# Patient Record
Sex: FEMALE | Race: WHITE | NOT HISPANIC OR LATINO | URBAN - METROPOLITAN AREA
[De-identification: names, ages, dates, MRNs, and addresses within clinical notes are randomized per-mention and may not be internally consistent; named-entity substitution may affect disease eponyms.]

---

## 2020-10-11 ENCOUNTER — INPATIENT (INPATIENT)
Facility: HOSPITAL | Age: 25
LOS: 39 days | Discharge: HOME | End: 2020-11-20
Attending: PSYCHIATRY & NEUROLOGY | Admitting: PSYCHIATRY & NEUROLOGY
Payer: COMMERCIAL

## 2020-10-11 VITALS
TEMPERATURE: 98 F | HEART RATE: 106 BPM | DIASTOLIC BLOOD PRESSURE: 80 MMHG | RESPIRATION RATE: 16 BRPM | OXYGEN SATURATION: 100 % | SYSTOLIC BLOOD PRESSURE: 128 MMHG

## 2020-10-11 DIAGNOSIS — F25.9 SCHIZOAFFECTIVE DISORDER, UNSPECIFIED: ICD-10-CM

## 2020-10-11 LAB
ALBUMIN SERPL ELPH-MCNC: 4.9 G/DL — SIGNIFICANT CHANGE UP (ref 3.3–5)
ALP SERPL-CCNC: 48 U/L — SIGNIFICANT CHANGE UP (ref 40–120)
ALT FLD-CCNC: 6 U/L — SIGNIFICANT CHANGE UP (ref 4–33)
AMPHET UR-MCNC: NEGATIVE — SIGNIFICANT CHANGE UP
ANION GAP SERPL CALC-SCNC: 11 MMO/L — SIGNIFICANT CHANGE UP (ref 7–14)
APAP SERPL-MCNC: < 15 UG/ML — LOW (ref 15–25)
APPEARANCE UR: SIGNIFICANT CHANGE UP
AST SERPL-CCNC: 10 U/L — SIGNIFICANT CHANGE UP (ref 4–32)
BACTERIA # UR AUTO: HIGH
BARBITURATES UR SCN-MCNC: NEGATIVE — SIGNIFICANT CHANGE UP
BASOPHILS # BLD AUTO: 0.03 K/UL — SIGNIFICANT CHANGE UP (ref 0–0.2)
BASOPHILS NFR BLD AUTO: 0.3 % — SIGNIFICANT CHANGE UP (ref 0–2)
BENZODIAZ UR-MCNC: NEGATIVE — SIGNIFICANT CHANGE UP
BILIRUB SERPL-MCNC: 0.8 MG/DL — SIGNIFICANT CHANGE UP (ref 0.2–1.2)
BILIRUB UR-MCNC: NEGATIVE — SIGNIFICANT CHANGE UP
BLOOD UR QL VISUAL: NEGATIVE — SIGNIFICANT CHANGE UP
BUN SERPL-MCNC: 10 MG/DL — SIGNIFICANT CHANGE UP (ref 7–23)
CALCIUM SERPL-MCNC: 9.2 MG/DL — SIGNIFICANT CHANGE UP (ref 8.4–10.5)
CANNABINOIDS UR-MCNC: NEGATIVE — SIGNIFICANT CHANGE UP
CHLORIDE SERPL-SCNC: 107 MMOL/L — SIGNIFICANT CHANGE UP (ref 98–107)
CO2 SERPL-SCNC: 24 MMOL/L — SIGNIFICANT CHANGE UP (ref 22–31)
COCAINE METAB.OTHER UR-MCNC: NEGATIVE — SIGNIFICANT CHANGE UP
COD CRY URNS QL: SIGNIFICANT CHANGE UP (ref 0–0)
COLOR SPEC: YELLOW — SIGNIFICANT CHANGE UP
CREAT SERPL-MCNC: 0.61 MG/DL — SIGNIFICANT CHANGE UP (ref 0.5–1.3)
EOSINOPHIL # BLD AUTO: 0.06 K/UL — SIGNIFICANT CHANGE UP (ref 0–0.5)
EOSINOPHIL NFR BLD AUTO: 0.7 % — SIGNIFICANT CHANGE UP (ref 0–6)
ETHANOL BLD-MCNC: < 10 MG/DL — SIGNIFICANT CHANGE UP
GLUCOSE SERPL-MCNC: 103 MG/DL — HIGH (ref 70–99)
GLUCOSE UR-MCNC: NEGATIVE — SIGNIFICANT CHANGE UP
HCT VFR BLD CALC: 41.8 % — SIGNIFICANT CHANGE UP (ref 34.5–45)
HGB BLD-MCNC: 13.4 G/DL — SIGNIFICANT CHANGE UP (ref 11.5–15.5)
IMM GRANULOCYTES NFR BLD AUTO: 0.2 % — SIGNIFICANT CHANGE UP (ref 0–1.5)
KETONES UR-MCNC: SIGNIFICANT CHANGE UP
LEUKOCYTE ESTERASE UR-ACNC: NEGATIVE — SIGNIFICANT CHANGE UP
LYMPHOCYTES # BLD AUTO: 1.39 K/UL — SIGNIFICANT CHANGE UP (ref 1–3.3)
LYMPHOCYTES # BLD AUTO: 15.6 % — SIGNIFICANT CHANGE UP (ref 13–44)
MCHC RBC-ENTMCNC: 26.9 PG — LOW (ref 27–34)
MCHC RBC-ENTMCNC: 32.1 % — SIGNIFICANT CHANGE UP (ref 32–36)
MCV RBC AUTO: 83.9 FL — SIGNIFICANT CHANGE UP (ref 80–100)
METHADONE UR-MCNC: NEGATIVE — SIGNIFICANT CHANGE UP
MONOCYTES # BLD AUTO: 1.08 K/UL — HIGH (ref 0–0.9)
MONOCYTES NFR BLD AUTO: 12.1 % — SIGNIFICANT CHANGE UP (ref 2–14)
NEUTROPHILS # BLD AUTO: 6.35 K/UL — SIGNIFICANT CHANGE UP (ref 1.8–7.4)
NEUTROPHILS NFR BLD AUTO: 71.1 % — SIGNIFICANT CHANGE UP (ref 43–77)
NITRITE UR-MCNC: NEGATIVE — SIGNIFICANT CHANGE UP
NRBC # FLD: 0 K/UL — SIGNIFICANT CHANGE UP (ref 0–0)
OPIATES UR-MCNC: NEGATIVE — SIGNIFICANT CHANGE UP
OXYCODONE UR-MCNC: NEGATIVE — SIGNIFICANT CHANGE UP
PCP UR-MCNC: NEGATIVE — SIGNIFICANT CHANGE UP
PH UR: 6.5 — SIGNIFICANT CHANGE UP (ref 5–8)
PLATELET # BLD AUTO: 238 K/UL — SIGNIFICANT CHANGE UP (ref 150–400)
PMV BLD: 10.8 FL — SIGNIFICANT CHANGE UP (ref 7–13)
POTASSIUM SERPL-MCNC: 3.4 MMOL/L — LOW (ref 3.5–5.3)
POTASSIUM SERPL-SCNC: 3.4 MMOL/L — LOW (ref 3.5–5.3)
PROT SERPL-MCNC: 7 G/DL — SIGNIFICANT CHANGE UP (ref 6–8.3)
PROT UR-MCNC: 30 — SIGNIFICANT CHANGE UP
RBC # BLD: 4.98 M/UL — SIGNIFICANT CHANGE UP (ref 3.8–5.2)
RBC # FLD: 14.7 % — HIGH (ref 10.3–14.5)
RBC CASTS # UR COMP ASSIST: SIGNIFICANT CHANGE UP (ref 0–?)
SALICYLATES SERPL-MCNC: < 5 MG/DL — LOW (ref 15–30)
SODIUM SERPL-SCNC: 142 MMOL/L — SIGNIFICANT CHANGE UP (ref 135–145)
SP GR SPEC: 1.03 — SIGNIFICANT CHANGE UP (ref 1–1.04)
SQUAMOUS # UR AUTO: SIGNIFICANT CHANGE UP
TSH SERPL-MCNC: 1.25 UIU/ML — SIGNIFICANT CHANGE UP (ref 0.27–4.2)
UROBILINOGEN FLD QL: HIGH
WBC # BLD: 8.93 K/UL — SIGNIFICANT CHANGE UP (ref 3.8–10.5)
WBC # FLD AUTO: 8.93 K/UL — SIGNIFICANT CHANGE UP (ref 3.8–10.5)
WBC UR QL: SIGNIFICANT CHANGE UP (ref 0–?)

## 2020-10-11 PROCEDURE — 99285 EMERGENCY DEPT VISIT HI MDM: CPT | Mod: GC

## 2020-10-11 RX ORDER — HALOPERIDOL DECANOATE 100 MG/ML
5 INJECTION INTRAMUSCULAR ONCE
Refills: 0 | Status: COMPLETED | OUTPATIENT
Start: 2020-10-11 | End: 2020-10-11

## 2020-10-11 RX ORDER — RISPERIDONE 4 MG/1
1 TABLET ORAL
Refills: 0 | Status: DISCONTINUED | OUTPATIENT
Start: 2020-10-12 | End: 2020-10-16

## 2020-10-11 RX ORDER — HALOPERIDOL DECANOATE 100 MG/ML
5 INJECTION INTRAMUSCULAR EVERY 6 HOURS
Refills: 0 | Status: DISCONTINUED | OUTPATIENT
Start: 2020-10-12 | End: 2020-10-14

## 2020-10-11 RX ORDER — HALOPERIDOL DECANOATE 100 MG/ML
5 INJECTION INTRAMUSCULAR ONCE
Refills: 0 | Status: DISCONTINUED | OUTPATIENT
Start: 2020-10-12 | End: 2020-10-14

## 2020-10-11 RX ORDER — BENZTROPINE MESYLATE 1 MG
1 TABLET ORAL AT BEDTIME
Refills: 0 | Status: DISCONTINUED | OUTPATIENT
Start: 2020-10-12 | End: 2020-11-05

## 2020-10-11 RX ADMIN — HALOPERIDOL DECANOATE 5 MILLIGRAM(S): 100 INJECTION INTRAMUSCULAR at 19:39

## 2020-10-11 RX ADMIN — Medication 2 MILLIGRAM(S): at 19:39

## 2020-10-11 NOTE — ED BEHAVIORAL HEALTH ASSESSMENT NOTE - PSYCHIATRIC ISSUES AND PLAN (INCLUDE STANDING AND PRN MEDICATION)
Psychosis and hyperactivity. Start Risperdal 1mg BID, Ativan 1mg BID, Cogentin 1mg QHS. PRNS: Haldol 5mg, Ativan 2mg po/IM q6H for agitation/severe agitation.

## 2020-10-11 NOTE — ED BEHAVIORAL HEALTH ASSESSMENT NOTE - CASE SUMMARY
Patient is 26yo female, unemployed, single, not in school, domiciled with mom, PPH of schizoaffective disorder with symptom onset recently in December 2019, 2 past inpatient psych hospitalizations, recently was enrolled in On-Track program at [facility] but dropped out, who presented to [facility] ED BIB mom for worsening psychosis, talking to self. Upon evaluation today, patient is floridly psychotic, talking to multiple people in her head, at times cackling loudly to herself. Pt required PRN Haldol 5mg IM and Ativan 2mg IM for acute psychosis and agitation while in the ED with positive effect. Given her acute decompensation secondary to med non-adherence, patient at this time requires psych hospitalization for stabilization and will be admitted on a 9.39 status. Pt will be started on Risperdal 1mg po bid for psychosis and cogentin 1mg po qhs. Pt also will be given Ativan 1mg po bid for psychotic agitation with a plan to taper down. No CO needed at this time as patient is redirectable and has no hx of elopement/violence/suicidal ideation.

## 2020-10-11 NOTE — ED PROVIDER NOTE - OBJECTIVE STATEMENT
26 y/o  F   hx  Schizophrenia BIB mother   secondary to increase paranoia and bizarre behaviour  . Patient appears paranoid, expressing a flight of ideas.   Admit to hearing voices telling her bad things.  Denies falling, punching or kicking any objects. Denies pain, SOB, fever, chills, chest/abdominal discomfort .Denies SI/HI/VH. Denies  recent use of  illicit drug. No evidence of physical ,   injuries, broken  skin or deformities.

## 2020-10-11 NOTE — ED BEHAVIORAL HEALTH NOTE - BEHAVIORAL HEALTH NOTE
COVID Exposure Screen- Collateral (i.e. third-party, chart review, belongings, etc; include EMS and ED staff)    PER PATIENT'S MOM GWEN ([phone])    1.	*In the past 14 days, has the patient been around anyone with a positive COVID-19 test?*   (  ) Yes   ( X ) No   (  ) Unknown- Reason (e.g. collateral uncertain, refusing to answer, etc.):  ______  IF YES PROCEED TO QUESTION #2. IF NO or UNKNOWN, PLEASE SKIP TO QUESTION #7  2.	Was the patient within 6 feet of them for at least 15 minutes? (  ) Yes   (  ) No   (  ) Unknown- Reason: ______    3.	Did the patient provided care for them? (  ) Yes   (  ) No   (  ) Unknown- Reason: ______    4.	Did the patient have direct physical contact with them (touched, hugged, or kissed them)?   (  ) Yes   (  ) No    (  ) Unknown- Reason: ______    5.	Did the patient share eating or drinking utensils with them? (  ) Yes   (  ) No    (  ) Unknown- Reason: ______    6.	Have they sneezed, coughed, or somehow got respiratory droplets on the patient? (  ) Yes   (  ) No    (  ) Unknown- Reason: ______      7.	*Has the patient been out of [place] within the past 14 days?*  (  ) Yes   (X  ) No   (  ) Unknown- Reason (e.g. collateral uncertain, refusing to answer, etc.): _______  IF YES PLEASE ANSWER THE FOLLOWING QUESTIONS:  8.	Which state/country has the patient been to? ______   9.	Was the patient there over 24 hours? (  ) Yes   (  ) No    (  ) Unknown- Reason: ______    10.	What date did the patient return to [facility]? ______     Lesa Morales M.D.   PGY-3 Psychiatry Resident

## 2020-10-11 NOTE — ED PROVIDER NOTE - PROGRESS NOTE DETAILS
DD ED ATTG:  rec'd signout from ELVIE Delmer:  25F paranoid, talking to her self, command auditory hallucination, rx/ed 5/2/50, agitated.  TBA L4 awaiting covid.  I was asked to put in the disposition when the covid resulted.  Covid negative.  OK for admission to L4 under Dr MARSHALL Montana.

## 2020-10-11 NOTE — ED BEHAVIORAL HEALTH ASSESSMENT NOTE - DIFFERENTIAL
1. Schizoaffective Disorder 1. Schizoaffective Disorder vs. schizophrenia vs. bipolar 1 disorder with psychotic symptoms

## 2020-10-11 NOTE — ED BEHAVIORAL HEALTH ASSESSMENT NOTE - RISK ASSESSMENT
Risk factors: history of schizoaffective disorder; patient is currently floridly psychotic with AH and responding to internal stimuli. Also with 2.5 weeks of med non-compliance. Protective factors: patient not voicing SI/HI, no history of past SAs, domiciled with mom., no access to guns. Due to patient's level of disorganization/bizarre behavior and active psychosis, she cannot care for herself and requires inpatient psychiatric hospitalization for stabilization. Low Acute Suicide Risk

## 2020-10-11 NOTE — ED ADULT NURSE NOTE - OBJECTIVE STATEMENT
Break Coverage - Patient received into low acuity brought by mother for psych evaluation.  Per triage note mother reports patient is talking to herself, hallucinating.  Patient denies any complaints initially, states she is here because her mother brought her, not answering further questions to RN. Patient received crying then intermittently laughing and cursing to herself "Carmen shut the f*** up you are a stupid c*nt".  Patient responds to verbal redirection at this time.  Awaiting collateral information from family and psych evaluation.

## 2020-10-11 NOTE — ED PROVIDER NOTE - CLINICAL SUMMARY MEDICAL DECISION MAKING FREE TEXT BOX
Labs, Urine Tox/UA, EKG    Medical evaluation performed. There is no clinical evidence of intoxication or any acute medical problem requiring immediate intervention. Patient is awaiting psychiatric consultation. Final disposition will be determined by psychiatrist. 26 y/o  F   hx  Schizophrenia   Labs, Urine Tox/UA, EKG    Medical evaluation performed. There is no clinical evidence of intoxication or any acute medical problem requiring immediate intervention. Patient is awaiting psychiatric consultation. Final disposition will be determined by psychiatrist.

## 2020-10-11 NOTE — ED ADULT NURSE REASSESSMENT NOTE - NS ED NURSE REASSESS COMMENT FT1
Patient increasingly agitated, talking to herself in 3rd person reference, calling herself different curses and names.  Cooperative to instruction.  Psych NP Andrew notified to patient behavior, brought to low acuity psych unit to observe.  Labs drawn, covid pcr collected and urine specimens sent as ordered   Psychiatry evaluation ongoing. Patient increasingly agitated, talking to herself in 3rd person reference, calling herself different curses and names.  Cooperative to instruction.  Psych NP Andrew notified to patient behavior, brought to low acuity psych unit to observe.  Labs drawn, covid pcr collected and urine specimens sent as ordered  Patient brought to high acuity  unit for escalated behavior, property secured and placed in locker Psychiatry evaluation ongoing.

## 2020-10-11 NOTE — ED ADULT NURSE NOTE - NSIMPLEMENTINTERV_GEN_ALL_ED
Implemented All Fall Risk Interventions:  [place] to call system. Call bell, personal items and telephone within reach. Instruct patient to call for assistance. Room bathroom lighting operational. Non-slip footwear when patient is off stretcher. Physically safe environment: no spills, clutter or unnecessary equipment. Stretcher in lowest position, wheels locked, appropriate side rails in place. Provide visual cue, wrist band, yellow gown, etc. Monitor gait and stability. Monitor for mental status changes and reorient to person, place, and time. Review medications for side effects contributing to fall risk. Reinforce activity limits and safety measures with patient and family.

## 2020-10-11 NOTE — ED ADULT TRIAGE NOTE - CHIEF COMPLAINT QUOTE
mom states " she is talking to her self and people in her head." patient states my mom brought me here. and not answering any questions.

## 2020-10-11 NOTE — ED BEHAVIORAL HEALTH ASSESSMENT NOTE - DESCRIPTION
In the ED, patient appeared quite bizarre, cackling loudly to self, talking to self in high-pitched, off-putting voice. Patient at times whispering/muttering to self, at times shouting, at times doubled over in bed laughing. She required PO PRN Haldol 5mg, Ativan 2mg for agitation. No physical aggression.     Vital Signs Last 24 Hrs  T(C): 36.9 (11 Oct 2020 18:13), Max: 36.9 (11 Oct 2020 18:13)  T(F): 98.5 (11 Oct 2020 18:13), Max: 98.5 (11 Oct 2020 18:13)  HR: 106 (11 Oct 2020 18:13) (106 - 106)  BP: 128/80 (11 Oct 2020 18:13) (128/80 - 128/80)  BP(mean): --  RR: 16 (11 Oct 2020 18:13) (16 - 16)  SpO2: 100% (11 Oct 2020 18:13) (100% - 100%) In the ED, patient appeared quite bizarre, cackling loudly to self, talking to self in high-pitched, off-putting voice. Patient at times whispering/muttering to self, at times shouting, at times doubled over in bed laughing. She required IM PRN Haldol 5mg, Ativan 2mg for agitation. No physical aggression.     Vital Signs Last 24 Hrs  T(C): 36.9 (11 Oct 2020 18:13), Max: 36.9 (11 Oct 2020 18:13)  T(F): 98.5 (11 Oct 2020 18:13), Max: 98.5 (11 Oct 2020 18:13)  HR: 106 (11 Oct 2020 18:13) (106 - 106)  BP: 128/80 (11 Oct 2020 18:13) (128/80 - 128/80)  BP(mean): --  RR: 16 (11 Oct 2020 18:13) (16 - 16)  SpO2: 100% (11 Oct 2020 18:13) (100% - 100%) none lives with mom, not a student, single, currently unemployed (previously worked at [employer])

## 2020-10-11 NOTE — ED BEHAVIORAL HEALTH ASSESSMENT NOTE - SUMMARY
Patient is 24yo female, unemployed, single, not in school, domiciled with mom, PPH of schizoaffective disorder with symptom onset recently in December 2019, 2 past inpatient psych hospitalizations, recently was enrolled in On-Track program at [facility] but dropped out, who presented to [facility] ED BIB mom for worsening psychosis, talking to self. Upon evaluation today, patient is floridly psychotic, talking to multiple people in her head, at times cackling loudly to herself. She is unable to hold linear conversation. Per mom, patient has been non-compliant with Haldol and Ativan for 2.5 weeks, over which time she has become increasingly psychotic. Per mom, no known past SAs and no known current SI/HI.     Plan:   1. Patient requires admission to inpatient psychiatric hospital due to inability to care for self in the context of severe psychosis. Will admit to [facility] L4. (Pending negative covid testing). Legal: 9.39. Routine obs, as patient not reporting SI/HI and no history of past SAs.   2. Patient has failed Abilify and Haldol in the past. Will start patient on Risperdal 1mg BID and Ativan 1mg BID, with Cogentin 1mg QHS (patient has history of tremor from Haldol in past, took Cogentin in past).   3. Will start PRN Haldol 5mg, Ativan 2mg po/IM q6H for agitation/severe agitation.   4. Spoke to patient's mom Dank and informed her of dispo

## 2020-10-11 NOTE — ED BEHAVIORAL HEALTH ASSESSMENT NOTE - DETAILS
Unable to assess for SI in patient, although she does not report SI/HI. Per mom, patient with history of voicing SI in past but no past SAs. Haldol: tremor provided verbal handoff to JOEY Brown rosita Morales

## 2020-10-11 NOTE — ED PROVIDER NOTE - PMH
No pertinent past medical history <<----- Click to add NO pertinent Past Medical History Schizophrenia

## 2020-10-11 NOTE — ED BEHAVIORAL HEALTH ASSESSMENT NOTE - ADDITIONAL DETAILS ALL
Per mom, patient has history of SIB by cutting in past, none recent. Also with history of voicing vague SI, but no history of past SAs and no history of active suicidal intent or plan, to mom's knowledge. Patient is unable to tolerate interview at this time.

## 2020-10-11 NOTE — ED BEHAVIORAL HEALTH ASSESSMENT NOTE - COMMENTS ON SUICIDE RISK/PROTECTIVE FACTORS:
Risk factors: history of schizoaffective disorder; patient is currently floridly psychotic with AH and responding to internal stimuli. Also with 2.5 weeks of med non-compliance. Protective factors: patient not voicing SI/HI, no history of past SAs, domiciled with mom., no access to guns.

## 2020-10-11 NOTE — ED BEHAVIORAL HEALTH ASSESSMENT NOTE - OTHER PAST PSYCHIATRIC HISTORY (INCLUDE DETAILS REGARDING ONSET, COURSE OF ILLNESS, INPATIENT/OUTPATIENT TREATMENT)
history of schizoaffective disorder which first presented in December 2019. 2 past inpatient psych hosps. Failed Abilify trial in past. Was maintained on Haldol 10mg and Ativan 2mg until became non-compliant 2.5 weeks ago. Per mom, patient did not do well on Haldol, became overly sedated. No past SAs. Hx of SIB by cutting in past.

## 2020-10-11 NOTE — ED BEHAVIORAL HEALTH ASSESSMENT NOTE - UNABLE TO CARE FOR SELF DETAILS
patient floridly psychotic, disorganized, illogical, talking loudly to herself, having multiple conversations with self

## 2020-10-11 NOTE — ED BEHAVIORAL HEALTH ASSESSMENT NOTE - HPI (INCLUDE ILLNESS QUALITY, SEVERITY, DURATION, TIMING, CONTEXT, MODIFYING FACTORS, ASSOCIATED SIGNS AND SYMPTOMS)
Patient is 26yo female, unemployed, single, not in school, domiciled with mom, PPH of schizoaffective disorder with symptom onset recently in December 2019, 2 past inpatient psych hospitalizations, recently was enrolled in On-Track program at [facility] but dropped out, who presented to [facility] ED BIB mom for worsening psychosis, talking to self.     Upon interview this evening, patient presents as floridly psychotic, says "This is all Dru's fault!" and then proceeds to have conversation with multiple people, as if playing multiple characters in a play. She says "Carmen why would you say that? Oh god damn it this b**** is so smart. I wanna be tazed b****. Carmen, we have a confession - we really know you don't give a f***, you might be adopted, all of you in that room are pedophiles..." When writer asks if patient can have a conversation, patient says loudly "WE ARE" and then begins laughing uncontrollably, alternating between talking to herself and cackling in high-pitched, off-putting way. Interview limited by patient's inability to hold conversation.    Obtained collateral from patient's mom Dank ([phone]). Per mom, patient began to have psychotic symptoms in December 2019; at that time, she presented with paranoid, persecutory delusions. She has had two inpatient psych hospitalizations, most recently at [facility]. In August of this year, patient also presented to [facility] with psychotic symptoms but was not able to be admitted to inpatient psychiatric hospital because she had gone to [place] for one day and needed to be quarantined. Patient was discharged home to mom at that time. In addition, patient was recently enrolled in On-Track program at [facility] and given a diagnosis of Schizoaffective Disorder. She has been compliant with a regimen of Haldol 10mg, Ativan 2mg until 2.5 weeks ago when she became non-compliant. Patient has been progressively more psychotic since auto-discontinuing meds; she has been talking to herself at home (having conversations with multiple people in her head), preoccupied with refuting accusation that she is a lesbian, preoccupied with her previous job working as a  at [employer] over 1 year ago, at which she perceives to have been bullied by coworkers.     Regarding safety, mom is not aware of any past suicide attempts by patient, although she has made vague statements of SI in the past. She has a history of SIB by cutting, but none recently. No guns in the home.     Substance: mom reports that patient has a history of cannabis use. Mom is not aware of any other drug use.     Past med trials: mom reports that in the past, patient has been tried on Abilify (did not help with psychosis). Most recently, she has been prescribed Haldol 10mg QHS and Ativan 2mg. She has also been prescribed Cogentin in the past for tremor 2/2 Haldol. Mom says that Haldol has not had good effect on patient's behavior; she appeared overly sedated and "like a zombie," and still with residual psychotic symptoms.

## 2020-10-12 DIAGNOSIS — F25.9 SCHIZOAFFECTIVE DISORDER, UNSPECIFIED: ICD-10-CM

## 2020-10-12 LAB
HBA1C BLD-MCNC: 4.2 % — SIGNIFICANT CHANGE UP (ref 4–5.6)
HCG SERPL-ACNC: < 5 MIU/ML — SIGNIFICANT CHANGE UP
SARS-COV-2 IGG SERPL QL IA: NEGATIVE — SIGNIFICANT CHANGE UP
SARS-COV-2 IGM SERPL IA-ACNC: <3.8 AU/ML — SIGNIFICANT CHANGE UP
SARS-COV-2 RNA SPEC QL NAA+PROBE: SIGNIFICANT CHANGE UP

## 2020-10-12 PROCEDURE — 99222 1ST HOSP IP/OBS MODERATE 55: CPT

## 2020-10-12 RX ADMIN — Medication 1 MILLIGRAM(S): at 20:58

## 2020-10-12 RX ADMIN — Medication 2 MILLIGRAM(S): at 03:30

## 2020-10-12 RX ADMIN — HALOPERIDOL DECANOATE 5 MILLIGRAM(S): 100 INJECTION INTRAMUSCULAR at 20:58

## 2020-10-12 RX ADMIN — RISPERIDONE 1 MILLIGRAM(S): 4 TABLET ORAL at 20:58

## 2020-10-12 RX ADMIN — HALOPERIDOL DECANOATE 5 MILLIGRAM(S): 100 INJECTION INTRAMUSCULAR at 03:30

## 2020-10-12 RX ADMIN — Medication 1 MILLIGRAM(S): at 09:06

## 2020-10-12 RX ADMIN — RISPERIDONE 1 MILLIGRAM(S): 4 TABLET ORAL at 09:06

## 2020-10-12 NOTE — ED ADULT NURSE REASSESSMENT NOTE - NS ED NURSE REASSESS COMMENT FT1
Pt sleeping, respirations even and non labored. Pt awaiting covid-19 results and medical clearance prior to xfer to inpatient psychiatric bed.

## 2020-10-13 LAB
CHOLEST SERPL-MCNC: 115 MG/DL — LOW (ref 120–199)
HDLC SERPL-MCNC: 49 MG/DL — SIGNIFICANT CHANGE UP (ref 45–65)
LIPID PNL WITH DIRECT LDL SERPL: 72 MG/DL — SIGNIFICANT CHANGE UP
TRIGL SERPL-MCNC: 41 MG/DL — SIGNIFICANT CHANGE UP (ref 10–149)

## 2020-10-13 PROCEDURE — 99232 SBSQ HOSP IP/OBS MODERATE 35: CPT

## 2020-10-13 RX ORDER — TRAZODONE HCL 50 MG
50 TABLET ORAL AT BEDTIME
Refills: 0 | Status: DISCONTINUED | OUTPATIENT
Start: 2020-10-13 | End: 2020-10-14

## 2020-10-13 RX ORDER — LITHIUM CARBONATE 300 MG/1
300 TABLET, EXTENDED RELEASE ORAL
Refills: 0 | Status: DISCONTINUED | OUTPATIENT
Start: 2020-10-13 | End: 2020-10-15

## 2020-10-13 RX ADMIN — LITHIUM CARBONATE 300 MILLIGRAM(S): 300 TABLET, EXTENDED RELEASE ORAL at 20:34

## 2020-10-13 RX ADMIN — Medication 50 MILLIGRAM(S): at 20:34

## 2020-10-13 RX ADMIN — Medication 1 MILLIGRAM(S): at 08:23

## 2020-10-13 RX ADMIN — RISPERIDONE 1 MILLIGRAM(S): 4 TABLET ORAL at 20:34

## 2020-10-13 RX ADMIN — Medication 1 MILLIGRAM(S): at 20:34

## 2020-10-13 RX ADMIN — RISPERIDONE 1 MILLIGRAM(S): 4 TABLET ORAL at 08:23

## 2020-10-13 RX ADMIN — HALOPERIDOL DECANOATE 5 MILLIGRAM(S): 100 INJECTION INTRAMUSCULAR at 20:34

## 2020-10-14 PROCEDURE — 99232 SBSQ HOSP IP/OBS MODERATE 35: CPT

## 2020-10-14 RX ORDER — OLANZAPINE 15 MG/1
7.5 TABLET, FILM COATED ORAL EVERY 12 HOURS
Refills: 0 | Status: DISCONTINUED | OUTPATIENT
Start: 2020-10-14 | End: 2020-10-14

## 2020-10-14 RX ORDER — OLANZAPINE 15 MG/1
10 TABLET, FILM COATED ORAL EVERY 8 HOURS
Refills: 0 | Status: DISCONTINUED | OUTPATIENT
Start: 2020-10-14 | End: 2020-11-20

## 2020-10-14 RX ORDER — TRAZODONE HCL 50 MG
100 TABLET ORAL AT BEDTIME
Refills: 0 | Status: DISCONTINUED | OUTPATIENT
Start: 2020-10-14 | End: 2020-10-23

## 2020-10-14 RX ORDER — CLONAZEPAM 1 MG
1 TABLET ORAL
Refills: 0 | Status: DISCONTINUED | OUTPATIENT
Start: 2020-10-14 | End: 2020-10-19

## 2020-10-14 RX ORDER — OLANZAPINE 15 MG/1
7.5 TABLET, FILM COATED ORAL ONCE
Refills: 0 | Status: DISCONTINUED | OUTPATIENT
Start: 2020-10-14 | End: 2020-11-20

## 2020-10-14 RX ADMIN — LITHIUM CARBONATE 300 MILLIGRAM(S): 300 TABLET, EXTENDED RELEASE ORAL at 08:13

## 2020-10-14 RX ADMIN — RISPERIDONE 1 MILLIGRAM(S): 4 TABLET ORAL at 08:14

## 2020-10-14 RX ADMIN — Medication 1 MILLIGRAM(S): at 20:15

## 2020-10-14 RX ADMIN — Medication 1 MILLIGRAM(S): at 08:14

## 2020-10-14 RX ADMIN — RISPERIDONE 1 MILLIGRAM(S): 4 TABLET ORAL at 20:15

## 2020-10-14 RX ADMIN — LITHIUM CARBONATE 300 MILLIGRAM(S): 300 TABLET, EXTENDED RELEASE ORAL at 20:15

## 2020-10-14 RX ADMIN — Medication 100 MILLIGRAM(S): at 20:15

## 2020-10-15 PROCEDURE — 99232 SBSQ HOSP IP/OBS MODERATE 35: CPT

## 2020-10-15 RX ORDER — LITHIUM CARBONATE 300 MG/1
600 TABLET, EXTENDED RELEASE ORAL AT BEDTIME
Refills: 0 | Status: DISCONTINUED | OUTPATIENT
Start: 2020-10-15 | End: 2020-10-23

## 2020-10-15 RX ORDER — LITHIUM CARBONATE 300 MG/1
300 TABLET, EXTENDED RELEASE ORAL DAILY
Refills: 0 | Status: DISCONTINUED | OUTPATIENT
Start: 2020-10-15 | End: 2020-10-23

## 2020-10-15 RX ORDER — LANOLIN ALCOHOL/MO/W.PET/CERES
5 CREAM (GRAM) TOPICAL AT BEDTIME
Refills: 0 | Status: DISCONTINUED | OUTPATIENT
Start: 2020-10-15 | End: 2020-11-20

## 2020-10-15 RX ADMIN — Medication 1 MILLIGRAM(S): at 20:21

## 2020-10-15 RX ADMIN — LITHIUM CARBONATE 600 MILLIGRAM(S): 300 TABLET, EXTENDED RELEASE ORAL at 20:21

## 2020-10-15 RX ADMIN — Medication 100 MILLIGRAM(S): at 20:21

## 2020-10-15 RX ADMIN — LITHIUM CARBONATE 300 MILLIGRAM(S): 300 TABLET, EXTENDED RELEASE ORAL at 11:16

## 2020-10-15 RX ADMIN — RISPERIDONE 1 MILLIGRAM(S): 4 TABLET ORAL at 11:16

## 2020-10-15 RX ADMIN — RISPERIDONE 1 MILLIGRAM(S): 4 TABLET ORAL at 20:21

## 2020-10-15 RX ADMIN — Medication 1 MILLIGRAM(S): at 11:16

## 2020-10-16 PROCEDURE — 99232 SBSQ HOSP IP/OBS MODERATE 35: CPT

## 2020-10-16 RX ORDER — RISPERIDONE 4 MG/1
2 TABLET ORAL AT BEDTIME
Refills: 0 | Status: DISCONTINUED | OUTPATIENT
Start: 2020-10-16 | End: 2020-10-20

## 2020-10-16 RX ORDER — RISPERIDONE 4 MG/1
1 TABLET ORAL DAILY
Refills: 0 | Status: DISCONTINUED | OUTPATIENT
Start: 2020-10-17 | End: 2020-10-28

## 2020-10-16 RX ADMIN — RISPERIDONE 2 MILLIGRAM(S): 4 TABLET ORAL at 21:16

## 2020-10-16 RX ADMIN — Medication 5 MILLIGRAM(S): at 00:08

## 2020-10-16 RX ADMIN — LITHIUM CARBONATE 600 MILLIGRAM(S): 300 TABLET, EXTENDED RELEASE ORAL at 21:16

## 2020-10-16 RX ADMIN — Medication 100 MILLIGRAM(S): at 21:16

## 2020-10-16 RX ADMIN — LITHIUM CARBONATE 300 MILLIGRAM(S): 300 TABLET, EXTENDED RELEASE ORAL at 09:44

## 2020-10-16 RX ADMIN — Medication 1 MILLIGRAM(S): at 21:16

## 2020-10-16 RX ADMIN — RISPERIDONE 1 MILLIGRAM(S): 4 TABLET ORAL at 09:45

## 2020-10-16 RX ADMIN — Medication 1 MILLIGRAM(S): at 09:44

## 2020-10-17 PROCEDURE — 99232 SBSQ HOSP IP/OBS MODERATE 35: CPT

## 2020-10-17 RX ADMIN — Medication 1 MILLIGRAM(S): at 10:14

## 2020-10-17 RX ADMIN — Medication 1 MILLIGRAM(S): at 20:59

## 2020-10-17 RX ADMIN — LITHIUM CARBONATE 600 MILLIGRAM(S): 300 TABLET, EXTENDED RELEASE ORAL at 20:59

## 2020-10-17 RX ADMIN — Medication 5 MILLIGRAM(S): at 21:15

## 2020-10-17 RX ADMIN — Medication 100 MILLIGRAM(S): at 20:59

## 2020-10-17 RX ADMIN — RISPERIDONE 1 MILLIGRAM(S): 4 TABLET ORAL at 10:14

## 2020-10-17 RX ADMIN — RISPERIDONE 2 MILLIGRAM(S): 4 TABLET ORAL at 20:59

## 2020-10-17 RX ADMIN — Medication 2 MILLIGRAM(S): at 01:55

## 2020-10-17 RX ADMIN — OLANZAPINE 10 MILLIGRAM(S): 15 TABLET, FILM COATED ORAL at 01:55

## 2020-10-17 RX ADMIN — LITHIUM CARBONATE 300 MILLIGRAM(S): 300 TABLET, EXTENDED RELEASE ORAL at 10:14

## 2020-10-18 RX ORDER — ACETAMINOPHEN 500 MG
650 TABLET ORAL ONCE
Refills: 0 | Status: DISCONTINUED | OUTPATIENT
Start: 2020-10-18 | End: 2020-11-05

## 2020-10-18 RX ORDER — IBUPROFEN 200 MG
600 TABLET ORAL ONCE
Refills: 0 | Status: DISCONTINUED | OUTPATIENT
Start: 2020-10-18 | End: 2020-10-18

## 2020-10-18 RX ADMIN — Medication 5 MILLIGRAM(S): at 20:11

## 2020-10-18 RX ADMIN — Medication 100 MILLIGRAM(S): at 20:10

## 2020-10-18 RX ADMIN — RISPERIDONE 2 MILLIGRAM(S): 4 TABLET ORAL at 20:10

## 2020-10-18 RX ADMIN — Medication 1 MILLIGRAM(S): at 08:23

## 2020-10-18 RX ADMIN — RISPERIDONE 1 MILLIGRAM(S): 4 TABLET ORAL at 08:23

## 2020-10-18 RX ADMIN — LITHIUM CARBONATE 300 MILLIGRAM(S): 300 TABLET, EXTENDED RELEASE ORAL at 08:23

## 2020-10-18 RX ADMIN — Medication 1 MILLIGRAM(S): at 20:10

## 2020-10-18 RX ADMIN — LITHIUM CARBONATE 600 MILLIGRAM(S): 300 TABLET, EXTENDED RELEASE ORAL at 20:10

## 2020-10-18 NOTE — PHARMACOTHERAPY INTERVENTION NOTE - COMMENTS
Patient ordered for ibuprofen 600 mG x1 dose for headache. Patient also on lithium 300 mG daily and lithium 600 mG at bedtime. Called MD and recommended using acetaminophen due to interaction between ibuprofen and lithium. MD discontinued ibuprofen and ordered acetaminophen 650 mG x1 dose.

## 2020-10-19 LAB
ALBUMIN SERPL ELPH-MCNC: 4.2 G/DL — SIGNIFICANT CHANGE UP (ref 3.3–5)
ALP SERPL-CCNC: 40 U/L — SIGNIFICANT CHANGE UP (ref 40–120)
ALT FLD-CCNC: 5 U/L — SIGNIFICANT CHANGE UP (ref 4–33)
ANION GAP SERPL CALC-SCNC: 10 MMO/L — SIGNIFICANT CHANGE UP (ref 7–14)
AST SERPL-CCNC: 12 U/L — SIGNIFICANT CHANGE UP (ref 4–32)
BASOPHILS # BLD AUTO: 0.01 K/UL — SIGNIFICANT CHANGE UP (ref 0–0.2)
BASOPHILS NFR BLD AUTO: 0.2 % — SIGNIFICANT CHANGE UP (ref 0–2)
BILIRUB SERPL-MCNC: 1.2 MG/DL — SIGNIFICANT CHANGE UP (ref 0.2–1.2)
BUN SERPL-MCNC: 10 MG/DL — SIGNIFICANT CHANGE UP (ref 7–23)
CALCIUM SERPL-MCNC: 9.4 MG/DL — SIGNIFICANT CHANGE UP (ref 8.4–10.5)
CHLORIDE SERPL-SCNC: 105 MMOL/L — SIGNIFICANT CHANGE UP (ref 98–107)
CHOLEST SERPL-MCNC: 109 MG/DL — LOW (ref 120–199)
CO2 SERPL-SCNC: 22 MMOL/L — SIGNIFICANT CHANGE UP (ref 22–31)
CREAT SERPL-MCNC: 0.64 MG/DL — SIGNIFICANT CHANGE UP (ref 0.5–1.3)
EOSINOPHIL # BLD AUTO: 0.1 K/UL — SIGNIFICANT CHANGE UP (ref 0–0.5)
EOSINOPHIL NFR BLD AUTO: 1.8 % — SIGNIFICANT CHANGE UP (ref 0–6)
GLUCOSE SERPL-MCNC: 120 MG/DL — HIGH (ref 70–99)
HCT VFR BLD CALC: 38.3 % — SIGNIFICANT CHANGE UP (ref 34.5–45)
HDLC SERPL-MCNC: 43 MG/DL — LOW (ref 45–65)
HGB BLD-MCNC: 12.1 G/DL — SIGNIFICANT CHANGE UP (ref 11.5–15.5)
IMM GRANULOCYTES NFR BLD AUTO: 0.2 % — SIGNIFICANT CHANGE UP (ref 0–1.5)
LIPID PNL WITH DIRECT LDL SERPL: 68 MG/DL — SIGNIFICANT CHANGE UP
LITHIUM SERPL-MCNC: 1.1 MMOL/L — SIGNIFICANT CHANGE UP (ref 0.6–1.2)
LYMPHOCYTES # BLD AUTO: 0.56 K/UL — LOW (ref 1–3.3)
LYMPHOCYTES # BLD AUTO: 10.3 % — LOW (ref 13–44)
MCHC RBC-ENTMCNC: 26.2 PG — LOW (ref 27–34)
MCHC RBC-ENTMCNC: 31.6 % — LOW (ref 32–36)
MCV RBC AUTO: 83.1 FL — SIGNIFICANT CHANGE UP (ref 80–100)
MONOCYTES # BLD AUTO: 0.63 K/UL — SIGNIFICANT CHANGE UP (ref 0–0.9)
MONOCYTES NFR BLD AUTO: 11.5 % — SIGNIFICANT CHANGE UP (ref 2–14)
NEUTROPHILS # BLD AUTO: 4.15 K/UL — SIGNIFICANT CHANGE UP (ref 1.8–7.4)
NEUTROPHILS NFR BLD AUTO: 76 % — SIGNIFICANT CHANGE UP (ref 43–77)
NRBC # FLD: 0 K/UL — SIGNIFICANT CHANGE UP (ref 0–0)
PLATELET # BLD AUTO: 179 K/UL — SIGNIFICANT CHANGE UP (ref 150–400)
PMV BLD: 10.3 FL — SIGNIFICANT CHANGE UP (ref 7–13)
POTASSIUM SERPL-MCNC: 3.8 MMOL/L — SIGNIFICANT CHANGE UP (ref 3.5–5.3)
POTASSIUM SERPL-SCNC: 3.8 MMOL/L — SIGNIFICANT CHANGE UP (ref 3.5–5.3)
PROT SERPL-MCNC: 6.4 G/DL — SIGNIFICANT CHANGE UP (ref 6–8.3)
RBC # BLD: 4.61 M/UL — SIGNIFICANT CHANGE UP (ref 3.8–5.2)
RBC # FLD: 14.7 % — HIGH (ref 10.3–14.5)
SODIUM SERPL-SCNC: 137 MMOL/L — SIGNIFICANT CHANGE UP (ref 135–145)
TRIGL SERPL-MCNC: 58 MG/DL — SIGNIFICANT CHANGE UP (ref 10–149)
WBC # BLD: 5.46 K/UL — SIGNIFICANT CHANGE UP (ref 3.8–10.5)
WBC # FLD AUTO: 5.46 K/UL — SIGNIFICANT CHANGE UP (ref 3.8–10.5)

## 2020-10-19 PROCEDURE — 99232 SBSQ HOSP IP/OBS MODERATE 35: CPT

## 2020-10-19 RX ORDER — CLONAZEPAM 1 MG
1 TABLET ORAL
Refills: 0 | Status: DISCONTINUED | OUTPATIENT
Start: 2020-10-19 | End: 2020-10-23

## 2020-10-19 RX ADMIN — LITHIUM CARBONATE 300 MILLIGRAM(S): 300 TABLET, EXTENDED RELEASE ORAL at 10:46

## 2020-10-19 RX ADMIN — Medication 1 MILLIGRAM(S): at 10:46

## 2020-10-19 RX ADMIN — RISPERIDONE 1 MILLIGRAM(S): 4 TABLET ORAL at 10:46

## 2020-10-20 PROCEDURE — 99232 SBSQ HOSP IP/OBS MODERATE 35: CPT

## 2020-10-20 RX ORDER — RISPERIDONE 4 MG/1
3 TABLET ORAL AT BEDTIME
Refills: 0 | Status: DISCONTINUED | OUTPATIENT
Start: 2020-10-20 | End: 2020-10-22

## 2020-10-20 RX ADMIN — Medication 1 MILLIGRAM(S): at 21:28

## 2020-10-20 RX ADMIN — Medication 1 MILLIGRAM(S): at 11:34

## 2020-10-20 RX ADMIN — Medication 100 MILLIGRAM(S): at 21:28

## 2020-10-20 RX ADMIN — LITHIUM CARBONATE 300 MILLIGRAM(S): 300 TABLET, EXTENDED RELEASE ORAL at 11:34

## 2020-10-20 RX ADMIN — Medication 2 MILLIGRAM(S): at 04:15

## 2020-10-20 RX ADMIN — RISPERIDONE 1 MILLIGRAM(S): 4 TABLET ORAL at 11:34

## 2020-10-20 RX ADMIN — RISPERIDONE 3 MILLIGRAM(S): 4 TABLET ORAL at 21:28

## 2020-10-20 RX ADMIN — LITHIUM CARBONATE 600 MILLIGRAM(S): 300 TABLET, EXTENDED RELEASE ORAL at 21:28

## 2020-10-21 PROCEDURE — 99232 SBSQ HOSP IP/OBS MODERATE 35: CPT

## 2020-10-21 RX ADMIN — Medication 100 MILLIGRAM(S): at 20:55

## 2020-10-21 RX ADMIN — LITHIUM CARBONATE 600 MILLIGRAM(S): 300 TABLET, EXTENDED RELEASE ORAL at 20:54

## 2020-10-21 RX ADMIN — LITHIUM CARBONATE 300 MILLIGRAM(S): 300 TABLET, EXTENDED RELEASE ORAL at 09:08

## 2020-10-21 RX ADMIN — Medication 1 MILLIGRAM(S): at 20:54

## 2020-10-21 RX ADMIN — RISPERIDONE 1 MILLIGRAM(S): 4 TABLET ORAL at 09:08

## 2020-10-21 RX ADMIN — Medication 1 MILLIGRAM(S): at 09:08

## 2020-10-21 RX ADMIN — RISPERIDONE 3 MILLIGRAM(S): 4 TABLET ORAL at 20:55

## 2020-10-22 PROCEDURE — 99232 SBSQ HOSP IP/OBS MODERATE 35: CPT

## 2020-10-22 RX ORDER — RISPERIDONE 4 MG/1
4 TABLET ORAL AT BEDTIME
Refills: 0 | Status: DISCONTINUED | OUTPATIENT
Start: 2020-10-22 | End: 2020-10-26

## 2020-10-22 RX ADMIN — Medication 1 MILLIGRAM(S): at 21:07

## 2020-10-22 RX ADMIN — RISPERIDONE 4 MILLIGRAM(S): 4 TABLET ORAL at 21:07

## 2020-10-22 RX ADMIN — LITHIUM CARBONATE 600 MILLIGRAM(S): 300 TABLET, EXTENDED RELEASE ORAL at 21:07

## 2020-10-22 RX ADMIN — Medication 1 MILLIGRAM(S): at 08:14

## 2020-10-22 RX ADMIN — RISPERIDONE 1 MILLIGRAM(S): 4 TABLET ORAL at 08:12

## 2020-10-22 RX ADMIN — Medication 100 MILLIGRAM(S): at 21:07

## 2020-10-22 RX ADMIN — LITHIUM CARBONATE 300 MILLIGRAM(S): 300 TABLET, EXTENDED RELEASE ORAL at 08:14

## 2020-10-23 PROCEDURE — 99232 SBSQ HOSP IP/OBS MODERATE 35: CPT

## 2020-10-23 PROCEDURE — 93010 ELECTROCARDIOGRAM REPORT: CPT

## 2020-10-23 RX ORDER — LITHIUM CARBONATE 300 MG/1
900 TABLET, EXTENDED RELEASE ORAL AT BEDTIME
Refills: 0 | Status: DISCONTINUED | OUTPATIENT
Start: 2020-10-23 | End: 2020-11-20

## 2020-10-23 RX ORDER — TRAZODONE HCL 50 MG
100 TABLET ORAL AT BEDTIME
Refills: 0 | Status: DISCONTINUED | OUTPATIENT
Start: 2020-10-23 | End: 2020-11-20

## 2020-10-23 RX ORDER — CLONAZEPAM 1 MG
0.5 TABLET ORAL
Refills: 0 | Status: DISCONTINUED | OUTPATIENT
Start: 2020-10-23 | End: 2020-10-26

## 2020-10-23 RX ADMIN — RISPERIDONE 4 MILLIGRAM(S): 4 TABLET ORAL at 20:27

## 2020-10-23 RX ADMIN — LITHIUM CARBONATE 900 MILLIGRAM(S): 300 TABLET, EXTENDED RELEASE ORAL at 20:27

## 2020-10-23 RX ADMIN — RISPERIDONE 1 MILLIGRAM(S): 4 TABLET ORAL at 09:00

## 2020-10-23 RX ADMIN — Medication 1 MILLIGRAM(S): at 09:00

## 2020-10-23 RX ADMIN — Medication 1 MILLIGRAM(S): at 20:27

## 2020-10-23 RX ADMIN — Medication 0.5 MILLIGRAM(S): at 20:27

## 2020-10-23 RX ADMIN — LITHIUM CARBONATE 300 MILLIGRAM(S): 300 TABLET, EXTENDED RELEASE ORAL at 09:00

## 2020-10-24 RX ADMIN — LITHIUM CARBONATE 900 MILLIGRAM(S): 300 TABLET, EXTENDED RELEASE ORAL at 20:16

## 2020-10-24 RX ADMIN — Medication 0.5 MILLIGRAM(S): at 09:26

## 2020-10-24 RX ADMIN — RISPERIDONE 1 MILLIGRAM(S): 4 TABLET ORAL at 09:26

## 2020-10-24 RX ADMIN — RISPERIDONE 4 MILLIGRAM(S): 4 TABLET ORAL at 20:16

## 2020-10-24 RX ADMIN — Medication 0.5 MILLIGRAM(S): at 20:16

## 2020-10-24 RX ADMIN — Medication 1 MILLIGRAM(S): at 20:16

## 2020-10-24 RX ADMIN — Medication 100 MILLIGRAM(S): at 20:15

## 2020-10-25 RX ADMIN — LITHIUM CARBONATE 900 MILLIGRAM(S): 300 TABLET, EXTENDED RELEASE ORAL at 20:13

## 2020-10-25 RX ADMIN — Medication 1 MILLIGRAM(S): at 20:13

## 2020-10-25 RX ADMIN — RISPERIDONE 1 MILLIGRAM(S): 4 TABLET ORAL at 09:22

## 2020-10-25 RX ADMIN — RISPERIDONE 4 MILLIGRAM(S): 4 TABLET ORAL at 20:13

## 2020-10-25 RX ADMIN — Medication 0.5 MILLIGRAM(S): at 20:13

## 2020-10-25 RX ADMIN — Medication 5 MILLIGRAM(S): at 20:13

## 2020-10-25 RX ADMIN — Medication 0.5 MILLIGRAM(S): at 09:21

## 2020-10-26 LAB
LITHIUM SERPL-MCNC: 0.88 MMOL/L — SIGNIFICANT CHANGE UP (ref 0.6–1.2)
PROLACTIN SERPL-MCNC: 167 NG/ML — HIGH (ref 3.4–24.1)

## 2020-10-26 PROCEDURE — 99232 SBSQ HOSP IP/OBS MODERATE 35: CPT

## 2020-10-26 RX ORDER — CLONAZEPAM 1 MG
0.5 TABLET ORAL
Refills: 0 | Status: DISCONTINUED | OUTPATIENT
Start: 2020-10-26 | End: 2020-11-02

## 2020-10-26 RX ORDER — RISPERIDONE 4 MG/1
3 TABLET ORAL AT BEDTIME
Refills: 0 | Status: DISCONTINUED | OUTPATIENT
Start: 2020-10-26 | End: 2020-10-28

## 2020-10-26 RX ADMIN — LITHIUM CARBONATE 900 MILLIGRAM(S): 300 TABLET, EXTENDED RELEASE ORAL at 20:26

## 2020-10-26 RX ADMIN — Medication 100 MILLIGRAM(S): at 20:26

## 2020-10-26 RX ADMIN — RISPERIDONE 1 MILLIGRAM(S): 4 TABLET ORAL at 08:34

## 2020-10-26 RX ADMIN — RISPERIDONE 3 MILLIGRAM(S): 4 TABLET ORAL at 20:26

## 2020-10-26 RX ADMIN — Medication 0.5 MILLIGRAM(S): at 08:34

## 2020-10-26 RX ADMIN — Medication 0.5 MILLIGRAM(S): at 20:26

## 2020-10-26 RX ADMIN — Medication 1 MILLIGRAM(S): at 20:25

## 2020-10-27 PROCEDURE — 99232 SBSQ HOSP IP/OBS MODERATE 35: CPT

## 2020-10-27 RX ADMIN — Medication 0.5 MILLIGRAM(S): at 20:26

## 2020-10-27 RX ADMIN — Medication 0.5 MILLIGRAM(S): at 09:15

## 2020-10-27 RX ADMIN — RISPERIDONE 1 MILLIGRAM(S): 4 TABLET ORAL at 09:15

## 2020-10-27 RX ADMIN — RISPERIDONE 3 MILLIGRAM(S): 4 TABLET ORAL at 20:26

## 2020-10-27 RX ADMIN — LITHIUM CARBONATE 900 MILLIGRAM(S): 300 TABLET, EXTENDED RELEASE ORAL at 20:26

## 2020-10-27 RX ADMIN — Medication 1 MILLIGRAM(S): at 20:25

## 2020-10-27 RX ADMIN — Medication 5 MILLIGRAM(S): at 20:26

## 2020-10-28 PROCEDURE — 99232 SBSQ HOSP IP/OBS MODERATE 35: CPT

## 2020-10-28 RX ORDER — RISPERIDONE 4 MG/1
2 TABLET ORAL AT BEDTIME
Refills: 0 | Status: DISCONTINUED | OUTPATIENT
Start: 2020-10-28 | End: 2020-10-29

## 2020-10-28 RX ADMIN — RISPERIDONE 2 MILLIGRAM(S): 4 TABLET ORAL at 21:26

## 2020-10-28 RX ADMIN — LITHIUM CARBONATE 900 MILLIGRAM(S): 300 TABLET, EXTENDED RELEASE ORAL at 21:26

## 2020-10-28 RX ADMIN — Medication 0.5 MILLIGRAM(S): at 21:26

## 2020-10-28 RX ADMIN — Medication 0.5 MILLIGRAM(S): at 08:31

## 2020-10-28 RX ADMIN — Medication 1 MILLIGRAM(S): at 21:26

## 2020-10-28 RX ADMIN — RISPERIDONE 1 MILLIGRAM(S): 4 TABLET ORAL at 08:31

## 2020-10-29 PROCEDURE — 99232 SBSQ HOSP IP/OBS MODERATE 35: CPT

## 2020-10-29 RX ADMIN — Medication 0.5 MILLIGRAM(S): at 20:18

## 2020-10-29 RX ADMIN — Medication 0.5 MILLIGRAM(S): at 08:42

## 2020-10-29 RX ADMIN — Medication 5 MILLIGRAM(S): at 20:18

## 2020-10-29 RX ADMIN — LITHIUM CARBONATE 900 MILLIGRAM(S): 300 TABLET, EXTENDED RELEASE ORAL at 20:18

## 2020-10-29 RX ADMIN — Medication 1 MILLIGRAM(S): at 20:18

## 2020-10-30 LAB
BASOPHILS # BLD AUTO: 0.02 K/UL — SIGNIFICANT CHANGE UP (ref 0–0.2)
BASOPHILS NFR BLD AUTO: 0.3 % — SIGNIFICANT CHANGE UP (ref 0–2)
EOSINOPHIL # BLD AUTO: 0.11 K/UL — SIGNIFICANT CHANGE UP (ref 0–0.5)
EOSINOPHIL NFR BLD AUTO: 1.7 % — SIGNIFICANT CHANGE UP (ref 0–6)
HCT VFR BLD CALC: 38.1 % — SIGNIFICANT CHANGE UP (ref 34.5–45)
HGB BLD-MCNC: 12.5 G/DL — SIGNIFICANT CHANGE UP (ref 11.5–15.5)
IMM GRANULOCYTES NFR BLD AUTO: 0.3 % — SIGNIFICANT CHANGE UP (ref 0–1.5)
LITHIUM SERPL-MCNC: 0.98 MMOL/L — SIGNIFICANT CHANGE UP (ref 0.6–1.2)
LYMPHOCYTES # BLD AUTO: 0.44 K/UL — LOW (ref 1–3.3)
LYMPHOCYTES # BLD AUTO: 6.7 % — LOW (ref 13–44)
MCHC RBC-ENTMCNC: 26.7 PG — LOW (ref 27–34)
MCHC RBC-ENTMCNC: 32.8 % — SIGNIFICANT CHANGE UP (ref 32–36)
MCV RBC AUTO: 81.4 FL — SIGNIFICANT CHANGE UP (ref 80–100)
MONOCYTES # BLD AUTO: 0.65 K/UL — SIGNIFICANT CHANGE UP (ref 0–0.9)
MONOCYTES NFR BLD AUTO: 9.9 % — SIGNIFICANT CHANGE UP (ref 2–14)
NEUTROPHILS # BLD AUTO: 5.35 K/UL — SIGNIFICANT CHANGE UP (ref 1.8–7.4)
NEUTROPHILS NFR BLD AUTO: 81.1 % — HIGH (ref 43–77)
NRBC # FLD: 0 K/UL — SIGNIFICANT CHANGE UP (ref 0–0)
PLATELET # BLD AUTO: 156 K/UL — SIGNIFICANT CHANGE UP (ref 150–400)
PMV BLD: 9.9 FL — SIGNIFICANT CHANGE UP (ref 7–13)
PROLACTIN SERPL-MCNC: 127 NG/ML — HIGH (ref 3.4–24.1)
RBC # BLD: 4.68 M/UL — SIGNIFICANT CHANGE UP (ref 3.8–5.2)
RBC # FLD: 14.6 % — HIGH (ref 10.3–14.5)
WBC # BLD: 6.59 K/UL — SIGNIFICANT CHANGE UP (ref 3.8–10.5)
WBC # FLD AUTO: 6.59 K/UL — SIGNIFICANT CHANGE UP (ref 3.8–10.5)

## 2020-10-30 PROCEDURE — 99232 SBSQ HOSP IP/OBS MODERATE 35: CPT

## 2020-10-30 RX ORDER — SENNA PLUS 8.6 MG/1
2 TABLET ORAL AT BEDTIME
Refills: 0 | Status: DISCONTINUED | OUTPATIENT
Start: 2020-10-30 | End: 2020-11-09

## 2020-10-30 RX ORDER — CLOZAPINE 150 MG/1
50 TABLET, ORALLY DISINTEGRATING ORAL AT BEDTIME
Refills: 0 | Status: DISCONTINUED | OUTPATIENT
Start: 2020-11-01 | End: 2020-11-03

## 2020-10-30 RX ORDER — CLOZAPINE 150 MG/1
25 TABLET, ORALLY DISINTEGRATING ORAL AT BEDTIME
Refills: 0 | Status: COMPLETED | OUTPATIENT
Start: 2020-10-30 | End: 2020-10-31

## 2020-10-30 RX ADMIN — Medication 1 MILLIGRAM(S): at 21:21

## 2020-10-30 RX ADMIN — Medication 0.5 MILLIGRAM(S): at 07:59

## 2020-10-30 RX ADMIN — LITHIUM CARBONATE 900 MILLIGRAM(S): 300 TABLET, EXTENDED RELEASE ORAL at 21:21

## 2020-10-30 RX ADMIN — Medication 0.5 MILLIGRAM(S): at 21:21

## 2020-10-30 RX ADMIN — CLOZAPINE 25 MILLIGRAM(S): 150 TABLET, ORALLY DISINTEGRATING ORAL at 21:21

## 2020-10-31 RX ADMIN — CLOZAPINE 25 MILLIGRAM(S): 150 TABLET, ORALLY DISINTEGRATING ORAL at 21:09

## 2020-10-31 RX ADMIN — Medication 0.5 MILLIGRAM(S): at 10:12

## 2020-10-31 RX ADMIN — SENNA PLUS 2 TABLET(S): 8.6 TABLET ORAL at 21:10

## 2020-10-31 RX ADMIN — LITHIUM CARBONATE 900 MILLIGRAM(S): 300 TABLET, EXTENDED RELEASE ORAL at 21:09

## 2020-10-31 RX ADMIN — Medication 1 MILLIGRAM(S): at 21:09

## 2020-10-31 RX ADMIN — Medication 0.5 MILLIGRAM(S): at 21:09

## 2020-11-01 RX ADMIN — Medication 5 MILLIGRAM(S): at 21:11

## 2020-11-01 RX ADMIN — OLANZAPINE 10 MILLIGRAM(S): 15 TABLET, FILM COATED ORAL at 21:11

## 2020-11-01 RX ADMIN — Medication 1 MILLIGRAM(S): at 21:10

## 2020-11-01 RX ADMIN — CLOZAPINE 50 MILLIGRAM(S): 150 TABLET, ORALLY DISINTEGRATING ORAL at 21:11

## 2020-11-01 RX ADMIN — Medication 0.5 MILLIGRAM(S): at 08:35

## 2020-11-01 RX ADMIN — Medication 0.5 MILLIGRAM(S): at 21:10

## 2020-11-01 RX ADMIN — LITHIUM CARBONATE 900 MILLIGRAM(S): 300 TABLET, EXTENDED RELEASE ORAL at 21:11

## 2020-11-01 RX ADMIN — Medication 100 MILLIGRAM(S): at 21:20

## 2020-11-02 PROCEDURE — 99232 SBSQ HOSP IP/OBS MODERATE 35: CPT

## 2020-11-02 RX ORDER — CLONAZEPAM 1 MG
0.5 TABLET ORAL
Refills: 0 | Status: DISCONTINUED | OUTPATIENT
Start: 2020-11-02 | End: 2020-11-09

## 2020-11-02 RX ADMIN — Medication 0.5 MILLIGRAM(S): at 21:37

## 2020-11-02 RX ADMIN — Medication 0.5 MILLIGRAM(S): at 09:54

## 2020-11-02 RX ADMIN — Medication 1 MILLIGRAM(S): at 21:37

## 2020-11-02 RX ADMIN — CLOZAPINE 50 MILLIGRAM(S): 150 TABLET, ORALLY DISINTEGRATING ORAL at 21:37

## 2020-11-02 RX ADMIN — LITHIUM CARBONATE 900 MILLIGRAM(S): 300 TABLET, EXTENDED RELEASE ORAL at 21:37

## 2020-11-03 PROCEDURE — 99232 SBSQ HOSP IP/OBS MODERATE 35: CPT

## 2020-11-03 RX ORDER — CLOZAPINE 150 MG/1
75 TABLET, ORALLY DISINTEGRATING ORAL AT BEDTIME
Refills: 0 | Status: DISCONTINUED | OUTPATIENT
Start: 2020-11-03 | End: 2020-11-05

## 2020-11-03 RX ADMIN — Medication 1 MILLIGRAM(S): at 22:31

## 2020-11-03 RX ADMIN — Medication 0.5 MILLIGRAM(S): at 22:31

## 2020-11-03 RX ADMIN — CLOZAPINE 75 MILLIGRAM(S): 150 TABLET, ORALLY DISINTEGRATING ORAL at 22:31

## 2020-11-03 RX ADMIN — Medication 0.5 MILLIGRAM(S): at 08:42

## 2020-11-03 RX ADMIN — LITHIUM CARBONATE 900 MILLIGRAM(S): 300 TABLET, EXTENDED RELEASE ORAL at 22:31

## 2020-11-04 PROCEDURE — 99232 SBSQ HOSP IP/OBS MODERATE 35: CPT

## 2020-11-04 RX ADMIN — CLOZAPINE 75 MILLIGRAM(S): 150 TABLET, ORALLY DISINTEGRATING ORAL at 21:40

## 2020-11-04 RX ADMIN — Medication 1 MILLIGRAM(S): at 21:40

## 2020-11-04 RX ADMIN — Medication 0.5 MILLIGRAM(S): at 10:49

## 2020-11-04 RX ADMIN — LITHIUM CARBONATE 900 MILLIGRAM(S): 300 TABLET, EXTENDED RELEASE ORAL at 21:40

## 2020-11-04 RX ADMIN — Medication 0.5 MILLIGRAM(S): at 21:40

## 2020-11-05 PROCEDURE — 99232 SBSQ HOSP IP/OBS MODERATE 35: CPT

## 2020-11-05 RX ORDER — CLOZAPINE 150 MG/1
100 TABLET, ORALLY DISINTEGRATING ORAL AT BEDTIME
Refills: 0 | Status: DISCONTINUED | OUTPATIENT
Start: 2020-11-05 | End: 2020-11-06

## 2020-11-05 RX ADMIN — Medication 0.5 MILLIGRAM(S): at 20:56

## 2020-11-05 RX ADMIN — Medication 0.5 MILLIGRAM(S): at 08:04

## 2020-11-05 RX ADMIN — LITHIUM CARBONATE 900 MILLIGRAM(S): 300 TABLET, EXTENDED RELEASE ORAL at 20:56

## 2020-11-05 RX ADMIN — CLOZAPINE 100 MILLIGRAM(S): 150 TABLET, ORALLY DISINTEGRATING ORAL at 20:56

## 2020-11-06 LAB
BASOPHILS # BLD AUTO: 0.03 K/UL — SIGNIFICANT CHANGE UP (ref 0–0.2)
BASOPHILS NFR BLD AUTO: 0.4 % — SIGNIFICANT CHANGE UP (ref 0–2)
EOSINOPHIL # BLD AUTO: 0.14 K/UL — SIGNIFICANT CHANGE UP (ref 0–0.5)
EOSINOPHIL NFR BLD AUTO: 1.8 % — SIGNIFICANT CHANGE UP (ref 0–6)
HCT VFR BLD CALC: 38.6 % — SIGNIFICANT CHANGE UP (ref 34.5–45)
HGB BLD-MCNC: 12.2 G/DL — SIGNIFICANT CHANGE UP (ref 11.5–15.5)
IMM GRANULOCYTES NFR BLD AUTO: 0.4 % — SIGNIFICANT CHANGE UP (ref 0–1.5)
LITHIUM SERPL-MCNC: 0.99 MMOL/L — SIGNIFICANT CHANGE UP (ref 0.6–1.2)
LYMPHOCYTES # BLD AUTO: 0.55 K/UL — LOW (ref 1–3.3)
LYMPHOCYTES # BLD AUTO: 7 % — LOW (ref 13–44)
MCHC RBC-ENTMCNC: 26.8 PG — LOW (ref 27–34)
MCHC RBC-ENTMCNC: 31.6 % — LOW (ref 32–36)
MCV RBC AUTO: 84.8 FL — SIGNIFICANT CHANGE UP (ref 80–100)
MONOCYTES # BLD AUTO: 0.69 K/UL — SIGNIFICANT CHANGE UP (ref 0–0.9)
MONOCYTES NFR BLD AUTO: 8.7 % — SIGNIFICANT CHANGE UP (ref 2–14)
NEUTROPHILS # BLD AUTO: 6.47 K/UL — SIGNIFICANT CHANGE UP (ref 1.8–7.4)
NEUTROPHILS NFR BLD AUTO: 81.7 % — HIGH (ref 43–77)
NRBC # FLD: 0 K/UL — SIGNIFICANT CHANGE UP (ref 0–0)
PLATELET # BLD AUTO: 201 K/UL — SIGNIFICANT CHANGE UP (ref 150–400)
PMV BLD: 10.1 FL — SIGNIFICANT CHANGE UP (ref 7–13)
PROLACTIN SERPL-MCNC: 28.2 NG/ML — HIGH (ref 3.4–24.1)
RBC # BLD: 4.55 M/UL — SIGNIFICANT CHANGE UP (ref 3.8–5.2)
RBC # FLD: 14.6 % — HIGH (ref 10.3–14.5)
WBC # BLD: 7.91 K/UL — SIGNIFICANT CHANGE UP (ref 3.8–10.5)
WBC # FLD AUTO: 7.91 K/UL — SIGNIFICANT CHANGE UP (ref 3.8–10.5)

## 2020-11-06 RX ORDER — CLOZAPINE 150 MG/1
150 TABLET, ORALLY DISINTEGRATING ORAL ONCE
Refills: 0 | Status: COMPLETED | OUTPATIENT
Start: 2020-11-08 | End: 2020-11-08

## 2020-11-06 RX ORDER — CLOZAPINE 150 MG/1
100 TABLET, ORALLY DISINTEGRATING ORAL ONCE
Refills: 0 | Status: COMPLETED | OUTPATIENT
Start: 2020-11-06 | End: 2020-11-06

## 2020-11-06 RX ORDER — CLOZAPINE 150 MG/1
175 TABLET, ORALLY DISINTEGRATING ORAL AT BEDTIME
Refills: 0 | Status: DISCONTINUED | OUTPATIENT
Start: 2020-11-09 | End: 2020-11-09

## 2020-11-06 RX ORDER — CLOZAPINE 150 MG/1
125 TABLET, ORALLY DISINTEGRATING ORAL ONCE
Refills: 0 | Status: COMPLETED | OUTPATIENT
Start: 2020-11-07 | End: 2020-11-07

## 2020-11-06 RX ADMIN — CLOZAPINE 100 MILLIGRAM(S): 150 TABLET, ORALLY DISINTEGRATING ORAL at 20:44

## 2020-11-06 RX ADMIN — LITHIUM CARBONATE 900 MILLIGRAM(S): 300 TABLET, EXTENDED RELEASE ORAL at 20:44

## 2020-11-06 RX ADMIN — Medication 0.5 MILLIGRAM(S): at 20:44

## 2020-11-06 RX ADMIN — Medication 0.5 MILLIGRAM(S): at 08:06

## 2020-11-07 RX ADMIN — Medication 0.5 MILLIGRAM(S): at 21:52

## 2020-11-07 RX ADMIN — CLOZAPINE 125 MILLIGRAM(S): 150 TABLET, ORALLY DISINTEGRATING ORAL at 21:52

## 2020-11-07 RX ADMIN — LITHIUM CARBONATE 900 MILLIGRAM(S): 300 TABLET, EXTENDED RELEASE ORAL at 21:52

## 2020-11-07 RX ADMIN — Medication 0.5 MILLIGRAM(S): at 08:15

## 2020-11-08 RX ADMIN — Medication 0.5 MILLIGRAM(S): at 20:29

## 2020-11-08 RX ADMIN — CLOZAPINE 150 MILLIGRAM(S): 150 TABLET, ORALLY DISINTEGRATING ORAL at 20:29

## 2020-11-08 RX ADMIN — LITHIUM CARBONATE 900 MILLIGRAM(S): 300 TABLET, EXTENDED RELEASE ORAL at 20:29

## 2020-11-08 RX ADMIN — Medication 0.5 MILLIGRAM(S): at 08:16

## 2020-11-09 PROCEDURE — 99232 SBSQ HOSP IP/OBS MODERATE 35: CPT

## 2020-11-09 RX ORDER — SENNA PLUS 8.6 MG/1
2 TABLET ORAL AT BEDTIME
Refills: 0 | Status: DISCONTINUED | OUTPATIENT
Start: 2020-11-09 | End: 2020-11-20

## 2020-11-09 RX ORDER — CLOZAPINE 150 MG/1
200 TABLET, ORALLY DISINTEGRATING ORAL AT BEDTIME
Refills: 0 | Status: DISCONTINUED | OUTPATIENT
Start: 2020-11-09 | End: 2020-11-13

## 2020-11-09 RX ORDER — CLOZAPINE 150 MG/1
50 TABLET, ORALLY DISINTEGRATING ORAL DAILY
Refills: 0 | Status: DISCONTINUED | OUTPATIENT
Start: 2020-11-10 | End: 2020-11-10

## 2020-11-09 RX ORDER — CLONAZEPAM 1 MG
0.5 TABLET ORAL
Refills: 0 | Status: DISCONTINUED | OUTPATIENT
Start: 2020-11-09 | End: 2020-11-16

## 2020-11-09 RX ADMIN — Medication 0.5 MILLIGRAM(S): at 09:01

## 2020-11-09 RX ADMIN — Medication 0.5 MILLIGRAM(S): at 20:28

## 2020-11-09 RX ADMIN — CLOZAPINE 200 MILLIGRAM(S): 150 TABLET, ORALLY DISINTEGRATING ORAL at 20:28

## 2020-11-09 RX ADMIN — OLANZAPINE 10 MILLIGRAM(S): 15 TABLET, FILM COATED ORAL at 20:29

## 2020-11-09 RX ADMIN — LITHIUM CARBONATE 900 MILLIGRAM(S): 300 TABLET, EXTENDED RELEASE ORAL at 20:28

## 2020-11-09 RX ADMIN — SENNA PLUS 2 TABLET(S): 8.6 TABLET ORAL at 20:28

## 2020-11-10 PROCEDURE — 99232 SBSQ HOSP IP/OBS MODERATE 35: CPT

## 2020-11-10 RX ORDER — CLOZAPINE 150 MG/1
100 TABLET, ORALLY DISINTEGRATING ORAL DAILY
Refills: 0 | Status: DISCONTINUED | OUTPATIENT
Start: 2020-11-11 | End: 2020-11-18

## 2020-11-10 RX ADMIN — Medication 0.5 MILLIGRAM(S): at 20:28

## 2020-11-10 RX ADMIN — CLOZAPINE 50 MILLIGRAM(S): 150 TABLET, ORALLY DISINTEGRATING ORAL at 08:40

## 2020-11-10 RX ADMIN — LITHIUM CARBONATE 900 MILLIGRAM(S): 300 TABLET, EXTENDED RELEASE ORAL at 20:28

## 2020-11-10 RX ADMIN — Medication 5 MILLIGRAM(S): at 20:28

## 2020-11-10 RX ADMIN — SENNA PLUS 2 TABLET(S): 8.6 TABLET ORAL at 20:28

## 2020-11-10 RX ADMIN — Medication 0.5 MILLIGRAM(S): at 08:40

## 2020-11-10 RX ADMIN — CLOZAPINE 200 MILLIGRAM(S): 150 TABLET, ORALLY DISINTEGRATING ORAL at 20:28

## 2020-11-11 PROCEDURE — 99232 SBSQ HOSP IP/OBS MODERATE 35: CPT

## 2020-11-11 RX ADMIN — CLOZAPINE 200 MILLIGRAM(S): 150 TABLET, ORALLY DISINTEGRATING ORAL at 20:09

## 2020-11-11 RX ADMIN — CLOZAPINE 100 MILLIGRAM(S): 150 TABLET, ORALLY DISINTEGRATING ORAL at 09:43

## 2020-11-11 RX ADMIN — SENNA PLUS 2 TABLET(S): 8.6 TABLET ORAL at 20:09

## 2020-11-11 RX ADMIN — Medication 0.5 MILLIGRAM(S): at 09:43

## 2020-11-11 RX ADMIN — LITHIUM CARBONATE 900 MILLIGRAM(S): 300 TABLET, EXTENDED RELEASE ORAL at 20:09

## 2020-11-11 RX ADMIN — Medication 0.5 MILLIGRAM(S): at 20:09

## 2020-11-12 PROCEDURE — 99232 SBSQ HOSP IP/OBS MODERATE 35: CPT

## 2020-11-12 RX ADMIN — LITHIUM CARBONATE 900 MILLIGRAM(S): 300 TABLET, EXTENDED RELEASE ORAL at 20:24

## 2020-11-12 RX ADMIN — Medication 0.5 MILLIGRAM(S): at 20:24

## 2020-11-12 RX ADMIN — SENNA PLUS 2 TABLET(S): 8.6 TABLET ORAL at 20:24

## 2020-11-12 RX ADMIN — Medication 0.5 MILLIGRAM(S): at 08:38

## 2020-11-12 RX ADMIN — CLOZAPINE 100 MILLIGRAM(S): 150 TABLET, ORALLY DISINTEGRATING ORAL at 08:38

## 2020-11-12 RX ADMIN — CLOZAPINE 200 MILLIGRAM(S): 150 TABLET, ORALLY DISINTEGRATING ORAL at 20:24

## 2020-11-13 LAB
BASOPHILS # BLD AUTO: 0.02 K/UL — SIGNIFICANT CHANGE UP (ref 0–0.2)
BASOPHILS NFR BLD AUTO: 0.2 % — SIGNIFICANT CHANGE UP (ref 0–2)
EOSINOPHIL # BLD AUTO: 0.16 K/UL — SIGNIFICANT CHANGE UP (ref 0–0.5)
EOSINOPHIL NFR BLD AUTO: 1.8 % — SIGNIFICANT CHANGE UP (ref 0–6)
HCT VFR BLD CALC: 38.4 % — SIGNIFICANT CHANGE UP (ref 34.5–45)
HGB BLD-MCNC: 11.9 G/DL — SIGNIFICANT CHANGE UP (ref 11.5–15.5)
IMM GRANULOCYTES NFR BLD AUTO: 1.2 % — SIGNIFICANT CHANGE UP (ref 0–1.5)
LYMPHOCYTES # BLD AUTO: 0.62 K/UL — LOW (ref 1–3.3)
LYMPHOCYTES # BLD AUTO: 6.8 % — LOW (ref 13–44)
MCHC RBC-ENTMCNC: 26.7 PG — LOW (ref 27–34)
MCHC RBC-ENTMCNC: 31 % — LOW (ref 32–36)
MCV RBC AUTO: 86.1 FL — SIGNIFICANT CHANGE UP (ref 80–100)
MONOCYTES # BLD AUTO: 0.85 K/UL — SIGNIFICANT CHANGE UP (ref 0–0.9)
MONOCYTES NFR BLD AUTO: 9.4 % — SIGNIFICANT CHANGE UP (ref 2–14)
NEUTROPHILS # BLD AUTO: 7.32 K/UL — SIGNIFICANT CHANGE UP (ref 1.8–7.4)
NEUTROPHILS NFR BLD AUTO: 80.6 % — HIGH (ref 43–77)
NRBC # FLD: 0 K/UL — SIGNIFICANT CHANGE UP (ref 0–0)
PLATELET # BLD AUTO: 171 K/UL — SIGNIFICANT CHANGE UP (ref 150–400)
PMV BLD: 10.2 FL — SIGNIFICANT CHANGE UP (ref 7–13)
RBC # BLD: 4.46 M/UL — SIGNIFICANT CHANGE UP (ref 3.8–5.2)
RBC # FLD: 15.2 % — HIGH (ref 10.3–14.5)
WBC # BLD: 9.08 K/UL — SIGNIFICANT CHANGE UP (ref 3.8–10.5)
WBC # FLD AUTO: 9.08 K/UL — SIGNIFICANT CHANGE UP (ref 3.8–10.5)

## 2020-11-13 PROCEDURE — 99232 SBSQ HOSP IP/OBS MODERATE 35: CPT

## 2020-11-13 RX ORDER — CLOZAPINE 150 MG/1
250 TABLET, ORALLY DISINTEGRATING ORAL AT BEDTIME
Refills: 0 | Status: DISCONTINUED | OUTPATIENT
Start: 2020-11-13 | End: 2020-11-16

## 2020-11-13 RX ADMIN — Medication 0.5 MILLIGRAM(S): at 21:16

## 2020-11-13 RX ADMIN — CLOZAPINE 250 MILLIGRAM(S): 150 TABLET, ORALLY DISINTEGRATING ORAL at 21:16

## 2020-11-13 RX ADMIN — Medication 0.5 MILLIGRAM(S): at 09:38

## 2020-11-13 RX ADMIN — LITHIUM CARBONATE 900 MILLIGRAM(S): 300 TABLET, EXTENDED RELEASE ORAL at 21:16

## 2020-11-13 RX ADMIN — SENNA PLUS 2 TABLET(S): 8.6 TABLET ORAL at 21:16

## 2020-11-13 RX ADMIN — CLOZAPINE 100 MILLIGRAM(S): 150 TABLET, ORALLY DISINTEGRATING ORAL at 09:38

## 2020-11-14 RX ADMIN — Medication 0.5 MILLIGRAM(S): at 08:55

## 2020-11-14 RX ADMIN — LITHIUM CARBONATE 900 MILLIGRAM(S): 300 TABLET, EXTENDED RELEASE ORAL at 20:06

## 2020-11-14 RX ADMIN — CLOZAPINE 250 MILLIGRAM(S): 150 TABLET, ORALLY DISINTEGRATING ORAL at 20:06

## 2020-11-14 RX ADMIN — SENNA PLUS 2 TABLET(S): 8.6 TABLET ORAL at 20:06

## 2020-11-14 RX ADMIN — Medication 5 MILLIGRAM(S): at 20:07

## 2020-11-14 RX ADMIN — Medication 0.5 MILLIGRAM(S): at 20:07

## 2020-11-14 RX ADMIN — CLOZAPINE 100 MILLIGRAM(S): 150 TABLET, ORALLY DISINTEGRATING ORAL at 08:55

## 2020-11-15 RX ADMIN — Medication 0.5 MILLIGRAM(S): at 20:32

## 2020-11-15 RX ADMIN — SENNA PLUS 2 TABLET(S): 8.6 TABLET ORAL at 20:32

## 2020-11-15 RX ADMIN — CLOZAPINE 250 MILLIGRAM(S): 150 TABLET, ORALLY DISINTEGRATING ORAL at 20:32

## 2020-11-15 RX ADMIN — LITHIUM CARBONATE 900 MILLIGRAM(S): 300 TABLET, EXTENDED RELEASE ORAL at 20:32

## 2020-11-15 RX ADMIN — Medication 0.5 MILLIGRAM(S): at 08:27

## 2020-11-15 RX ADMIN — CLOZAPINE 100 MILLIGRAM(S): 150 TABLET, ORALLY DISINTEGRATING ORAL at 08:27

## 2020-11-16 PROCEDURE — 99232 SBSQ HOSP IP/OBS MODERATE 35: CPT

## 2020-11-16 RX ORDER — CLOZAPINE 150 MG/1
300 TABLET, ORALLY DISINTEGRATING ORAL AT BEDTIME
Refills: 0 | Status: DISCONTINUED | OUTPATIENT
Start: 2020-11-16 | End: 2020-11-18

## 2020-11-16 RX ORDER — CLONAZEPAM 1 MG
0.5 TABLET ORAL
Refills: 0 | Status: DISCONTINUED | OUTPATIENT
Start: 2020-11-16 | End: 2020-11-20

## 2020-11-16 RX ADMIN — Medication 0.5 MILLIGRAM(S): at 20:55

## 2020-11-16 RX ADMIN — CLOZAPINE 100 MILLIGRAM(S): 150 TABLET, ORALLY DISINTEGRATING ORAL at 09:06

## 2020-11-16 RX ADMIN — LITHIUM CARBONATE 900 MILLIGRAM(S): 300 TABLET, EXTENDED RELEASE ORAL at 20:55

## 2020-11-16 RX ADMIN — CLOZAPINE 300 MILLIGRAM(S): 150 TABLET, ORALLY DISINTEGRATING ORAL at 20:55

## 2020-11-16 RX ADMIN — Medication 0.5 MILLIGRAM(S): at 09:06

## 2020-11-16 RX ADMIN — SENNA PLUS 2 TABLET(S): 8.6 TABLET ORAL at 20:55

## 2020-11-17 PROCEDURE — 99232 SBSQ HOSP IP/OBS MODERATE 35: CPT

## 2020-11-17 RX ADMIN — Medication 0.5 MILLIGRAM(S): at 08:42

## 2020-11-17 RX ADMIN — CLOZAPINE 100 MILLIGRAM(S): 150 TABLET, ORALLY DISINTEGRATING ORAL at 08:42

## 2020-11-17 RX ADMIN — Medication 0.5 MILLIGRAM(S): at 21:22

## 2020-11-17 RX ADMIN — CLOZAPINE 300 MILLIGRAM(S): 150 TABLET, ORALLY DISINTEGRATING ORAL at 21:22

## 2020-11-17 RX ADMIN — LITHIUM CARBONATE 900 MILLIGRAM(S): 300 TABLET, EXTENDED RELEASE ORAL at 21:22

## 2020-11-17 RX ADMIN — SENNA PLUS 2 TABLET(S): 8.6 TABLET ORAL at 21:22

## 2020-11-18 PROCEDURE — 99232 SBSQ HOSP IP/OBS MODERATE 35: CPT

## 2020-11-18 RX ORDER — CLOZAPINE 150 MG/1
400 TABLET, ORALLY DISINTEGRATING ORAL AT BEDTIME
Refills: 0 | Status: DISCONTINUED | OUTPATIENT
Start: 2020-11-18 | End: 2020-11-20

## 2020-11-19 LAB
ALBUMIN SERPL ELPH-MCNC: 4.3 G/DL — SIGNIFICANT CHANGE UP (ref 3.3–5)
ALP SERPL-CCNC: 72 U/L — SIGNIFICANT CHANGE UP (ref 40–120)
ALT FLD-CCNC: 13 U/L — SIGNIFICANT CHANGE UP (ref 4–33)
ANION GAP SERPL CALC-SCNC: 12 MMO/L — SIGNIFICANT CHANGE UP (ref 7–14)
AST SERPL-CCNC: 13 U/L — SIGNIFICANT CHANGE UP (ref 4–32)
BASOPHILS # BLD AUTO: 0.01 K/UL — SIGNIFICANT CHANGE UP (ref 0–0.2)
BASOPHILS NFR BLD AUTO: 0.1 % — SIGNIFICANT CHANGE UP (ref 0–2)
BILIRUB SERPL-MCNC: 1 MG/DL — SIGNIFICANT CHANGE UP (ref 0.2–1.2)
BUN SERPL-MCNC: 7 MG/DL — SIGNIFICANT CHANGE UP (ref 7–23)
CALCIUM SERPL-MCNC: 9.2 MG/DL — SIGNIFICANT CHANGE UP (ref 8.4–10.5)
CHLORIDE SERPL-SCNC: 107 MMOL/L — SIGNIFICANT CHANGE UP (ref 98–107)
CHOLEST SERPL-MCNC: 126 MG/DL — SIGNIFICANT CHANGE UP (ref 120–199)
CO2 SERPL-SCNC: 20 MMOL/L — LOW (ref 22–31)
CREAT SERPL-MCNC: 0.63 MG/DL — SIGNIFICANT CHANGE UP (ref 0.5–1.3)
DIRECT LDL: 90 MG/DL — SIGNIFICANT CHANGE UP
EOSINOPHIL # BLD AUTO: 0.05 K/UL — SIGNIFICANT CHANGE UP (ref 0–0.5)
EOSINOPHIL NFR BLD AUTO: 0.7 % — SIGNIFICANT CHANGE UP (ref 0–6)
GLUCOSE SERPL-MCNC: 131 MG/DL — HIGH (ref 70–99)
HCT VFR BLD CALC: 39.4 % — SIGNIFICANT CHANGE UP (ref 34.5–45)
HDLC SERPL-MCNC: 23 MG/DL — LOW (ref 45–65)
HGB BLD-MCNC: 12.1 G/DL — SIGNIFICANT CHANGE UP (ref 11.5–15.5)
IMM GRANULOCYTES NFR BLD AUTO: 0.8 % — SIGNIFICANT CHANGE UP (ref 0–1.5)
LYMPHOCYTES # BLD AUTO: 0.6 K/UL — LOW (ref 1–3.3)
LYMPHOCYTES # BLD AUTO: 8.3 % — LOW (ref 13–44)
MCHC RBC-ENTMCNC: 26.3 PG — LOW (ref 27–34)
MCHC RBC-ENTMCNC: 30.7 % — LOW (ref 32–36)
MCV RBC AUTO: 85.7 FL — SIGNIFICANT CHANGE UP (ref 80–100)
MONOCYTES # BLD AUTO: 0.64 K/UL — SIGNIFICANT CHANGE UP (ref 0–0.9)
MONOCYTES NFR BLD AUTO: 8.8 % — SIGNIFICANT CHANGE UP (ref 2–14)
NEUTROPHILS # BLD AUTO: 5.9 K/UL — SIGNIFICANT CHANGE UP (ref 1.8–7.4)
NEUTROPHILS NFR BLD AUTO: 81.3 % — HIGH (ref 43–77)
NRBC # FLD: 0 K/UL — SIGNIFICANT CHANGE UP (ref 0–0)
PLATELET # BLD AUTO: 147 K/UL — LOW (ref 150–400)
PMV BLD: 10.2 FL — SIGNIFICANT CHANGE UP (ref 7–13)
POTASSIUM SERPL-MCNC: 3.9 MMOL/L — SIGNIFICANT CHANGE UP (ref 3.5–5.3)
POTASSIUM SERPL-SCNC: 3.9 MMOL/L — SIGNIFICANT CHANGE UP (ref 3.5–5.3)
PROLACTIN SERPL-MCNC: 24.7 NG/ML — HIGH (ref 3.4–24.1)
PROT SERPL-MCNC: 5.9 G/DL — LOW (ref 6–8.3)
RBC # BLD: 4.6 M/UL — SIGNIFICANT CHANGE UP (ref 3.8–5.2)
RBC # FLD: 16.1 % — HIGH (ref 10.3–14.5)
SODIUM SERPL-SCNC: 139 MMOL/L — SIGNIFICANT CHANGE UP (ref 135–145)
TRIGL SERPL-MCNC: 132 MG/DL — SIGNIFICANT CHANGE UP (ref 10–149)
WBC # BLD: 7.26 K/UL — SIGNIFICANT CHANGE UP (ref 3.8–10.5)
WBC # FLD AUTO: 7.26 K/UL — SIGNIFICANT CHANGE UP (ref 3.8–10.5)

## 2020-11-19 PROCEDURE — 99232 SBSQ HOSP IP/OBS MODERATE 35: CPT

## 2020-11-19 RX ORDER — SENNA PLUS 8.6 MG/1
2 TABLET ORAL
Qty: 28 | Refills: 0
Start: 2020-11-19 | End: 2020-12-02

## 2020-11-19 RX ORDER — CLONAZEPAM 1 MG
1 TABLET ORAL
Qty: 14 | Refills: 0
Start: 2020-11-19 | End: 2020-11-25

## 2020-11-19 RX ORDER — CLOZAPINE 150 MG/1
2 TABLET, ORALLY DISINTEGRATING ORAL
Qty: 14 | Refills: 0
Start: 2020-11-19 | End: 2020-11-25

## 2020-11-19 RX ORDER — PROPRANOLOL HCL 160 MG
1 CAPSULE, EXTENDED RELEASE 24HR ORAL
Qty: 28 | Refills: 0
Start: 2020-11-19 | End: 2020-12-02

## 2020-11-19 RX ORDER — LITHIUM CARBONATE 300 MG/1
3 TABLET, EXTENDED RELEASE ORAL
Qty: 42 | Refills: 0
Start: 2020-11-19 | End: 2020-12-02

## 2020-11-19 RX ADMIN — CLOZAPINE 400 MILLIGRAM(S): 150 TABLET, ORALLY DISINTEGRATING ORAL at 20:36

## 2020-11-19 RX ADMIN — SENNA PLUS 2 TABLET(S): 8.6 TABLET ORAL at 20:36

## 2020-11-19 RX ADMIN — LITHIUM CARBONATE 900 MILLIGRAM(S): 300 TABLET, EXTENDED RELEASE ORAL at 20:36

## 2020-11-19 RX ADMIN — Medication 0.5 MILLIGRAM(S): at 08:29

## 2020-11-19 RX ADMIN — Medication 0.5 MILLIGRAM(S): at 20:36

## 2020-11-20 VITALS — TEMPERATURE: 99 F

## 2020-11-20 PROBLEM — F20.9 SCHIZOPHRENIA, UNSPECIFIED: Chronic | Status: ACTIVE | Noted: 2020-10-11

## 2020-11-20 PROCEDURE — 99232 SBSQ HOSP IP/OBS MODERATE 35: CPT

## 2020-11-20 RX ADMIN — Medication 0.5 MILLIGRAM(S): at 09:16

## 2020-11-24 ENCOUNTER — OUTPATIENT (OUTPATIENT)
Dept: OUTPATIENT SERVICES | Facility: HOSPITAL | Age: 25
LOS: 1 days | Discharge: LEFT BEFORE DISCHARGE | End: 2020-11-24
Payer: COMMERCIAL

## 2020-11-27 LAB
ANISOCYTOSIS BLD QL: SLIGHT — SIGNIFICANT CHANGE UP
BASOPHILS # BLD AUTO: 0 K/UL — SIGNIFICANT CHANGE UP (ref 0–0.2)
BASOPHILS NFR BLD AUTO: 0 % — SIGNIFICANT CHANGE UP (ref 0–2)
BASOPHILS NFR SPEC: 0 % — SIGNIFICANT CHANGE UP (ref 0–2)
EOSINOPHIL # BLD AUTO: 0.01 K/UL — SIGNIFICANT CHANGE UP (ref 0–0.5)
EOSINOPHIL NFR BLD AUTO: 0.2 % — SIGNIFICANT CHANGE UP (ref 0–6)
EOSINOPHIL NFR FLD: 0 % — SIGNIFICANT CHANGE UP (ref 0–6)
GIANT PLATELETS BLD QL SMEAR: PRESENT — SIGNIFICANT CHANGE UP
HCT VFR BLD CALC: 34.8 % — SIGNIFICANT CHANGE UP (ref 34.5–45)
HGB BLD-MCNC: 11 G/DL — LOW (ref 11.5–15.5)
IMM GRANULOCYTES NFR BLD AUTO: 0.7 % — SIGNIFICANT CHANGE UP (ref 0–1.5)
LYMPHOCYTES # BLD AUTO: 0.48 K/UL — LOW (ref 1–3.3)
LYMPHOCYTES # BLD AUTO: 8.7 % — LOW (ref 13–44)
LYMPHOCYTES NFR SPEC AUTO: 2.7 % — LOW (ref 13–44)
MCHC RBC-ENTMCNC: 27 PG — SIGNIFICANT CHANGE UP (ref 27–34)
MCHC RBC-ENTMCNC: 31.6 % — LOW (ref 32–36)
MCV RBC AUTO: 85.5 FL — SIGNIFICANT CHANGE UP (ref 80–100)
MICROCYTES BLD QL: SLIGHT — SIGNIFICANT CHANGE UP
MONOCYTES # BLD AUTO: 0.62 K/UL — SIGNIFICANT CHANGE UP (ref 0–0.9)
MONOCYTES NFR BLD AUTO: 11.2 % — SIGNIFICANT CHANGE UP (ref 2–14)
MONOCYTES NFR BLD: 9 % — SIGNIFICANT CHANGE UP (ref 2–9)
NEUTROPHIL AB SER-ACNC: 86.5 % — HIGH (ref 43–77)
NEUTROPHILS # BLD AUTO: 4.37 K/UL — SIGNIFICANT CHANGE UP (ref 1.8–7.4)
NEUTROPHILS NFR BLD AUTO: 79.2 % — HIGH (ref 43–77)
NEUTS BAND # BLD: 0.9 % — SIGNIFICANT CHANGE UP (ref 0–6)
NRBC # FLD: 0 K/UL — SIGNIFICANT CHANGE UP (ref 0–0)
OVALOCYTES BLD QL SMEAR: SLIGHT — SIGNIFICANT CHANGE UP
PLATELET # BLD AUTO: 104 K/UL — LOW (ref 150–400)
PLATELET COUNT - ESTIMATE: SIGNIFICANT CHANGE UP
PMV BLD: 10.3 FL — SIGNIFICANT CHANGE UP (ref 7–13)
POLYCHROMASIA BLD QL SMEAR: SLIGHT — SIGNIFICANT CHANGE UP
RBC # BLD: 4.07 M/UL — SIGNIFICANT CHANGE UP (ref 3.8–5.2)
RBC # FLD: 17 % — HIGH (ref 10.3–14.5)
SMUDGE CELLS # BLD: PRESENT — SIGNIFICANT CHANGE UP
VARIANT LYMPHS # BLD: 0.9 % — SIGNIFICANT CHANGE UP
WBC # BLD: 5.52 K/UL — SIGNIFICANT CHANGE UP (ref 3.8–10.5)
WBC # FLD AUTO: 5.52 K/UL — SIGNIFICANT CHANGE UP (ref 3.8–10.5)

## 2020-12-01 LAB
BASOPHILS # BLD AUTO: 0 K/UL — SIGNIFICANT CHANGE UP (ref 0–0.2)
BASOPHILS NFR BLD AUTO: 0 % — SIGNIFICANT CHANGE UP (ref 0–2)
EOSINOPHIL # BLD AUTO: 0.01 K/UL — SIGNIFICANT CHANGE UP (ref 0–0.5)
EOSINOPHIL NFR BLD AUTO: 0.3 % — SIGNIFICANT CHANGE UP (ref 0–6)
HCT VFR BLD CALC: 35.9 % — SIGNIFICANT CHANGE UP (ref 34.5–45)
HGB BLD-MCNC: 11.4 G/DL — LOW (ref 11.5–15.5)
IMM GRANULOCYTES NFR BLD AUTO: 0.5 % — SIGNIFICANT CHANGE UP (ref 0–1.5)
LYMPHOCYTES # BLD AUTO: 0.46 K/UL — LOW (ref 1–3.3)
LYMPHOCYTES # BLD AUTO: 11.7 % — LOW (ref 13–44)
MCHC RBC-ENTMCNC: 27.4 PG — SIGNIFICANT CHANGE UP (ref 27–34)
MCHC RBC-ENTMCNC: 31.8 % — LOW (ref 32–36)
MCV RBC AUTO: 86.3 FL — SIGNIFICANT CHANGE UP (ref 80–100)
MONOCYTES # BLD AUTO: 0.48 K/UL — SIGNIFICANT CHANGE UP (ref 0–0.9)
MONOCYTES NFR BLD AUTO: 12.2 % — SIGNIFICANT CHANGE UP (ref 2–14)
NEUTROPHILS # BLD AUTO: 2.96 K/UL — SIGNIFICANT CHANGE UP (ref 1.8–7.4)
NEUTROPHILS NFR BLD AUTO: 75.3 % — SIGNIFICANT CHANGE UP (ref 43–77)
NRBC # FLD: 0 K/UL — SIGNIFICANT CHANGE UP (ref 0–0)
PLATELET # BLD AUTO: 99 K/UL — LOW (ref 150–400)
PMV BLD: 10.5 FL — SIGNIFICANT CHANGE UP (ref 7–13)
RBC # BLD: 4.16 M/UL — SIGNIFICANT CHANGE UP (ref 3.8–5.2)
RBC # FLD: 17.1 % — HIGH (ref 10.3–14.5)
WBC # BLD: 3.93 K/UL — SIGNIFICANT CHANGE UP (ref 3.8–10.5)
WBC # FLD AUTO: 3.93 K/UL — SIGNIFICANT CHANGE UP (ref 3.8–10.5)

## 2020-12-08 LAB
ALBUMIN SERPL ELPH-MCNC: 3.9 G/DL — SIGNIFICANT CHANGE UP (ref 3.3–5)
ALP SERPL-CCNC: 123 U/L — HIGH (ref 40–120)
ALT FLD-CCNC: 42 U/L — HIGH (ref 4–33)
ANION GAP SERPL CALC-SCNC: 11 MMOL/L — SIGNIFICANT CHANGE UP (ref 7–14)
AST SERPL-CCNC: 42 U/L — HIGH (ref 4–32)
BASOPHILS # BLD AUTO: 0 K/UL — SIGNIFICANT CHANGE UP (ref 0–0.2)
BASOPHILS NFR BLD AUTO: 0 % — SIGNIFICANT CHANGE UP (ref 0–2)
BILIRUB SERPL-MCNC: 0.7 MG/DL — SIGNIFICANT CHANGE UP (ref 0.2–1.2)
BUN SERPL-MCNC: 9 MG/DL — SIGNIFICANT CHANGE UP (ref 7–23)
CALCIUM SERPL-MCNC: 8.8 MG/DL — SIGNIFICANT CHANGE UP (ref 8.4–10.5)
CHLORIDE SERPL-SCNC: 108 MMOL/L — HIGH (ref 98–107)
CHOLEST SERPL-MCNC: 131 MG/DL — SIGNIFICANT CHANGE UP
CO2 SERPL-SCNC: 22 MMOL/L — SIGNIFICANT CHANGE UP (ref 22–31)
CREAT SERPL-MCNC: 0.5 MG/DL — SIGNIFICANT CHANGE UP (ref 0.5–1.3)
EOSINOPHIL # BLD AUTO: 0.07 K/UL — SIGNIFICANT CHANGE UP (ref 0–0.5)
EOSINOPHIL NFR BLD AUTO: 2.7 % — SIGNIFICANT CHANGE UP (ref 0–6)
GIANT PLATELETS BLD QL SMEAR: PRESENT — SIGNIFICANT CHANGE UP
GLUCOSE SERPL-MCNC: 112 MG/DL — HIGH (ref 70–99)
HCT VFR BLD CALC: 34.7 % — SIGNIFICANT CHANGE UP (ref 34.5–45)
HDLC SERPL-MCNC: 29 MG/DL — LOW
HGB BLD-MCNC: 11.1 G/DL — LOW (ref 11.5–15.5)
IANC: 1.83 K/UL — SIGNIFICANT CHANGE UP (ref 1.5–8.5)
LIPID PNL WITH DIRECT LDL SERPL: 73 MG/DL — SIGNIFICANT CHANGE UP
LYMPHOCYTES # BLD AUTO: 0.19 K/UL — LOW (ref 1–3.3)
LYMPHOCYTES # BLD AUTO: 7.1 % — LOW (ref 13–44)
MANUAL SMEAR VERIFICATION: SIGNIFICANT CHANGE UP
MCHC RBC-ENTMCNC: 27.6 PG — SIGNIFICANT CHANGE UP (ref 27–34)
MCHC RBC-ENTMCNC: 32 GM/DL — SIGNIFICANT CHANGE UP (ref 32–36)
MCV RBC AUTO: 86.3 FL — SIGNIFICANT CHANGE UP (ref 80–100)
MONOCYTES # BLD AUTO: 0.24 K/UL — SIGNIFICANT CHANGE UP (ref 0–0.9)
MONOCYTES NFR BLD AUTO: 8.8 % — SIGNIFICANT CHANGE UP (ref 2–14)
NEUTROPHILS # BLD AUTO: 1.93 K/UL — SIGNIFICANT CHANGE UP (ref 1.8–7.4)
NEUTROPHILS NFR BLD AUTO: 70.8 % — SIGNIFICANT CHANGE UP (ref 43–77)
NEUTS BAND # BLD: 0.9 % — SIGNIFICANT CHANGE UP (ref 0–6)
NON HDL CHOLESTEROL: 102 MG/DL — SIGNIFICANT CHANGE UP
PLAT MORPH BLD: NORMAL — SIGNIFICANT CHANGE UP
PLATELET # BLD AUTO: 94 K/UL — LOW (ref 150–400)
PLATELET COUNT - ESTIMATE: ABNORMAL
POTASSIUM SERPL-MCNC: 3.8 MMOL/L — SIGNIFICANT CHANGE UP (ref 3.5–5.3)
POTASSIUM SERPL-SCNC: 3.8 MMOL/L — SIGNIFICANT CHANGE UP (ref 3.5–5.3)
PROT SERPL-MCNC: 5.7 G/DL — LOW (ref 6–8.3)
RBC # BLD: 4.02 M/UL — SIGNIFICANT CHANGE UP (ref 3.8–5.2)
RBC # FLD: 17.2 % — HIGH (ref 10.3–14.5)
RBC BLD AUTO: NORMAL — SIGNIFICANT CHANGE UP
SMUDGE CELLS # BLD: PRESENT — SIGNIFICANT CHANGE UP
SODIUM SERPL-SCNC: 141 MMOL/L — SIGNIFICANT CHANGE UP (ref 135–145)
TRIGL SERPL-MCNC: 143 MG/DL — SIGNIFICANT CHANGE UP
VARIANT LYMPHS # BLD: 9.7 % — HIGH (ref 0–6)
WBC # BLD: 2.69 K/UL — LOW (ref 3.8–10.5)
WBC # FLD AUTO: 2.69 K/UL — LOW (ref 3.8–10.5)

## 2020-12-09 LAB — A1C WITH ESTIMATED AVERAGE GLUCOSE RESULT: 4.1 % — SIGNIFICANT CHANGE UP (ref 4–5.6)

## 2020-12-18 DIAGNOSIS — F25.9 SCHIZOAFFECTIVE DISORDER, UNSPECIFIED: ICD-10-CM

## 2020-12-21 ENCOUNTER — INPATIENT (INPATIENT)
Facility: HOSPITAL | Age: 25
LOS: 141 days | Discharge: HOME | End: 2021-05-12
Attending: PSYCHIATRY & NEUROLOGY | Admitting: PSYCHIATRY & NEUROLOGY
Payer: COMMERCIAL

## 2020-12-21 VITALS
SYSTOLIC BLOOD PRESSURE: 119 MMHG | DIASTOLIC BLOOD PRESSURE: 94 MMHG | RESPIRATION RATE: 18 BRPM | HEIGHT: 61 IN | OXYGEN SATURATION: 98 % | HEART RATE: 77 BPM | TEMPERATURE: 97 F

## 2020-12-21 DIAGNOSIS — F25.0 SCHIZOAFFECTIVE DISORDER, BIPOLAR TYPE: ICD-10-CM

## 2020-12-21 LAB
ALBUMIN SERPL ELPH-MCNC: 4.9 G/DL — SIGNIFICANT CHANGE UP (ref 3.3–5)
ALP SERPL-CCNC: 62 U/L — SIGNIFICANT CHANGE UP (ref 40–120)
ALT FLD-CCNC: 32 U/L — SIGNIFICANT CHANGE UP (ref 4–33)
ANION GAP SERPL CALC-SCNC: 18 MMOL/L — HIGH (ref 7–14)
AST SERPL-CCNC: 24 U/L — SIGNIFICANT CHANGE UP (ref 4–32)
B PERT DNA SPEC QL NAA+PROBE: SIGNIFICANT CHANGE UP
BILIRUB SERPL-MCNC: 1 MG/DL — SIGNIFICANT CHANGE UP (ref 0.2–1.2)
BUN SERPL-MCNC: 6 MG/DL — LOW (ref 7–23)
C PNEUM DNA SPEC QL NAA+PROBE: SIGNIFICANT CHANGE UP
CALCIUM SERPL-MCNC: 9.8 MG/DL — SIGNIFICANT CHANGE UP (ref 8.4–10.5)
CHLORIDE SERPL-SCNC: 107 MMOL/L — SIGNIFICANT CHANGE UP (ref 98–107)
CO2 SERPL-SCNC: 19 MMOL/L — LOW (ref 22–31)
CREAT SERPL-MCNC: 0.52 MG/DL — SIGNIFICANT CHANGE UP (ref 0.5–1.3)
FLUAV H1 2009 PAND RNA SPEC QL NAA+PROBE: SIGNIFICANT CHANGE UP
FLUAV H1 RNA SPEC QL NAA+PROBE: SIGNIFICANT CHANGE UP
FLUAV H3 RNA SPEC QL NAA+PROBE: SIGNIFICANT CHANGE UP
FLUAV SUBTYP SPEC NAA+PROBE: SIGNIFICANT CHANGE UP
FLUBV RNA SPEC QL NAA+PROBE: SIGNIFICANT CHANGE UP
GLUCOSE SERPL-MCNC: 89 MG/DL — SIGNIFICANT CHANGE UP (ref 70–99)
HADV DNA SPEC QL NAA+PROBE: SIGNIFICANT CHANGE UP
HCG SERPL-ACNC: <5 MIU/ML — SIGNIFICANT CHANGE UP
HCOV PNL SPEC NAA+PROBE: SIGNIFICANT CHANGE UP
HMPV RNA SPEC QL NAA+PROBE: SIGNIFICANT CHANGE UP
HPIV1 RNA SPEC QL NAA+PROBE: SIGNIFICANT CHANGE UP
HPIV2 RNA SPEC QL NAA+PROBE: SIGNIFICANT CHANGE UP
HPIV3 RNA SPEC QL NAA+PROBE: SIGNIFICANT CHANGE UP
HPIV4 RNA SPEC QL NAA+PROBE: SIGNIFICANT CHANGE UP
PCP SPEC-MCNC: SIGNIFICANT CHANGE UP
POTASSIUM SERPL-MCNC: 3 MMOL/L — LOW (ref 3.5–5.3)
POTASSIUM SERPL-SCNC: 3 MMOL/L — LOW (ref 3.5–5.3)
PROT SERPL-MCNC: 6.9 G/DL — SIGNIFICANT CHANGE UP (ref 6–8.3)
RAPID RVP RESULT: SIGNIFICANT CHANGE UP
RSV RNA SPEC QL NAA+PROBE: SIGNIFICANT CHANGE UP
RV+EV RNA SPEC QL NAA+PROBE: SIGNIFICANT CHANGE UP
SARS-COV-2 RNA SPEC QL NAA+PROBE: SIGNIFICANT CHANGE UP
SODIUM SERPL-SCNC: 144 MMOL/L — SIGNIFICANT CHANGE UP (ref 135–145)
TSH SERPL-MCNC: 0.98 UIU/ML — SIGNIFICANT CHANGE UP (ref 0.27–4.2)

## 2020-12-21 PROCEDURE — 99285 EMERGENCY DEPT VISIT HI MDM: CPT

## 2020-12-21 PROCEDURE — 93010 ELECTROCARDIOGRAM REPORT: CPT

## 2020-12-21 PROCEDURE — 99285 EMERGENCY DEPT VISIT HI MDM: CPT | Mod: 25

## 2020-12-21 RX ORDER — CEPHALEXIN 500 MG
500 CAPSULE ORAL ONCE
Refills: 0 | Status: COMPLETED | OUTPATIENT
Start: 2020-12-21 | End: 2020-12-22

## 2020-12-21 RX ORDER — HALOPERIDOL DECANOATE 100 MG/ML
5 INJECTION INTRAMUSCULAR ONCE
Refills: 0 | Status: COMPLETED | OUTPATIENT
Start: 2020-12-21 | End: 2020-12-21

## 2020-12-21 RX ORDER — CHLORPROMAZINE HCL 10 MG
50 TABLET ORAL EVERY 6 HOURS
Refills: 0 | Status: DISCONTINUED | OUTPATIENT
Start: 2020-12-21 | End: 2020-12-22

## 2020-12-21 RX ORDER — POTASSIUM CHLORIDE 20 MEQ
10 PACKET (EA) ORAL ONCE
Refills: 0 | Status: COMPLETED | OUTPATIENT
Start: 2020-12-21 | End: 2020-12-21

## 2020-12-21 RX ORDER — POTASSIUM CHLORIDE 20 MEQ
10 PACKET (EA) ORAL ONCE
Refills: 0 | Status: COMPLETED | OUTPATIENT
Start: 2020-12-21 | End: 2020-12-22

## 2020-12-21 RX ORDER — SODIUM CHLORIDE 9 MG/ML
1000 INJECTION INTRAMUSCULAR; INTRAVENOUS; SUBCUTANEOUS
Refills: 0 | Status: COMPLETED | OUTPATIENT
Start: 2020-12-21 | End: 2020-12-21

## 2020-12-21 RX ORDER — CHLORPROMAZINE HCL 10 MG
50 TABLET ORAL ONCE
Refills: 0 | Status: DISCONTINUED | OUTPATIENT
Start: 2020-12-21 | End: 2020-12-22

## 2020-12-21 RX ORDER — POTASSIUM CHLORIDE 20 MEQ
40 PACKET (EA) ORAL ONCE
Refills: 0 | Status: COMPLETED | OUTPATIENT
Start: 2020-12-21 | End: 2020-12-21

## 2020-12-21 RX ADMIN — Medication 2 MILLIGRAM(S): at 15:10

## 2020-12-21 RX ADMIN — SODIUM CHLORIDE 100 MILLILITER(S): 9 INJECTION INTRAMUSCULAR; INTRAVENOUS; SUBCUTANEOUS at 23:26

## 2020-12-21 RX ADMIN — Medication 100 MILLIEQUIVALENT(S): at 23:22

## 2020-12-21 RX ADMIN — HALOPERIDOL DECANOATE 5 MILLIGRAM(S): 100 INJECTION INTRAMUSCULAR at 15:10

## 2020-12-21 NOTE — ED PROVIDER NOTE - OBJECTIVE STATEMENT
25 year old female history of schizo-affective disorder off medications brought in by mother for erratic behavior and outbursts at home.  During interview with writer, patient talking to self but not-understandable, laughing inappropriately.  Patient unable to answer further questions.  Mother Dank [phone]

## 2020-12-21 NOTE — ED BEHAVIORAL HEALTH ASSESSMENT NOTE - DESCRIPTION
none see HPI  Vital Signs Last 24 Hrs  T(C): 36.3 (21 Dec 2020 13:35), Max: 36.3 (21 Dec 2020 13:35)  T(F): 97.4 (21 Dec 2020 13:35), Max: 97.4 (21 Dec 2020 13:35)  HR: 77 (21 Dec 2020 13:35) (77 - 77)  BP: 119/94 (21 Dec 2020 13:35) (119/94 - 119/94)  BP(mean): --  RR: 18 (21 Dec 2020 13:35) (18 - 18)  SpO2: 98% (21 Dec 2020 13:35) (98% - 98%) lives with mom, not a student, single, currently unemployed (previously worked at [employer])

## 2020-12-21 NOTE — ED BEHAVIORAL HEALTH ASSESSMENT NOTE - DEPENDENTS
Anesthesia Volume In Cc: 0 Render The Number Of Extractions: Yes Consent: The patient's consent was obtained including but not limited to risks of crusting, scabbing, blistering, scarring, darker or lighter pigmentary change, recurrence, incomplete removal and infection. Post-Care Instructions: I reviewed with the patient in detail post-care instructions. Patient is to wear sunprotection, and avoid picking at any of the treated lesions. Pt may apply Vaseline to crusted or scabbing areas. Detail Level: Detailed None known

## 2020-12-21 NOTE — ED ADULT TRIAGE NOTE - CHIEF COMPLAINT QUOTE
Pt refusing to take meds for schizo-affective disorder as per mom and acting out the past 2 weeks. Pt refusing to answer questions

## 2020-12-21 NOTE — ED BEHAVIORAL HEALTH NOTE - BEHAVIORAL HEALTH NOTE
Worker called patient’s mother Dank Taylor ([phone]) for collateral information. All information is as per Mother:    Patient is a 25 year old female, domiciled with mother, with a recent [facility] hospitalization and discharged on November 20, BIB as a walk in for decompensation. Mother states that the patient has been non-compliant with her medications for three days. Mother states that the patient was taking Clozapine 200mg once a day but two weeks ago the patient was switched to Abilify 5mg. Patient is also prescribed Propanol 20mg twice a day. Mother states that the patient sees Dr. Antonina Villaseñor. She states that the patient refused her medications on Saturday, Sunday, and Monday. She states that the patient is not using drugs or alcohol. She states that since the patient has not been taking the medication she has been talking to herself and has been telling and screaming. Mother states that the patient is “having a whole party with herself”. Mother states that the patient was screaming on the top of her lungs yesterday. Mother denies that the patient has been violent. She states that the patient is talking with people who are not there. Mother believes that the people who she is seeing is people who she use to work with in the past. Mother states that the patient has been worsening since she has been off the medications. Mother states that she has not slept in 3 days and only has been taking naps. Mother states that the patient has been taking baths. Mother states that the patient has not traveled out of [place] for the past two weeks and mother states that the patient was last tested for covid- 19 during her last hospitalization. Patient is not working or is not in school. Mother states while on the unit at [facility] during her last admission patient has a history of refusing to take medication.  Case discussed with Dr. Coronado. Worker called patient’s mother Dank Taylor ([phone]) for collateral information. All information is as per Mother:    Patient is a 25 year old female, domiciled with mother, with a recent [facility] hospitalization and discharged on November 20, BIB as a walk in for decompensation. Mother states that the patient has been non-compliant with her medications for three days. Mother states that the patient was taking Clozapine 200mg once a day but two weeks ago the patient was switched to Abilify 5mg. Patient is also prescribed Propanol 20mg twice a day. Mother states that the patient sees Dr. Antonina Villaseñor. She states that the patient refused her medications on Saturday, Sunday, and Monday. She states that the patient is not using drugs or alcohol. She states that since the patient has not been taking the medication she has been talking to herself and has been telling and screaming. Mother states that the patient is “having a whole party with herself”. Mother states that the patient was screaming on the top of her lungs yesterday. Mother denies that the patient has been violent. She states that the patient is talking with people who are not there. Mother believes that the people who she is seeing is people who she use to work with in the past. Mother states that the patient has been worsening since she has been off the medications. Mother states that she has not slept in 3 days and only has been taking naps. Mother states that the patient has been taking baths. Mother states that the patient has not traveled out of [place] for the past two weeks and mother states that the patient was last tested for covid- 19 during her last hospitalization. Patient is not working or is not in school. Mother states while on the unit at [facility] during her last admission patient has a history of refusing to take medication.  Case discussed with Dr. Coronado. Patient is admitted to [facility] l3. Worker informed mother of patient admission to L3 and provided unit information. Worker informed that currently there are no visitation hours.

## 2020-12-21 NOTE — ED PROVIDER NOTE - PROGRESS NOTE DETAILS
Patient evaluated by psych, increasingly psychotic, screaming inappropriately.  Will provide meds. K noted to be 3.0.  Attempted to provide PO K replacement but patient refused.  Will transfer to main ED under Dr. Bloch for IV replenishment. Rec'd signout on this pt from ELVIE Manuel:  24yo F w/ PPH of schizophrenia awaiting K repletion before psych admission for acute psychosis.   -Dr. Salazar

## 2020-12-21 NOTE — ED PROVIDER NOTE - CLINICAL SUMMARY MEDICAL DECISION MAKING FREE TEXT BOX
25 year old female history of schizo-affective disorder off medications brought in by mother for erratic behavior and outbursts at home.  During interview with writer, patient talking to self but not-understandable, laughing inappropriately.  Acutely psychotic.  Will likely require meds, labs, psych consult for likely admission.
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
## 2020-12-21 NOTE — ED ADULT NURSE NOTE - OBJECTIVE STATEMENT
Pt refusing to take meds for schizo-affective disorder as per mom and acting out the past 2 weeks. Pt refusing to answer questions about SI/HI, AH/VH.  Pt is laughing to herself when asked a question.  Pt was uncooperative with intake process in  at first.  Eventually able to follow directions.

## 2020-12-21 NOTE — ED BEHAVIORAL HEALTH ASSESSMENT NOTE - SUMMARY
Patient is 24yo female, unemployed, single, not in school, domiciled with mom, PPH of schizoaffective disorder with symptom onset recently in December 2019, 3 past inpatient psych hospitalizations (most recent 10/12-11/20/20 at [facility] for psychosis), followed by Dr. Loya at [facility] but pt currently noncompliant with medications, no h/o suicide attempts, no h/o violence, no PMH, no h/o substance abuse, brought in by mother for talking/laughing to herself, yelling, not sleeping, med noncompliance.   Pt is unable to care for self at this time due to severity of psychosis. She requires inpatient psychiatric admission for safety and stabilization.

## 2020-12-22 LAB
ANION GAP SERPL CALC-SCNC: 10 MMOL/L — SIGNIFICANT CHANGE UP (ref 7–14)
BUN SERPL-MCNC: 7 MG/DL — SIGNIFICANT CHANGE UP (ref 7–23)
CALCIUM SERPL-MCNC: 9 MG/DL — SIGNIFICANT CHANGE UP (ref 8.4–10.5)
CHLORIDE SERPL-SCNC: 110 MMOL/L — HIGH (ref 98–107)
CO2 SERPL-SCNC: 22 MMOL/L — SIGNIFICANT CHANGE UP (ref 22–31)
CREAT SERPL-MCNC: 0.52 MG/DL — SIGNIFICANT CHANGE UP (ref 0.5–1.3)
GLUCOSE SERPL-MCNC: 83 MG/DL — SIGNIFICANT CHANGE UP (ref 70–99)
POTASSIUM SERPL-MCNC: 3.9 MMOL/L — SIGNIFICANT CHANGE UP (ref 3.5–5.3)
POTASSIUM SERPL-SCNC: 3.9 MMOL/L — SIGNIFICANT CHANGE UP (ref 3.5–5.3)
SARS-COV-2 IGG SERPL QL IA: NEGATIVE — SIGNIFICANT CHANGE UP
SARS-COV-2 IGM SERPL IA-ACNC: 0.07 INDEX — SIGNIFICANT CHANGE UP
SODIUM SERPL-SCNC: 142 MMOL/L — SIGNIFICANT CHANGE UP (ref 135–145)

## 2020-12-22 RX ORDER — CEPHALEXIN 500 MG
500 CAPSULE ORAL EVERY 12 HOURS
Refills: 0 | Status: COMPLETED | OUTPATIENT
Start: 2020-12-22 | End: 2020-12-26

## 2020-12-22 RX ORDER — DIPHENHYDRAMINE HCL 50 MG
50 CAPSULE ORAL EVERY 4 HOURS
Refills: 0 | Status: DISCONTINUED | OUTPATIENT
Start: 2020-12-22 | End: 2021-05-12

## 2020-12-22 RX ORDER — ARIPIPRAZOLE 15 MG/1
10 TABLET ORAL AT BEDTIME
Refills: 0 | Status: DISCONTINUED | OUTPATIENT
Start: 2020-12-22 | End: 2020-12-26

## 2020-12-22 RX ORDER — FLUPHENAZINE HYDROCHLORIDE 1 MG/1
5 TABLET, FILM COATED ORAL EVERY 6 HOURS
Refills: 0 | Status: DISCONTINUED | OUTPATIENT
Start: 2020-12-22 | End: 2021-02-08

## 2020-12-22 RX ORDER — DIPHENHYDRAMINE HCL 50 MG
50 CAPSULE ORAL ONCE
Refills: 0 | Status: DISCONTINUED | OUTPATIENT
Start: 2020-12-22 | End: 2021-05-12

## 2020-12-22 RX ORDER — FLUPHENAZINE HYDROCHLORIDE 1 MG/1
5 TABLET, FILM COATED ORAL ONCE
Refills: 0 | Status: DISCONTINUED | OUTPATIENT
Start: 2020-12-22 | End: 2021-02-08

## 2020-12-22 RX ADMIN — Medication 100 MILLIEQUIVALENT(S): at 00:20

## 2020-12-22 RX ADMIN — Medication 100 MILLIEQUIVALENT(S): at 01:20

## 2020-12-22 NOTE — BH SOCIAL WORK INITIAL PSYCHOSOCIAL EVALUATION - OTHER PAST PSYCHIATRIC HISTORY (INCLUDE DETAILS REGARDING ONSET, COURSE OF ILLNESS, INPATIENT/OUTPATIENT TREATMENT)
Patient had her first break in December, 2019 and has been hospitalized three times since then with her most recent discharge on 11/20/2020 from [facility].  She has a history of med non-compliance both in and out of the hospital and went off her medications about four days ago.  Since then she has been increasingly bizarre.  She appears to be having both auditory and visual hallucinations, is non-sensical, not sleeping, laughing to herself and trying to high five people who are not there.  Her mother, with whom she lives, brought her to the ED due to this bizarre behavior.  She has continued to be bizarre and agitated at times.

## 2020-12-22 NOTE — BH INPATIENT PSYCHIATRY ASSESSMENT NOTE - HPI (INCLUDE ILLNESS QUALITY, SEVERITY, DURATION, TIMING, CONTEXT, MODIFYING FACTORS, ASSOCIATED SIGNS AND SYMPTOMS)
Patient is 24yo female, unemployed, single, not in school, domiciled with mom, PPH of schizoaffective disorder with symptom onset recently in December 2019, 3 past inpatient psych hospitalizations (most recent 10/12-11/20/20 at [facility] for psychosis), followed by Dr. Loya at [facility] but pt currently noncompliant with medications, no h/o suicide attempts, no h/o violence, no PMH, no h/o substance abuse, brought in by mother for talking/laughing to herself, yelling, not sleeping, med noncompliance.     She was being cross titrated from clozapine to abilify and clozapine appears to have been discontinued and abilify 5 was started.  She was disorganized in the ED and transferred to us on a 939 legal status and arrive in the 5am-6am hour.     She sits up in bed, notably malodorous with pants covered with menstrual blood, staring at us minimizing, stating that she is "perfect" and everything is "fine".  She does not participate in interview meaningfully.

## 2020-12-22 NOTE — BH INPATIENT PSYCHIATRY ASSESSMENT NOTE - OTHER PAST PSYCHIATRIC HISTORY (INCLUDE DETAILS REGARDING ONSET, COURSE OF ILLNESS, INPATIENT/OUTPATIENT TREATMENT)
patient with two admissions prior to fall 2020 [facility] inpatient admission during which time she was started on clozapine.  reportedly history of failed response to abilify and haldol  family reports history of non suicidal sself injurious behavior but denies miladys suicide attempts/suicidality  in ETP

## 2020-12-22 NOTE — BH SOCIAL WORK INITIAL PSYCHOSOCIAL EVALUATION - NSBHCHILDBEHAVIOR_PSY_ALL_CORE
As per patient's mother, she has a history of some self injurious behavior though none recently./Other

## 2020-12-22 NOTE — BH INPATIENT PSYCHIATRY ASSESSMENT NOTE - NSCOMMENTSUICRISKFACT_PSY_ALL_CORE
patient appears to be at risk of harm to herself primarily through self care or poor decision making influenced by symptoms

## 2020-12-22 NOTE — BH INPATIENT PSYCHIATRY ASSESSMENT NOTE - NSBHASSESSSUMMFT_PSY_ALL_CORE
Patient is 24yo female, unemployed, single, not in school, domiciled with mom, PPH of schizoaffective disorder with symptom onset recently in December 2019, 3 past inpatient psych hospitalizations (most recent 10/12-11/20/20 at [facility] for psychosis), followed by Dr. Loya at [facility] but pt currently noncompliant with medications, no h/o suicide attempts, no h/o violence, no PMH, no h/o substance abuse, brought in by mother for talking/laughing to herself, yelling, not sleeping, med noncompliance in setting of cross titration from clozapine to abilify.  She was disorganized but somewhat agitated in the ED.  Here she is calmer but uncooperative and likely still disorganized.  She is unable to care for herself.   1. continue 939 \[facility]\"" for safety and stabilization.  2. will give abilify 10mg daily at bedtime for psychosis  3. will dc thorazine prns because of risk involved in giving im thorazine and im ativan together  4. will change prns to prolixin po/im, ativan po/im and benadrylpo/im--some notation in chart about eps on haldol so giving benadryl im prophylactically is justified  v falls (  5. continue keflex

## 2020-12-22 NOTE — BH INPATIENT PSYCHIATRY ASSESSMENT NOTE - NSBHCHARTREVIEWVS_PSY_A_CORE FT
Vital Signs Last 24 Hrs  T(C): 36.3 (22 Dec 2020 06:56), Max: 37.1 (21 Dec 2020 18:28)  T(F): 97.4 (22 Dec 2020 06:56), Max: 98.8 (21 Dec 2020 18:28)  HR: 95 (22 Dec 2020 04:25) (67 - 95)  BP: 107/59 (22 Dec 2020 04:25) (89/60 - 112/57)  BP(mean): --  RR: 18 (22 Dec 2020 04:25) (16 - 20)  SpO2: 100% (22 Dec 2020 04:25) (99% - 100%)

## 2020-12-22 NOTE — BH INPATIENT PSYCHIATRY ASSESSMENT NOTE - DETAILS
Haldol: tremor  clozapine  per discharge summary from low 4, failed trials of haldol and abilify leading to start of clozapine  no known h/o suicide attempts

## 2020-12-22 NOTE — BH SOCIAL WORK INITIAL PSYCHOSOCIAL EVALUATION - NSPROGENSOURCEINFOFT_PSY_ALL_CORE
EMR  Writer is a social work float assigned to assist with admissions on multiple units for today.  Unit  will be attempting to meet with the patient.  She is, however, highly disorganized.

## 2020-12-22 NOTE — BH SOCIAL WORK INITIAL PSYCHOSOCIAL EVALUATION - NSBHTREATHX_PSY_ALL_CORE
Patient has three previous inpatient psychiatric hospitalizations, last d/c'd from [facility] 11/20/2020.  Is in treatement at [facility] alternately listed as being with Dr. Loya and a Dr. Antonina Villaseñor./Unable to answer (specify)

## 2020-12-22 NOTE — BH SOCIAL WORK INITIAL PSYCHOSOCIAL EVALUATION - NSBHLIFEGOAL_PSY_ALL_CORE
Writer is a social work float assigned to assist with admissions on multiple units for today.  The patient is currently too disorganized to discuss goals.  However, she had worked in the past, at [employer], and is not currently working.

## 2020-12-22 NOTE — ED BEHAVIORAL HEALTH NOTE - BEHAVIORAL HEALTH NOTE
Per NP signout, pt with UTI, (resulted, and faxed to --not in EMR). Recommendation of Keflex 500mg bid for 5 days.

## 2020-12-22 NOTE — BH INPATIENT PSYCHIATRY ASSESSMENT NOTE - CURRENT MEDICATION
MEDICATIONS  (STANDING):  cephalexin 500 milliGRAM(s) Oral every 12 hours  chlorproMAZINE    Injectable 50 milliGRAM(s) IntraMuscular once  LORazepam   Injectable 2 milliGRAM(s) IntraMuscular once    MEDICATIONS  (PRN):  chlorproMAZINE    Tablet 50 milliGRAM(s) Oral every 6 hours PRN agitation  LORazepam     Tablet 2 milliGRAM(s) Oral every 6 hours PRN Agitation

## 2020-12-22 NOTE — BH INPATIENT PSYCHIATRY ASSESSMENT NOTE - NSBHMETABOLIC_PSY_ALL_CORE_FT
BMI: BMI (kg/m2): 29.7 (12-22-20 @ 06:56)  HbA1c: A1C with Estimated Average Glucose Result: 4.1 % (12-08-20 @ 18:57)    Glucose:   BP: 107/59 (12-22-20 @ 04:25) (89/60 - 119/94)  Lipid Panel: Date/Time: 12-08-20 @ 18:57  Cholesterol, Serum: 131  Direct LDL: --  HDL Cholesterol, Serum: 29  Total Cholesterol/HDL Ration Measurement: --  Triglycerides, Serum: 143

## 2020-12-22 NOTE — PSYCHIATRIC REHAB INITIAL EVALUATION - NSBHPRRECOMMEND_PSY_ALL_CORE
Writer met with patient in order to orient patient to unit, provide patient with a unit schedule and introduce patient to psychiatric rehabilitation staff and department functions. Patient was receptive to orientation, however patient was unable to engage in meaningful conversation and was oddly related during initial interview. Therefore, the following information will be based off patient’s chart. Due to patient’s severity of symptoms, writer and patient were unable to establish a collaborative psychiatric rehabilitation goal, therefore one will be chosen for her. Psychiatric Rehabilitation staff will continue to engage patient daily in order to develop therapeutic rapport. In response to COVID19, unit programming will be re-evaluated on a consistent basis in effort to maintain safety guidelines.

## 2020-12-23 PROCEDURE — 90853 GROUP PSYCHOTHERAPY: CPT

## 2020-12-23 PROCEDURE — 99232 SBSQ HOSP IP/OBS MODERATE 35: CPT

## 2020-12-23 RX ADMIN — Medication 500 MILLIGRAM(S): at 21:29

## 2020-12-23 RX ADMIN — ARIPIPRAZOLE 10 MILLIGRAM(S): 15 TABLET ORAL at 21:29

## 2020-12-23 RX ADMIN — Medication 2 MILLIGRAM(S): at 21:29

## 2020-12-23 RX ADMIN — Medication 500 MILLIGRAM(S): at 09:18

## 2020-12-23 NOTE — BH INPATIENT PSYCHIATRY PROGRESS NOTE - NSBHFUPINTERVALHXFT_PSY_A_CORE
Pt very guarded during interview, appears internally preoccupied, speaks minimally and is thought blocked.  Pt gives mostly one word answers to questions and denies most sx.  Pt denies SI/HI/I/P or AH/VH or paranoia.  Pt denies hx of previous hospitalizations or outpatient care, although per chart patient has been psychiatrically hospitalized and attends [facility] outpatient.  Pt reports she takes medication, has been compliant, but does not know the name.  Pt reports poor appetite and not sleeping due to "amnesia."  Pt denies substance use.  Per chart, patient refused Abilify and Keflex last night, patient agreeable to take today after risks and benefits discussed.

## 2020-12-23 NOTE — BH INPATIENT PSYCHIATRY PROGRESS NOTE - NSBHASSESSSUMMFT_PSY_ALL_CORE
Patient is 24yo female, unemployed, single, not in school, domiciled with mom, PPH of schizoaffective disorder with symptom onset recently in December 2019, 3 past inpatient psych hospitalizations (most recent 10/12-11/20/20 at [facility] for psychosis), followed by Dr. Loya at [facility] but pt currently noncompliant with medications, no h/o suicide attempts, no h/o violence, no PMH, no h/o substance abuse, brought in by mother for talking/laughing to herself, yelling, not sleeping, med noncompliance.     1-Continue Abilify 10mg PO at bedtime for psychosis  2-Keflex for UTI

## 2020-12-24 PROCEDURE — 99232 SBSQ HOSP IP/OBS MODERATE 35: CPT

## 2020-12-24 RX ADMIN — FLUPHENAZINE HYDROCHLORIDE 5 MILLIGRAM(S): 1 TABLET, FILM COATED ORAL at 14:59

## 2020-12-24 RX ADMIN — Medication 500 MILLIGRAM(S): at 21:26

## 2020-12-24 RX ADMIN — Medication 500 MILLIGRAM(S): at 09:45

## 2020-12-24 RX ADMIN — ARIPIPRAZOLE 10 MILLIGRAM(S): 15 TABLET ORAL at 21:27

## 2020-12-24 RX ADMIN — Medication 2 MILLIGRAM(S): at 15:00

## 2020-12-24 NOTE — BH INPATIENT PSYCHIATRY PROGRESS NOTE - OTHER
Guarded odd blunted appears internally preoccupied and is responding to internal stimuli on the unit impoverished

## 2020-12-24 NOTE — BH INPATIENT PSYCHIATRY PROGRESS NOTE - NSBHFUPINTERVALHXFT_PSY_A_CORE
Pt with similar presentation to yesterday, guarded on interview, speaks minimally and gives mostly one word answers to questions asked of her.  Pt denies SI/HI/I/P or AH/VH or paranoia.  Pt reports she slept well last night.  Pt reports she has not been eating, only had a pretzel yesterday which is consistent with staff report.  Pt agreeable for nutrition consult.  Pt was compliant with Abilify and Keflex.  Pt appears internally preoccupied and is observed laughing and talking to herself on the unit.  Pt sits in the periphery of groups, but does not participate.  Pt with poor self care.

## 2020-12-24 NOTE — BH INPATIENT PSYCHIATRY PROGRESS NOTE - NSBHASSESSSUMMFT_PSY_ALL_CORE
Patient is 24yo female, unemployed, single, not in school, domiciled with mom, PPH of schizoaffective disorder with symptom onset recently in December 2019, 3 past inpatient psych hospitalizations (most recent 10/12-11/20/20 at [facility] for psychosis), followed by Dr. Loya at [facility] but pt currently noncompliant with medications, no h/o suicide attempts, no h/o violence, no PMH, no h/o substance abuse, brought in by mother for talking/laughing to herself, yelling, not sleeping, med noncompliance.    1-Continue Abilify 10mg PO at bedtime with plan for EDGAR  2-Group therapy  3-Monitor PO intake, nutrition consult placed

## 2020-12-25 PROCEDURE — 99231 SBSQ HOSP IP/OBS SF/LOW 25: CPT

## 2020-12-25 RX ADMIN — Medication 500 MILLIGRAM(S): at 20:59

## 2020-12-25 RX ADMIN — ARIPIPRAZOLE 10 MILLIGRAM(S): 15 TABLET ORAL at 20:59

## 2020-12-25 RX ADMIN — Medication 2 MILLIGRAM(S): at 12:41

## 2020-12-25 RX ADMIN — Medication 500 MILLIGRAM(S): at 09:13

## 2020-12-25 NOTE — BH INPATIENT PSYCHIATRY PROGRESS NOTE - NSBHFUPINTERVALHXFT_PSY_A_CORE
Patient seen for follow up for psychosis, patient remains guarded, disorganized, limited engagement with interview. Talking to self, laughing and smiling inappropriate to context during exam. Pt denies SI/HI/I/P or AH/VH or paranoia, though they are suspected given presentation.  Pt reports she slept well last night.  Pt reports she has not been eating, but is drinking ensure, informed patient I would order going forward. Pt was compliant with Abilify and Keflex.  Pt appears internally preoccupied and is observed laughing and talking to herself on the unit.  Pt sits in the periphery of groups, but does not participate.  Pt with poor self care.

## 2020-12-25 NOTE — BH INPATIENT PSYCHIATRY PROGRESS NOTE - NSBHASSESSSUMMFT_PSY_ALL_CORE
Patient is 26yo female, unemployed, single, not in school, domiciled with mom, PPH of schizoaffective disorder with symptom onset recently in December 2019, 3 past inpatient psych hospitalizations (most recent 10/12-11/20/20 at [facility] for psychosis), followed by Dr. Loya at [facility] but pt currently noncompliant with medications, no h/o suicide attempts, no h/o violence, no PMH, no h/o substance abuse, brought in by mother for talking/laughing to herself, yelling, not sleeping, med noncompliance.    1-Continue Abilify 10mg PO at bedtime with plan for EDGAR  2-Group therapy  3-Monitor PO intake, nutrition consult placed, ordered for ensure

## 2020-12-25 NOTE — BH INPATIENT PSYCHIATRY PROGRESS NOTE - OTHER
Guarded appears internally preoccupied and is responding to internal stimuli on the unit impoverished odd blunted

## 2020-12-26 PROCEDURE — 99232 SBSQ HOSP IP/OBS MODERATE 35: CPT

## 2020-12-26 RX ORDER — ARIPIPRAZOLE 15 MG/1
15 TABLET ORAL AT BEDTIME
Refills: 0 | Status: DISCONTINUED | OUTPATIENT
Start: 2020-12-26 | End: 2020-12-29

## 2020-12-26 RX ADMIN — Medication 500 MILLIGRAM(S): at 09:34

## 2020-12-26 RX ADMIN — ARIPIPRAZOLE 15 MILLIGRAM(S): 15 TABLET ORAL at 20:56

## 2020-12-26 RX ADMIN — Medication 500 MILLIGRAM(S): at 20:55

## 2020-12-26 NOTE — BH INPATIENT PSYCHIATRY PROGRESS NOTE - NSBHASSESSSUMMFT_PSY_ALL_CORE
Patient is 26yo female, unemployed, single, not in school, domiciled with mom, PPH of schizoaffective disorder with symptom onset recently in December 2019, 3 past inpatient psych hospitalizations (most recent 10/12-11/20/20 at [facility] for psychosis), followed by Dr. Loya at [facility] but pt currently noncompliant with medications, no h/o suicide attempts, no h/o violence, no PMH, no h/o substance abuse, brought in by mother for talking/laughing to herself, yelling, not sleeping, med noncompliance.    1- will increase Abilify to 15mg PO at bedtime with plan for EDGAR to start at 15mg 12/26  2-Group therapy  3-Monitor PO intake, nutrition consult placed, ordered for ensure

## 2020-12-26 NOTE — BH INPATIENT PSYCHIATRY PROGRESS NOTE - OTHER
odd blunted Guarded appears internally preoccupied and is responding to internal stimuli on the unit impoverished

## 2020-12-26 NOTE — BH INPATIENT PSYCHIATRY PROGRESS NOTE - NSCGISEVERILLNESS_PSY_ALL_CORE
6 = Severely ill - disruptive pathology, behavior and function are frequently influenced by symptoms, may require assistance from others

## 2020-12-26 NOTE — BH INPATIENT PSYCHIATRY PROGRESS NOTE - NSBHFUPINTERVALHXFT_PSY_A_CORE
Patient seen for follow up for psychosis, patient remains guarded, disorganized, limited engagement with interview. Talking to self, laughing and smiling inappropriate to context during exam. Pt denies SI/HI/I/P or AH/VH or paranoia, though they are highly suspected given presentation.  Pt reports she slept well last night.  Pt reports she continues to have limited appetite and is not eating much, but is drinking ensure. Pt was compliant with Abilify and Keflex.  Pt appears internally preoccupied and is observed laughing and talking to herself on the unit appears to be conversing with internal stimuli at one point during interview said "oh my god an imaginary friend at age 25!" but would not elaborate on this when asked to. Pt sits in the periphery of groups, but does not participate.  Pt with poor self care.

## 2020-12-27 RX ADMIN — ARIPIPRAZOLE 15 MILLIGRAM(S): 15 TABLET ORAL at 21:58

## 2020-12-28 PROCEDURE — 99232 SBSQ HOSP IP/OBS MODERATE 35: CPT

## 2020-12-28 RX ADMIN — ARIPIPRAZOLE 15 MILLIGRAM(S): 15 TABLET ORAL at 21:22

## 2020-12-28 NOTE — BH INPATIENT PSYCHIATRY PROGRESS NOTE - NSBHFUPINTERVALHXFT_PSY_A_CORE
Patient was bizarre on interview, reported that she was not sure why she was admitted, and initially told writer that she did not know the date, time, until writer asked at least three times. Patient was thought  blocked, internally preoccupied. Patient with poverty of content/ speech/ thoughts. Patient reported fair sleep and appetite. AS per staff, patient remaining internally preoccupied.

## 2020-12-28 NOTE — BH INPATIENT PSYCHIATRY PROGRESS NOTE - OTHER
appears internally preoccupied and is responding to internal stimuli on the unit impoverished odd blunted Guarded "good"

## 2020-12-28 NOTE — BH INPATIENT PSYCHIATRY PROGRESS NOTE - NSBHASSESSSUMMFT_PSY_ALL_CORE
Patient is 24yo female, unemployed, single, not in school, domiciled with mom, PPH of schizoaffective disorder with symptom onset recently in December 2019, 3 past inpatient psych hospitalizations (most recent 10/12-11/20/20 at [facility] for psychosis), followed by Dr. Loya at [facility] but pt currently noncompliant with medications, no h/o suicide attempts, no h/o violence, no PMH, no h/o substance abuse, brought in by mother for talking/laughing to herself, yelling, not sleeping, med noncompliance. Patient remaining bizarre, oddly related, with poverty of thoughts, speech, content  1. c/w Abilify 15mg at bedtime  2. c/w groups, milieu

## 2020-12-29 PROCEDURE — 99232 SBSQ HOSP IP/OBS MODERATE 35: CPT

## 2020-12-29 RX ORDER — ARIPIPRAZOLE 15 MG/1
20 TABLET ORAL AT BEDTIME
Refills: 0 | Status: DISCONTINUED | OUTPATIENT
Start: 2020-12-29 | End: 2020-12-30

## 2020-12-29 NOTE — BH INPATIENT PSYCHIATRY PROGRESS NOTE - NSBHASSESSSUMMFT_PSY_ALL_CORE
Patient is 26yo female, unemployed, single, not in school, domiciled with mom, PPH of schizoaffective disorder with symptom onset recently in December 2019, 3 past inpatient psych hospitalizations (most recent 10/12-11/20/20 at [facility] for psychosis), followed by Dr. Loya at [facility] but pt currently noncompliant with medications, no h/o suicide attempts, no h/o violence, no PMH, no h/o substance abuse, brought in by mother for talking/laughing to herself, yelling, not sleeping, med noncompliance. Patient remaining bizarre, oddly related, with poverty of thoughts, speech, content  1. c/w Abilify, increase to 20 mg at bedtime  2. c/w groups, milieu

## 2020-12-29 NOTE — BH INPATIENT PSYCHIATRY PROGRESS NOTE - NSBHFUPINTERVALHXFT_PSY_A_CORE
Patient is bizarre, oddly related and answers with one or two word answers to questions. Patient observed laughing to self, internally preoccupied, talking to self. Patient with poverty of content and speech, thoughts. Patient not able to remember if she was on other medications. Patient reported fair sleep and appetite. Patient agreed to increase in Abilify. Patient keeps to self on the unit, without any interaction with peers, not able to attend groups.

## 2020-12-29 NOTE — DIETITIAN INITIAL EVALUATION ADULT. - OTHER INFO
Seen Pt in room, acting bizarre, smiling. Reports fair appetite/PO intake at present. Denies GI distress. Obtained food preferences, will implement them. Encouraged PO intake, Pt verbalized fair understanding

## 2020-12-29 NOTE — BH INPATIENT PSYCHIATRY PROGRESS NOTE - OTHER
appears internally preoccupied and is responding to internal stimuli on the unit odd blunted Guarded "good" impoverished

## 2020-12-30 PROCEDURE — 99232 SBSQ HOSP IP/OBS MODERATE 35: CPT

## 2020-12-30 RX ORDER — ARIPIPRAZOLE 15 MG/1
20 TABLET ORAL ONCE
Refills: 0 | Status: COMPLETED | OUTPATIENT
Start: 2020-12-30 | End: 2020-12-30

## 2020-12-30 RX ORDER — ARIPIPRAZOLE 15 MG/1
20 TABLET ORAL AT BEDTIME
Refills: 0 | Status: DISCONTINUED | OUTPATIENT
Start: 2020-12-30 | End: 2021-01-04

## 2020-12-30 NOTE — BH INPATIENT PSYCHIATRY PROGRESS NOTE - OTHER
appears internally preoccupied and is responding to internal stimuli on the unit odd, blunted Guarded "good" impoverished

## 2020-12-30 NOTE — BH INPATIENT PSYCHIATRY PROGRESS NOTE - NSBHASSESSSUMMFT_PSY_ALL_CORE
Patient is 26yo female, unemployed, single, not in school, domiciled with mom, PPH of schizoaffective disorder with symptom onset recently in December 2019, 3 past inpatient psych hospitalizations (most recent 10/12-11/20/20 at [facility] for psychosis), followed by Dr. Loya at [facility] but pt currently noncompliant with medications, no h/o suicide attempts, no h/o violence, no PMH, no h/o substance abuse, brought in by mother for talking/laughing to herself, yelling, not sleeping, med noncompliance. Patient remaining bizarre, oddly related, with poverty of thoughts, speech, content  1. c/w Abilify, increase to 20 mg at bedtime  2. c/w groups, milieu

## 2020-12-30 NOTE — BH INPATIENT PSYCHIATRY PROGRESS NOTE - NSBHFUPINTERVALHXFT_PSY_A_CORE
Patient reported that she likes to pace back and forth as it calms her. Denied akathisia. Patient observed to be internally preoccupied, responding to internal stimuli. Patient refused Abilify last night and this morning. Patient encouraged to take medications. Patient observed to be isolative in the milieu, keeping to self. Patient not sleeping well and with fair appetite.

## 2020-12-31 PROCEDURE — 99232 SBSQ HOSP IP/OBS MODERATE 35: CPT

## 2020-12-31 RX ORDER — ARIPIPRAZOLE 15 MG/1
20 TABLET ORAL ONCE
Refills: 0 | Status: COMPLETED | OUTPATIENT
Start: 2020-12-31 | End: 2020-12-31

## 2020-12-31 NOTE — BH INPATIENT PSYCHIATRY PROGRESS NOTE - NSBHASSESSSUMMFT_PSY_ALL_CORE
Patient is 26yo female, unemployed, single, not in school, domiciled with mom, PPH of schizoaffective disorder with symptom onset recently in December 2019, 3 past inpatient psych hospitalizations (most recent 10/12-11/20/20 at [facility] for psychosis), followed by Dr. Loya at [facility] but pt currently noncompliant with medications, no h/o suicide attempts, no h/o violence, no PMH, no h/o substance abuse, brought in by mother for talking/laughing to herself, yelling, not sleeping, med noncompliance. Patient remaining bizarre, oddly related, with poverty of thoughts, speech, content  1. c/w Abilify 20 mg at bedtime, encourage adherence  2. c/w groups, milieu  3. if patient continue to refuse, will pursue tx over objection

## 2020-12-31 NOTE — BH INPATIENT PSYCHIATRY PROGRESS NOTE - NSBHFUPINTERVALHXFT_PSY_A_CORE
Patient is laughing to self, internally preoccupied, not able to participate in a meaningful interview, disorganized. Patient with poor self care, has been observed eating and with poor sleep. Patient refusing medications despite encouragement.

## 2020-12-31 NOTE — BH INPATIENT PSYCHIATRY PROGRESS NOTE - OTHER
odd, blunted Guarded "good" appears internally preoccupied and is responding to internal stimuli on the unit impoverished

## 2021-01-01 RX ADMIN — ARIPIPRAZOLE 20 MILLIGRAM(S): 15 TABLET ORAL at 20:21

## 2021-01-02 RX ADMIN — ARIPIPRAZOLE 20 MILLIGRAM(S): 15 TABLET ORAL at 20:11

## 2021-01-03 RX ADMIN — ARIPIPRAZOLE 20 MILLIGRAM(S): 15 TABLET ORAL at 21:07

## 2021-01-04 PROCEDURE — 99232 SBSQ HOSP IP/OBS MODERATE 35: CPT

## 2021-01-04 RX ORDER — ARIPIPRAZOLE 15 MG/1
25 TABLET ORAL AT BEDTIME
Refills: 0 | Status: DISCONTINUED | OUTPATIENT
Start: 2021-01-04 | End: 2021-01-05

## 2021-01-04 RX ADMIN — ARIPIPRAZOLE 25 MILLIGRAM(S): 15 TABLET ORAL at 21:31

## 2021-01-04 NOTE — BH INPATIENT PSYCHIATRY PROGRESS NOTE - NSBHFUPINTERVALHXFT_PSY_A_CORE
Patient observed to talking to self in the milieu, denied command AH. Patient laughing to self, keeping to herself in the milieu, unable to tolerate interview. Patient with fair sleep and appetite.

## 2021-01-04 NOTE — BH INPATIENT PSYCHIATRY PROGRESS NOTE - OTHER
Guarded disorganized ramblings odd, blunted appears internally preoccupied and is responding to internal stimuli on the unit impoverished "good"

## 2021-01-04 NOTE — BH INPATIENT PSYCHIATRY PROGRESS NOTE - NSBHASSESSSUMMFT_PSY_ALL_CORE
Patient is 26yo female, unemployed, single, not in school, domiciled with mom, PPH of schizoaffective disorder with symptom onset recently in December 2019, 3 past inpatient psych hospitalizations (most recent 10/12-11/20/20 at [facility] for psychosis), followed by Dr. Loya at [facility] but pt currently noncompliant with medications, no h/o suicide attempts, no h/o violence, no PMH, no h/o substance abuse, brought in by mother for talking/laughing to herself, yelling, not sleeping, med noncompliance. Patient remaining bizarre, oddly related, with poverty of thoughts, speech, content  1.increase Abilify to 25 mg at bedtime, encourage adherence  2. c/w groups, milieu

## 2021-01-05 PROCEDURE — 99232 SBSQ HOSP IP/OBS MODERATE 35: CPT

## 2021-01-05 RX ORDER — ARIPIPRAZOLE 15 MG/1
30 TABLET ORAL AT BEDTIME
Refills: 0 | Status: DISCONTINUED | OUTPATIENT
Start: 2021-01-05 | End: 2021-01-08

## 2021-01-05 RX ADMIN — Medication 2 MILLIGRAM(S): at 17:03

## 2021-01-05 RX ADMIN — ARIPIPRAZOLE 30 MILLIGRAM(S): 15 TABLET ORAL at 20:57

## 2021-01-05 RX ADMIN — Medication 50 MILLIGRAM(S): at 17:02

## 2021-01-05 RX ADMIN — FLUPHENAZINE HYDROCHLORIDE 5 MILLIGRAM(S): 1 TABLET, FILM COATED ORAL at 17:02

## 2021-01-05 NOTE — BH INPATIENT PSYCHIATRY PROGRESS NOTE - OTHER
Guarded "good" appears internally preoccupied and is responding to internal stimuli on the unit disorganized ramblings odd, blunted impoverished

## 2021-01-05 NOTE — BH INPATIENT PSYCHIATRY PROGRESS NOTE - NSBHFUPINTERVALHXFT_PSY_A_CORE
Patient remains disorganized, internally preoccupied, talking to herself loudly while pacing the unit intermittently, sleeping at intervals last night. Patient requiring redirection from staff for meals, hygiene. Took Abilify last night.

## 2021-01-05 NOTE — BH INPATIENT PSYCHIATRY PROGRESS NOTE - NSBHASSESSSUMMFT_PSY_ALL_CORE
Patient is 26yo female, unemployed, single, not in school, domiciled with mom, PPH of schizoaffective disorder with symptom onset recently in December 2019, 3 past inpatient psych hospitalizations (most recent 10/12-11/20/20 at [facility] for psychosis), followed by Dr. Loya at [facility] but pt currently noncompliant with medications, no h/o suicide attempts, no h/o violence, no PMH, no h/o substance abuse, brought in by mother for talking/laughing to herself, yelling, not sleeping, med noncompliance. Patient remaining bizarre, oddly related, with poverty of thoughts, speech, content; Patient observed pacing and talking to herself loudly on the unit.   1.increase Abilify to 30 mg at bedtime, encourage adherence  2. c/w groups, milieu

## 2021-01-06 PROCEDURE — 99232 SBSQ HOSP IP/OBS MODERATE 35: CPT

## 2021-01-06 RX ADMIN — ARIPIPRAZOLE 30 MILLIGRAM(S): 15 TABLET ORAL at 21:29

## 2021-01-06 NOTE — BH INPATIENT PSYCHIATRY PROGRESS NOTE - NSBHASSESSSUMMFT_PSY_ALL_CORE
Patient is 24yo female, unemployed, single, not in school, domiciled with mom, PPH of schizoaffective disorder with symptom onset recently in December 2019, 3 past inpatient psych hospitalizations (most recent 10/12-11/20/20 at [facility] for psychosis), followed by Dr. Loya at [facility] but pt currently noncompliant with medications, no h/o suicide attempts, no h/o violence, no PMH, no h/o substance abuse, brought in by mother for talking/laughing to herself, yelling, not sleeping, med noncompliance. Patient remaining bizarre, oddly related, with poverty of thoughts, speech, content; Patient observed pacing and talking to herself loudly on the unit.  Patient able to sit for interview, at longer intervals, remaining disorganized.   1.increase Abilify to 30 mg at bedtime, encourage adherence  2. c/w groups, milieu

## 2021-01-06 NOTE — BH INPATIENT PSYCHIATRY PROGRESS NOTE - NSBHFUPINTERVALHXFT_PSY_A_CORE
Patient reported feeling "Good", and today patient was able to tolerate interview at a longer intervals than previous days. Patient remains disorganized, did not know it was 2021, stating that "it hasn't been xmas yet". Denied SE to meds. AS per staff, patient refusing to leave another patients' bed yesterday, accepted prn medications. Patient with fair sleep, appetite. Patient at this time is refusing EDGAR.

## 2021-01-06 NOTE — BH INPATIENT PSYCHIATRY PROGRESS NOTE - OTHER
appears internally preoccupied and is responding to internal stimuli on the unit disorganized ramblings impoverished Guarded "good" odd, blunted

## 2021-01-07 PROCEDURE — 99232 SBSQ HOSP IP/OBS MODERATE 35: CPT

## 2021-01-07 RX ORDER — HALOPERIDOL DECANOATE 100 MG/ML
2.5 INJECTION INTRAMUSCULAR DAILY
Refills: 0 | Status: DISCONTINUED | OUTPATIENT
Start: 2021-01-08 | End: 2021-01-08

## 2021-01-07 RX ORDER — HALOPERIDOL DECANOATE 100 MG/ML
5 INJECTION INTRAMUSCULAR AT BEDTIME
Refills: 0 | Status: DISCONTINUED | OUTPATIENT
Start: 2021-01-07 | End: 2021-01-12

## 2021-01-07 RX ORDER — LANOLIN ALCOHOL/MO/W.PET/CERES
3 CREAM (GRAM) TOPICAL AT BEDTIME
Refills: 0 | Status: DISCONTINUED | OUTPATIENT
Start: 2021-01-07 | End: 2021-01-12

## 2021-01-07 RX ADMIN — HALOPERIDOL DECANOATE 5 MILLIGRAM(S): 100 INJECTION INTRAMUSCULAR at 21:35

## 2021-01-07 RX ADMIN — ARIPIPRAZOLE 30 MILLIGRAM(S): 15 TABLET ORAL at 21:34

## 2021-01-07 RX ADMIN — Medication 3 MILLIGRAM(S): at 21:34

## 2021-01-07 NOTE — BH INPATIENT PSYCHIATRY PROGRESS NOTE - NSBHFUPINTERVALHXFT_PSY_A_CORE
Patient reported feeling "good", able to sit down for interview, however was having difficulty focusing on the interview. Patient observed to be talking to herself, loudly while pacing on the unit at intervals. Patient sleeping at intervals. Patient with fair to low appetite. STaff reported that patient was able to drink ensure and is drinking juice and eating certain finger foods. Patient remains disorganized, thought it is still 2020.

## 2021-01-07 NOTE — BH INPATIENT PSYCHIATRY PROGRESS NOTE - NSBHASSESSSUMMFT_PSY_ALL_CORE
Patient is 24yo female, unemployed, single, not in school, domiciled with mom, PPH of schizoaffective disorder with symptom onset recently in December 2019, 3 past inpatient psych hospitalizations (most recent 10/12-11/20/20 at [facility] for psychosis), followed by Dr. Loya at [facility] but pt currently noncompliant with medications, no h/o suicide attempts, no h/o violence, no PMH, no h/o substance abuse, brought in by mother for talking/laughing to herself, yelling, not sleeping, med noncompliance. Patient remaining bizarre, oddly related, with poverty of thoughts, speech, content; Patient observed pacing and talking to herself loudly on the unit.  Patient able to sit for interview, at longer intervals, still remaining disorganized.   1.increase Abilify to 30 mg at bedtime, encourage adherence, add Melatonin 3mg hs  2. c/w groups, milieu

## 2021-01-07 NOTE — BH INPATIENT PSYCHIATRY PROGRESS NOTE - OTHER
"good" impoverished odd, blunted appears internally preoccupied and is responding to internal stimuli on the unit disorganized disorganized ramblings

## 2021-01-08 PROCEDURE — 99232 SBSQ HOSP IP/OBS MODERATE 35: CPT

## 2021-01-08 RX ORDER — ARIPIPRAZOLE 15 MG/1
25 TABLET ORAL AT BEDTIME
Refills: 0 | Status: DISCONTINUED | OUTPATIENT
Start: 2021-01-08 | End: 2021-01-09

## 2021-01-08 RX ORDER — HALOPERIDOL DECANOATE 100 MG/ML
5 INJECTION INTRAMUSCULAR DAILY
Refills: 0 | Status: DISCONTINUED | OUTPATIENT
Start: 2021-01-09 | End: 2021-01-12

## 2021-01-08 RX ADMIN — HALOPERIDOL DECANOATE 2.5 MILLIGRAM(S): 100 INJECTION INTRAMUSCULAR at 10:44

## 2021-01-08 NOTE — BH INPATIENT PSYCHIATRY PROGRESS NOTE - OTHER
odd, blunted disorganized "good" appears internally preoccupied and is responding to internal stimuli on the unit disorganized ramblings impoverished

## 2021-01-08 NOTE — BH INPATIENT PSYCHIATRY PROGRESS NOTE - NSBHFUPINTERVALHXFT_PSY_A_CORE
Patient continues to be disorganized, however able to tolerate interview at longer intervals than previously. Patient continues to talk to self, pacing the halls, heard cursing. Patient eating better and sleeping on intervals. denied SE from haldol such as tremors, stiffness. Patient adherent to medications.

## 2021-01-08 NOTE — BH INPATIENT PSYCHIATRY PROGRESS NOTE - NSBHASSESSSUMMFT_PSY_ALL_CORE
Patient is 26yo female, unemployed, single, not in school, domiciled with mom, PPH of schizoaffective disorder with symptom onset recently in December 2019, 3 past inpatient psych hospitalizations (most recent 10/12-11/20/20 at [facility] for psychosis), followed by Dr. Loya at [facility] but pt currently noncompliant with medications, no h/o suicide attempts, no h/o violence, no PMH, no h/o substance abuse, brought in by mother for talking/laughing to herself, yelling, not sleeping, med noncompliance. Patient remaining bizarre, oddly related, with poverty of thoughts, speech, content; Patient observed pacing and talking to herself loudly on the unit.  Patient able to sit for interview, at longer intervals, still remaining disorganized, delusional, responding to internal stimuli.   1.tapering Abilify, decrease to 25 mg at bedtime--poor efficacy, encourage adherence, add Melatonin 3mg hs, added Haldol, increased to 5mg bid  2. c/w groups, milieu

## 2021-01-09 PROCEDURE — 99232 SBSQ HOSP IP/OBS MODERATE 35: CPT

## 2021-01-09 RX ORDER — ARIPIPRAZOLE 15 MG/1
20 TABLET ORAL AT BEDTIME
Refills: 0 | Status: DISCONTINUED | OUTPATIENT
Start: 2021-01-09 | End: 2021-01-10

## 2021-01-09 RX ADMIN — Medication 2 MILLIGRAM(S): at 00:21

## 2021-01-09 NOTE — BH INPATIENT PSYCHIATRY PROGRESS NOTE - NSBHASSESSSUMMFT_PSY_ALL_CORE
Patient is 26yo female, unemployed, single, not in school, domiciled with mom, PPH of schizoaffective disorder with symptom onset recently in December 2019, 3 past inpatient psych hospitalizations (most recent 10/12-11/20/20 at [facility] for psychosis), followed by Dr. Loya at [facility] but pt currently noncompliant with medications, no h/o suicide attempts, no h/o violence, no PMH, no h/o substance abuse, brought in by mother for talking/laughing to herself, yelling, not sleeping, med noncompliance. Patient remaining bizarre, oddly related, with poverty of thoughts, speech, content; Patient observed pacing and talking to herself loudly on the unit.  Patient able to sit for interview, at longer intervals, still remaining disorganized, delusional, responding to internal stimuli.   1.tapering Abilify, will decrease further tonight since patient non-compliant and obviously ineffective for managing sx-poor efficacy, encourage adherence, add Melatonin 3mg hs, added Haldol, increased to 5mg bid  2. c/w groups, milieu

## 2021-01-09 NOTE — BH INPATIENT PSYCHIATRY PROGRESS NOTE - OTHER
disorganized ramblings, conversing with AH disorganized appears internally preoccupied and is responding to internal stimuli on the unit odd, blunted impoverished

## 2021-01-09 NOTE — BH INPATIENT PSYCHIATRY PROGRESS NOTE - NSBHFUPINTERVALHXFT_PSY_A_CORE
Patient isolative to room, initially agreeable to interview but had difficulty participating due to apparently constant AH, which patient was frequently yelling at during interview.  Denies CAH at present. Patient refused all standing meds last night but was unable to meaningfully state why, though she did take PO ativan PRN at midnight and slept after that. Patient denies SI/HI, patient terminated interview early and began yelling and threatening voices (not writer).

## 2021-01-10 PROCEDURE — 99232 SBSQ HOSP IP/OBS MODERATE 35: CPT

## 2021-01-10 NOTE — BH INPATIENT PSYCHIATRY PROGRESS NOTE - NSBHFUPINTERVALHXFT_PSY_A_CORE
Patient seen for f/u for psychosis, patient better able to tolerate interview today, though is clearly actively hallucinating and responding to voices, patient has been non-compliant with all meds the last 2 days, but discussed compliance again today and after explanation of benefits of Haldol patient expressed willingness to take it tonight. Denies CAH at present. Slept ok last night, Patient denies SI/HI, reports mood is "fine"

## 2021-01-10 NOTE — BH INPATIENT PSYCHIATRY PROGRESS NOTE - OTHER
disorganized appears internally preoccupied and is responding to internal stimuli on the unit impoverished odd, blunted "fine" disorganized ramblings, conversing with AH

## 2021-01-10 NOTE — BH INPATIENT PSYCHIATRY PROGRESS NOTE - NSBHASSESSSUMMFT_PSY_ALL_CORE
Patient is 26yo female, unemployed, single, not in school, domiciled with mom, PPH of schizoaffective disorder with symptom onset recently in December 2019, 3 past inpatient psych hospitalizations (most recent 10/12-11/20/20 at [facility] for psychosis), followed by Dr. Loya at [facility] but pt currently noncompliant with medications, no h/o suicide attempts, no h/o violence, no PMH, no h/o substance abuse, brought in by mother for talking/laughing to herself, yelling, not sleeping, med noncompliance. Patient remaining bizarre, oddly related, with poverty of thoughts, speech, content; Patient observed pacing and talking to herself loudly on the unit.  Patient able to sit for interview, at longer intervals, still remaining disorganized, delusional, responding to internal stimuli.     - As patient has been non-compliant with Abilify x2 days, will dc Abilify in order to focus compliance efforts on Haldol which patient did say she would take on 1/10 after discussing risk/benefit. c/w Melatonin 3mg hs,   - c/w groups, milieu

## 2021-01-10 NOTE — BH INPATIENT PSYCHIATRY PROGRESS NOTE - NSBHMSESPABN_PSY_A_CORE
Decreased productivity/Increased latency
Loud volume/Increased latency/Other
Loud volume/Increased latency/Other
Decreased productivity/Increased latency
Loud volume/Increased latency/Other
Decreased productivity/Increased latency
Loud volume/Increased latency/Other
Soft volume/Decreased productivity/Increased latency
Loud volume/Increased latency/Other
Decreased productivity/Increased latency
Loud volume/Increased latency/Other
Loud volume/Increased latency/Other

## 2021-01-11 PROCEDURE — 99232 SBSQ HOSP IP/OBS MODERATE 35: CPT

## 2021-01-11 RX ADMIN — HALOPERIDOL DECANOATE 5 MILLIGRAM(S): 100 INJECTION INTRAMUSCULAR at 09:28

## 2021-01-11 NOTE — BH INPATIENT PSYCHIATRY PROGRESS NOTE - NSBHASSESSSUMMFT_PSY_ALL_CORE
Patient is 24yo female, unemployed, single, not in school, domiciled with mom, PPH of schizoaffective disorder with symptom onset recently in December 2019, 3 past inpatient psych hospitalizations (most recent 10/12-11/20/20 at [facility] for psychosis), followed by Dr. Loya at [facility] but pt currently noncompliant with medications, no h/o suicide attempts, no h/o violence, no PMH, no h/o substance abuse, brought in by mother for talking/laughing to herself, yelling, not sleeping, med noncompliance. Patient remaining bizarre, oddly related, with poverty of thoughts, speech, content; Patient observed pacing and talking to herself loudly on the unit.  Patient able to sit for interview, at longer intervals, still remaining disorganized, delusional, responding to internal stimuli.     - Continue Haldol 5mg bid   - c/w groups, milieu

## 2021-01-11 NOTE — BH INPATIENT PSYCHIATRY PROGRESS NOTE - MSE UNSTRUCTURED FT
The patient appears stated age, fair hygiene, dressed appropriately.  She was calm, superficially cooperative with the interview.  She maintained limited eye contact.  No psychomotor agitation or retardation.  Steady gait.  The patient’s speech was fluent, normal in tone, rate, and volume.  The patient’s mood is “fine.”  Affect is constricted, stable, appropriate.  The patient’s thoughts are goal directed, but impoverished.  She denies any delusions or hallucinations, but is responding to internal stimuli.  She denies any suicidal or homicidal ideation, intent, or plan.  Insight is poor.  Judgment is impaired.  Impulse control has been fair on the unit.

## 2021-01-11 NOTE — BH INPATIENT PSYCHIATRY PROGRESS NOTE - NSCGISEVERILLNESS_PSY_ALL_CORE
5 = Markedly ill - intrusive symptoms that distinctly impair social/occupational function or cause intrusive levels of distress

## 2021-01-11 NOTE — BH INPATIENT PSYCHIATRY PROGRESS NOTE - NSBHFUPINTERVALHXFT_PSY_A_CORE
Patient seen for follow up for psychosis, chart reviewed, and case discussed with treatment team.  No events reported overnight.  Over the weekend, the patient started to refuse medications.  She could not say why.  Today, the patient did take Haldol with encouragement from staff.  The patient is agreeable to continue to take medications.  The patient is oddly related, often internally preoccupied, laughing and talking to self.  However, she denies AHs.  The patient is reported to be drinking, but does not eat a lot.  The patient reports sleeping well.

## 2021-01-12 PROCEDURE — 99232 SBSQ HOSP IP/OBS MODERATE 35: CPT

## 2021-01-12 RX ORDER — OLANZAPINE 15 MG/1
5 TABLET, FILM COATED ORAL AT BEDTIME
Refills: 0 | Status: DISCONTINUED | OUTPATIENT
Start: 2021-01-12 | End: 2021-01-14

## 2021-01-12 RX ADMIN — Medication 50 MILLIGRAM(S): at 02:43

## 2021-01-12 RX ADMIN — Medication 2 MILLIGRAM(S): at 02:43

## 2021-01-12 RX ADMIN — OLANZAPINE 5 MILLIGRAM(S): 15 TABLET, FILM COATED ORAL at 21:07

## 2021-01-12 NOTE — BH INPATIENT PSYCHIATRY PROGRESS NOTE - NSBHFUPINTERVALHXFT_PSY_A_CORE
Patient seen for follow up for psychosis, chart reviewed, and case discussed with treatment team.  No events reported overnight.  The patient refused medication again last night, and this morning.  She has no insight into her illness, and states she doesn't think she needs them.  The patient is seen Over the weekend, the patient started to refuse medications.  She could not say why.  Today, the patient did take Haldol with encouragement from staff.  The patient is agreeable to continue to take medications.  The patient is oddly related, often internally preoccupied, laughing and talking to self.  However, she denies AHs.  The patient is reported to be drinking, but does not eat a lot.  The patient reports sleeping well. Patient seen for follow up for psychosis, chart reviewed, and case discussed with treatment team.  No events reported overnight.  The patient refused medication again last night, and this morning.  She has no insight into her illness, and states she doesn't think she needs them.  The patient is seen on the unit, walking the halls internally preoccupied, constantly talking to herself.  She denies AHs, but does say she communicates with various people.  She is disorganized, tangential and loose in thought process.  The patient has not been sleeping well at night.  She is noted by staff to be eating at times, but just throws out her tray at other times.

## 2021-01-12 NOTE — BH INPATIENT PSYCHIATRY PROGRESS NOTE - MSE UNSTRUCTURED FT
The patient appears stated age, fair hygiene, dressed appropriately.  She was calm, superficially cooperative with the interview.  She maintained limited eye contact.  No psychomotor agitation or retardation.  Steady gait.  The patient’s speech was fluent, normal in tone, rate, and volume.  The patient’s mood is “fine.”  Affect is constricted, stable, appropriate.  The patient’s thoughts are goal directed, but impoverished.  She denies any delusions or hallucinations, but is responding to internal stimuli.  She denies any suicidal or homicidal ideation, intent, or plan.  Insight is poor.  Judgment is impaired.  Impulse control has been fair on the unit. The patient appears stated age, fair hygiene, dressed appropriately.  She was calm, makes attempts to be cooperative with the interview.  She maintained limited eye contact.  No psychomotor agitation or retardation.  Steady gait.  The patient’s speech was fluent, normal in tone, rate, and volume.  The patient’s mood is “okay.”  Affect is constricted, but reactive, laughing to self at times, stable, inappropriate.  The patient’s thoughts are goal directed, but tangential and loose at times.  She denies any delusions or hallucinations, but is responding to internal stimuli.  She denies any suicidal or homicidal ideation, intent, or plan.  Insight is poor.  Judgment is impaired.  Impulse control has been fair on the unit.

## 2021-01-12 NOTE — BH INPATIENT PSYCHIATRY PROGRESS NOTE - NSBHASSESSSUMMFT_PSY_ALL_CORE
Patient is 26yo female, unemployed, single, not in school, domiciled with mom, PPH of schizoaffective disorder with symptom onset recently in December 2019, 3 past inpatient psych hospitalizations (most recent 10/12-11/20/20 at [facility] for psychosis), followed by Dr. Loya at [facility] but pt currently noncompliant with medications, no h/o suicide attempts, no h/o violence, no PMH, no h/o substance abuse, brought in by mother for talking/laughing to herself, yelling, not sleeping, med noncompliance. Patient remaining bizarre, oddly related, with poverty of thoughts, speech, content; Patient observed pacing and talking to herself loudly on the unit.  Patient able to sit for interview, at longer intervals, still remaining disorganized, delusional, responding to internal stimuli.     - Continue Haldol 5mg bid   - c/w groups, milieu   Patient is 24yo female, unemployed, single, not in school, domiciled with mom, PPH of schizoaffective disorder with symptom onset recently in December 2019, 3 past inpatient psych hospitalizations (most recent 10/12-11/20/20 at [facility] for psychosis), followed by Dr. Loya at [facility] but pt currently noncompliant with medications, no h/o suicide attempts, no h/o violence, no PMH, no h/o substance abuse, brought in by mother for talking/laughing to herself, yelling, not sleeping, med noncompliance.    Patient remaining bizarre, oddly related, with poverty of thoughts, speech, content; Patient observed pacing and talking to herself loudly on the unit.  She has been noncompliant with medications.    - Continue to offer Haldol 5mg bid  - Will pursue treatment over objections  - c/w groups, milieu

## 2021-01-13 LAB
ALBUMIN SERPL ELPH-MCNC: 4.2 G/DL — SIGNIFICANT CHANGE UP (ref 3.3–5)
ALP SERPL-CCNC: 40 U/L — SIGNIFICANT CHANGE UP (ref 40–120)
ALT FLD-CCNC: 14 U/L — SIGNIFICANT CHANGE UP (ref 4–33)
ANION GAP SERPL CALC-SCNC: 13 MMOL/L — SIGNIFICANT CHANGE UP (ref 7–14)
AST SERPL-CCNC: 13 U/L — SIGNIFICANT CHANGE UP (ref 4–32)
BASOPHILS # BLD AUTO: 0.01 K/UL — SIGNIFICANT CHANGE UP (ref 0–0.2)
BASOPHILS NFR BLD AUTO: 0.2 % — SIGNIFICANT CHANGE UP (ref 0–2)
BILIRUB SERPL-MCNC: 0.9 MG/DL — SIGNIFICANT CHANGE UP (ref 0.2–1.2)
BUN SERPL-MCNC: 14 MG/DL — SIGNIFICANT CHANGE UP (ref 7–23)
CALCIUM SERPL-MCNC: 9.2 MG/DL — SIGNIFICANT CHANGE UP (ref 8.4–10.5)
CHLORIDE SERPL-SCNC: 109 MMOL/L — HIGH (ref 98–107)
CO2 SERPL-SCNC: 20 MMOL/L — LOW (ref 22–31)
CREAT SERPL-MCNC: 0.58 MG/DL — SIGNIFICANT CHANGE UP (ref 0.5–1.3)
EOSINOPHIL # BLD AUTO: 0 K/UL — SIGNIFICANT CHANGE UP (ref 0–0.5)
EOSINOPHIL NFR BLD AUTO: 0 % — SIGNIFICANT CHANGE UP (ref 0–6)
GLUCOSE SERPL-MCNC: 99 MG/DL — SIGNIFICANT CHANGE UP (ref 70–99)
HCT VFR BLD CALC: 41.9 % — SIGNIFICANT CHANGE UP (ref 34.5–45)
HGB BLD-MCNC: 13 G/DL — SIGNIFICANT CHANGE UP (ref 11.5–15.5)
IANC: 4.46 K/UL — SIGNIFICANT CHANGE UP (ref 1.5–8.5)
IMM GRANULOCYTES NFR BLD AUTO: 0.3 % — SIGNIFICANT CHANGE UP (ref 0–1.5)
LYMPHOCYTES # BLD AUTO: 1 K/UL — SIGNIFICANT CHANGE UP (ref 1–3.3)
LYMPHOCYTES # BLD AUTO: 16.2 % — SIGNIFICANT CHANGE UP (ref 13–44)
MCHC RBC-ENTMCNC: 26.5 PG — LOW (ref 27–34)
MCHC RBC-ENTMCNC: 31 GM/DL — LOW (ref 32–36)
MCV RBC AUTO: 85.3 FL — SIGNIFICANT CHANGE UP (ref 80–100)
MONOCYTES # BLD AUTO: 0.69 K/UL — SIGNIFICANT CHANGE UP (ref 0–0.9)
MONOCYTES NFR BLD AUTO: 11.2 % — SIGNIFICANT CHANGE UP (ref 2–14)
NEUTROPHILS # BLD AUTO: 4.46 K/UL — SIGNIFICANT CHANGE UP (ref 1.8–7.4)
NEUTROPHILS NFR BLD AUTO: 72.1 % — SIGNIFICANT CHANGE UP (ref 43–77)
NRBC # BLD: 0 /100 WBCS — SIGNIFICANT CHANGE UP
NRBC # FLD: 0 K/UL — SIGNIFICANT CHANGE UP
PLATELET # BLD AUTO: 183 K/UL — SIGNIFICANT CHANGE UP (ref 150–400)
POTASSIUM SERPL-MCNC: 3.9 MMOL/L — SIGNIFICANT CHANGE UP (ref 3.5–5.3)
POTASSIUM SERPL-SCNC: 3.9 MMOL/L — SIGNIFICANT CHANGE UP (ref 3.5–5.3)
PROT SERPL-MCNC: 6.2 G/DL — SIGNIFICANT CHANGE UP (ref 6–8.3)
RBC # BLD: 4.91 M/UL — SIGNIFICANT CHANGE UP (ref 3.8–5.2)
RBC # FLD: 14.4 % — SIGNIFICANT CHANGE UP (ref 10.3–14.5)
SODIUM SERPL-SCNC: 142 MMOL/L — SIGNIFICANT CHANGE UP (ref 135–145)
WBC # BLD: 6.18 K/UL — SIGNIFICANT CHANGE UP (ref 3.8–10.5)
WBC # FLD AUTO: 6.18 K/UL — SIGNIFICANT CHANGE UP (ref 3.8–10.5)

## 2021-01-13 PROCEDURE — 99232 SBSQ HOSP IP/OBS MODERATE 35: CPT

## 2021-01-13 RX ADMIN — OLANZAPINE 5 MILLIGRAM(S): 15 TABLET, FILM COATED ORAL at 22:05

## 2021-01-13 NOTE — BH INPATIENT PSYCHIATRY PROGRESS NOTE - NSBHASSESSSUMMFT_PSY_ALL_CORE
Patient is 24yo female, unemployed, single, not in school, domiciled with mom, PPH of schizoaffective disorder with symptom onset recently in December 2019, 3 past inpatient psych hospitalizations (most recent 10/12-11/20/20 at [facility] for psychosis), followed by Dr. Loya at [facility] but pt currently noncompliant with medications, no h/o suicide attempts, no h/o violence, no PMH, no h/o substance abuse, brought in by mother for talking/laughing to herself, yelling, not sleeping, med noncompliance.    Patient remaining bizarre, oddly related, internally preoccupied, talking to self.  She took Zyprexa last night, denies side effects.    - Haldol changed to Zyprexa, as patient was refusing it.  - Will pursue treatment over objections as patient has been inconsistent with medications.  - c/w groups, milieu

## 2021-01-13 NOTE — BH TREATMENT PLAN - NSTXPATIENTPARTICIPATE_PSY_ALL_CORE
Patient participated in identification of needs/problems/goals for treatment/Patient participated in defining interventions

## 2021-01-13 NOTE — BH CHART NOTE - NSEVENTNOTEFT_PSY_ALL_CORE
DIRECTOR OF INPATIENT PSYCHIATRY NOTE:  I have evaluated the patient’s need for medication over her objection in the presence of the LS  Ms. Stephanie Early over the phone, the risks and benefits of medication, and her ability to make a reasoned decision.      Briefly, the pt is a 26yo female, unemployed, single, not in school, domiciled with mom, PPH of schizoaffective disorder with symptom onset recently in December 2019, 3 past inpatient psych hospitalizations (most recent 10/12-11/20/20 at [facility] for psychosis), followed by Dr. Loya at [facility] but pt currently noncompliant with medications, no h/o suicide attempts, no h/o violence, no PMH, no h/o substance abuse, brought in by mother on 12/21/20 for talking/laughing to herself, yelling, not sleeping, med noncompliance.       On evaluation, the pt was found irritable and responding to internal stimuli.  Pt stated that she does not want to continue the meeting ended it prematurely.    She has been prescribed olanzapine which she took the first dose last night though reportedly threatened to stop taking it. She does not understand the extent of her illness.    It is my opinion that this patient currently experiences psychotic symptoms that are severely impacting her safety and ability to care for herself, and would likely benefit from antipsychotic medications.  Furthermore, it is my opinion that she lacks capacity to refuse antipsychotic therapy.  I have informed the patient of my decision and that unless she withdraws her objection to taking medications, we will make application to court for authorization to treat her over her objection. I have notified the patient and LS of this decision by letter.

## 2021-01-13 NOTE — BH INPATIENT PSYCHIATRY PROGRESS NOTE - NSBHFUPINTERVALHXFT_PSY_A_CORE
Patient seen for follow up for psychosis, chart reviewed, and case discussed with treatment team.  No events reported overnight.  The patient did take Zyprexa last night, denies any side effects.  She slept better with the medication.  She continues to be internally preoccupied, spending most of the time interacting with various voices.  She is somewhat labile, going from angry to laughing in response to her internal stimuli.  She continues t have no insight into her illness.  She remains disorganized, tangential and loose in thought process.  She has been eating better.

## 2021-01-13 NOTE — BH INPATIENT PSYCHIATRY PROGRESS NOTE - MSE UNSTRUCTURED FT
The patient appears stated age, fair hygiene, dressed appropriately.  She was calm, makes attempts to be cooperative with the interview, but becomes distracted.  She maintained limited eye contact.  No psychomotor agitation or retardation.  Steady gait.  The patient’s speech was fluent, normal in tone, rate, and volume.  The patient’s mood is “fine.”  Affect is constricted, but reactive, laughing to self at times, labile, inappropriate.  The patient’s thoughts are goal directed, but tangential and loose at times.  She denies any delusions or hallucinations, but is responding to internal stimuli.  She denies any suicidal or homicidal ideation, intent, or plan.  Insight is poor.  Judgment is impaired.  Impulse control has been fair on the unit.

## 2021-01-14 PROCEDURE — 99232 SBSQ HOSP IP/OBS MODERATE 35: CPT

## 2021-01-14 RX ORDER — OLANZAPINE 15 MG/1
10 TABLET, FILM COATED ORAL AT BEDTIME
Refills: 0 | Status: DISCONTINUED | OUTPATIENT
Start: 2021-01-14 | End: 2021-01-19

## 2021-01-14 NOTE — BH INPATIENT PSYCHIATRY PROGRESS NOTE - MSE UNSTRUCTURED FT
The patient appears stated age, fair hygiene, dressed appropriately.  She was calm, makes attempts to be cooperative with the interview, but becomes distracted.  She maintains limited eye contact.  No psychomotor agitation or retardation.  Steady gait.  The patient’s speech was fluent, normal in tone, rate, and volume.  The patient’s mood is “fine.”  Affect is constricted, but reactive, laughing to self at times, labile, inappropriate.  The patient’s thoughts are goal directed, but tangential and loose at times.  She denies any delusions or hallucinations, but is responding to internal stimuli.  She denies any suicidal or homicidal ideation, intent, or plan.  Insight is poor.  Judgment is impaired.  Impulse control has been fair on the unit.

## 2021-01-14 NOTE — BH INPATIENT PSYCHIATRY PROGRESS NOTE - NSBHFUPINTERVALHXFT_PSY_A_CORE
Patient seen for follow up for psychosis, chart reviewed, and case discussed with treatment team.  No events reported overnight.  The patient is largely unchanged.  She continues to be internally preoccupied, spending most of the time interacting with various voices.  She is isolative, without any socialization.  She continues to have no insight into her illness.  She remains disorganized, tangential and loose in thought process.  She is eating only intermittently.  She had insomnia again last night.  She did take Zyprexa again, denies any side effects.

## 2021-01-14 NOTE — MEDICAL LEGAL COORDINATOR PROGRESS NOTE - COMMENTS
On Thursday January 14, 2021, I served patient with a "OSC" where as the hospital is seeking to medicate patient over objection. I also placed a copy of "OSC" in patient medical records.

## 2021-01-14 NOTE — BH INPATIENT PSYCHIATRY PROGRESS NOTE - NSBHASSESSSUMMFT_PSY_ALL_CORE
Patient is 26yo female, unemployed, single, not in school, domiciled with mom, PPH of schizoaffective disorder with symptom onset recently in December 2019, 3 past inpatient psych hospitalizations (most recent 10/12-11/20/20 at [facility] for psychosis), followed by Dr. Loya at [facility] but pt currently noncompliant with medications, no h/o suicide attempts, no h/o violence, no PMH, no h/o substance abuse, brought in by mother for talking/laughing to herself, yelling, not sleeping, med noncompliance.    Patient remains bizarre, oddly related, internally preoccupied, talking to self.  She took Zyprexa last night, denies side effects.    - Increase Zyprexa to 10mg hs.  - Pursuing treatment over objections as patient has been inconsistent with medications.  - c/w groups, milieu

## 2021-01-15 PROCEDURE — 99231 SBSQ HOSP IP/OBS SF/LOW 25: CPT

## 2021-01-15 RX ADMIN — Medication 50 MILLIGRAM(S): at 23:38

## 2021-01-15 RX ADMIN — FLUPHENAZINE HYDROCHLORIDE 5 MILLIGRAM(S): 1 TABLET, FILM COATED ORAL at 23:38

## 2021-01-15 RX ADMIN — OLANZAPINE 10 MILLIGRAM(S): 15 TABLET, FILM COATED ORAL at 23:40

## 2021-01-15 NOTE — BH INPATIENT PSYCHIATRY PROGRESS NOTE - MSE UNSTRUCTURED FT
The patient appears stated age, fair hygiene, dressed appropriately.  She was calm, makes attempts to be cooperative with the interview, but is distracted by voices.  She maintains limited eye contact.  No psychomotor agitation or retardation.  Steady gait.  The patient’s speech was fluent, normal in tone, rate, and volume.  The patient’s mood is “okay.”  Affect is constricted, but reactive, angry at times, labile, inappropriate.  The patient’s thoughts are goal directed, but tangential and loose at times.  She denies any delusions, responds to constant auditory hallucinations.  She denies any suicidal or homicidal ideation, intent, or plan.  Insight is poor.  Judgment is impaired.  Impulse control has been fair on the unit.

## 2021-01-15 NOTE — BH INPATIENT PSYCHIATRY PROGRESS NOTE - NSBHASSESSSUMMFT_PSY_ALL_CORE
Patient is 24yo female, unemployed, single, not in school, domiciled with mom, PPH of schizoaffective disorder with symptom onset recently in December 2019, 3 past inpatient psych hospitalizations (most recent 10/12-11/20/20 at [facility] for psychosis), followed by Dr. Loya at [facility] but pt currently noncompliant with medications, no h/o suicide attempts, no h/o violence, no PMH, no h/o substance abuse, brought in by mother for talking/laughing to herself, yelling, not sleeping, med noncompliance.    Patient remains psychotic, responding constantly to internal stimuli, talking to self.  She has only been intermittently compliant with medications.    - Increased Zyprexa to 10mg hs.  - Pursuing treatment over objections as patient has been inconsistent with medications.  - c/w groups, milieu

## 2021-01-15 NOTE — BH INPATIENT PSYCHIATRY PROGRESS NOTE - NSBHFUPINTERVALHXFT_PSY_A_CORE
Patient seen for follow up for psychosis, chart reviewed, and case discussed with treatment team.  No events reported overnight.  The patient continues to be internally preoccupied, spending all of her time interacting with various voices, and talking to herself.  Sometimes she seems very upset by the voices.  The patient did not take Zyprexa last night, stating a voice told her it was not real, and that the staff are not real staff.  Education and reassurance provided, and patient stated she would take the medications tonight.  She remains isolative, without any socialization.  She continues to have no insight into her illness.  She remains disorganized, tangential and loose in thought process.  She is eating only intermittently.  She had insomnia again last night.

## 2021-01-16 PROCEDURE — 99231 SBSQ HOSP IP/OBS SF/LOW 25: CPT

## 2021-01-16 NOTE — BH INPATIENT PSYCHIATRY PROGRESS NOTE - MSE UNSTRUCTURED FT
The patient appears stated age, fair hygiene, dressed appropriately.  She was calm, makes attempts to be cooperative with the interview, but is distracted by voices.  She maintains limited eye contact.  No psychomotor agitation or retardation.  Steady gait.  The patient’s speech was fluent, normal in tone, rate, and volume.  The patient’s mood is “fine.”  Affect is constricted, but reactive, rather labile, inappropriate.  The patient’s thoughts are goal directed, but tangential and loose at times.  She denies any delusions, responds to constant auditory hallucinations.  She denies any suicidal or homicidal ideation, intent, or plan.  Insight is poor.  Judgment is impaired.  Impulse control has been fair on the unit.

## 2021-01-16 NOTE — BH INPATIENT PSYCHIATRY PROGRESS NOTE - NSBHASSESSSUMMFT_PSY_ALL_CORE
Patient is 24yo female, unemployed, single, not in school, domiciled with mom, PPH of schizoaffective disorder with symptom onset recently in December 2019, 3 past inpatient psych hospitalizations (most recent 10/12-11/20/20 at [facility] for psychosis), followed by Dr. Loya at [facility] but pt currently noncompliant with medications, no h/o suicide attempts, no h/o violence, no PMH, no h/o substance abuse, brought in by mother for talking/laughing to herself, yelling, not sleeping, med noncompliance.    Patient remains psychotic, responding constantly to internal stimuli, talking to self.  She has only been intermittently compliant with medications.    - Continue Zyprexa 10mg hs.  - Pursuing treatment over objections as patient has been inconsistent with medications.  - c/w groups, milieu

## 2021-01-16 NOTE — BH INPATIENT PSYCHIATRY PROGRESS NOTE - NSBHFUPINTERVALHXFT_PSY_A_CORE
Patient seen for follow up for psychosis, chart reviewed, and case discussed with treatment team.  No events reported overnight.  The patient is little changed/  She continues to be internally preoccupied, spending all of her time interacting with various voices, and talking to herself on the unit.  She superficially denies any problems when interviewed.  The patient did take Zyprexa last night, denies any problems.  She slept a little more last night.  She continues to have no insight into her illness.  She remains disorganized, tangential and loose in thought process.  She is eating only intermittently.

## 2021-01-17 PROCEDURE — 99231 SBSQ HOSP IP/OBS SF/LOW 25: CPT

## 2021-01-17 NOTE — BH INPATIENT PSYCHIATRY PROGRESS NOTE - NSBHFUPINTERVALHXFT_PSY_A_CORE
Patient seen for follow up for psychosis, chart reviewed, and case discussed with treatment team.  No events reported overnight.  The patient is little changed.  She continues to be internally preoccupied, spending all of her time interacting with various voices, and talking to herself on the unit.  She did not take medication again last night.  When asked why, she becomes upset and yells that she is a doctor.  She superficially denies any problems, no SI.  She continues to have no insight into her illness.  She remains disorganized, tangential and loose in thought process.  She is eating only intermittently.

## 2021-01-17 NOTE — BH INPATIENT PSYCHIATRY PROGRESS NOTE - MSE UNSTRUCTURED FT
The patient appears stated age, fair hygiene, dressed appropriately.  She was calm, makes attempts to be cooperative with the interview, but is distracted by voices.  She maintains limited eye contact.  No psychomotor agitation or retardation.  Steady gait.  The patient’s speech was fluent, normal in tone, rate, and volume.  The patient’s mood is “fine.”  Affect is constricted, but reactive, labile, inappropriate at times.  The patient’s thoughts are goal directed, but tangential and loose at times.  She has various delusions, responds to constant auditory hallucinations.  She denies any suicidal or homicidal ideation, intent, or plan.  Insight is poor.  Judgment is impaired.  Impulse control has been fair on the unit.

## 2021-01-17 NOTE — BH INPATIENT PSYCHIATRY PROGRESS NOTE - NSBHASSESSSUMMFT_PSY_ALL_CORE
Patient is 24yo female, unemployed, single, not in school, domiciled with mom, PPH of schizoaffective disorder with symptom onset recently in December 2019, 3 past inpatient psych hospitalizations (most recent 10/12-11/20/20 at [facility] for psychosis), followed by Dr. Loya at [facility] but pt currently noncompliant with medications, no h/o suicide attempts, no h/o violence, no PMH, no h/o substance abuse, brought in by mother for talking/laughing to herself, yelling, not sleeping, med noncompliance.    Patient remains psychotic, responding constantly to internal stimuli, and delusional thinking, talking to self.  She has only been intermittently compliant with medications.    - Continue Zyprexa 10mg hs.  - Pursuing treatment over objections as patient has been inconsistent with medications.  - c/w groups, milieu

## 2021-01-18 PROCEDURE — 99231 SBSQ HOSP IP/OBS SF/LOW 25: CPT

## 2021-01-18 RX ORDER — FLUPHENAZINE HYDROCHLORIDE 1 MG/1
5 TABLET, FILM COATED ORAL ONCE
Refills: 0 | Status: COMPLETED | OUTPATIENT
Start: 2021-01-18 | End: 2021-01-18

## 2021-01-18 RX ORDER — DIPHENHYDRAMINE HCL 50 MG
50 CAPSULE ORAL ONCE
Refills: 0 | Status: COMPLETED | OUTPATIENT
Start: 2021-01-18 | End: 2021-01-18

## 2021-01-18 RX ADMIN — OLANZAPINE 10 MILLIGRAM(S): 15 TABLET, FILM COATED ORAL at 21:33

## 2021-01-18 RX ADMIN — Medication 50 MILLIGRAM(S): at 04:16

## 2021-01-18 RX ADMIN — FLUPHENAZINE HYDROCHLORIDE 5 MILLIGRAM(S): 1 TABLET, FILM COATED ORAL at 04:16

## 2021-01-18 RX ADMIN — Medication 2 MILLIGRAM(S): at 04:16

## 2021-01-18 NOTE — BH INPATIENT PSYCHIATRY PROGRESS NOTE - NSBHFUPINTERVALHXFT_PSY_A_CORE
Patient seen for follow up for psychosis, chart reviewed, and case discussed with treatment team.  The patient refused medications again last night.  In the middle of the night, she became very loud, yelling, needing IM prn medications.  This morning, she is sedated, and not very cooperative with the interview.  The patient continues to be internally preoccupied, talking to herself.  She continues to be disorganized, and responds to delusions.  She continues to have no insight into her illness.  She is eating only intermittently.

## 2021-01-18 NOTE — BH INPATIENT PSYCHIATRY PROGRESS NOTE - MSE UNSTRUCTURED FT
The patient appears stated age, disheveled, dressed appropriately.  She was irritable, noncooperative with the interview.  She maintains limited eye contact.  No psychomotor agitation or retardation.  Steady gait.  The patient’s speech was fluent, irritable in tone, decreased in rate and normal in volume.  The patient’s mood is “okay.”  Affect is constricted, labile, inappropriate at times.  The patient’s thoughts are goal directed, but tangential and loose at times.  She has various delusions, responds to constant auditory hallucinations.  She denies any suicidal or homicidal ideation, intent, or plan.  Insight is poor.  Judgment is impaired.  Impulse control has been tenuous on the unit.

## 2021-01-18 NOTE — BH INPATIENT PSYCHIATRY PROGRESS NOTE - NSBHASSESSSUMMFT_PSY_ALL_CORE
Patient is 24yo female, unemployed, single, not in school, domiciled with mom, PPH of schizoaffective disorder with symptom onset recently in December 2019, 3 past inpatient psych hospitalizations (most recent 10/12-11/20/20 at [facility] for psychosis), followed by Dr. Loya at [facility] but pt currently noncompliant with medications, no h/o suicide attempts, no h/o violence, no PMH, no h/o substance abuse, brought in by mother for talking/laughing to herself, yelling, not sleeping, med noncompliance.    Patient remains psychotic, responding constantly to internal stimuli, and delusional thinking, talking to self.  She became agitated last night.  She has only been intermittently compliant with medications.    - Continue to offer Zyprexa 10mg hs.  - Pursuing treatment over objections as patient has been inconsistent with medications.  - c/w groups, milieu

## 2021-01-19 PROCEDURE — 99232 SBSQ HOSP IP/OBS MODERATE 35: CPT

## 2021-01-19 RX ORDER — OLANZAPINE 15 MG/1
15 TABLET, FILM COATED ORAL AT BEDTIME
Refills: 0 | Status: DISCONTINUED | OUTPATIENT
Start: 2021-01-19 | End: 2021-01-26

## 2021-01-19 NOTE — BH INPATIENT PSYCHIATRY PROGRESS NOTE - NSBHASSESSSUMMFT_PSY_ALL_CORE
Patient is 26yo female, unemployed, single, not in school, domiciled with mom, PPH of schizoaffective disorder with symptom onset recently in December 2019, 3 past inpatient psych hospitalizations (most recent 10/12-11/20/20 at [facility] for psychosis), followed by Dr. Loya at [facility] but pt currently noncompliant with medications, no h/o suicide attempts, no h/o violence, no PMH, no h/o substance abuse, brought in by mother for talking/laughing to herself, yelling, not sleeping, med noncompliance.    Patient remains psychotic, responding constantly to internal stimuli, and delusional thinking, talking to self.  Intermittently agitated.  She has only been intermittently compliant with medications.    - Increase Zyprexa to 15mg hs.  - Pursuing treatment over objections as patient has been inconsistent with medications.  - c/w groups, milieu

## 2021-01-19 NOTE — BH INPATIENT PSYCHIATRY PROGRESS NOTE - MSE UNSTRUCTURED FT
The patient appears stated age, disheveled, dressed appropriately.  She was calm, generally noncooperative with the interview, superficial.  She maintains limited eye contact.  No psychomotor agitation or retardation.  Steady gait.  The patient’s speech was fluent, normal in tone, rate and volume.  The patient’s mood is “fine.”  Affect is constricted, labile, inappropriate at times.  The patient’s thoughts are goal directed, but tangential and loose at times.  She has various delusions, responds to constant auditory hallucinations.  She denies any suicidal or homicidal ideation, intent, or plan.  Insight is poor.  Judgment is impaired.  Impulse control has been tenuous on the unit.

## 2021-01-19 NOTE — BH INPATIENT PSYCHIATRY PROGRESS NOTE - NSBHFUPINTERVALHXFT_PSY_A_CORE
Patient seen for follow up for psychosis, chart reviewed, and case discussed with treatment team.  The patient took medication last night with a lot of encouragement.  She did sleep better, and is somewhat brighter this morning.  However, she continues to be internally preoccupied, talking to herself throughout the day.  She superficially denies any problems.  She continues to be disorganized.  She continues to have no insight into her illness.  She is eating only intermittently.

## 2021-01-20 PROCEDURE — 99231 SBSQ HOSP IP/OBS SF/LOW 25: CPT

## 2021-01-20 RX ORDER — FLUPHENAZINE HYDROCHLORIDE 1 MG/1
5 TABLET, FILM COATED ORAL ONCE
Refills: 0 | Status: DISCONTINUED | OUTPATIENT
Start: 2021-01-20 | End: 2021-01-23

## 2021-01-20 RX ORDER — DIPHENHYDRAMINE HCL 50 MG
50 CAPSULE ORAL ONCE
Refills: 0 | Status: DISCONTINUED | OUTPATIENT
Start: 2021-01-20 | End: 2021-01-23

## 2021-01-20 NOTE — BH INPATIENT PSYCHIATRY PROGRESS NOTE - NSBHASSESSSUMMFT_PSY_ALL_CORE
Patient is 26yo female, unemployed, single, not in school, domiciled with mom, PPH of schizoaffective disorder with symptom onset recently in December 2019, 3 past inpatient psych hospitalizations (most recent 10/12-11/20/20 at [facility] for psychosis), followed by Dr. Loya at [facility] but pt currently noncompliant with medications, no h/o suicide attempts, no h/o violence, no PMH, no h/o substance abuse, brought in by mother for talking/laughing to herself, yelling, not sleeping, med noncompliance.    Patient remains psychotic, responding constantly to internal stimuli, and delusional thinking, talking to self.  Intermittently agitated.  She has only been intermittently compliant with medications.    - Increased Zyprexa to 15mg hs.  - Pursuing treatment over objections as patient has been inconsistent with medications.  - c/w groups, milieu

## 2021-01-20 NOTE — BH INPATIENT PSYCHIATRY PROGRESS NOTE - MSE UNSTRUCTURED FT
The patient appears stated age, disheveled, dressed appropriately.  She was irritable, but generally cooperative with the interview.  She maintains limited eye contact.  No psychomotor agitation or retardation.  Steady gait.  The patient’s speech was fluent, irritable in tone, normal rate and volume.  The patient’s mood is “fine.”  Affect is constricted, labile, inappropriate at times.  The patient’s thoughts are goal directed, but tangential and loose at times.  She has various delusions, responds to constant auditory hallucinations.  She denies any suicidal or homicidal ideation, intent, or plan.  Insight is poor.  Judgment is impaired.  Impulse control has been tenuous on the unit.

## 2021-01-20 NOTE — BH INPATIENT PSYCHIATRY PROGRESS NOTE - NSBHFUPINTERVALHXFT_PSY_A_CORE
Patient seen for follow up for psychosis, chart reviewed, and case discussed with treatment team.  No events reported overnight.  The patient is largely unchanged.  She continues to be internally preoccupied, talking to herself throughout the day.  She states she has a device implanted within her which causes her to be schizophrenia.  During interview, she becomes irate, "firing" her treatment team.  She continues to be disorganized.  She continues to have no insight into her illness.  She is eating only intermittently.  She refused medication again last night.

## 2021-01-21 PROCEDURE — 99231 SBSQ HOSP IP/OBS SF/LOW 25: CPT

## 2021-01-21 RX ADMIN — OLANZAPINE 15 MILLIGRAM(S): 15 TABLET, FILM COATED ORAL at 20:29

## 2021-01-21 NOTE — BH INPATIENT PSYCHIATRY PROGRESS NOTE - NSBHASSESSSUMMFT_PSY_ALL_CORE
Patient is 26yo female, unemployed, single, not in school, domiciled with mom, PPH of schizoaffective disorder with symptom onset recently in December 2019, 3 past inpatient psych hospitalizations (most recent 10/12-11/20/20 at [facility] for psychosis), followed by Dr. Loya at [facility] but pt currently noncompliant with medications, no h/o suicide attempts, no h/o violence, no PMH, no h/o substance abuse, brought in by mother for talking/laughing to herself, yelling, not sleeping, med noncompliance.    Patient remains psychotic, responding constantly to internal stimuli, and delusional thinking, talking to self. Intermittently agitated. She has only been intermittently compliant with medications.    - Increase Zyprexa to 15mg hs.  - Pursuing treatment over objections as patient has been inconsistent with medications.  - c/w groups, milieu   Patient is 24yo female, unemployed, single, not in school, domiciled with mom, PPH of schizoaffective disorder with symptom onset recently in December 2019, 3 past inpatient psych hospitalizations (most recent 10/12-11/20/20 at [facility] for psychosis), followed by Dr. Loya at [facility] but pt currently noncompliant with medications, no h/o suicide attempts, no h/o violence, no PMH, no h/o substance abuse, brought in by mother for talking/laughing to herself, yelling, not sleeping, med noncompliance.    Patient remains psychotic, responding constantly to internal stimuli, and delusional thinking, talking to self. Intermittently agitated. She has only been intermittently compliant with medications.    - Increased Zyprexa to 15mg hs.  - Pursuing treatment over objections as patient has been inconsistent with medications.  - c/w groups, milieu

## 2021-01-21 NOTE — BH INPATIENT PSYCHIATRY PROGRESS NOTE - CASE SUMMARY
Patient remains acutely psychotic, noncompliant with medications.  Will continue to offer Zyprexa.  Pursuing treatment over objections.

## 2021-01-21 NOTE — BH INPATIENT PSYCHIATRY PROGRESS NOTE - MSE UNSTRUCTURED FT
The patient appears stated age, disheveled, dressed appropriately.  She was calm, but generally non-cooperative with the interview. She maintains limited eye contact. No psychomotor agitation or retardation. Steady gait. The patient’s speech was fluent, irritable in tone, normal rate and volume. The patient’s mood is “fine.” Affect is constricted, labile, inappropriate at times. The patient’s thoughts are goal directed, but tangential and loose at times. She has various delusions, responds to constant auditory hallucinations. She denies any suicidal or homicidal ideation, intent, or plan. Insight is poor. Judgment is impaired. Impulse control has been tenuous on the unit.

## 2021-01-21 NOTE — BH INPATIENT PSYCHIATRY PROGRESS NOTE - NSCGISEVERILLNESS_PSY_ALL_CORE
6 = Severely ill - disruptive pathology, behavior and function are frequently influenced by symptoms, may require assistance from others 2018

## 2021-01-21 NOTE — BH INPATIENT PSYCHIATRY PROGRESS NOTE - MODIFICATIONS
Patient seen by me, chart reviewed, and case discussed with treatment team.  Reviewed and agree with above progress note, assessment and plan; corrections made where appropriate.

## 2021-01-21 NOTE — BH INPATIENT PSYCHIATRY PROGRESS NOTE - NSBHFUPINTERVALHXFT_PSY_A_CORE
Patient seen for follow up for psychosis, chart reviewed, and case discussed with treatment team.  No events reported overnight. The patient is largely unchanged. She continues to be internally preoccupied, talking to herself throughout the day. This morning, she was not cooperative during the interview. She stated she was tired and planned to sleep. Per nursing checks, she was awake form [id number] this morning. She remains disorganized, tangential and loose in thought process. She continues to have no insight into her illness. She is eating only intermittently. She refused medication again last night.

## 2021-01-22 PROCEDURE — 99231 SBSQ HOSP IP/OBS SF/LOW 25: CPT

## 2021-01-22 NOTE — BH INPATIENT PSYCHIATRY PROGRESS NOTE - NSBHLEGALSTATUSDATE_PSY_ALL_CORE
04-Jan-2021
04-Jan-2021
04-Jan-2021 00:00
04-Jan-2021
04-Jan-2021 00:00
04-Jan-2021
04-Jan-2021 00:00
04-Jan-2021
04-Jan-2021

## 2021-01-22 NOTE — BH INPATIENT PSYCHIATRY PROGRESS NOTE - NSBHASSESSSUMMFT_PSY_ALL_CORE
Patient is 26yo female, unemployed, single, not in school, domiciled with mom, PPH of schizoaffective disorder with symptom onset recently in December 2019, 3 past inpatient psych hospitalizations (most recent 10/12-11/20/20 at [facility] for psychosis), followed by Dr. Loya at [facility] but pt currently noncompliant with medications, no h/o suicide attempts, no h/o violence, no PMH, no h/o substance abuse, brought in by mother for talking/laughing to herself, yelling, not sleeping, med noncompliance.    Patient remains psychotic, responding constantly to internal stimuli, and delusional thinking, talking to self. Intermittently agitated. She has only been intermittently compliant with medications.    - Increased Zyprexa to 15mg hs.  - Pursuing treatment over objections as patient has been inconsistent with medications.  - c/w groups, milieu

## 2021-01-22 NOTE — BH INPATIENT PSYCHIATRY PROGRESS NOTE - MSE UNSTRUCTURED FT
The patient appears stated age, disheveled, dressed appropriately.  She was alert, calm and cooperative during the interview. She maintains limited eye contact. No psychomotor agitation or retardation. Steady gait. The patient’s speech was fluent, irritable in tone, normal rate and volume. The patient’s mood is “good.” Affect is constricted, labile, inappropriate at times. The patient’s thoughts are goal directed, but tangential and loose at times. She has various delusions, responds to constant auditory hallucinations. She denies any suicidal or homicidal ideation, intent, or plan. Insight is poor. Judgment is impaired. Impulse control has been tenuous on the unit. The patient appears stated age, disheveled, dressed appropriately.  She was alert, calm and cooperative during the interview. She maintains limited eye contact. No psychomotor agitation or retardation. Steady gait. The patient’s speech was fluent, normal in tone, rate and volume. The patient’s mood is “fine.” Affect is constricted, labile, inappropriate at times. The patient’s thoughts are goal directed, but tangential and loose at times. She has various delusions, responds to constant auditory hallucinations. She denies any suicidal or homicidal ideation, intent, or plan. Insight is poor. Judgment is impaired. Impulse control has been tenuous on the unit.

## 2021-01-22 NOTE — BH INPATIENT PSYCHIATRY PROGRESS NOTE - NSBHLEGALSTATUSNEW_PSY_ALL_CORE
9.27 (2PC)

## 2021-01-22 NOTE — BH INPATIENT PSYCHIATRY PROGRESS NOTE - CASE SUMMARY
The patient remains psychotic, responding constantly to AHs.  She did take Zyprexa last night, and slept better, however, she remains only intermittently compliant.  Pursuing treatment over objections.  Continue Zyprexa trial.

## 2021-01-22 NOTE — BH INPATIENT PSYCHIATRY PROGRESS NOTE - NSBHFUPINTERVALHXFT_PSY_A_CORE
Patient seen for follow up for psychosis, chart reviewed, and case discussed with treatment team. No events reported overnight. The patient took medication last night. As a result she did sleep through the night. During interview this morning, she was cooperative and answered questions appropriately. However, she continues to be internally preoccupied, talking to herself throughout the day. She superficially denies any problems. She continues to be disorganized. She continues to have no insight into her illness. She is eating only intermittently.  Patient seen for follow up for psychosis, chart reviewed, and case discussed with treatment team. No events reported overnight. The patient took medication last night. As a result she did sleep through the night. During interview this morning, she was cooperative and answered questions more appropriately. However, she continues to be internally preoccupied, talking to herself throughout the day. She superficially denies any problems. She continues to be disorganized. She continues to have no insight into her illness. She is eating only intermittently.

## 2021-01-23 PROCEDURE — 99231 SBSQ HOSP IP/OBS SF/LOW 25: CPT

## 2021-01-23 RX ORDER — FLUPHENAZINE HYDROCHLORIDE 1 MG/1
5 TABLET, FILM COATED ORAL ONCE
Refills: 0 | Status: COMPLETED | OUTPATIENT
Start: 2021-01-23 | End: 2021-01-23

## 2021-01-23 RX ORDER — DIPHENHYDRAMINE HCL 50 MG
50 CAPSULE ORAL ONCE
Refills: 0 | Status: DISCONTINUED | OUTPATIENT
Start: 2021-01-23 | End: 2021-05-12

## 2021-01-23 RX ADMIN — Medication 2 MILLIGRAM(S): at 12:14

## 2021-01-23 RX ADMIN — FLUPHENAZINE HYDROCHLORIDE 5 MILLIGRAM(S): 1 TABLET, FILM COATED ORAL at 12:13

## 2021-01-23 NOTE — BH INPATIENT PSYCHIATRY PROGRESS NOTE - NSBHASSESSSUMMFT_PSY_ALL_CORE
Patient is 26yo female, unemployed, single, not in school, domiciled with mom, PPH of schizoaffective disorder with symptom onset recently in December 2019, 3 past inpatient psych hospitalizations (most recent 10/12-11/20/20 at [facility] for psychosis), followed by Dr. Loya at [facility] AOPD but pt currently noncompliant with medications, no h/o suicide attempts, no h/o violence, no PMH, no h/o substance abuse, brought in by mother for talking/laughing to herself, yelling, not sleeping, med noncompliance.    Patient remains psychotic, responding constantly to internal stimuli, and delusional thinking, talking to self. Intermittently agitated. She continues to refuse standing dose of antipsychotic.     Continue plan as per primary treatment team.

## 2021-01-23 NOTE — BH INPATIENT PSYCHIATRY PROGRESS NOTE - NSBHFUPINTERVALHXFT_PSY_A_CORE
As per nursing staff, pt continues to argue loudly with the voices, pacing around the unit. Pt continues to refuse olanzapine schedule dose. Last PRN for agitation 12 PM 1/22.   On interview, pt is pacing around her room, speaking partially to writer and partially responding to internal stimuli. She says that she feels "great." She says that she slept well last night and denies any concerns or complaints. She refuses to discuss the need for medication and immediately starts shouting at the voices again.

## 2021-01-23 NOTE — BH INPATIENT PSYCHIATRY PROGRESS NOTE - MSE UNSTRUCTURED FT
The patient appears stated age, disheveled, dressed appropriately.  She was alert, calm and but limited in ability to engage during the interview. She maintains limited eye contact. + PMA as patient paces around the unit shouting in response to internal stimuli.  Steady gait. The patient’s speech was fluent, normal in tone, rate and volume. The patient’s mood is “great.” Affect is constricted, labile, inappropriate at times. The patient’s thoughts are goal directed for very short periods of time and quickly unraveling to tangential and loose. She has various delusions, responds to constant auditory hallucinations. She denies any suicidal or homicidal ideation, intent, or plan. Insight is poor. Judgment is impaired. Impulse control has been tenuous on the unit.

## 2021-01-24 PROCEDURE — 99231 SBSQ HOSP IP/OBS SF/LOW 25: CPT

## 2021-01-24 RX ADMIN — OLANZAPINE 15 MILLIGRAM(S): 15 TABLET, FILM COATED ORAL at 21:41

## 2021-01-24 RX ADMIN — Medication 2 MILLIGRAM(S): at 14:28

## 2021-01-24 RX ADMIN — FLUPHENAZINE HYDROCHLORIDE 5 MILLIGRAM(S): 1 TABLET, FILM COATED ORAL at 14:24

## 2021-01-24 NOTE — BH INPATIENT PSYCHIATRY PROGRESS NOTE - MSE UNSTRUCTURED FT
The patient appears stated age, disheveled, dressed appropriately.  She was alert, calm and but limited in ability to engage during the interview. She maintains limited eye contact. No PMR/PMA.  Steady gait. The patient’s speech was fluent, normal in tone, rate and volume. The patient’s mood is “great.” Affect is constricted, labile, inappropriate at times. The patient’s thoughts are goal directed for very short periods of time and quickly unraveling to tangential and loose. She has various delusions, responds to constant auditory hallucinations. She denies any suicidal or homicidal ideation, intent, or plan. Insight is poor. Judgment is impaired. Impulse control has been tenuous on the unit.

## 2021-01-24 NOTE — BH INPATIENT PSYCHIATRY PROGRESS NOTE - NSBHASSESSSUMMFT_PSY_ALL_CORE
Patient is 24yo female, unemployed, single, not in school, domiciled with mom, PPH of schizoaffective disorder with symptom onset recently in December 2019, 3 past inpatient psych hospitalizations (most recent 10/12-11/20/20 at [facility] for psychosis), followed by Dr. Loya at [facility] but pt currently noncompliant with medications, no h/o suicide attempts, no h/o violence, no PMH, no h/o substance abuse, brought in by mother for talking/laughing to herself, yelling, not sleeping, med noncompliance.    Patient remains psychotic, responding constantly to internal stimuli. Intermittently agitated. She continues to refuse standing dose of antipsychotic.     Continue plan as per primary treatment team.

## 2021-01-24 NOTE — BH INPATIENT PSYCHIATRY PROGRESS NOTE - NSBHFUPINTERVALHXFT_PSY_A_CORE
As per nursing staff, pt continues to refuse olanzapine but received prolixin and ativan yesterday afternoon for agitation.    On interview, pt is found sitting in the day room having a loud discourse with her internal stimuli. She says that she slept well last night and that she's receiving PRNs, which she thinks are helping her to sleep better though she continues to decline the standing med. Pt denies any AH or paranoid thoughts and isn't able to explain with whom she's speaking so loudly. No SI.

## 2021-01-25 LAB — SARS-COV-2 RNA SPEC QL NAA+PROBE: SIGNIFICANT CHANGE UP

## 2021-01-25 PROCEDURE — 99232 SBSQ HOSP IP/OBS MODERATE 35: CPT

## 2021-01-25 RX ADMIN — OLANZAPINE 15 MILLIGRAM(S): 15 TABLET, FILM COATED ORAL at 21:38

## 2021-01-25 RX ADMIN — Medication 2 MILLIGRAM(S): at 14:48

## 2021-01-25 RX ADMIN — FLUPHENAZINE HYDROCHLORIDE 5 MILLIGRAM(S): 1 TABLET, FILM COATED ORAL at 14:48

## 2021-01-25 NOTE — BH INPATIENT PSYCHIATRY PROGRESS NOTE - CASE SUMMARY
The patient continues to be psychotic.  She seems calmer on the days after taking medications, but then becomes progressively more agitated again when she refuses.  Will continue to offer Zyprexa.  Pursuing treatment over objections.

## 2021-01-25 NOTE — BH INPATIENT PSYCHIATRY PROGRESS NOTE - NSBHFUPINTERVALHXFT_PSY_A_CORE
Patient seen for follow up for psychosis, chart reviewed, and case discussed with treatment team. No events reported overnight. The patient remains unchanged. Last night she took her medication. She says that she slept well and denies any concerns or complaints. This morning she was brighter, seen sitting in the dinning room eating breakfast. She continued to eat only periodically. During the interview, she was calm, cooperative and answered questions appropriately. However, staff observes her talking to herself throughout the day. She remains disorganized, tangential and loose in thought process. She continues to have no insight into her illness. Patient seen for follow up for psychosis, chart reviewed, and case discussed with treatment team. Over the weekend, the patient refused most medications, required prn medication for agitation. The patient remains largely unchanged. Last night she took her medication. She says that she slept well and denies any concerns or complaints. This morning she was brighter, seen sitting in the dinning room eating breakfast. She continues to eat only periodically. During the interview, she was calm, cooperative and answered questions appropriately. However, staff observes her talking to herself throughout the day. She remains disorganized, tangential and loose in thought process. She continues to have no insight into her illness.

## 2021-01-25 NOTE — BH INPATIENT PSYCHIATRY PROGRESS NOTE - NSBHASSESSSUMMFT_PSY_ALL_CORE
Patient is 24yo female, unemployed, single, not in school, domiciled with mom, PPH of schizoaffective disorder with symptom onset recently in December 2019, 3 past inpatient psych hospitalizations (most recent 10/12-11/20/20 at [facility] for psychosis), followed by Dr. Loya at [facility] A[facility] but pt currently noncompliant with medications, no h/o suicide attempts, no h/o violence, no PMH, no h/o substance abuse, brought in by mother for talking/laughing to herself, yelling, not sleeping, med noncompliance.    Patient remains psychotic, responding constantly to auditory hallucinations. She becomes agitated at times. She continues to periodically refuse her standing dose of antipsychotic.    - Increased Zyprexa to 15mg hs.  - Pursuing treatment over objections as patient has been inconsistent with medications.  - c/w groups, milieu

## 2021-01-26 PROCEDURE — 99232 SBSQ HOSP IP/OBS MODERATE 35: CPT

## 2021-01-26 RX ORDER — OLANZAPINE 15 MG/1
15 TABLET, FILM COATED ORAL AT BEDTIME
Refills: 0 | Status: DISCONTINUED | OUTPATIENT
Start: 2021-01-26 | End: 2021-01-28

## 2021-01-26 RX ORDER — OLANZAPINE 15 MG/1
10 TABLET, FILM COATED ORAL AT BEDTIME
Refills: 0 | Status: DISCONTINUED | OUTPATIENT
Start: 2021-01-26 | End: 2021-05-12

## 2021-01-26 RX ADMIN — OLANZAPINE 15 MILLIGRAM(S): 15 TABLET, FILM COATED ORAL at 20:11

## 2021-01-26 RX ADMIN — FLUPHENAZINE HYDROCHLORIDE 5 MILLIGRAM(S): 1 TABLET, FILM COATED ORAL at 14:20

## 2021-01-26 RX ADMIN — Medication 2 MILLIGRAM(S): at 14:21

## 2021-01-26 NOTE — BH INPATIENT PSYCHIATRY PROGRESS NOTE - NSBHASSESSSUMMFT_PSY_ALL_CORE
Patient is 26yo female, unemployed, single, not in school, domiciled with mom, PPH of schizoaffective disorder with symptom onset recently in December 2019, 3 past inpatient psych hospitalizations (most recent 10/12-11/20/20 at [facility] for psychosis), followed by Dr. Loya at [facility] but patient currently noncompliant with medications, no h/o suicide attempts, no h/o violence, no PMH, no h/o substance abuse, brought in by mother for talking/laughing to herself, yelling, not sleeping, and medication noncompliance.    Patient remains psychotic. She is internally preoccupied and is seen responding to auditory hallucinations throughout the day. She continues to be intermittently agitated, requiring PRN medication. She periodically refuses her standing dose of antipsychotic.       - Treatment over objection granted this morning.  - Start Zyprexa 15mg PO QD. If patient refuses, then give Zyprexa 10mg IM as per court order.  -Discontinue Ativan 2mg IM   - c/w groups, milieu   Patient is 26yo female, unemployed, single, not in school, domiciled with mom, PPH of schizoaffective disorder with symptom onset recently in December 2019, 3 past inpatient psych hospitalizations (most recent 10/12-11/20/20 at [facility] for psychosis), followed by Dr. Loya at [facility] but patient currently noncompliant with medications, no h/o suicide attempts, no h/o violence, no PMH, no h/o substance abuse, brought in by mother for talking/laughing to herself, yelling, not sleeping, and medication noncompliance.    Patient remains psychotic. She is internally preoccupied and is seen responding to auditory hallucinations throughout the day. She continues to be intermittently agitated, requiring PRN medication. She periodically refuses her standing dose of antipsychotic.     - Treatment over objection granted this morning.  - Start Zyprexa 15mg PO QD. If patient refuses, then give Zyprexa 10mg IM as per court order.  - Discontinue Ativan 2mg IM prn  - c/w groups, milieu

## 2021-01-26 NOTE — BH INPATIENT PSYCHIATRY PROGRESS NOTE - MSE UNSTRUCTURED FT
The patient appears stated age, disheveled, dressed appropriately. She was alert, calm and cooperative during the interview. She maintains limited eye contact. No psychomotor agitation or retardation. Steady gait. The patient’s speech was fluent, normal in tone, rate and volume. The patient’s mood is “fine.” Affect is constricted but reactive, rather labile and inappropriate at times. The patient’s thoughts are goal directed, but tangential and loose at times. She has various delusions and responds to constant auditory hallucinations. She denies any suicidal or homicidal ideation, intent, or plan. Insight is poor. Judgment is impaired. Impulse control has been tenuous on the unit.

## 2021-01-26 NOTE — BH INPATIENT PSYCHIATRY PROGRESS NOTE - CASE SUMMARY
The patient continues to be psychotic, internally preoccupied, agitated at times.  Treatment over objections was granted by the court today.  Will continue to offer Zyprexa po, and give IM should the patient refuse - per court order.

## 2021-01-26 NOTE — BH INPATIENT PSYCHIATRY PROGRESS NOTE - NSBHFUPINTERVALHXFT_PSY_A_CORE
Patient seen for follow up for psychosis, chart reviewed, and case discussed with treatment team. The patient remains unchanged. She took her medication last night for the second night in a row. This morning she was found sleeping in her room. She states that she slept well and denies any concerns or complaints. During the interview, she was calm, cooperative and answered questions appropriately. However, she remains internally preoccupied. Staff continues to observe her interacting with various voices throughout the day. She remains disorganized, tangential and loose in thought process. She continues to have no insight into her illness. She is seen only eating periodically. Court hearing was held this morning for treatment over objection, which was granted. Patient seen for follow up for psychosis, chart reviewed, and case discussed with treatment team. The patient remains largely unchanged. She took her medication last night for the second night in a row. This morning she was found sleeping in her room. She states that she slept well and denies any concerns or complaints. During the interview, she was calm, cooperative and answered questions appropriately. However, she remains internally preoccupied. Staff continues to observe her interacting with various voices throughout the day. She remains disorganized, tangential and loose in thought process. She continues to have no insight into her illness. She is seen only eating periodically. Court hearing was held this morning for treatment over objection, which was granted.

## 2021-01-27 PROCEDURE — 99232 SBSQ HOSP IP/OBS MODERATE 35: CPT

## 2021-01-27 RX ADMIN — OLANZAPINE 15 MILLIGRAM(S): 15 TABLET, FILM COATED ORAL at 20:26

## 2021-01-27 NOTE — BH INPATIENT PSYCHIATRY PROGRESS NOTE - NSBHFUPINTERVALHXFT_PSY_A_CORE
Chart reviewed. Case d/w interdisciplinary team. Patient seen in coverage for Dr. [place] and examined for f/u of psychosis. yesterday pt received prolixin 5 mg and ativan 2 mg PRN for agitation. Was compliant with court ordered zyprexa 15 mg at HS. Slept overnight. Continues to be observed to be responding to internal stimuli and pacing the unit.     On interview this AM pt reports good mood, spending time on unit walking and listening to music. Reports good sleep and appetite/PO intake and denies any physical complaints or c/f medication issues or side effects. Denies paranoia, IoR, AVH, SI/HI. When asked about pt talking outloud she states she is in the "first person" with someone else speaking thru her. Says there is a book being written, but that she doesn't want to be part of the book. She wants the speaking to stop but doesn't know how to make it stop. Asks to be pulled out of the books or movies she is in.

## 2021-01-27 NOTE — BH INPATIENT PSYCHIATRY PROGRESS NOTE - NSBHASSESSSUMMFT_PSY_ALL_CORE
Carmen is a 24 yo F with a history of schizoaffective disorder vs schizophrenia, 3 prior hospitalizations, in [facility] ETP program but non-compliant with medications PTA, who presented BIB mother for insomnia, medication [id number], yelling, and talking to self/RIS. Patient has been inconsistently compliant with zyprexa, with TOO granted on 1/26. She continues to respond to internal stimuli nearly constantly, with delusions re: being "spoken thru" in relation to a book or movie. She is disheveled and paces on the unit. Sleep is stable. Given continued significant psychotic symptoms pt requires further inpt hospitalization for safety and stabilization.     Plan:  -continue involuntary hospitalization  -routine obs appropriate, prolixin/ativan PRN agitation  -continue zyprexa 15 mg qHS for psychosis; to receive Zyprexa IM per court order if she refuses PO medications; aim to increase zyprexa to 20 mg tomorrow with plan to check EKG early next week and continue titration if needed  -G/M therapy as tolerated  -no acute medical issues  -dispo when stable

## 2021-01-28 PROCEDURE — 99232 SBSQ HOSP IP/OBS MODERATE 35: CPT

## 2021-01-28 RX ORDER — OLANZAPINE 15 MG/1
20 TABLET, FILM COATED ORAL AT BEDTIME
Refills: 0 | Status: DISCONTINUED | OUTPATIENT
Start: 2021-01-28 | End: 2021-02-01

## 2021-01-28 RX ADMIN — OLANZAPINE 20 MILLIGRAM(S): 15 TABLET, FILM COATED ORAL at 20:16

## 2021-01-28 NOTE — UM REPORT PROGRESS NOTE - NSUMSWNOTE_GEN_A_CORE FT
Pt is 25 year old female, single, unemployed, domiciled with mother, with past history of schizophrenia, hx of medication non-compliance who was brought in by family following bizarre and psychotic presentation. Since admission, pt has been non-compliant with medication, more disorganized, pacing the halls talking to herself and increasingly internally preoccupied.  Pt is 25 year old female, single, unemployed, domiciled with mother, with past history of schizophrenia, hx of medication non-compliance who was brought in by family following bizarre and psychotic presentation. Since admission, pt has been compliant with medication however continues to be regressed in symptoms, disorganized, pacing the halls talking to herself and increasingly internally preoccupied.  Patient remains psychiatrically unstable.  She is disorganized, disheveled, and confused.  There was a phone screen held with the ACT team and the patient was only able to participate for about twenty minutes of it.  There is little to no progress towards discharge at this time.    Previous note:  Pt is 25 year old female, single, unemployed, domiciled with mother, with past history of schizophrenia, hx of medication non-compliance who was brought in by family following bizarre and psychotic presentation. Since admission, pt has been compliant with medication however continues to be regressed in symptoms, disorganized, pacing the halls talking to herself and increasingly internally preoccupied.  Pt continues to be symptomatic, is seen responding to internal stimuli in the hallways. ADL's are poor, pt is not social with peers and has little insight into her illness.  Pt speaks to her family on phone, is responsive to them.   Pt ADL's are improved, pt is bathing. Pt continues to  respond to internal stimuli, gestures with her hands, will respond to staff when spoken to.  Pt continues to be symptomatic, paces the halls, responding to internal stimuli and waving hands all day. Pt will respond to staff but cannot tolerate more than a d=few minutes. ADL's are adequate, pt needs to be directed to shower. Pt is tx compliant but has no insight into her illness.  Pt continues to respond to internal stimuli. At times yells and curses out loud, moves her hands quickly and has full conversations. Pt is compliant with tx but has no insight into her illness or behavior. Pt speaks to her family on the phone.  There has been no change in patient's behaviors and symptoms.  She continues to respond to internal stimuli. At times yells and curses out loud, moves her hands quickly, and has full conversations. Pt is compliant with treatment but has no insight into her illness or behavior.  Pt speaks to her family on the phone.  MD. is titrating Clozapine.  There has been a slight improvement in patient's behaviors and symptoms.  She continues to respond to internal stimuli.  She still requires intermittent PRN's for anxiety and agitation. She is compliant with all standing medications. At times she yells and curses out loud, moves her hands quickly, and has full conversations but less so now. Pt with poor insight into her illness or behavior.  Pt speaks to her family on the phone and was visited by her mother last evening.  Her visit went well.

## 2021-01-28 NOTE — BH INPATIENT PSYCHIATRY PROGRESS NOTE - MSE UNSTRUCTURED FT
The patient appears stated age, disheveled, dressed appropriately. She was alert, calm and cooperative during first part of the interview, but begins inappropriately laughing and then screaming and abruptly leaves interview room. She maintains limited eye contact with odd and distant relatedness. No psychomotor agitation or retardation. Steady gait. The patient’s speech was fluent, normal in tone, rate and volume. The patient’s mood is “good” Affect is labile from irritable to nearly euphoric and laughing. The patient’s thoughts are goal directed around simple questions, but tangential and loose at times when discussing delusional content related to responding to internal stimuli. She has various delusions related to being in "first person". Although she denies AH she is constantly responding to internal stimuli. She denies any suicidal or homicidal ideation, intent, or plan. Insight is poor. Judgment is impaired. Impulse control has been tenuous on the unit.

## 2021-01-28 NOTE — BH INPATIENT PSYCHIATRY PROGRESS NOTE - NSBHASSESSSUMMFT_PSY_ALL_CORE
Carmen is a 24 yo F with a history of schizoaffective disorder vs schizophrenia, 3 prior hospitalizations, in [facility] ETP program but non-compliant with medications PTA, who presented BIB mother for insomnia, medication [id number], yelling, and talking to self/RIS. Patient has been inconsistently compliant with zyprexa, with TOO granted on 1/26. She continues to respond to internal stimuli nearly constantly, with delusions re: being "spoken thru" and "in first person." Affect appears more labile and inappropriate today-with quick switches from laughing to shouting/cursing. She is disheveled and paces on the unit. Sleep is stable. Given continued significant psychotic symptoms pt requires further inpt hospitalization for safety and stabilization.     Plan:  -continue involuntary hospitalization  -routine obs appropriate, prolixin/ativan PRN agitation  -increase zyprexa to 10 mg qHS for psychosis; to receive Zyprexa IM per court order if she refuses PO medications; plan to check EKG early next week and continue titration if needed  -G/M therapy as tolerated  -no acute medical issues  -dispo when stable

## 2021-01-28 NOTE — UM REPORT PROGRESS NOTE - NSUMSWPRIORDC_GEN_A_CORE
[facility] Hospital[facility] (PHP) Outpatient Mental Health Clinic/Partial Hospitalization Program (PHP)/other...

## 2021-01-28 NOTE — UM REPORT PROGRESS NOTE - NSUMSWACTION_GEN_A_CORE FT
JONATHAN has met with pt with little success as pt does not engage in conversation and talking to herself in the day room. JONATHAN has been in touch with pt mother who has been apologetic with daughter behavior on the unit, as she informed writer that daughter was cursing at staff while on the phone with mother. JONATHAN has met with treatment team who confirmed court approval for medication over objection was approved this Tuesday 1/26/2021. JONATHAN has spoken with pt mother this week to inform her about daughter progress as well as court order approval. JONATHAN will continue to follow up with pt progress, meet with team, discuss discharge plans and make appropriate referrals.  SW has met with the patient to provide support. Pt is internally preoccupied, not able to answer to many questions and informing SW that she does not need to continue with medication or aftercare treatment and wants to be home by the end of the week. JONATHAN has met with the treatment team to discuss pt progress and recent court order medication over objection as well as potential of dual planning with a state application. As per team, pt has just been started on medication and improvements will come with increase in medication. JONATHAN has been in touch with pt mother to provide her updates on daughter care, as well as information related with care coordination referral that can be made while patient is in the hospital. JONATHAN explained that pt is currently symptomatic and not agreeable with treatment or support following discharge but JONATHAN will discuss care coordination again with pt following improvement. JONATHAN will continue to follow up with pt progress, meet with team, discuss discharge plans and make appropriate referrals.  JONATHAN has met with the patient to provide support. Pt continues to be internally preoccupied, however is now able to respond to writers questions. When approached, able to ask writer for lunch and is now seen with improved hygiene, recently showered. JONATHAN has met with treatment team who confirmed that pt was just started on Haldol which shows minimal improvement (better engaged with team, more polite).  JONATHAN will continue to follow up with pt progress, meet with team, discuss discharge plans and make appropriate referrals.  JONATHAN has met with the patient to provide support. Pt continues to be internally preoccupied, however is now able to respond to writers questions. JONATHAN has learned from the team that pt has a scheduled meeting for Wednesday 2/24. JONATHAN has outreached pt mother to inform her about upcoming state hearing. Mother was feeling hopeful that daughter would be able to return home however understands the need for the referral to state as a dual plan.  JONATHAN will continue to follow up with pt progress, meet with team, discuss discharge plans and continue with state application/appropriate discharge planning.  JONATHAN has met with the patient to provide support. Pt continues to be internally preoccupied, however is now able to respond to writers questions. JONATHAN has learned from the team that patient is improving in some way, more visible on unit and compliant on medication. JONATHAN has spoken with the treatment team to discuss dual treatment plans (State vs. ACT/AOT). Pt mother was informed about the alternative plan of ACT/AOT, which mom is more agreeable with as she is ok with her daughter returning back home. JONATHAN has discussed start of on ACT/AOT application.   JONATHAN will continue to follow up with pt progress, meet with team, discuss discharge plans and continue with ACT/AOT application/appropriate discharge planning.  JONATHAN has met with the patient to provide support. Pt continues to be internally preoccupied, however is now able to respond to writers questions. JONATHAN has learned from the team that patient is minimally improving in some way, more visible on unit, pacing the unit less and compliant on medication. SW has spoken with the treatment team to discuss  ACT/AOT application, they are aware that her application is being processed with [facility] team. Pt mother was informed about the alternative plan of ACT/AOT, which mom is more agreeable with as she is ok with her daughter returning back home. JONATHAN has attempted to contact pt ACT , no response left a voicemail.   JONATHAN will continue to follow up with pt progress, meet with team, discuss discharge plans and continue with ACT/AOT application/appropriate discharge planning.  As per jose francisco  who was present, ACT team had a phone screen with the patient who was only able to complete about twenty minutes of the screen.  The ACT team had some concerns about their ability to provide the appropriate level of care and their psychiatrist is going to follow up.    Previous note:  JONATHAN has met with the patient to provide support. Pt continues to be internally preoccupied, however is now able to respond to writers questions. JONATHAN has learned from the team that patient is minimally improving in some way, more visible on unit, pacing the unit less and compliant on medication. JONATHAN has spoken with the treatment team to discuss  ACT/AOT application, they are aware that her application is being processed with [facility] team. Pt mother was informed about the alternative plan of ACT/AOT, which mom is more agreeable with as she is ok with her daughter returning back home. JONATHAN has attempted to contact pt ACT , no response left a voicemail.   JONATHAN will continue to follow up with pt progress, meet with team, discuss discharge plans and continue with ACT/AOT application/appropriate discharge planning.  SW met with pt for support. SW communicated with pts mother for support, discussed pt returning home with symptoms and having follow up with ACT Team. Ambrose communicated with previous SW and d/c planning staff regarding ACT team referral. SW left message for ACT , to discuss status of pt d/c.  JONATHAN met with team,  communicated with ACT team  who reported they had not agreed to accept pt yet and needed to further discuss. STated they will meet with pt soon. JONATHAN communicated with pts mother to get additional information regarding timeline of treatment and which medication impacted pts symptoms.  JONATHAN communicated with ACT team. Dr Nicolas from ACT will be assessing pt today on the unit. JONATHAN met with pt to explain drs visit.  JONATHAN communicated with pts mother for support and psychoeducation, explained ACT/AOT and status. JONATHAN met with team regarding status of ACT and pt progress.   AMBROSE and  will communicate with pts mother regarding past clozapine tx and how pt responded. AMBROSE met with pt for support, communicated with pts mother for support and psychoeducation. AMBROSE began completing state application., had  complete part part. Ambrose is gathering documents for state referral.   SW submitted patient's State Application.  PPD also requested and sent with the application for transfer to [facility].  SW communicates with patient's mother providing support and treatment updates.  SW participates in daily interdisciplinary treatment team meetings to coordinate patient care and discharge plans.   SW submitted patient's State Application.  PPD also requested and sent with the application for transfer to [facility].  SW communicates with patient's mother providing support and treatment updates.  SW participates in daily interdisciplinary treatment team meetings to coordinate patient care and discharge plans.  Mother is having second thoughts about patient's transfer to [facility] and will contact MD.

## 2021-01-28 NOTE — BH INPATIENT PSYCHIATRY PROGRESS NOTE - NSBHFUPINTERVALHXFT_PSY_A_CORE
Chart reviewed. Case d/w interdisciplinary team. Patient seen in coverage for Dr. Montana and examined for f/u of psychosis. No PRNs required or requested. Was compliant with court ordered zyprexa 15 mg at HS. Slept overnight. Continues to be observed to be responding to internal stimuli and pacing the unit.     On interview this AM pt reports good mood, spending time on unit walking and listening to music. Reports good sleep and appetite/PO intake and denies any physical complaints or c/f medication issues or side effects. Denies paranoia, IoR, SI/HI. Patient begins laughing inappropriately during interview. When asked about this says she is "in first person" that she "doesn't want to be a part of it and wants it to stop." Feels like someone is talking through her. This person or these people make her scream and tell her to cut herself. Denies wanting to engage in cutting or self harm and has not acted on these instructions. Pt states she doesn't know what is happening or why she is laughing.     At end of interview begin to discuss treatment and increasing medication dose. She becomes acutely upset, stating she does not want to take medication if does is being increased. Begins yelling and cursing and walks out of interview room.

## 2021-01-28 NOTE — UM REPORT PROGRESS NOTE - NSUMSWPRIORDCOTH_GEN_A_CORE FT
[facility] ACT team  [facility] ACT team /AOT [facility] ACT team /AOT/State [facility] ACT team /AOT/ State

## 2021-01-28 NOTE — UM REPORT PROGRESS NOTE - NSUMSWPROPOSEDC_GEN_A_CORE
Outpatient Mental Health Clinic/Partial Hospitalization Program (PHP) Transfer to state Assertive Community Treatment (ACT)/Assisted Outpatient Treatment (AOT) [facility] Community Treatment (ACT)/Assisted Outpatient Treatment (AOT)/Transfer to state

## 2021-01-29 PROCEDURE — 99232 SBSQ HOSP IP/OBS MODERATE 35: CPT

## 2021-01-29 RX ADMIN — OLANZAPINE 20 MILLIGRAM(S): 15 TABLET, FILM COATED ORAL at 20:56

## 2021-01-29 NOTE — BH INPATIENT PSYCHIATRY PROGRESS NOTE - MSE UNSTRUCTURED FT
The patient appears stated age, disheveled, dressed appropriately. She was alert, irritable and superficially cooperative during the interview. She maintains limited eye contact with odd and intense yet distant relatedness. No psychomotor agitation or retardation. Steady gait. The patient’s speech was fluent, normal in tone, rapid rate and normal in volume. The patient’s mood is “I don't want this” Affect is irritable, intense. The patient’s thoughts are goal directed around simple questions, but tangential and loose at times when discussing delusional content related to responding to internal stimuli. She has various delusions related to being in "first person". Although she denies AH she is constantly responding to internal stimuli. She denies any suicidal or homicidal ideation, intent, or plan. Insight is poor. Judgment is impaired. Impulse control has been tenuous on the unit.

## 2021-01-29 NOTE — BH INPATIENT PSYCHIATRY PROGRESS NOTE - NSBHASSESSSUMMFT_PSY_ALL_CORE
Carmen is a 24 yo F with a history of schizoaffective disorder vs schizophrenia, 3 prior hospitalizations, in [facility] ETP program but non-compliant with medications PTA, who presented BIB mother for insomnia, medication non-compliance, yelling, and talking to self/RIS. Patient has been inconsistently compliant with zyprexa, with TOO granted on 1/26. She continues to respond to internal stimuli nearly constantly, with delusions re: being "spoken thru" and "in first person." Affect appears more irritable, intense and inappropriate today-with patient yelling and cursing while RIS. She is disheveled and paces on the unit. Sleep is stable. Given continued significant psychotic symptoms pt requires further inpt hospitalization for safety and stabilization.     Plan:  -continue involuntary hospitalization  -routine obs appropriate, prolixin/ativan PRN agitation  -continue zyprexa 20 mg qHS for psychosis; to receive Zyprexa IM per court order if she refuses PO medications; plan to check EKG early next week and continue titration if needed  -G/M therapy as tolerated  -no acute medical issues  -dispo when stable

## 2021-01-29 NOTE — BH INPATIENT PSYCHIATRY PROGRESS NOTE - NSBHFUPINTERVALHXFT_PSY_A_CORE
Chart reviewed. Case d/w interdisciplinary team. Patient seen in coverage for Dr. Montana and examined for f/u of psychosis. No PRNs required or requested. Was compliant with court ordered zyprexa 20 mg at HS. Slept overnight. Continues to be observed to be responding to internal stimuli and pacing the unit.     On interview this AM pt appears more agitated and upset-yelling and cursing at voices she is hearing. States she is feeling fine. Reports sleeping okay, taking a shower this AM, and eating okay. Says she continues to experience the 1st person sh** and wants it to stop, saying it is illegal in [place]. Says to MD that "you are a Doctor, make it stop and tell these people to pull me out of all the movies that I am in." Provided education on hope that zyprexa will help with this. pt denies any physical complaints or c/f medication side effects after increasing dose. After this patient asks to leave interview room.

## 2021-01-30 LAB — K2, SPICE RESULT: NEGATIVE — SIGNIFICANT CHANGE UP

## 2021-01-30 RX ADMIN — OLANZAPINE 20 MILLIGRAM(S): 15 TABLET, FILM COATED ORAL at 20:00

## 2021-01-31 RX ADMIN — OLANZAPINE 20 MILLIGRAM(S): 15 TABLET, FILM COATED ORAL at 20:30

## 2021-01-31 RX ADMIN — Medication 2 MILLIGRAM(S): at 14:33

## 2021-01-31 RX ADMIN — FLUPHENAZINE HYDROCHLORIDE 5 MILLIGRAM(S): 1 TABLET, FILM COATED ORAL at 14:33

## 2021-02-01 LAB
B PERT DNA SPEC QL NAA+PROBE: SIGNIFICANT CHANGE UP
C PNEUM DNA SPEC QL NAA+PROBE: SIGNIFICANT CHANGE UP
FLUAV H1 2009 PAND RNA SPEC QL NAA+PROBE: SIGNIFICANT CHANGE UP
FLUAV H1 RNA SPEC QL NAA+PROBE: SIGNIFICANT CHANGE UP
FLUAV H3 RNA SPEC QL NAA+PROBE: SIGNIFICANT CHANGE UP
FLUAV SUBTYP SPEC NAA+PROBE: SIGNIFICANT CHANGE UP
FLUBV RNA SPEC QL NAA+PROBE: SIGNIFICANT CHANGE UP
HADV DNA SPEC QL NAA+PROBE: SIGNIFICANT CHANGE UP
HCOV PNL SPEC NAA+PROBE: SIGNIFICANT CHANGE UP
HMPV RNA SPEC QL NAA+PROBE: SIGNIFICANT CHANGE UP
HPIV1 RNA SPEC QL NAA+PROBE: SIGNIFICANT CHANGE UP
HPIV2 RNA SPEC QL NAA+PROBE: SIGNIFICANT CHANGE UP
HPIV3 RNA SPEC QL NAA+PROBE: SIGNIFICANT CHANGE UP
HPIV4 RNA SPEC QL NAA+PROBE: SIGNIFICANT CHANGE UP
RAPID RVP RESULT: SIGNIFICANT CHANGE UP
RSV RNA SPEC QL NAA+PROBE: SIGNIFICANT CHANGE UP
RV+EV RNA SPEC QL NAA+PROBE: SIGNIFICANT CHANGE UP
SARS-COV-2 RNA SPEC QL NAA+PROBE: SIGNIFICANT CHANGE UP

## 2021-02-01 PROCEDURE — 99232 SBSQ HOSP IP/OBS MODERATE 35: CPT

## 2021-02-01 PROCEDURE — 93010 ELECTROCARDIOGRAM REPORT: CPT

## 2021-02-01 RX ORDER — OLANZAPINE 15 MG/1
25 TABLET, FILM COATED ORAL AT BEDTIME
Refills: 0 | Status: DISCONTINUED | OUTPATIENT
Start: 2021-02-01 | End: 2021-02-03

## 2021-02-01 RX ADMIN — OLANZAPINE 25 MILLIGRAM(S): 15 TABLET, FILM COATED ORAL at 21:38

## 2021-02-01 NOTE — BH INPATIENT PSYCHIATRY PROGRESS NOTE - NSBHFUPINTERVALHXFT_PSY_A_CORE
Chart reviewed. Case d/w interdisciplinary team. Patient seen in coverage for Dr. Montana and examined for f/u of psychosis. Yesterday afternoon received prolixin 5 mg and ativan 2 mg PRN for agitation (shouting, responding to internal stimuli). Was compliant with court ordered zyprexa 20 mg at HS. Slept overnight. Continues to be observed to be responding to internal stimuli and pacing the unit.     On interview this AM pt appears more agitated and upset-yelling and cursing at voices she is hearing. States she is feeling fine. Reports sleeping okay, taking a shower this AM, and eating okay. Despite sleeping okay says she feels "drained" by her mother who she believes is responsible for the "first person" experience she is having and who refuses to make it go away. States a woman from [facility] makes her scream. Denies any other CAH to harm self or others. Denies ideas of reference or paranoia. Pt asks what MD means re: question about IoR, and on MD attempt to provide psychoeducation pt screams "you don't have to explain it to that bi**h!" stating the woman from [facility] asked that question of MD. pt denies any physical complaints or c/f medication side effects after increasing dose.

## 2021-02-01 NOTE — BH INPATIENT PSYCHIATRY PROGRESS NOTE - MSE UNSTRUCTURED FT
The patient appears stated age, disheveled, dressed appropriately. She was alert, irritable and superficially cooperative during the interview. She maintains limited eye contact with odd and intense yet distant relatedness. No psychomotor agitation or retardation. Steady gait. The patient’s speech was fluent, normal in tone, rapid rate and normal in volume. The patient’s mood is “fine” Affect is irritable, intense. The patient’s thoughts are goal directed around simple questions, but tangential and loose at times when discussing delusional content related to responding to internal stimuli. She has various delusions related to being in "first person". Although she denies AH she is constantly responding to internal stimuli. She denies any suicidal or homicidal ideation, intent, or plan. Insight is poor. Judgment is impaired. Impulse control has been tenuous on the unit.

## 2021-02-01 NOTE — BH INPATIENT PSYCHIATRY PROGRESS NOTE - NSBHASSESSSUMMFT_PSY_ALL_CORE
Carmen is a 24 yo F with a history of schizoaffective disorder vs schizophrenia, 3 prior hospitalizations, in [facility] ETP program but non-compliant with medications PTA, who presented BIB mother for insomnia, medication non-compliance, yelling, and talking to self/RIS. Patient has been inconsistently compliant with zyprexa, with TOO granted on 1/26 and subsequent compliance. She continues to respond to internal stimuli nearly constantly, with delusions re: being "spoken thru" and "in first person." Affect appears more irritable and intense. She is disheveled and paces on the unit. Sleep is stable. Given continued significant psychotic symptoms pt requires further inpt hospitalization for safety and stabilization.     Plan:  -continue involuntary hospitalization  -routine obs appropriate, prolixin/ativan PRN agitation  -increase zyprexa to 25 mg qHS for psychosis; to receive Zyprexa IM per court order if she refuses PO medications; EKG today with QTc 467  -G/M therapy as tolerated  -no acute medical issues  -dispo when stable

## 2021-02-02 PROCEDURE — 99231 SBSQ HOSP IP/OBS SF/LOW 25: CPT

## 2021-02-02 RX ADMIN — OLANZAPINE 25 MILLIGRAM(S): 15 TABLET, FILM COATED ORAL at 20:37

## 2021-02-02 NOTE — BH INPATIENT PSYCHIATRY PROGRESS NOTE - NSBHFUPINTERVALHXFT_PSY_A_CORE
Pt eating a turkey sandwich and drinking a Gatorade, v.o. Dr Bhanu Rebolledo to keep his sugar from dropping any lower. Patient seen for follow up for psychosis, chart reviewed, and case discussed with treatment team. The patient continues to be internally preoccupied, talking to herself, sometimes yelling to herself constantly throughout the day.  She states she is being controlled, and forced to be the "1st person" in a book.  She continues to be compliant with court ordered Zyprexa, denies significant side effects.  She remains disorganized, tangential and loose in thought process. She starts to interact with internal stimuli in the middle of interview.  She continues to have no insight into her illness. She has been eating and sleeping well.

## 2021-02-02 NOTE — BH INPATIENT PSYCHIATRY PROGRESS NOTE - MSE UNSTRUCTURED FT
The patient appears stated age, disheveled, dressed appropriately. She was alert, superficially cooperative with the interview. She maintains limited eye contact with odd and intense yet distant relatedness. No psychomotor agitation or retardation. Steady gait. The patient’s speech was fluent, normal in tone, rate and volume. The patient’s mood is “fine.” Affect is irritable, intense, generally stable. The patient’s thoughts are goal directed around simple questions, but tangential and loose at times when discussing delusional content related to responding to internal stimuli. She has various delusions related to being in "first person". Although she denies AH she is constantly responding to internal stimuli. She denies any suicidal or homicidal ideation, intent, or plan. Insight is poor. Judgment is impaired. Impulse control has been tenuous on the unit.

## 2021-02-02 NOTE — BH INPATIENT PSYCHIATRY PROGRESS NOTE - NSBHASSESSSUMMFT_PSY_ALL_CORE
Patient is 26yo female, unemployed, single, not in school, domiciled with mom, PPH of schizoaffective disorder with symptom onset recently in December 2019, 3 past inpatient psych hospitalizations (most recent 10/12-11/20/20 at [facility] for psychosis), followed by Dr. Loya at [facility] but patient currently noncompliant with medications, no h/o suicide attempts, no h/o violence, no PMH, no h/o substance abuse, brought in by mother for talking/laughing to herself, yelling, not sleeping, and medication noncompliance.    Patient remains psychotic. She is internally preoccupied and is seen responding to auditory hallucinations throughout the day. She continues to be intermittently agitated, requiring PRN medication.  She has been compliant with court ordered medications, tolerating them well.    - Continue Zyprexa trial, increased to 20mg PO hs.  If patient refuses, then give Zyprexa 10mg IM as per court order.  - c/w groups, milieu

## 2021-02-03 PROCEDURE — 99232 SBSQ HOSP IP/OBS MODERATE 35: CPT

## 2021-02-03 RX ORDER — OLANZAPINE 15 MG/1
30 TABLET, FILM COATED ORAL AT BEDTIME
Refills: 0 | Status: DISCONTINUED | OUTPATIENT
Start: 2021-02-03 | End: 2021-02-08

## 2021-02-03 RX ADMIN — OLANZAPINE 30 MILLIGRAM(S): 15 TABLET, FILM COATED ORAL at 21:17

## 2021-02-03 NOTE — BH INPATIENT PSYCHIATRY PROGRESS NOTE - CASE SUMMARY
The patient remains psychotic, with near-constant interaction with internally stimuli.  She has been compliant with medications, tolerating them well.  Will increase Zyprexa to 30mg.  If the patient continues to show little response, will consider augmentation with another antipsychotic or clozapine (however, patient with history of noncompliance with it in the past).

## 2021-02-03 NOTE — BH INPATIENT PSYCHIATRY PROGRESS NOTE - NSBHASSESSSUMMFT_PSY_ALL_CORE
Patient is 26yo female, unemployed, single, not in school, domiciled with mom, PPH of schizoaffective disorder with symptom onset recently in December 2019, 3 past inpatient psych hospitalizations (most recent 10/12-11/20/20 at [facility] for psychosis), followed by Dr. Loya at [facility] but patient currently noncompliant with medications, no h/o suicide attempts, no h/o violence, no PMH, no h/o substance abuse, brought in by mother for talking/laughing to herself, yelling, not sleeping, and medication noncompliance.    Patient remains psychotic. She continue to be internally preoccupied, observed by staff responding to auditory hallucinations constantly. Affect appears more labile and inappropriate today-with quick switches from laughing to irritable.     Plan:  -Continue involuntary hospitalization  -Prolixin/ativan PRN agitation  -Increase Zyprexa to 30 mg QD for psychosis; per court order to receive Zyprexa IM if she refuses PO medications  -EKG ordered, results pending  -G/M therapy as tolerated  -Dispo when stable

## 2021-02-03 NOTE — BH INPATIENT PSYCHIATRY PROGRESS NOTE - NSBHFUPINTERVALHXFT_PSY_A_CORE
Patient seen for follow up for psychosis, chart reviewed, and case discussed with treatment team. No overnight events reported. Patients denies any complaints. Patient was non-cooperative with interview this morning. She was unable to answer questions appropriately due to uncontrollable laughter. She continues to be internally preoccupied. She spends most of the day responding to internal stimuli. Staff reports her spending most of the day talking and yelling to herself. She remains disorganized, tangential and loose in thought process. She continues to have no insight into her illness. She has been eating and sleeping well since receiving court ordered Zyprexa.

## 2021-02-03 NOTE — BH INPATIENT PSYCHIATRY PROGRESS NOTE - MSE UNSTRUCTURED FT
The patient appears stated age, disheveled, dressed appropriately. She was alert, but non-cooperative with the interview. She maintains limited eye contact. No psychomotor agitation or retardation. Steady gait. The patient’s speech was fluent, normal in tone, rate and volume. The patient’s mood is “beautiful.” Affect is liable, rather euphoric with inappropriate laughter. The patient’s thoughts are goal directed at times with simple questions, but continue to be predominately tangential and loose. She has various delusions related to being in "first person". She continues to deny auditory hallucinations, but is observed constantly responding to internal stimuli. She denies any suicidal or homicidal ideation, intent, or plan. Insight is poor. Judgment is impaired. Impulse control has been tenuous on the unit. The patient appears stated age, disheveled, dressed appropriately. She was alert, calm, but non-cooperative with the interview. She maintains limited eye contact. No psychomotor agitation or retardation. Steady gait. The patient’s speech was fluent, normal in tone, rate and volume. The patient’s mood is “beautiful.” Affect is liable, rather euphoric with consistent inappropriate laughter. The patient’s thoughts are goal directed at times with simple questions, but continue to be predominately tangential and loose. She has various delusions related to being in "first person". She continues to deny auditory hallucinations, but is observed constantly responding to internal stimuli. She denies any suicidal or homicidal ideation, intent, or plan. Insight is poor. Judgment is impaired. Impulse control has been tenuous on the unit.  The patient appears stated age, disheveled, dressed appropriately. She was alert, calm, but non-cooperative with the interview. She maintains limited eye contact. No psychomotor agitation or retardation. Steady gait. The patient’s speech was fluent, normal in tone, rate and volume. The patient’s mood is “beautiful.” Affect is labile, rather euphoric with consistent inappropriate laughter. The patient’s thoughts are goal directed at times with simple questions, but continue to be predominately tangential and loose. She has various delusions related to being in "first person". She continues to deny auditory hallucinations, but is observed constantly responding to internal stimuli. She denies any suicidal or homicidal ideation, intent, or plan. Insight is poor. Judgment is impaired. Impulse control has been tenuous on the unit.

## 2021-02-04 PROCEDURE — 99231 SBSQ HOSP IP/OBS SF/LOW 25: CPT

## 2021-02-04 RX ADMIN — OLANZAPINE 30 MILLIGRAM(S): 15 TABLET, FILM COATED ORAL at 21:57

## 2021-02-04 NOTE — BH INPATIENT PSYCHIATRY PROGRESS NOTE - MSE UNSTRUCTURED FT
The patient appears stated age, disheveled, dressed appropriately. She was alert, calm, but non-cooperative with the interview. She maintains limited eye contact. No psychomotor agitation or retardation. Steady gait. The patient’s speech was fluent, normal in tone, rate and volume. The patient’s mood is “fine.” Affect is constricted, rather irritable. Her thoughts are goal directed around simple questions, continue to be predominately tangential and loose. She has various delusions related to being in "first person". Although she continues to deny auditory hallucinations, she constantly responds to internal stimuli. She denies any suicidal or homicidal ideation, intent, or plan. Insight is poor. Judgment is impaired. Impulse control has been tenuous on the unit.

## 2021-02-04 NOTE — BH INPATIENT PSYCHIATRY PROGRESS NOTE - NSBHASSESSSUMMFT_PSY_ALL_CORE
Carmen is a 26 yo F with a history of Schizoaffective disorder vs Schizophrenia, 3 prior hospitalizations, in [facility] ETP program but non-compliant with medications PTA, who presented BIB mother for insomnia, medication non-compliance, yelling, and talking to self/RIS. Patient has been inconsistently compliant with Zyprexa, with TOO granted on 1/26.     She continues to be psychotic. She is observed constantly responding to internal stimuli. She has various delusions related to being "spoken thru" and "in first person." She is sleeping and eating better since court ordered Zyprexa. Given continued significant psychotic symptoms pt requires further inpatient hospitalization for safety and stabilization.    Plan:  -Continue involuntary hospitalization  -Prolixin/ativan PRN agitation  -Increased Zyprexa to 30 mg QD for psychosis; per court order to receive Zyprexa IM if she refuses PO medications  -EKG ordered, results pending  -G/M therapy as tolerated  -Dispo when stable Carmen is a 24 yo F with a history of Schizoaffective disorder vs Schizophrenia, 3 prior hospitalizations, in [facility] ETP program but non-compliant with medications PTA, who presented BIB mother for insomnia, medication non-compliance, yelling, and talking to self/RIS. Patient has been inconsistently compliant with Zyprexa, with TOO granted on 1/26.     She continues to be psychotic. She is observed constantly responding to internal stimuli. She has various delusions related to being "spoken through" and "in first person." She is sleeping and eating better since court ordered Zyprexa. Given continued significant psychotic symptoms pt requires further inpatient hospitalization for safety and stabilization.    Plan:  -Continue involuntary hospitalization  -Prolixin/ativan PRN agitation  -Increased Zyprexa to 30 mg QD for psychosis; per court order to receive Zyprexa IM if she refuses PO medications  -EKG ordered for am  -G/M therapy as tolerated  -Dispo when stable Normal gait / station

## 2021-02-04 NOTE — BH INPATIENT PSYCHIATRY PROGRESS NOTE - NSBHFUPINTERVALHXFT_PSY_A_CORE
Patient seen for follow up for psychosis, chart reviewed, and case discussed with treatment team. No overnight events reported. Patient was uncooperative with interview, but able to answer questions appropriately. However, she seems increasingly irritable and agitated this morning. Patients denies any complaints. She states she is "fine" and nothing is bothering her. However, during interview she only provided one word responses. She remains internally preoccupied. Staff reports no change in her behavior. She spends most of her time responding to internal stimuli, which results in her talking and yelling out loud. She remains disorganized, tangential and loose in thought process. She continues to have no insight into her illness. Although, she has been eating and sleeping well since receiving court ordered Zyprexa. Patient seen for follow up for psychosis, chart reviewed, and case discussed with treatment team. No overnight events reported. Patient was largely uncooperative with interview, but able to answer direct questions appropriately. However, she seems increasingly irritable and agitated this morning. Patients denies any complaints. She states she is "fine" and nothing is bothering her. However, during interview she only provided one word responses. She remains internally preoccupied. Staff reports no change in her behavior. She spends most of her time responding to internal stimuli, which results in her talking and yelling out loud. She remains disorganized, tangential and loose in thought process. She continues to have no insight into her illness. Although, she has been eating and sleeping well since receiving court ordered Zyprexa.    Treatment team spoke with the patient's mother yesterday, who indicated the patient had done well last summer off of medications, but had recently stopped EDGAR Haldol.  She did seem like a "zombie" at times with haldol.

## 2021-02-04 NOTE — BH INPATIENT PSYCHIATRY PROGRESS NOTE - CASE SUMMARY
The patient remains psychotic, little changed aside from improved sleep and appetite.  She remains grossly psychotic, and internally preoccupied.  Will continue Zyprexa trial.  May consider Haldol, as mother states patient appeared to do better with it in the past.

## 2021-02-05 PROCEDURE — 99232 SBSQ HOSP IP/OBS MODERATE 35: CPT

## 2021-02-05 PROCEDURE — 93010 ELECTROCARDIOGRAM REPORT: CPT

## 2021-02-05 RX ADMIN — OLANZAPINE 30 MILLIGRAM(S): 15 TABLET, FILM COATED ORAL at 20:13

## 2021-02-05 NOTE — BH INPATIENT PSYCHIATRY PROGRESS NOTE - CASE SUMMARY
The patient is little changed.  She continues to be labile, irritable, and responding to internal stimuli.  However, she is perhaps having some more periods of more lucid thought process.  Tolerating medications well, will continue Zyprexa trial.  EKG WNL today.

## 2021-02-05 NOTE — BH INPATIENT PSYCHIATRY PROGRESS NOTE - NSBHFUPINTERVALHXFT_PSY_A_CORE
Patient seen for follow up for psychosis, chart reviewed, and case discussed with treatment team. No overnight events reported. This morning, patient was increasingly agitated and irritable. During interview, she was able to answer direct questions appropriately. However, she continues to provide one word responses. Patients denies any complaints. She states she is "fine" and nothing is bothering her. She remains internally preoccupied. However, staff reports minor improvement in behavior. She is still responding to internal stimuli, but is observed doing so less frequently. She is more visible on the unit, spending time in the TV room rather than pacing aimlessly. She remains disorganized, tangential and loose in thought process. She continues to have no insight into her illness. She is eating and sleeping well since receiving court ordered Zyprexa. Patient seen for follow up for psychosis, chart reviewed, and case discussed with treatment team. No overnight events reported. This morning, patient was  agitated and irritable again. During interview, she was able to answer direct questions appropriately. However, she continues to provide one word responses. Patients denies any complaints. She states she is "fine" and nothing is bothering her. She remains internally preoccupied. However, staff reports minor improvement in behavior. She is still responding to internal stimuli, but is observed doing so less frequently. She is more visible on the unit, spending time in the TV room rather than pacing aimlessly. She remains disorganized, tangential and loose in thought process. She continues to have no insight into her illness. She is eating and sleeping well since receiving court ordered Zyprexa.

## 2021-02-05 NOTE — BH INPATIENT PSYCHIATRY PROGRESS NOTE - MSE UNSTRUCTURED FT
The patient appears stated age, disheveled, dressed appropriately. She was alert, but non-cooperative with the interview. She maintains limited eye contact. No psychomotor agitation or retardation. Steady gait. The patient’s speech was fluent, normal in tone, rate and volume. The patient’s mood is “fine.” Affect is constricted, liable, rather irritable at times. Her thoughts are goal directed surrounding simple questions, but remain predominately tangential and loose. She has various delusions related to being in "first person" and being "stuck in a movie." She continues to deny auditory hallucinations; however, she is observed responding to internal stimuli repeatedly. She denies any suicidal or homicidal ideation, intent, or plan. Insight is poor. Judgment is impaired. Impulse control has been tenuous on the unit.

## 2021-02-05 NOTE — BH INPATIENT PSYCHIATRY PROGRESS NOTE - NSCGISEVERILLNESS_PSY_ALL_CORE
6 = Severely ill - disruptive pathology, behavior and function are frequently influenced by symptoms, may require assistance from others 5 = Markedly ill - intrusive symptoms that distinctly impair social/occupational function or cause intrusive levels of distress

## 2021-02-05 NOTE — BH INPATIENT PSYCHIATRY PROGRESS NOTE - NSBHASSESSSUMMFT_PSY_ALL_CORE
Carmen is a 24 yo F with a history of Schizoaffective disorder vs Schizophrenia, 3 prior hospitalizations, in [facility] ETP program but non-compliant with medications PTA, who presented BIB mother for insomnia, medication non-compliance, yelling, and talking to self/RIS. Patient has been inconsistently compliant with Zyprexa, with TOO granted on 1/26.     She remains psychotic; however, seems to be showing minor improvement. Staff continues to observe her responding to internal stimuli; but the responses seem less frequent more controlled. She has various delusions related to being "spoken through" and "in first person." She is sleeping and eating better since court ordered Zyprexa. Given continued significant psychotic symptoms pt requires further inpatient hospitalization for safety and stabilization.    Plan:  -Continue involuntary hospitalization  -Prolixin/ativan PRN agitation  -Increased Zyprexa to 30 mg QD for psychosis; per court order to receive Zyprexa IM if she refuses PO medications  -EKG normal  -G/M therapy as tolerated  -Dispo when stable

## 2021-02-06 RX ADMIN — OLANZAPINE 30 MILLIGRAM(S): 15 TABLET, FILM COATED ORAL at 20:14

## 2021-02-07 RX ADMIN — OLANZAPINE 30 MILLIGRAM(S): 15 TABLET, FILM COATED ORAL at 20:17

## 2021-02-08 PROCEDURE — 99232 SBSQ HOSP IP/OBS MODERATE 35: CPT

## 2021-02-08 RX ORDER — HALOPERIDOL DECANOATE 100 MG/ML
5 INJECTION INTRAMUSCULAR ONCE
Refills: 0 | Status: DISCONTINUED | OUTPATIENT
Start: 2021-02-08 | End: 2021-05-12

## 2021-02-08 RX ORDER — HALOPERIDOL DECANOATE 100 MG/ML
5 INJECTION INTRAMUSCULAR AT BEDTIME
Refills: 0 | Status: DISCONTINUED | OUTPATIENT
Start: 2021-02-08 | End: 2021-05-12

## 2021-02-08 RX ORDER — HALOPERIDOL DECANOATE 100 MG/ML
5 INJECTION INTRAMUSCULAR AT BEDTIME
Refills: 0 | Status: DISCONTINUED | OUTPATIENT
Start: 2021-02-08 | End: 2021-02-10

## 2021-02-08 RX ORDER — OLANZAPINE 15 MG/1
20 TABLET, FILM COATED ORAL AT BEDTIME
Refills: 0 | Status: DISCONTINUED | OUTPATIENT
Start: 2021-02-08 | End: 2021-02-10

## 2021-02-08 RX ORDER — HALOPERIDOL DECANOATE 100 MG/ML
5 INJECTION INTRAMUSCULAR EVERY 4 HOURS
Refills: 0 | Status: DISCONTINUED | OUTPATIENT
Start: 2021-02-08 | End: 2021-05-12

## 2021-02-08 RX ADMIN — Medication 2 MILLIGRAM(S): at 21:00

## 2021-02-08 RX ADMIN — Medication 50 MILLIGRAM(S): at 21:00

## 2021-02-08 RX ADMIN — HALOPERIDOL DECANOATE 5 MILLIGRAM(S): 100 INJECTION INTRAMUSCULAR at 21:00

## 2021-02-08 RX ADMIN — OLANZAPINE 20 MILLIGRAM(S): 15 TABLET, FILM COATED ORAL at 21:00

## 2021-02-08 NOTE — BH INPATIENT PSYCHIATRY PROGRESS NOTE - NSBHASSESSSUMMFT_PSY_ALL_CORE
Carmen is a 26 yo F with a history of Schizoaffective disorder vs Schizophrenia, 3 prior hospitalizations, in [facility] ETP program but non-compliant with medications PTA, who presented BIB mother for insomnia, medication non-compliance, yelling, and talking to self/RIS. Patient has been inconsistently compliant with Zyprexa, with TOO granted on 1/26.     She remains psychotic, showing only minimal improvement with Zyprexa. Staff continues to observe her responding to internal stimuli, although responses are less frequent. She has various delusions related to being "spoken through" and "in first person." She is sleeping and eating better since court ordered Zyprexa. Haldol trial will be started tonight with plans to titrate up as needed, while titrating down on Zyprexa. Patients mother reports history of patient tolerating Haldol well. Given continued significant psychotic symptoms pt requires further inpatient hospitalization for safety and stabilization.    Plan:  - Continue involuntary hospitalization  - Prolixin/ativan PRN agitation  - Start Haldol 5 mg QD for psychosis with plan to titrate up as needed; per court order to receive Haldol IM if she refuses PO medications  - Decrease Zyprexa to 20 mg QD with plan to titrate down; per court order to receive Zyprexa IM if she refuses PO medications  - G/M therapy as tolerated  - Dispo when stable

## 2021-02-08 NOTE — BH INPATIENT PSYCHIATRY PROGRESS NOTE - CASE SUMMARY
The patient remains psychotic, internally preoccupied all day.  As she has only shown minimal improvement with Zyprexa, will start Haldol trial.  The patient's mother has reported patient did well with Haldol in the past.

## 2021-02-08 NOTE — BH INPATIENT PSYCHIATRY PROGRESS NOTE - MSE UNSTRUCTURED FT
The patient appears stated age, disheveled, dressed appropriately. She was alert, but non-cooperative with the interview. She maintains limited eye contact. No psychomotor agitation or retardation. Steady gait. The patient’s speech was fluent, normal in tone, rate and volume. The patient’s mood is “good.” Affect is constricted, liable, alternating from irritable to agitated quickly. Her thoughts are goal directed surrounding simple questions, but remain predominately tangential and loose. She has various delusions related to being in "first person" and being "stuck in a movie." She continues to deny auditory hallucinations; however, she is observed responding to internal stimuli. She denies any suicidal or homicidal ideation, intent, or plan. Insight is poor. Judgment is impaired. Impulse control has been tenuous on the unit.  The patient appears stated age, disheveled, dressed appropriately. She was alert, irritable, and non-cooperative with the interview. She maintains limited eye contact. No psychomotor agitation or retardation. Steady gait. The patient’s speech was fluent, normal in tone, rate and volume. The patient’s mood is “good.” Affect is constricted, liable, alternating from irritable to agitated quickly. Her thoughts are goal directed surrounding simple questions, but remain predominately tangential and loose. She has various delusions related to being in "first person" and being "stuck in a movie." She continues to deny auditory hallucinations; however, she is observed responding to internal stimuli. She denies any suicidal or homicidal ideation, intent, or plan. Insight is poor. Judgment is impaired. Impulse control has been tenuous on the unit.

## 2021-02-09 PROCEDURE — 99231 SBSQ HOSP IP/OBS SF/LOW 25: CPT

## 2021-02-09 RX ADMIN — OLANZAPINE 20 MILLIGRAM(S): 15 TABLET, FILM COATED ORAL at 21:40

## 2021-02-09 RX ADMIN — HALOPERIDOL DECANOATE 5 MILLIGRAM(S): 100 INJECTION INTRAMUSCULAR at 21:41

## 2021-02-09 NOTE — BH INPATIENT PSYCHIATRY PROGRESS NOTE - NSBHASSESSSUMMFT_PSY_ALL_CORE
Carmen is a 26 yo F with a history of Schizoaffective disorder vs Schizophrenia, 3 prior hospitalizations, in [facility] ETP program but non-compliant with medications PTA, who presented BIB mother for insomnia, medication non-compliance, yelling, and talking to self/RIS. Patient has been inconsistently compliant with Zyprexa, with TOO granted on 1/26.     She remains acutely psychotic, showing minor improvement with Haldol trial. Staff continues to report her responding to internal stimuli, although responses are less frequent and more controlled. She has various delusions related to being "spoken through" and "in first person." She is sleeping and eating well and behavior has been well controlled. Given continued significant psychotic symptoms pt requires further inpatient hospitalization for safety and stabilization.    Plan:  - Continue involuntary hospitalization  - Prolixin/ativan PRN agitation  - Continue Haldol 5 mg QD for psychosis with plan to titrate up as needed; per court order to receive Haldol IM if she refuses PO medications  - Continue Zyprexa 20 mg QD with plan to titrate down; per court order to receive Zyprexa IM if she refuses PO medications  - Monitor closely for side effects  - G/M therapy as tolerated  - Dispo when stable

## 2021-02-09 NOTE — BH INPATIENT PSYCHIATRY PROGRESS NOTE - CASE SUMMARY
The patient continues to be psychotic, internally preoccupied, but is a little more organized this morning, and more pleasant.  The patient was compliant with medications, tolerating them well.  Will continue haldol trial, with Zyprexa for now.

## 2021-02-09 NOTE — BH INPATIENT PSYCHIATRY PROGRESS NOTE - MSE UNSTRUCTURED FT
The patient appears stated age, disheveled, dressed appropriately. She was alert, calm and cooperative with the interview. She maintains limited eye contact. No psychomotor agitation or retardation. Steady gait. The patient’s speech was fluent, normal in tone, rate and volume. The patient’s mood is “fine.” Affect is constricted, stable, appropriate. Her thoughts are goal directed surrounding simple questions, but continue to be predominately tangential and loose. She has various delusions related to being in "first person" and being "stuck in a movie." She continues to deny auditory hallucinations; however, she is observed responding to internal stimuli. She denies any suicidal or homicidal ideation, intent, or plan. Insight is poor. Judgment is impaired. Impulse control has been tenuous on the unit.

## 2021-02-09 NOTE — BH INPATIENT PSYCHIATRY PROGRESS NOTE - NSBHFUPINTERVALHXFT_PSY_A_CORE
Patient seen for follow up for psychosis, chart reviewed, and case discussed with treatment team. No overnight events reported. Patient denies any acute complaints. This morning, patient was brighter and increasingly cooperative. During interview, she was able to answer questions appropriately and hold a conversation, instead of providing one word responses. She states her mood is "fine" and her thoughts are "clear." However, she remains internally preoccupied. Staff reports her visible on the unit, spending more time in the TV and dinning room. She is starting to spend less time pacing and interacting with various voices. Additionally, her interactions seem to be more controlled. However; she continues to be disorganized, tangential and loose in thought process. She continues to have no insight into her illness. She is eating and sleeping well and behavior has been more controlled.

## 2021-02-10 PROCEDURE — 99232 SBSQ HOSP IP/OBS MODERATE 35: CPT

## 2021-02-10 RX ORDER — BENZTROPINE MESYLATE 1 MG
1 TABLET ORAL AT BEDTIME
Refills: 0 | Status: DISCONTINUED | OUTPATIENT
Start: 2021-02-10 | End: 2021-04-08

## 2021-02-10 RX ORDER — HALOPERIDOL DECANOATE 100 MG/ML
7.5 INJECTION INTRAMUSCULAR AT BEDTIME
Refills: 0 | Status: DISCONTINUED | OUTPATIENT
Start: 2021-02-10 | End: 2021-02-17

## 2021-02-10 RX ORDER — OLANZAPINE 15 MG/1
15 TABLET, FILM COATED ORAL AT BEDTIME
Refills: 0 | Status: DISCONTINUED | OUTPATIENT
Start: 2021-02-10 | End: 2021-02-23

## 2021-02-10 RX ADMIN — OLANZAPINE 15 MILLIGRAM(S): 15 TABLET, FILM COATED ORAL at 21:36

## 2021-02-10 RX ADMIN — HALOPERIDOL DECANOATE 7.5 MILLIGRAM(S): 100 INJECTION INTRAMUSCULAR at 21:35

## 2021-02-10 RX ADMIN — Medication 1 MILLIGRAM(S): at 21:36

## 2021-02-10 NOTE — BH INPATIENT PSYCHIATRY PROGRESS NOTE - NSBHASSESSSUMMFT_PSY_ALL_CORE
Carmen is a 26 yo F with a history of Schizoaffective disorder vs Schizophrenia, 3 prior hospitalizations, in [facility] ETP program but non-compliant with medications PTA, who presented BIB mother for insomnia, medication non-compliance, yelling, and talking to self/RIS. Patient has been inconsistently compliant with Zyprexa, with TOO granted on 1/26.     She remains acutely psychotic, showing minor improvement with Haldol trial. She remains internally preoccupied, talking to herself throughout the day.  She has been compliant with medications, tolerating them well.    Plan:  - Continue involuntary hospitalization  - Prolixin/ativan PRN agitation  - Increase Haldol to 7.5 mg QHS for psychosis with plan to titrate up as needed; per court order to receive Haldol IM if she refuses PO medications  - Decrease Zyprexa to 15 mg QHS with plan to continue to titrate down; per court order to receive Zyprexa IM if she refuses PO medications  - Monitor closely for side effects  - G/M therapy as tolerated  - Dispo when stable

## 2021-02-10 NOTE — BH INPATIENT PSYCHIATRY PROGRESS NOTE - MSE UNSTRUCTURED FT
The patient appears stated age, disheveled, dressed appropriately. She was alert, calm and superficially cooperative with the interview. She maintains limited eye contact. No psychomotor agitation or retardation. Steady gait. The patient’s speech was fluent, normal in tone, rate and volume. The patient’s mood is “fine.” Affect is constricted, stable, appropriate. Her thoughts are goal directed, but rather impoverished, tangential and loose at times.  She has various delusions related to being in "first person" and being "stuck in a movie."  She continues to deny auditory hallucinations; however, she is observed responding to internal stimuli. She denies any suicidal or homicidal ideation, intent, or plan. Insight is poor. Judgment is impaired. Impulse control has been fair on the unit.

## 2021-02-10 NOTE — BH INPATIENT PSYCHIATRY PROGRESS NOTE - NSBHFUPINTERVALHXFT_PSY_A_CORE
Patient seen for follow up for psychosis, chart reviewed, and case discussed with treatment team. No overnight events reported. The patient is little changed.  She continues to be internally preoccupied, talking to herself frequently during the day.  She remains isolative without socialization with peers.  She is superficial during interview, denies any problems.  She denies depression, or SI, and behavior has been well controlled.  She has been eating and sleeping well.  Staff reports the patient is overall less loud, and less irritable than before.  She continues to have no insight into her illness.  She is compliant with medications, tolerating them well.

## 2021-02-11 PROCEDURE — 99231 SBSQ HOSP IP/OBS SF/LOW 25: CPT

## 2021-02-11 RX ADMIN — HALOPERIDOL DECANOATE 7.5 MILLIGRAM(S): 100 INJECTION INTRAMUSCULAR at 21:21

## 2021-02-11 RX ADMIN — Medication 1 MILLIGRAM(S): at 21:21

## 2021-02-11 RX ADMIN — OLANZAPINE 15 MILLIGRAM(S): 15 TABLET, FILM COATED ORAL at 21:21

## 2021-02-11 NOTE — BH INPATIENT PSYCHIATRY PROGRESS NOTE - CASE SUMMARY
The patient continues to be psychotic, internally preoccupied, talking to herself throughout the day, sometimes to the point of not being able to be interrupted.  She has been compliant with medications, tolerating them well.  Will continue Haldol trial.  Consider application to [facility], as patient has not been showing improvement despite multiple medical trials.

## 2021-02-11 NOTE — BH INPATIENT PSYCHIATRY PROGRESS NOTE - NSBHFUPINTERVALHXFT_PSY_A_CORE
Patient seen for follow up for psychosis, chart reviewed, and case discussed with treatment team. No overnight events reported. Denies any acute complaints. The patient remains largely unchanged. She has no insight into her illness. She continues to be internally preoccupied, responding to various stimuli throughout the day. Although, staff reports the patient is overall less loud and irritable recently. She remains isolative without socialization with peers; however, is more visible on the unit. During interview, she is very superficial consistently providing only one word responses. She denies depression, SI, auditory or visual hallucinations. She has been eating and sleeping well and behavior well controlled on the unit. She is compliant with medications, tolerating them well.

## 2021-02-11 NOTE — BH INPATIENT PSYCHIATRY PROGRESS NOTE - NSBHASSESSSUMMFT_PSY_ALL_CORE
Carmen is a 24 yo F with a history of Schizoaffective disorder vs Schizophrenia, 3 prior hospitalizations, in [facility] ETP program but non-compliant with medications PTA, who presented BIB mother for insomnia, medication non-compliance, yelling, and talking to self/RIS. Patient has been inconsistently compliant with Zyprexa, with TOO granted on 1/26.     She remains acutely psychotic, showing only minor improvement with Haldol trial. She continues to have no insight into her illness. She remains internally preoccupied, responding to various stimuli throughout the day. She has been compliant with medications and tolerating them well. Behavior has been well controlled on the unit.    Plan:  - Continue involuntary hospitalization  - Prolixin/ativan PRN agitation  - Increased Haldol to 7.5 mg QHS for psychosis with plan to titrate up as needed; per court order to receive Haldol IM if she refuses PO medications  - Decreased Zyprexa to 15 mg QHS with plan to continue to titrate down; per court order to receive Zyprexa IM if she refuses PO medications  - Monitor closely for side effects  - G/M therapy as tolerated  - Dispo when stable

## 2021-02-11 NOTE — BH INPATIENT PSYCHIATRY PROGRESS NOTE - MSE UNSTRUCTURED FT
The patient appears stated age, disheveled, dressed appropriately. She was alert, calm and superficially cooperative with the interview. She maintains limited eye contact. No psychomotor agitation or retardation. Steady gait. The patient’s speech was fluent, normal in tone, rate and volume. The patient’s mood is “fine.” Affect is constricted, stable, appropriate. Her thoughts are goal directed surrounding simple questions, but remain predominately tangential and loose. She has various delusions related to being in "first person" and being "stuck in a movie." She continues to deny auditory hallucinations; however, she is observed responding to internal stimuli. She denies any suicidal or homicidal ideation, intent, or plan. Insight is poor. Judgment is impaired. Impulse control has been fair on the unit.

## 2021-02-12 PROCEDURE — 99231 SBSQ HOSP IP/OBS SF/LOW 25: CPT

## 2021-02-12 RX ADMIN — Medication 1 MILLIGRAM(S): at 22:17

## 2021-02-12 RX ADMIN — HALOPERIDOL DECANOATE 7.5 MILLIGRAM(S): 100 INJECTION INTRAMUSCULAR at 22:17

## 2021-02-12 RX ADMIN — OLANZAPINE 15 MILLIGRAM(S): 15 TABLET, FILM COATED ORAL at 22:16

## 2021-02-12 NOTE — BH INPATIENT PSYCHIATRY PROGRESS NOTE - MSE UNSTRUCTURED FT
The patient appears stated age, disheveled, dressed appropriately. She was alert, calm and superficially cooperative with the interview. She maintains limited eye contact. No psychomotor agitation or retardation. Steady gait. The patient’s speech was fluent, normal in tone, rate and volume. The patient’s mood is “fine.” Affect is constricted, stable, rather irritable. Her thoughts are goal directed surrounding simple questions, but remain predominately tangential and loose. She has various delusions related to being in "first person" and being "stuck in a movie." She continues to deny auditory hallucinations; however, she is observed responding to internal stimuli. She denies any suicidal or homicidal ideation, intent, or plan. Insight is poor. Judgment is impaired. Impulse control has been fair on the unit.

## 2021-02-12 NOTE — BH INPATIENT PSYCHIATRY PROGRESS NOTE - CASE SUMMARY
The patient remains internally preoccupied, talking to self.  However, she is more able to answer closed-ended questions, and overall is more pleasant.  She has been tolerating medications well.  Will continue Zyprexa and Haldol trial.

## 2021-02-12 NOTE — BH INPATIENT PSYCHIATRY PROGRESS NOTE - NSBHASSESSSUMMFT_PSY_ALL_CORE
Carmen is a 24 yo F with a history of Schizoaffective disorder vs Schizophrenia, 3 prior hospitalizations, in [facility] ETP program but non-compliant with medications PTA, who presented BIB mother for insomnia, medication non-compliance, yelling, and talking to self/RIS. Patient has been inconsistently compliant with Zyprexa, with TOO granted on 1/26.     Patient remains psychotic, continuing to show only minor improvement with Haldol trial. She continues to have no insight into her illness. She remains internally preoccupied, responding to various stimuli throughout the day. She is eating well and sleeping well. She has been compliant with medications and tolerating them well. Behavior has been well controlled on the unit.    Plan:  - Continue involuntary hospitalization  - Prolixin/ativan PRN agitation  - Increased Haldol to 7.5 mg QHS for psychosis with plan to titrate up as needed; per court order to receive Haldol IM if she refuses PO medications  - Decreased Zyprexa to 15 mg QHS with plan to continue to titrate down; per court order to receive Zyprexa IM if she refuses PO medications  - Monitor closely for side effects  - G/M therapy as tolerated  - Dispo when stable

## 2021-02-12 NOTE — BH INPATIENT PSYCHIATRY PROGRESS NOTE - NSBHFUPINTERVALHXFT_PSY_A_CORE
Patient seen for follow up for psychosis, chart reviewed, and case discussed with treatment team. No overnight events reported. The patient remains largely unchanged. She continues to have no insight into her illness. She remains internally preoccupied, responding to various stimuli throughout the day. Staff reports minor improvements in the patient, stating she is overall less loud and irritable. On the unit, she is more visibile; however, continues to be isolative without socialization with peers. During interview, she is very irritable, consistently rolling her eyes and giving limited responses. She denies depression, SI, auditory or visual hallucinations. She has been eating and sleeping well and behavior well controlled on the unit. She is compliant with medications, tolerating them well.

## 2021-02-13 PROCEDURE — 99232 SBSQ HOSP IP/OBS MODERATE 35: CPT

## 2021-02-13 RX ADMIN — HALOPERIDOL DECANOATE 5 MILLIGRAM(S): 100 INJECTION INTRAMUSCULAR at 23:46

## 2021-02-13 RX ADMIN — OLANZAPINE 15 MILLIGRAM(S): 15 TABLET, FILM COATED ORAL at 20:12

## 2021-02-13 RX ADMIN — Medication 1 MILLIGRAM(S): at 20:12

## 2021-02-13 RX ADMIN — HALOPERIDOL DECANOATE 7.5 MILLIGRAM(S): 100 INJECTION INTRAMUSCULAR at 20:12

## 2021-02-13 RX ADMIN — Medication 2 MILLIGRAM(S): at 23:46

## 2021-02-13 RX ADMIN — Medication 50 MILLIGRAM(S): at 23:46

## 2021-02-13 NOTE — BH INPATIENT PSYCHIATRY PROGRESS NOTE - NSBHFUPINTERVALHXFT_PSY_A_CORE
Patient seen for follow up of psychosis.  Case reviewed with comprehensive treatment team.  Patient continues top pace the unit with self-talk and laughing loudly and inappropriately.  Patient is compliant with medications with no reported or observed side effects.  Patient is eating and sleeping well.  Patient is unable to give a meaningful narrative of events preceding hospitalization and is dismissive on interview.  Interview limited by thought disorganization.

## 2021-02-13 NOTE — BH INPATIENT PSYCHIATRY PROGRESS NOTE - MSE UNSTRUCTURED FT
Patient is casually dressed, laughing to self loudly, distracted, unable to engage in clinically meaningful interview, oddly-related, poor EC. +PMR. Speech is soft and slow with latency and monotone. Mood "fine" and affect is blunted, minimally reactive. TP with paucity of thought. Denies SI/HI, +AH, + delusions. Cognition grossly intact. I&J poor.

## 2021-02-14 PROCEDURE — 99232 SBSQ HOSP IP/OBS MODERATE 35: CPT

## 2021-02-14 RX ADMIN — Medication 1 MILLIGRAM(S): at 20:27

## 2021-02-14 RX ADMIN — HALOPERIDOL DECANOATE 7.5 MILLIGRAM(S): 100 INJECTION INTRAMUSCULAR at 20:27

## 2021-02-14 RX ADMIN — Medication 2 MILLIGRAM(S): at 18:49

## 2021-02-14 RX ADMIN — OLANZAPINE 15 MILLIGRAM(S): 15 TABLET, FILM COATED ORAL at 20:27

## 2021-02-14 RX ADMIN — HALOPERIDOL DECANOATE 5 MILLIGRAM(S): 100 INJECTION INTRAMUSCULAR at 18:48

## 2021-02-14 RX ADMIN — Medication 50 MILLIGRAM(S): at 18:48

## 2021-02-14 NOTE — BH INPATIENT PSYCHIATRY PROGRESS NOTE - MSE UNSTRUCTURED FT
Patient is casually dressed, appears stated age,  laughing to self loudly, distracted, unable to engage in clinically meaningful interview, oddly-related, poor EC. +PMR. Speech is soft and slow with latency and monotone. Mood "good" and affect is blunted, minimally reactive. TP with paucity of thought. Denies SI/HI, +AH, + delusions. Cognition grossly intact. I&J poor.

## 2021-02-14 NOTE — BH INPATIENT PSYCHIATRY PROGRESS NOTE - NSBHFUPINTERVALHXFT_PSY_A_CORE
Patient seen for follow up of psychosis.  Case reviewed with comprehensive treatment team.  Patient continues top pace the unit with self-talk and laughing loudly and inappropriately.  She was given prn PO haldol, ativan, and benadryl last night for agitation.  Patient is compliant with medications with no reported or observed side effects.  Patient is eating and sleeping well.  Patient is unable to give a meaningful narrative of events preceding hospitalization and is dismissive on interview.  Interview limited by thought disorganization.

## 2021-02-15 PROCEDURE — 99232 SBSQ HOSP IP/OBS MODERATE 35: CPT

## 2021-02-15 RX ADMIN — Medication 1 MILLIGRAM(S): at 21:52

## 2021-02-15 RX ADMIN — OLANZAPINE 15 MILLIGRAM(S): 15 TABLET, FILM COATED ORAL at 21:52

## 2021-02-15 RX ADMIN — HALOPERIDOL DECANOATE 7.5 MILLIGRAM(S): 100 INJECTION INTRAMUSCULAR at 21:53

## 2021-02-15 NOTE — BH INPATIENT PSYCHIATRY PROGRESS NOTE - NSBHFUPINTERVALHXFT_PSY_A_CORE
Patient seen for follow up of psychosis.  Case reviewed with comprehensive treatment team.  Patient continues top pace the unit with self-talk and laughing loudly and inappropriately.  She was given prn PO haldol, ativan, and benadryl yesterday evening at 6:45pm for agitation with good effect.  Patient is compliant with medications with no reported or observed side effects.  Patient is eating and sleeping well.  Patient is unable to give a meaningful narrative of events preceding hospitalization and is dismissive on interview.  Interview limited by sedation and thought disorganization.

## 2021-02-15 NOTE — BH INPATIENT PSYCHIATRY PROGRESS NOTE - MSE UNSTRUCTURED FT
Patient is casually dressed, appears stated age,  laughing to self loudly, distracted, unable to engage in clinically meaningful interview, oddly-related, poor EC. +PMR. Speech is soft and slow with latency and monotone. Mood "fine" and affect is blunted, minimally reactive. TP with paucity of thought. Denies SI/HI, +AH, + delusions. Cognition grossly intact. I&J poor.

## 2021-02-16 PROCEDURE — 99231 SBSQ HOSP IP/OBS SF/LOW 25: CPT

## 2021-02-16 RX ADMIN — OLANZAPINE 15 MILLIGRAM(S): 15 TABLET, FILM COATED ORAL at 21:30

## 2021-02-16 RX ADMIN — Medication 1 MILLIGRAM(S): at 21:30

## 2021-02-16 RX ADMIN — HALOPERIDOL DECANOATE 7.5 MILLIGRAM(S): 100 INJECTION INTRAMUSCULAR at 21:30

## 2021-02-16 NOTE — BH INPATIENT PSYCHIATRY PROGRESS NOTE - MSE UNSTRUCTURED FT
The patient appears stated age, disheveled, dressed appropriately. She was alert, calm and cooperative with the interview. She maintains limited eye contact. No psychomotor agitation or retardation. Steady gait. The patient’s speech was fluent, normal in tone, rate and volume. The patient’s mood is “good.” Affect is constricted, stable, appropriate. Her thoughts are goal directed surrounding simple questions, but continue to be predominately tangential and loose. She has various delusions related to being in "first person" and being "stuck in a movie." She continues to deny auditory or visual hallucinations; however, she is observed responding to internal stimuli. She denies any suicidal or homicidal ideation, intent, or plan. Insight is poor. Judgment is impaired. Impulse control has been tenuous on the unit. The patient appears stated age, disheveled, dressed appropriately. She was alert, calm and superficially cooperative with the interview. She maintains limited eye contact. No psychomotor agitation or retardation. Steady gait. The patient’s speech was fluent, normal in tone, rate and volume. The patient’s mood is “good.” Affect is constricted, stable, appropriate. Her thoughts are goal directed surrounding simple questions, but continue to be predominately tangential and loose. She has various delusions related to being in "first person" and being "stuck in a movie." She continues to deny auditory or visual hallucinations; however, she is observed responding to internal stimuli. She denies any suicidal or homicidal ideation, intent, or plan. Insight is poor. Judgment is impaired. Impulse control has been tenuous on the unit.

## 2021-02-16 NOTE — BH INPATIENT PSYCHIATRY PROGRESS NOTE - NSBHFUPINTERVALHXFT_PSY_A_CORE
Patient seen for follow up for psychosis, chart reviewed, and case discussed with treatment team. No overnight events reported. However, staff reported incident where patient became increasingly agitated yesterday evening requiring PRN's. The patient remains largely unchanged. She continues to have no insight into her illness. She remains internally preoccupied, responding to various stimuli throughout the day. At times, she is observed to less loud and irritable showing only minor improvements with Haldol trial. On the unit, she is more visibile but continues to be isolative without socialization with peers. During interview this morning, she is brighter and more cooperative. She denies depression, SI, auditory or visual hallucinations. She has been eating and sleeping well and behavior well controlled on the unit. She is compliant with medications, tolerating them well.   Patient seen for follow up for psychosis, chart reviewed, and case discussed with treatment team. No overnight events reported. However, staff reported incident where patient became increasingly agitated over the weekend, requiring PRN's. The patient remains largely unchanged. She continues to have no insight into her illness. She remains internally preoccupied, responding to various stimuli throughout the day. At times, she is observed to less loud and irritable showing only minor improvements with Haldol trial. On the unit, she is more visibile but continues to be isolative without socialization with peers. During interview this morning, she is brighter and more cooperative. She denies depression, SI, auditory or visual hallucinations. She has been eating and sleeping well and behavior well controlled on the unit. She is compliant with medications, tolerating them well.

## 2021-02-16 NOTE — BH INPATIENT PSYCHIATRY PROGRESS NOTE - CASE SUMMARY
The patient continues to be psychotic, with only small improvement in symptoms.  Will continue to slowly titrate Haldol (patient with significant side effects in the past).  Continue Zyprexa for now.  May pursue [facility] hospital, if patient does not start to show more significant improvement.

## 2021-02-16 NOTE — BH INPATIENT PSYCHIATRY PROGRESS NOTE - NSBHASSESSSUMMFT_PSY_ALL_CORE
Carmen is a 26 yo F with a history of Schizoaffective disorder vs Schizophrenia, 3 prior hospitalizations, in [facility] ETP program but non-compliant with medications PTA, who presented BIB mother for insomnia, medication non-compliance, yelling, and talking to self/RIS. Patient has been inconsistently compliant with Zyprexa, with TOO granted on 1/26.     Patient remains acutely psychotic. Staff reports minor improvement with Haldol trial; however, patient continues to respond to various stimuli throughout the day. She remains internally preoccupied and has no insight into her illness. She is eating well and sleeping well. She has been compliant with medications and tolerating them well. Behavior has been well controlled on the unit.    Plan:  - Continue involuntary hospitalization  - Prolixin/ativan PRN agitation  - Continue Haldol 7.5 mg QHS for psychosis with plan to titrate up as needed; per court order to receive Haldol IM if she refuses PO medications  - Continue Zyprexa to 15 mg QHS with plan to continue to titrate down; per court order to receive Zyprexa IM if she refuses PO medications  - Monitor closely for side effects  - G/M therapy as tolerated  - Dispo when stable

## 2021-02-17 PROCEDURE — 99232 SBSQ HOSP IP/OBS MODERATE 35: CPT

## 2021-02-17 RX ORDER — HALOPERIDOL DECANOATE 100 MG/ML
10 INJECTION INTRAMUSCULAR AT BEDTIME
Refills: 0 | Status: DISCONTINUED | OUTPATIENT
Start: 2021-02-17 | End: 2021-02-23

## 2021-02-17 RX ADMIN — Medication 1 MILLIGRAM(S): at 21:26

## 2021-02-17 RX ADMIN — HALOPERIDOL DECANOATE 10 MILLIGRAM(S): 100 INJECTION INTRAMUSCULAR at 21:26

## 2021-02-17 RX ADMIN — OLANZAPINE 15 MILLIGRAM(S): 15 TABLET, FILM COATED ORAL at 21:26

## 2021-02-17 NOTE — BH INPATIENT PSYCHIATRY PROGRESS NOTE - MSE UNSTRUCTURED FT
The patient appears stated age, disheveled, dressed appropriately. She was alert, calm and superficially cooperative with the interview. She maintains limited eye contact. No psychomotor agitation or retardation. Steady gait. The patient’s speech was fluent, normal in tone, rate and volume. The patient’s mood is “fine.” Affect is constricted, stable, appropriate. Her thoughts are goal directed during focused conversations, but continue to be tangential and loose at times. She has various delusions related to being in "first person" and being "stuck in a movie." She continues to deny auditory or visual hallucinations; however, she is repeatedly observed responding to internal stimuli. She denies any suicidal or homicidal ideation, intent, or plan. Insight is poor. Judgment is impaired. Impulse control has been tenuous on the unit.

## 2021-02-17 NOTE — BH INPATIENT PSYCHIATRY PROGRESS NOTE - NSBHFUPINTERVALHXFT_PSY_A_CORE
Patient seen for follow up for psychosis, chart reviewed, and case discussed with treatment team. No overnight events reported. The patient continues to remain unchanged, showing only minor improvement with Haldol trial. She continues to have no insight into her illness. She remains internally preoccupied, responding to various stimuli throughout the day. However, staff reports that she is able to have more focused conversations at times. On the unit, she is more visibile but continues to be isolative without socialization with peers. During interview this morning, she is less irritable but remains superficially cooperative. She denies depression, SI, auditory or visual hallucinations. She has been eating and sleeping well and behavior well controlled on the unit. She is compliant with medications, tolerating them well. Patient seen for follow up for psychosis, chart reviewed, and case discussed with treatment team. No overnight events reported. The patient continues to remain unchanged, showing only minor improvement with Haldol trial. She continues to have no insight into her illness. She remains internally preoccupied, responding to various stimuli throughout the day. However, staff reports that she is able to have more focused conversations at times. On the unit, she is more visibile but continues to be isolative without socialization with peers. During interview this morning, she is less irritable but remains superficially cooperative. She denies depression, SI, auditory or visual hallucinations. She has been eating and sleeping well and behavior well controlled on the unit. She is compliant with medications, tolerating them well.    Spoke with the patient's mother to provide clinical update.  Mother states the patient is sounding better overall on the phone.  Mother believes clozapine did not help her in the past - patient continued to be responding to internal stimuli.

## 2021-02-17 NOTE — BH INPATIENT PSYCHIATRY PROGRESS NOTE - NSBHASSESSSUMMFT_PSY_ALL_CORE
Carmen is a 24 yo F with a history of Schizoaffective disorder vs Schizophrenia, 3 prior hospitalizations, in [facility] ETP program but non-compliant with medications PTA, who presented BIB mother for insomnia, medication non-compliance, yelling, and talking to self/RIS. Patient has been inconsistently compliant with Zyprexa, with TOO granted on 1/26.     Patient remains acutely psychotic. Staff continues to report only minor improvement with Haldol trial. She remains internally preoccupied, responding to various internal stimuli throughout the day. She continues to have no insight into her illness. She is eating well and sleeping well. She has been compliant with medications and tolerating them well. Behavior has been well controlled on the unit.    Plan:  - Continue involuntary hospitalization  - Prolixin/ativan PRN agitation  - Increase Haldol to 10 mg QHS for psychosis with plan to titrate up as needed; per court order to receive Haldol IM if she refuses PO medications  - Continue Zyprexa 15 mg QHS with plan to continue to titrate down; per court order to receive Zyprexa IM if she refuses PO medications  - Monitor closely for side effects  - G/M therapy as tolerated  - Dispo when stable

## 2021-02-17 NOTE — BH INPATIENT PSYCHIATRY PROGRESS NOTE - CASE SUMMARY
The patient is little changed.  She has shown some small improvements in self-care, and is better able to have a conversation for short periods of time.  However, she continues to be internally preoccupied much of the day.  She has been tolerating medications well, will increase Haldol, continue Zyprexa.

## 2021-02-18 PROCEDURE — 99231 SBSQ HOSP IP/OBS SF/LOW 25: CPT

## 2021-02-18 RX ADMIN — Medication 1 MILLIGRAM(S): at 21:09

## 2021-02-18 RX ADMIN — OLANZAPINE 15 MILLIGRAM(S): 15 TABLET, FILM COATED ORAL at 21:09

## 2021-02-18 RX ADMIN — HALOPERIDOL DECANOATE 10 MILLIGRAM(S): 100 INJECTION INTRAMUSCULAR at 21:09

## 2021-02-18 NOTE — BH INPATIENT PSYCHIATRY PROGRESS NOTE - CASE SUMMARY
The patient continues to be internally preoccupied, talking to herself throughout the day.  She is tolerating the increase in Haldol well thus far, denying side effects.  Will continue.

## 2021-02-18 NOTE — BH INPATIENT PSYCHIATRY PROGRESS NOTE - NSBHASSESSSUMMFT_PSY_ALL_CORE
Carmen is a 24 yo F with a history of Schizoaffective disorder vs Schizophrenia, 3 prior hospitalizations, in [facility] ETP program but non-compliant with medications PTA, who presented BIB mother for insomnia, medication non-compliance, yelling, and talking to self/RIS. Patient has been inconsistently compliant with Zyprexa, with TOO granted on 1/26.     Patient remains psychotic. Staff continues to report only minor improvement with Haldol trial. She continues to be internally preoccupied, responding to various stimuli throughout the day. She denies previous delusions surrounding being "stuck in first person" and being "stuck in a movie." She is eating well and sleeping well. She has been compliant with medications and tolerating them well. Behavior has been well controlled on the unit.    Plan:  - Continue involuntary hospitalization  - Prolixin/ativan PRN agitation  - Continue Haldol 10 mg QHS for psychosis with plan to titrate up as needed; per court order to receive Haldol IM if she refuses PO medications  - Continue Zyprexa 15 mg QHS with plan to continue to titrate down; per court order to receive Zyprexa IM if she refuses PO medications  - Monitor closely for side effects  - G/M therapy as tolerated  - Dispo when stable

## 2021-02-18 NOTE — BH INPATIENT PSYCHIATRY PROGRESS NOTE - NSBHFUPINTERVALHXFT_PSY_A_CORE
Patient seen for follow up for psychosis, chart reviewed, and case discussed with treatment team. No overnight events reported. The patient remains largely unchanged. She continues to have no insight into her illness. She remains internally preoccupied. She is still observed responding to various stimuli throughout the day. However, staff reports that she is able to hold more focused conversations. On the unit she is more visibile, spending less time in her room; however, she remains isolative without socialization with peers. During interview this morning, she is slightly irritable but answers questions appropriately. She denies depression, SI, auditory or visual hallucinations and previous delusions. She has been eating and sleeping well and behavior well controlled on the unit. She is compliant with medications, tolerating them well.

## 2021-02-18 NOTE — BH INPATIENT PSYCHIATRY PROGRESS NOTE - MSE UNSTRUCTURED FT
The patient appears stated age, disheveled, dressed appropriately. She was alert, calm and superficially cooperative with the interview. She maintains limited eye contact. No psychomotor agitation or retardation. Steady gait. The patient’s speech was fluent, normal in tone, rate and volume. The patient’s mood is “fine.” Affect is constricted, stable, appropriate. Her thoughts are goal directed during focused conversations, but continue to be tangential and loose at times. She denies previous delusions related to being in "first person" and being "stuck in a movie." She continues to deny auditory or visual hallucinations; however, she is repeatedly observed responding to internal stimuli. She denies any suicidal or homicidal ideation, intent, or plan. Insight is limited. Judgment is impaired. Impulse control has been tenuous on the unit.

## 2021-02-19 PROCEDURE — 99232 SBSQ HOSP IP/OBS MODERATE 35: CPT

## 2021-02-19 RX ADMIN — Medication 1 MILLIGRAM(S): at 20:43

## 2021-02-19 RX ADMIN — HALOPERIDOL DECANOATE 10 MILLIGRAM(S): 100 INJECTION INTRAMUSCULAR at 20:43

## 2021-02-19 RX ADMIN — OLANZAPINE 15 MILLIGRAM(S): 15 TABLET, FILM COATED ORAL at 20:43

## 2021-02-19 NOTE — BH INPATIENT PSYCHIATRY PROGRESS NOTE - NSBHFUPINTERVALHXFT_PSY_A_CORE
chart reviewed, and case discussed with treatment team. No overnight events reported. The patient remains largely unchanged. She continues to have no insight into her illness. She remains internally preoccupied. Staff continuously reports her responding to various stimuli throughout the day. However, she is able to hold more focused conversations. On the unit she is more visible, but she remains isolative without socialization with peers. Throughout the interview this morning, she was inappropriately laughing, but repeatedly denied auditory hallucinations. She was able to regain focus and answer questions appropriately. She continues to deny depression, SI, auditory or visual hallucinations and previous delusions. She has been eating and sleeping well and behavior well controlled on the unit. She is compliant with medications, tolerating them well.

## 2021-02-19 NOTE — BH INPATIENT PSYCHIATRY PROGRESS NOTE - NSBHASSESSSUMMFT_PSY_ALL_CORE
Carmen is a 26 yo F with a history of Schizoaffective disorder vs Schizophrenia, 3 prior hospitalizations, in [facility] ETP program but non-compliant with medications PTA, who presented BIB mother for insomnia, medication non-compliance, yelling, and talking to self/RIS. Patient has been inconsistently compliant with Zyprexa, with TOO granted on 1/26.     Patient is acutely psychotic. Staff continues to report only minor improvement with Haldol trial. She remains internally preoccupied, responding to various stimuli throughout the day. She denies previous delusions surrounding being "stuck in first person" and being "stuck in a movie." She is eating well and sleeping well. She has been compliant with medications and tolerating them well. Behavior has been well controlled on the unit.    Plan:  - Continue involuntary hospitalization  - Prolixin/ativan PRN agitation  - Continue Haldol 10 mg QHS for psychosis with plan to titrate up as needed; per court order to receive Haldol IM if she refuses PO medications  - Continue Zyprexa 15 mg QHS with plan to continue to titrate down; per court order to receive Zyprexa IM if she refuses PO medications  - Monitor closely for side effects  - G/M therapy as tolerated  - Dispo when stable

## 2021-02-19 NOTE — BH INPATIENT PSYCHIATRY PROGRESS NOTE - MSE UNSTRUCTURED FT
The patient appears stated age, disheveled, and dressed appropriately. She was alert, calm and superficially cooperative with the interview. She maintains limited eye contact. No psychomotor agitation or retardation. Steady gait. The patient’s speech was fluent, normal in tone, rate and volume. The patient’s mood is “fine.” Affect is constricted, rather labile, quickly switching from inappropriately laughing to irritable. Her thoughts are goal directed during focused conversations, but continue to be tangential and loose at times. She denies previous delusions related to being in "first person" and being "stuck in a movie." She continues to deny auditory or visual hallucinations; however, she is repeatedly observed responding to internal stimuli. She denies any suicidal or homicidal ideation, intent, or plan. Insight is limited. Judgment is impaired. Impulse control has been tenuous on the unit.

## 2021-02-20 PROCEDURE — 99231 SBSQ HOSP IP/OBS SF/LOW 25: CPT

## 2021-02-20 RX ADMIN — OLANZAPINE 15 MILLIGRAM(S): 15 TABLET, FILM COATED ORAL at 20:43

## 2021-02-20 RX ADMIN — HALOPERIDOL DECANOATE 10 MILLIGRAM(S): 100 INJECTION INTRAMUSCULAR at 20:43

## 2021-02-20 RX ADMIN — Medication 1 MILLIGRAM(S): at 20:43

## 2021-02-20 NOTE — BH INPATIENT PSYCHIATRY PROGRESS NOTE - NSBHASSESSSUMMFT_PSY_ALL_CORE
Carmen is a 24 yo F with a history of Schizoaffective disorder vs Schizophrenia, 3 prior hospitalizations, in [facility] ETP program but non-compliant with medications PTA, who presented BIB mother for insomnia, medication non-compliance, yelling, and talking to self/RIS. Patient has been inconsistently compliant with Zyprexa, with TOO granted on 1/26.     Patient is acutely psychotic. Staff continues to report only minor improvement with Haldol trial. She remains internally preoccupied, responding to various stimuli throughout the day. She denies previous delusions surrounding being "stuck in first person" and being "stuck in a movie." She is eating well and sleeping well. She has been compliant with medications and tolerating them well. Behavior has been well controlled on the unit.    Plan:  - Continue involuntary hospitalization  - Prolixin/ativan PRN agitation  - Continue Haldol 10 mg QHS for psychosis with plan to titrate up as needed; per court order to receive Haldol IM if she refuses PO medications  - Continue Zyprexa 15 mg QHS with plan to continue to titrate down; per court order to receive Zyprexa IM if she refuses PO medications  - Monitor closely for side effects  - G/M therapy as tolerated  - Dispo when stable

## 2021-02-20 NOTE — BH INPATIENT PSYCHIATRY PROGRESS NOTE - NSBHFUPINTERVALHXFT_PSY_A_CORE
chart reviewed, and case discussed with treatment team. No overnight events reported. The patient remains largely unchanged. She continues to have no insight into her illness. She remains internally preoccupied isolative to room. Staff continuously reports her responding to various stimuli throughout the day. However, she is able to hold more focused conversations and did so reasonably with writer today. On the unit she is more visible, but she remains isolative without socialization with peers. Throughout the interview this morning, she was inappropriately laughing at times, but repeatedly denied auditory hallucinations. She was able answer questions appropriately. She continues to deny depression, SI, auditory or visual hallucinations and previous delusions. She has been eating and sleeping well and behavior well controlled on the unit. She is compliant with medications, tolerating them well.

## 2021-02-20 NOTE — BH INPATIENT PSYCHIATRY PROGRESS NOTE - MSE UNSTRUCTURED FT
The patient appears stated age, disheveled, poor hygiene, and dressed appropriately. She was alert, calm and superficially cooperative with the interview. She maintains limited eye contact. No psychomotor agitation or retardation. Steady gait. The patient’s speech was fluent, normal in tone, rate and volume. The patient’s mood is “fine.” Affect is constricted, rather labile, quickly switching from inappropriately laughing to irritable (though less irritable today). Her thoughts are goal directed during focused conversations, but continue to be tangential and loose at times. She denies previous delusions related to being in "first person" and being "stuck in a movie." She continues to deny auditory or visual hallucinations; however, she is repeatedly observed responding to internal stimuli. She denies any suicidal or homicidal ideation, intent, or plan. Insight is limited. Judgment is impaired. Impulse control has been tenuous on the unit.

## 2021-02-21 RX ADMIN — OLANZAPINE 15 MILLIGRAM(S): 15 TABLET, FILM COATED ORAL at 21:12

## 2021-02-21 RX ADMIN — Medication 1 MILLIGRAM(S): at 21:12

## 2021-02-21 RX ADMIN — HALOPERIDOL DECANOATE 10 MILLIGRAM(S): 100 INJECTION INTRAMUSCULAR at 21:12

## 2021-02-22 PROCEDURE — 99232 SBSQ HOSP IP/OBS MODERATE 35: CPT

## 2021-02-22 RX ADMIN — Medication 1 MILLIGRAM(S): at 20:26

## 2021-02-22 RX ADMIN — HALOPERIDOL DECANOATE 10 MILLIGRAM(S): 100 INJECTION INTRAMUSCULAR at 20:26

## 2021-02-22 RX ADMIN — OLANZAPINE 15 MILLIGRAM(S): 15 TABLET, FILM COATED ORAL at 20:26

## 2021-02-22 NOTE — BH INPATIENT PSYCHIATRY PROGRESS NOTE - NSBHFUPINTERVALHXFT_PSY_A_CORE
Chart reviewed and case discussed with treatment team.  No events reported over the weekend. Sleep and appetite is "good".  Patient is guarded and minimally cooperative with interview. She denies any AVH but is seen actively responding to internal stimuli on the unit. Patient denies any SI/HI. She keeps to herself and has an irritable edge. Patient is compliant with medications, no adverse effects reported.

## 2021-02-23 PROCEDURE — 99232 SBSQ HOSP IP/OBS MODERATE 35: CPT

## 2021-02-23 RX ORDER — OLANZAPINE 15 MG/1
10 TABLET, FILM COATED ORAL AT BEDTIME
Refills: 0 | Status: DISCONTINUED | OUTPATIENT
Start: 2021-02-23 | End: 2021-03-05

## 2021-02-23 RX ORDER — HALOPERIDOL DECANOATE 100 MG/ML
15 INJECTION INTRAMUSCULAR AT BEDTIME
Refills: 0 | Status: DISCONTINUED | OUTPATIENT
Start: 2021-02-23 | End: 2021-03-17

## 2021-02-23 RX ADMIN — OLANZAPINE 10 MILLIGRAM(S): 15 TABLET, FILM COATED ORAL at 21:46

## 2021-02-23 RX ADMIN — Medication 1 MILLIGRAM(S): at 21:46

## 2021-02-23 RX ADMIN — HALOPERIDOL DECANOATE 15 MILLIGRAM(S): 100 INJECTION INTRAMUSCULAR at 21:46

## 2021-02-23 NOTE — BH INPATIENT PSYCHIATRY PROGRESS NOTE - NSBHASSESSSUMMFT_PSY_ALL_CORE
Patient remains internally preoccupied with no insight into illness.     Continue to titrate Haldol and taper Zyprexa

## 2021-02-23 NOTE — BH INPATIENT PSYCHIATRY PROGRESS NOTE - NSBHFUPINTERVALHXFT_PSY_A_CORE
Chart reviewed and case discussed with treatment team.  No events reported overnight. Sleep and appetite is "good".  Patient remains guarded with an irritable edge. She is superficially cooperative with interview. She denies any AVH but is seen actively responding to internal stimuli on the unit. Patient denies any SI/HI. Patient is compliant with medications, no adverse effects reported.

## 2021-02-24 PROCEDURE — 99232 SBSQ HOSP IP/OBS MODERATE 35: CPT

## 2021-02-24 RX ADMIN — HALOPERIDOL DECANOATE 15 MILLIGRAM(S): 100 INJECTION INTRAMUSCULAR at 21:31

## 2021-02-24 RX ADMIN — OLANZAPINE 10 MILLIGRAM(S): 15 TABLET, FILM COATED ORAL at 21:27

## 2021-02-24 RX ADMIN — Medication 1 MILLIGRAM(S): at 21:27

## 2021-02-24 NOTE — BH CHART NOTE - NSEVENTNOTEFT_PSY_ALL_CORE
DIRECTOR OF INPATIENT PSYCHIATRY NOTE:   An appeal process was conducted today to assess this patient's potential transfer to a state hospital in the presence of the [facility]  Ms. Stephanie Early over the phone, the patient, and the attending physician, Dr. Montana.  Pt's mother Mrs. Taylor was allowed to be present over the phone with pt's permission.                    Briefly, the patient is a 25 year old female, domiciled with mother, with a recent [facility] hospitalization and discharged on November 20, BIB on 12/21/20 as a walk in for decompensation. Mother states that the patient has been non-compliant with her medications for three days. Mother states that the patient was taking Clozapine 200mg once a day but two weeks prior the patient was switched to Abilify 5mg. Patient is also prescribed Propanol 20mg twice a day. Mother states that the patient sees Dr. Antonina Villaseñor. She states that the patient refused her medications for 3 days prior to this index admission. She states that the patient is not using drugs or alcohol. She states that since the patient has not been taking the medication she has been talking to herself and has been telling and screaming.    Since admission, the patient has continued to have psychosis, irritability, agitation with minimal improvement. Pt spends most of the time in her room, isolated and internally preoccupied. On examination,  pt is irritable, short in her answer,  repeats "I don't care" and uncooperative.  The patient has been taking Haldol, Olanzapine and Cogentin.      Based on my review of the medical record and my interview with the patient today, I concur with the treatment team's recommendation that this patient requires additional hospitalization for stabilization and that a transfer to a state facility is indicated.

## 2021-02-24 NOTE — BH INPATIENT PSYCHIATRY PROGRESS NOTE - NSBHFUPINTERVALHXFT_PSY_A_CORE
Chart reviewed and case discussed with treatment team.  No events reported overnight. Sleep and appetite is "good".  Patient remains guarded with an irritable edge and is superficially cooperative with interview. She remains internally preoccupied, actively responding to internal stimuli on the unit. Patient denies any SI/HI. Patient is compliant with medications, no adverse effects reported.

## 2021-02-24 NOTE — BH INPATIENT PSYCHIATRY PROGRESS NOTE - NSBHASSESSSUMMFT_PSY_ALL_CORE
Patient remains internally preoccupied with no insight into illness.     Continue with Haldol and Zyprexa    Pending State

## 2021-02-25 RX ADMIN — Medication 1 MILLIGRAM(S): at 21:38

## 2021-02-25 RX ADMIN — OLANZAPINE 10 MILLIGRAM(S): 15 TABLET, FILM COATED ORAL at 21:38

## 2021-02-25 RX ADMIN — HALOPERIDOL DECANOATE 15 MILLIGRAM(S): 100 INJECTION INTRAMUSCULAR at 21:37

## 2021-02-25 NOTE — BH INPATIENT PSYCHIATRY PROGRESS NOTE - NSBHASSESSSUMMFT_PSY_ALL_CORE
Patient remains internally preoccupied with no insight into illness.     Continue with Haldol and Zyprexa    State Application pending

## 2021-02-25 NOTE — BH INPATIENT PSYCHIATRY PROGRESS NOTE - NSBHFUPINTERVALHXFT_PSY_A_CORE
Chart reviewed and case discussed with treatment team.  No events reported overnight. Sleep and appetite is "good".  Patient is out on the unit but keeps to herself. She remains internally preoccupied, actively responding to internal stimuli on the unit. Patient denies any SI/HI. Patient is compliant with medications, no adverse effects reported. Patient requires continued inpatient hospitalization for stabilization, will pursue further retention.

## 2021-02-26 PROCEDURE — 99231 SBSQ HOSP IP/OBS SF/LOW 25: CPT

## 2021-02-26 RX ADMIN — HALOPERIDOL DECANOATE 15 MILLIGRAM(S): 100 INJECTION INTRAMUSCULAR at 20:30

## 2021-02-26 RX ADMIN — OLANZAPINE 10 MILLIGRAM(S): 15 TABLET, FILM COATED ORAL at 20:29

## 2021-02-26 RX ADMIN — Medication 1 MILLIGRAM(S): at 20:29

## 2021-02-26 NOTE — BH INPATIENT PSYCHIATRY PROGRESS NOTE - NSBHMSESPEECH_PSY_A_CORE
mumbling and laughing to self/Normal volume, rate, productivity, spontaneity and articulation
mumbling and laughing to self/Abnormal as indicated, otherwise normal...
mumbling and laughing to self/Normal volume, rate, productivity, spontaneity and articulation
mumbling and laughing to self/Abnormal as indicated, otherwise normal...
mumbling and laughing to self/Normal volume, rate, productivity, spontaneity and articulation
mumbling and laughing to self/Abnormal as indicated, otherwise normal...
mumbling and laughing to self/Normal volume, rate, productivity, spontaneity and articulation
mumbling and laughing to self/Abnormal as indicated, otherwise normal...
mumbling and laughing to self/Normal volume, rate, productivity, spontaneity and articulation
mumbling and laughing to self/Abnormal as indicated, otherwise normal...

## 2021-02-26 NOTE — BH INPATIENT PSYCHIATRY PROGRESS NOTE - NSBHMSETHTCONTENT_PSY_A_CORE
Unremarkable
Unremarkable
Delusions
Unremarkable
Delusions
Delusions
Unremarkable
Delusions
Unremarkable

## 2021-02-26 NOTE — BH INPATIENT PSYCHIATRY PROGRESS NOTE - NSBHMSETHTPROC_PSY_A_CORE
concrete/Disorganized/Tangential/Other
concrete/Linear/Disorganized
concrete/Thought blocking/Other
concrete/Disorganized/Thought blocking/Other
concrete/Linear/Tangential
concrete/Disorganized/Thought blocking/Other
concrete/Disorganized/Thought blocking/Other
concrete/Disorganized/Tangential/Other
concrete/Disorganized/Thought blocking/Other
Thought blocking/Other
Thought blocking/Other
concrete/Linear/Perseverative/Impaired reasoning
concrete/Disorganized/Tangential/Other
concrete/Linear/Perseverative
concrete/Linear/Tangential
Thought blocking/Other
concrete/Disorganized/Thought blocking/Other
concrete/Thought blocking/Other

## 2021-02-26 NOTE — BH INPATIENT PSYCHIATRY PROGRESS NOTE - NSBHMSETHTASSOC_PSY_A_CORE
Loose

## 2021-02-26 NOTE — BH INPATIENT PSYCHIATRY PROGRESS NOTE - NSBHMSEIMPULSE_PSY_A_CORE
Normal
Normal
Impaired
Normal
Impaired
Normal
Impaired

## 2021-02-26 NOTE — BH INPATIENT PSYCHIATRY PROGRESS NOTE - NSBHMSERECMEM_PSY_A_CORE
Unable to assess

## 2021-02-26 NOTE — BH INPATIENT PSYCHIATRY PROGRESS NOTE - NSBHFUPINTERVALHXFT_PSY_A_CORE
Chart reviewed and case discussed with treatment team.  No events reported overnight. Sleep and appetite is "good".  Patient remains internally preoccupied, out on the unit yelling and talking to herself. She is superficially cooperative and irritable. Patient denies any SI/HI. Patient is compliant with medications, no adverse effects reported.

## 2021-02-26 NOTE — BH INPATIENT PSYCHIATRY PROGRESS NOTE - NSBHMSEAFFQUAL_PSY_A_CORE
Irritable/Other
Other
Irritable
Other
Irritable
Other
Other
Irritable
Irritable
Other
Other
Irritable
Other
Other
Irritable/Other

## 2021-02-26 NOTE — BH INPATIENT PSYCHIATRY PROGRESS NOTE - NSBHPSYCHOLCOGORIENT_PSY_A_CORE
Oriented to time, place, person, situation

## 2021-02-26 NOTE — BH INPATIENT PSYCHIATRY PROGRESS NOTE - NSBHMSEMOOD_PSY_A_CORE
Anxious/Other
Other
Normal
Anxious/Other
Irritable/Other
Irritable/Other
Other
Normal
Other
Normal
Anxious/Other
Other
Anxious/Other
Normal
Other
Anxious/Other
Other
Other

## 2021-02-26 NOTE — BH INPATIENT PSYCHIATRY PROGRESS NOTE - NSBHMSEAFFRANGE_PSY_A_CORE
Blunted

## 2021-02-26 NOTE — BH INPATIENT PSYCHIATRY PROGRESS NOTE - NSBHMSEAFFCONG_PSY_A_CORE
Non-congruent
Congruent
Non-congruent

## 2021-02-26 NOTE — BH INPATIENT PSYCHIATRY PROGRESS NOTE - NSBHASSESSSUMMFT_PSY_ALL_CORE
Patient remains psychotic with no insight into illness.     Continue with Haldol and Zyprexa    State Application pending

## 2021-02-26 NOTE — BH INPATIENT PSYCHIATRY PROGRESS NOTE - NSBHMSEBEHAV_PSY_A_CORE
Uncooperative/Other
Uncooperative
Uncooperative/Other
Cooperative
Uncooperative/Other
Cooperative
Uncooperative/Other
Uncooperative/Hostile/Other
Uncooperative/Hostile/Other

## 2021-02-27 RX ADMIN — HALOPERIDOL DECANOATE 15 MILLIGRAM(S): 100 INJECTION INTRAMUSCULAR at 21:20

## 2021-02-27 RX ADMIN — Medication 1 MILLIGRAM(S): at 21:21

## 2021-02-27 RX ADMIN — OLANZAPINE 10 MILLIGRAM(S): 15 TABLET, FILM COATED ORAL at 21:21

## 2021-02-28 RX ADMIN — HALOPERIDOL DECANOATE 15 MILLIGRAM(S): 100 INJECTION INTRAMUSCULAR at 20:22

## 2021-02-28 RX ADMIN — OLANZAPINE 10 MILLIGRAM(S): 15 TABLET, FILM COATED ORAL at 20:21

## 2021-02-28 RX ADMIN — Medication 1 MILLIGRAM(S): at 20:22

## 2021-03-01 PROCEDURE — 99231 SBSQ HOSP IP/OBS SF/LOW 25: CPT

## 2021-03-01 RX ADMIN — HALOPERIDOL DECANOATE 15 MILLIGRAM(S): 100 INJECTION INTRAMUSCULAR at 21:03

## 2021-03-01 RX ADMIN — OLANZAPINE 10 MILLIGRAM(S): 15 TABLET, FILM COATED ORAL at 21:03

## 2021-03-01 RX ADMIN — Medication 1 MILLIGRAM(S): at 21:04

## 2021-03-01 NOTE — BH INPATIENT PSYCHIATRY PROGRESS NOTE - NSBHFUPINTERVALHXFT_PSY_A_CORE
Patient seen for follow up for psychosis, chart reviewed, and case discussed with treatment team. No overnight events reported. The patient continues to be psychotic, responding to internal stimuli, talking to herself much of the day.  However, she is showing slow improvement in symptoms.  She is better able to have a closed-ended conversation, and her ADLs have also been improving.  She continues to have little insight into her illness.  She remains isolative without socialization with peers. She superficially denies any problems.  She denies depression, SI, and behavior has been well controlled. She has been eating and sleeping well. She is compliant with medications, tolerating them well.

## 2021-03-01 NOTE — BH INPATIENT PSYCHIATRY PROGRESS NOTE - NSBHASSESSSUMMFT_PSY_ALL_CORE
Carmen is a 24 yo F with a history of Schizoaffective disorder vs Schizophrenia, 3 prior hospitalizations, in [facility] ETP program but non-compliant with medications PTA, who presented BIB mother for insomnia, medication non-compliance, yelling, and talking to self/RIS. Patient has been inconsistently compliant with Zyprexa, with TOO granted on 1/26.     Patient remains psychotic. She continues to respond to internal stimuli, talking to herself.  However, she is showing small improvement.  She has been compliant with medications and tolerating them well.    Plan:  - Continue involuntary hospitalization  - Prolixin/ativan PRN agitation  - Continue Haldol 15 mg QHS for psychosis with plan to titrate up as needed; per court order to receive Haldol IM if she refuses PO medications  - Continue Zyprexa 10 mg QHS; per court order to receive Zyprexa IM if she refuses PO medications  - Monitor closely for side effects  - G/M therapy as tolerated  - Dispo when stable

## 2021-03-01 NOTE — BH INPATIENT PSYCHIATRY PROGRESS NOTE - MSE UNSTRUCTURED FT
The patient appears stated age, fair hygiene, dressed appropriately. She was alert, calm and superficially cooperative with the interview. She maintains limited eye contact. No psychomotor agitation or retardation. Steady gait. The patient’s speech was fluent, normal in tone, rate and volume. The patient’s mood is “fine.” Affect is constricted, stable, appropriate. Her thoughts are goal directed during focused conversations, but become tangential and loose at times. She denies current delusions.  She continues to deny auditory or visual hallucinations; however, she is repeatedly observed responding to internal stimuli. She denies any suicidal or homicidal ideation, intent, or plan. Insight is limited. Judgment is impaired. Impulse control has been fair on the unit.

## 2021-03-02 PROCEDURE — 99231 SBSQ HOSP IP/OBS SF/LOW 25: CPT

## 2021-03-02 RX ADMIN — OLANZAPINE 10 MILLIGRAM(S): 15 TABLET, FILM COATED ORAL at 20:33

## 2021-03-02 RX ADMIN — HALOPERIDOL DECANOATE 15 MILLIGRAM(S): 100 INJECTION INTRAMUSCULAR at 20:36

## 2021-03-02 RX ADMIN — Medication 1 MILLIGRAM(S): at 20:33

## 2021-03-02 NOTE — BH INPATIENT PSYCHIATRY PROGRESS NOTE - NSBHFUPINTERVALHXFT_PSY_A_CORE
Patient seen for follow up for psychosis, chart reviewed, and case discussed with treatment team. No overnight events reported. The patient is little changed.  She continues to be psychotic, responding to internal stimuli, talking to herself much of the day.  However, overall, she is showing slow improvement in symptoms.  She is better able to have a pleasant conversation, and her ADLs have also been improving.  She continues to have little insight into her illness.  She remains isolative without socialization with peers. She superficially denies any problems on interview.  She denies depression, SI, and behavior has been well controlled. She has been eating and sleeping well. She is compliant with medications, tolerating them well.

## 2021-03-02 NOTE — BH INPATIENT PSYCHIATRY PROGRESS NOTE - NSBHFUPINTERVALCCFT_PSY_A_CORE
Subjective:       Patient ID: Gavin Zamora Jr. is a 85 y.o. male.    Chief Complaint: No chief complaint on file.      Virtual Visit:  Audio only    Social Hx:  From [place]. Living in [place]. . 3 sons. Retired  Psychosis.

## 2021-03-02 NOTE — BH INPATIENT PSYCHIATRY PROGRESS NOTE - NSBHASSESSSUMMFT_PSY_ALL_CORE
Carmen is a 24 yo F with a history of Schizoaffective disorder vs Schizophrenia, 3 prior hospitalizations, in [facility] ETP program but non-compliant with medications PTA, who presented BIB mother for insomnia, medication non-compliance, yelling, and talking to self/RIS. Patient has been inconsistently compliant with Zyprexa, with TOO granted on 1/26.     Patient remains psychotic. She continues to respond to internal stimuli, talking to herself.  However, she is showing small improvement.  She has been compliant with medications and tolerating them well.    Plan:  - Continue involuntary hospitalization  - Prolixin/ativan PRN agitation  - Continue Haldol 15 mg QHS for psychosis with plan to titrate up as needed; per court order to receive Haldol IM if she refuses PO medications  - Continue Zyprexa 10 mg QHS; per court order to receive Zyprexa IM if she refuses PO medications  - Monitor closely for side effects  - G/M therapy as tolerated  - Dispo when stable

## 2021-03-02 NOTE — BH INPATIENT PSYCHIATRY PROGRESS NOTE - MSE UNSTRUCTURED FT
The patient appears stated age, fair hygiene, dressed appropriately. She was alert, calm and superficially cooperative with the interview. She maintains limited eye contact. No psychomotor agitation or retardation. Steady gait. The patient’s speech was fluent, normal in tone, rate and volume. The patient’s mood is “fine.” Affect is constricted, stable, appropriate. Her thoughts are goal directed during focused conversations, but become tangential and loose over time. She denies delusions.  She continues to deny auditory or visual hallucinations; however, she is repeatedly observed responding to internal stimuli. She denies any suicidal or homicidal ideation, intent, or plan. Insight is limited. Judgment is improving. Impulse control has been fair on the unit.

## 2021-03-03 PROCEDURE — 99231 SBSQ HOSP IP/OBS SF/LOW 25: CPT

## 2021-03-03 RX ORDER — LUMATEPERONE 10.5 MG/1
1 CAPSULE ORAL
Qty: 30 | Refills: 0
Start: 2021-03-03 | End: 2021-04-01

## 2021-03-03 RX ADMIN — HALOPERIDOL DECANOATE 15 MILLIGRAM(S): 100 INJECTION INTRAMUSCULAR at 21:09

## 2021-03-03 RX ADMIN — Medication 1 MILLIGRAM(S): at 21:08

## 2021-03-03 RX ADMIN — OLANZAPINE 10 MILLIGRAM(S): 15 TABLET, FILM COATED ORAL at 21:09

## 2021-03-03 NOTE — BH INPATIENT PSYCHIATRY PROGRESS NOTE - MSE UNSTRUCTURED FT
The patient appears stated age, fair hygiene, dressed appropriately. She was calm and superficially cooperative with the interview. She maintains limited eye contact. No psychomotor agitation or retardation. Steady gait. The patient’s speech was fluent, normal in tone, rate and volume. The patient’s mood is “fine.” Affect is constricted, stable, appropriate. Her thoughts are goal directed during focused conversations, but become tangential and loose over time. She denies delusions and hallucinations; however, she is observed responding to internal stimuli. She denies any suicidal or homicidal ideation, intent, or plan. Insight is limited. Judgment is improving. Impulse control has been fair on the unit.

## 2021-03-03 NOTE — BH INPATIENT PSYCHIATRY PROGRESS NOTE - NSBHFUPINTERVALHXFT_PSY_A_CORE
Patient seen for follow up for psychosis, chart reviewed, and case discussed with treatment team. No overnight events reported. The patient remains little changed.  She continues to be psychotic, responding to internal stimuli, talking to herself much of the day.  She has shown some slow improvement in symptoms.  She is better able to have a pleasant conversation, and her ADLs have also been improving.  She continues to have little insight into her illness.  She remains isolative without socialization with peers. She denies any problems on interview.  She denies depression, SI, and behavior has been well controlled. She has been eating and sleeping well. She is compliant with medications, tolerating them well.

## 2021-03-03 NOTE — BH INPATIENT PSYCHIATRY PROGRESS NOTE - NSBHASSESSSUMMFT_PSY_ALL_CORE
Carmen is a 26 yo F with a history of Schizoaffective disorder vs Schizophrenia, 3 prior hospitalizations, in [facility] ETP program but non-compliant with medications PTA, who presented BIB mother for insomnia, medication non-compliance, yelling, and talking to self/RIS. Patient has been inconsistently compliant with Zyprexa, with TOO granted on 1/26.     Patient remains psychotic. She continues to respond to internal stimuli, talking to herself.  However, she is showing small improvement.  She has been compliant with medications and tolerating them well.    Plan:  - Continue involuntary hospitalization  - Prolixin/ativan PRN agitation  - Continue Haldol 15 mg QHS for psychosis with plan to titrate up as needed; per court order to receive Haldol IM if she refuses PO medications  - Continue Zyprexa 10 mg QHS; per court order to receive Zyprexa IM if she refuses PO medications  - Will explore possibility of starting Caplyta - assess insurance coverage  - Monitor closely for side effects  - G/M therapy as tolerated  - Dispo when stable

## 2021-03-04 PROCEDURE — 99232 SBSQ HOSP IP/OBS MODERATE 35: CPT

## 2021-03-04 RX ADMIN — OLANZAPINE 10 MILLIGRAM(S): 15 TABLET, FILM COATED ORAL at 21:54

## 2021-03-04 RX ADMIN — HALOPERIDOL DECANOATE 15 MILLIGRAM(S): 100 INJECTION INTRAMUSCULAR at 21:54

## 2021-03-04 RX ADMIN — Medication 1 MILLIGRAM(S): at 21:54

## 2021-03-04 NOTE — BH INPATIENT PSYCHIATRY PROGRESS NOTE - NSBHASSESSSUMMFT_PSY_ALL_CORE
Carmen is a 26 yo F with a history of Schizoaffective disorder vs Schizophrenia, 3 prior hospitalizations, in [facility] ETP program but non-compliant with medications PTA, who presented BIB mother for insomnia, medication non-compliance, yelling, and talking to self/RIS. Patient has been inconsistently compliant with Zyprexa, with TOO granted on 1/26.     Patient remains psychotic. She continues to respond to internal stimuli, talking to herself.  However, she is showing small improvement.  She has been compliant with medications and tolerating them well.    Plan:  - Continue involuntary hospitalization  - Prolixin/ativan PRN agitation  - Continue Haldol 15 mg QHS for psychosis with plan to titrate up as needed; per court order to receive Haldol IM if she refuses PO medications  - Continue Zyprexa 10 mg QHS; per court order to receive Zyprexa IM if she refuses PO medications  - Pharmacy to order Caplyta - likely to start tomorrow - will d/c Zyprexa at that time.  - Monitor closely for side effects  - G/M therapy as tolerated  - Dispo when stable

## 2021-03-04 NOTE — BH INPATIENT PSYCHIATRY PROGRESS NOTE - NSBHFUPINTERVALHXFT_PSY_A_CORE
Patient seen for follow up for psychosis, chart reviewed, and case discussed with treatment team. No overnight events reported. The patient remains largely unchanged.  She continues to be psychotic, responding to internal stimuli much of the day.  She has shown some small improvement in symptoms.  She is better able to have a pleasant conversation, and her ADLs have also been improving.  She continues to have little insight into her illness.  She remains isolative without socialization with peers. She denies any problems on interview.  She denies depression, SI, and behavior has been well controlled. She has been eating and sleeping well. She is compliant with medications, tolerating them well.    Spoke with the patient's mother, who is agreeable to trial of Caplyta.

## 2021-03-05 PROCEDURE — 99232 SBSQ HOSP IP/OBS MODERATE 35: CPT

## 2021-03-05 RX ORDER — LUMATEPERONE 10.5 MG/1
42 CAPSULE ORAL
Refills: 0 | Status: DISCONTINUED | OUTPATIENT
Start: 2021-03-05 | End: 2021-04-05

## 2021-03-05 RX ADMIN — HALOPERIDOL DECANOATE 15 MILLIGRAM(S): 100 INJECTION INTRAMUSCULAR at 20:37

## 2021-03-05 RX ADMIN — Medication 1 MILLIGRAM(S): at 20:36

## 2021-03-05 RX ADMIN — Medication 50 MILLIGRAM(S): at 23:37

## 2021-03-05 RX ADMIN — LUMATEPERONE 42 MILLIGRAM(S): 10.5 CAPSULE ORAL at 17:27

## 2021-03-05 NOTE — BH INPATIENT PSYCHIATRY PROGRESS NOTE - NSBHFUPINTERVALHXFT_PSY_A_CORE
Patient seen for follow up for psychosis, chart reviewed, and case discussed with treatment team. No overnight events reported. The patient remains largely unchanged.  She continues to be psychotic, responding to internal stimuli much of the day.  She is able to answer closed ended questions, and is overall more pleasant, but impoverished in thoughts.  She remains isolative without socialization with peers. She denies any problems on interview.  She denies depression, SI, and behavior has been well controlled. She has been eating and sleeping well. She is compliant with medications, tolerating them well.    Spoke with the patient's mother, who is agreeable to trial of Caplyta.

## 2021-03-05 NOTE — BH INPATIENT PSYCHIATRY PROGRESS NOTE - NS ED BHA REVIEW OF ED CHART AVAILABLE LABS REVIEWED
None available

## 2021-03-05 NOTE — BH INPATIENT PSYCHIATRY PROGRESS NOTE - NSBHMETABOLICLABS_PSY_ALL_CORE
Labs within last 12 months

## 2021-03-05 NOTE — BH INPATIENT PSYCHIATRY PROGRESS NOTE - NSBHASSESSSUMMFT_PSY_ALL_CORE
Carmen is a 26 yo F with a history of Schizoaffective disorder vs Schizophrenia, 3 prior hospitalizations, in [facility] ETP program but non-compliant with medications PTA, who presented BIB mother for insomnia, medication non-compliance, yelling, and talking to self/RIS. Patient has been inconsistently compliant with Zyprexa, with TOO granted on 1/26.     Patient remains psychotic. She continues to respond to internal stimuli, talking to herself.  However, she is showing small improvement.  She has been compliant with medications and tolerating them well.    Plan:  - Continue involuntary hospitalization  - Prolixin/ativan PRN agitation  - Continue Haldol 15 mg QHS for psychosis with plan to titrate up as needed; per court order to receive Haldol IM if she refuses PO medications  - Plan to stop Zyprexa and start Caplyta upon arrival from pharmacy  - Monitor closely for side effects  - G/M therapy as tolerated  - Dispo when stable

## 2021-03-06 RX ADMIN — HALOPERIDOL DECANOATE 15 MILLIGRAM(S): 100 INJECTION INTRAMUSCULAR at 23:05

## 2021-03-06 RX ADMIN — LUMATEPERONE 42 MILLIGRAM(S): 10.5 CAPSULE ORAL at 17:41

## 2021-03-06 RX ADMIN — Medication 1 MILLIGRAM(S): at 23:05

## 2021-03-07 RX ADMIN — HALOPERIDOL DECANOATE 15 MILLIGRAM(S): 100 INJECTION INTRAMUSCULAR at 22:50

## 2021-03-07 RX ADMIN — LUMATEPERONE 42 MILLIGRAM(S): 10.5 CAPSULE ORAL at 17:03

## 2021-03-07 RX ADMIN — Medication 1 MILLIGRAM(S): at 22:50

## 2021-03-08 PROCEDURE — 99232 SBSQ HOSP IP/OBS MODERATE 35: CPT

## 2021-03-08 RX ADMIN — HALOPERIDOL DECANOATE 15 MILLIGRAM(S): 100 INJECTION INTRAMUSCULAR at 21:45

## 2021-03-08 RX ADMIN — Medication 1 MILLIGRAM(S): at 21:45

## 2021-03-08 RX ADMIN — LUMATEPERONE 42 MILLIGRAM(S): 10.5 CAPSULE ORAL at 16:46

## 2021-03-08 NOTE — BH INPATIENT PSYCHIATRY PROGRESS NOTE - MSE UNSTRUCTURED FT
The patient appears stated age, fair hygiene, dressed appropriately. She was calm and superficially cooperative with the interview. She maintains limited eye contact. No psychomotor agitation or retardation. Steady gait. The patient’s speech was fluent, normal in tone, rate and volume. The patient’s mood is “good” Affect is constricted, stable, appropriate. Her thoughts are impoverished, with poverty of content; She denies delusions and hallucinations; however, she is observed responding to internal stimuli. She denies any suicidal or homicidal ideation, intent, or plan. Insight is limited. Judgment is improving. Impulse control has been fair on the unit.

## 2021-03-08 NOTE — BH INPATIENT PSYCHIATRY PROGRESS NOTE - NSBHFUPINTERVALHXFT_PSY_A_CORE
Patient reported feeling "good", impoverished thoughts, poverty of content, mumbling to self, appearing disheveled. Reported fair sleep and appetite. Denies SE to meds. Patient keeping to self on the unit.

## 2021-03-08 NOTE — BH INPATIENT PSYCHIATRY PROGRESS NOTE - NSBHASSESSSUMMFT_PSY_ALL_CORE
Carmen is a 26 yo F with a history of Schizoaffective disorder vs Schizophrenia, 3 prior hospitalizations, in [facility] ETP program but non-compliant with medications PTA, who presented BIB mother for insomnia, medication non-compliance, yelling, and talking to self/RIS. Patient has been inconsistently compliant with Zyprexa, with TOO granted on 1/26.     Patient remains psychotic. She continues to respond to internal stimuli, talking to herself.  However, she is showing small improvement.  She has been compliant with medications and tolerating them well.    Plan:  - Continue involuntary hospitalization  - Prolixin/ativan PRN agitation  - Continue Haldol 15 mg QHS for psychosis with plan to titrate up as needed; per court order to receive Haldol IM if she refuses PO medications  - Plan to c/w  Caplyta   - Monitor closely for side effects  - G/M therapy as tolerated  - Dispo when stable

## 2021-03-09 PROCEDURE — 99232 SBSQ HOSP IP/OBS MODERATE 35: CPT

## 2021-03-09 RX ADMIN — LUMATEPERONE 42 MILLIGRAM(S): 10.5 CAPSULE ORAL at 16:07

## 2021-03-09 RX ADMIN — HALOPERIDOL DECANOATE 15 MILLIGRAM(S): 100 INJECTION INTRAMUSCULAR at 22:00

## 2021-03-09 RX ADMIN — Medication 1 MILLIGRAM(S): at 22:00

## 2021-03-09 NOTE — BH INPATIENT PSYCHIATRY PROGRESS NOTE - NSBHASSESSSUMMFT_PSY_ALL_CORE
Carmen is a 24 yo F with a history of Schizoaffective disorder vs Schizophrenia, 3 prior hospitalizations, in [facility] ETP program but non-compliant with medications PTA, who presented BIB mother for insomnia, medication non-compliance, yelling, and talking to self/RIS. Patient has been inconsistently compliant with Zyprexa, with TOO granted on 1/26.     Patient remains psychotic. She continues to respond to internal stimuli, talking to herself.  However, she is showing small improvement.  She has been compliant with medications and tolerating them well.    Plan:  - Continue involuntary hospitalization  - Prolixin/ativan PRN agitation  - Continue Haldol 15 mg QHS for psychosis with plan to titrate up as needed; per court order to receive Haldol IM if she refuses PO medications  - Plan to c/w  Caplyta   - Monitor closely for side effects  - G/M therapy as tolerated  - Dispo when stable--awaiting ACT / AOT

## 2021-03-09 NOTE — BH INPATIENT PSYCHIATRY PROGRESS NOTE - NSBHFUPINTERVALHXFT_PSY_A_CORE
care discussed with treatment team. Patient reported feeling "good", with poor eye contact, minimally verbal, with prompting, able to report getting a job after discharge. Staff reported patient is tending to hygiene, however does not comb her hair. Patient denied eps, stiffness or tremors. Staff reported that patient was loud last evening, talking to self. Appetite observed to be good and sleep log shows patient sleeping.

## 2021-03-10 PROCEDURE — 99232 SBSQ HOSP IP/OBS MODERATE 35: CPT

## 2021-03-10 RX ADMIN — HALOPERIDOL DECANOATE 15 MILLIGRAM(S): 100 INJECTION INTRAMUSCULAR at 20:58

## 2021-03-10 RX ADMIN — LUMATEPERONE 42 MILLIGRAM(S): 10.5 CAPSULE ORAL at 16:13

## 2021-03-10 RX ADMIN — Medication 1 MILLIGRAM(S): at 20:58

## 2021-03-10 NOTE — BH INPATIENT PSYCHIATRY PROGRESS NOTE - NSBHASSESSSUMMFT_PSY_ALL_CORE
Carmen is a 26 yo F with a history of Schizoaffective disorder vs Schizophrenia, 3 prior hospitalizations, in [facility] ETP program but non-compliant with medications PTA, who presented BIB mother for insomnia, medication non-compliance, yelling, and talking to self/RIS. Patient has been inconsistently compliant with Zyprexa, with TOO granted on 1/26.     Patient remains psychotic. She continues to respond to internal stimuli, talking to herself.  However, she is showing small improvement.  She has been compliant with medications and tolerating them well.  Plan:  - Continue involuntary hospitalization  - Prolixin/ativan PRN agitation  - Continue Haldol 15 mg QHS for psychosis with plan to titrate up as needed; per court order to receive Haldol IM if she refuses PO medications  - Plan to c/w  Caplyta   - Monitor closely for side effects  - G/M therapy as tolerated  - Dispo when stable--awaiting ACT / AOT

## 2021-03-10 NOTE — BH INPATIENT PSYCHIATRY PROGRESS NOTE - NSBHFUPINTERVALHXFT_PSY_A_CORE
care discussed with treatment team. Patient reported feeling "great", observed talking to herself, laughing to self and  yelling in the milieu, was able to be redirected on approach. Patient did say today that she wants to get out of here soon. Patient reported fair sleep and appetite. Encouraged fluids. observed eating well. denied palpitations, chest pain or dizziness.

## 2021-03-11 PROCEDURE — 99232 SBSQ HOSP IP/OBS MODERATE 35: CPT

## 2021-03-11 PROCEDURE — 93010 ELECTROCARDIOGRAM REPORT: CPT

## 2021-03-11 RX ADMIN — Medication 1 MILLIGRAM(S): at 20:34

## 2021-03-11 RX ADMIN — LUMATEPERONE 42 MILLIGRAM(S): 10.5 CAPSULE ORAL at 19:14

## 2021-03-11 RX ADMIN — HALOPERIDOL DECANOATE 15 MILLIGRAM(S): 100 INJECTION INTRAMUSCULAR at 20:34

## 2021-03-11 NOTE — BH INPATIENT PSYCHIATRY PROGRESS NOTE - NSBHASSESSSUMMFT_PSY_ALL_CORE
Carmen is a 24 yo F with a history of Schizoaffective disorder vs Schizophrenia, 3 prior hospitalizations, in [facility] ETP program but non-compliant with medications PTA, who presented BIB mother for insomnia, medication non-compliance, yelling, and talking to self/RIS. Patient has been inconsistently compliant with Zyprexa, with TOO granted on 1/26.     Patient remains psychotic. She continues to respond to internal stimuli, talking to herself.  However, she is showing small improvement.  She has been compliant with medications and tolerating them well. Continues to need supervision and redirection--in hygiene, hydration and self care.   Plan:  - Continue involuntary hospitalization  - Prolixin/ativan PRN agitation  - Continue Haldol 15 mg QHS for psychosis with plan to titrate up as needed; per court order to receive Haldol IM if she refuses PO medications  - Plan to c/w  Caplyta   - Monitor closely for side effects  - G/M therapy as tolerated  - Dispo when stable--awaiting ACT / AOT

## 2021-03-11 NOTE — BH INPATIENT PSYCHIATRY PROGRESS NOTE - NSBHFUPINTERVALHXFT_PSY_A_CORE
Care discussed with treatment team. Patient reported feeling "good", in bed, resistant to get up from bed for interview, appearing disheveled, hair uncombed and matted. Patient observed to display impoverished thoughts with poverty of content. Patient denies SE from meds. ekg showed SR, qtc 466ms. Patient reported sleeping well and with good appetite. Patient denied SE to meds --denied dizziness, palpitations.

## 2021-03-12 PROCEDURE — 99232 SBSQ HOSP IP/OBS MODERATE 35: CPT

## 2021-03-12 RX ADMIN — LUMATEPERONE 42 MILLIGRAM(S): 10.5 CAPSULE ORAL at 16:49

## 2021-03-12 RX ADMIN — Medication 1 MILLIGRAM(S): at 22:18

## 2021-03-12 RX ADMIN — HALOPERIDOL DECANOATE 15 MILLIGRAM(S): 100 INJECTION INTRAMUSCULAR at 22:18

## 2021-03-12 NOTE — BH INPATIENT PSYCHIATRY PROGRESS NOTE - MSE UNSTRUCTURED FT
The patient appears stated age, fair hygiene, disheveled, hair not combed.  She was calm and superficially cooperative with the interview. She maintains limited eye contact. No psychomotor agitation or retardation. mild voluntary tremors--right hand.  Steady gait. The patient’s speech was fluent, normal in tone, rate and volume. The patient’s mood is “good” Affect is constricted, stable, appropriate. Her thoughts are impoverished, with poverty of content; She denies delusions and hallucinations; however, she is observed responding to internal stimuli. She denies any suicidal or homicidal ideation, intent, or plan. Insight is limited. Judgment is improving. Impulse control has been fair on the unit.

## 2021-03-12 NOTE — BH INPATIENT PSYCHIATRY PROGRESS NOTE - NSBHFUPINTERVALHXFT_PSY_A_CORE
Care discussed w/ treatment team. Patient reported feeling "great", with poor eye contact. Patient continues with impoverished thoughts, poverty of content. Patient observed to have mild tremors on right hand. Patient reported fair sleep and appetite. Patient denied dizziness, palpitations or constipation. Observed talking to self loudly in the milieu.

## 2021-03-12 NOTE — BH INPATIENT PSYCHIATRY PROGRESS NOTE - NSBHASSESSSUMMFT_PSY_ALL_CORE
Carmen is a 24 yo F with a history of Schizoaffective disorder vs Schizophrenia, 3 prior hospitalizations, in [facility] ETP program but non-compliant with medications PTA, who presented BIB mother for insomnia, medication non-compliance, yelling, and talking to self/RIS. Patient has been inconsistently compliant with Zyprexa, with TOO granted on 1/26.     Patient remains psychotic. She continues to respond to internal stimuli, talking to herself.  However, she is showing small improvement.  She has been compliant with medications and tolerating them well. Continues to need supervision and redirection--in hygiene, hydration and self care.   Plan:  - Continue involuntary hospitalization  - Prolixin/ativan PRN agitation  - Continue Haldol 15 mg QHS for psychosis with plan to titrate up as needed; per court order to receive Haldol IM if she refuses PO medications  - Plan to c/w  Caplyta   - Monitor closely for side effects  - G/M therapy as tolerated  - Dispo when stable--awaiting ACT / AOT

## 2021-03-13 PROCEDURE — 99231 SBSQ HOSP IP/OBS SF/LOW 25: CPT

## 2021-03-13 RX ADMIN — LUMATEPERONE 42 MILLIGRAM(S): 10.5 CAPSULE ORAL at 16:38

## 2021-03-13 RX ADMIN — HALOPERIDOL DECANOATE 15 MILLIGRAM(S): 100 INJECTION INTRAMUSCULAR at 21:46

## 2021-03-13 RX ADMIN — Medication 1 MILLIGRAM(S): at 21:45

## 2021-03-13 NOTE — BH INPATIENT PSYCHIATRY PROGRESS NOTE - NSBHASSESSSUMMFT_PSY_ALL_CORE
Carmen is a 26 yo F with a history of Schizoaffective disorder vs Schizophrenia, 3 prior hospitalizations, in [facility] ETP program but non-compliant with medications PTA, who presented BIB mother for insomnia, medication non-compliance, yelling, and talking to self/RIS. Patient has been inconsistently compliant with Zyprexa, with TOO granted on 1/26.     Patient remains psychotic. She continues to respond to internal stimuli, talking to herself.  However, she is showing small improvement.  She has been compliant with medications and tolerating them well. Continues to need supervision and redirection--in hygiene, hydration and self care.   Plan:  - Continue involuntary hospitalization  - Prolixin/ativan PRN agitation  - Continue Haldol 15 mg QHS for psychosis with plan to titrate up as needed; per court order to receive Haldol IM if she refuses PO medications  - Plan to c/w  Caplyta   - Monitor closely for side effects  - G/M therapy as tolerated  - Dispo when stable--awaiting ACT / AOT

## 2021-03-13 NOTE — BH INPATIENT PSYCHIATRY PROGRESS NOTE - NSBHFUPINTERVALHXFT_PSY_A_CORE
Case discussed w/ treatment team. Patient reported feeling "good", with poor eye contact. Patient continues with impoverished thoughts, poverty of content. Patient observed to have mild tremors on right hand but otherwise denies med side effects. Patient denies SI/HI, denies AH/VH, but suspect they are present given talking to self and internal preoccupation, Patient reported fair sleep and appetite. Patient denied dizziness, palpitations or constipation. Observed talking to self loudly in the milieu.

## 2021-03-14 RX ADMIN — Medication 1 MILLIGRAM(S): at 21:35

## 2021-03-14 RX ADMIN — HALOPERIDOL DECANOATE 15 MILLIGRAM(S): 100 INJECTION INTRAMUSCULAR at 21:35

## 2021-03-14 RX ADMIN — LUMATEPERONE 42 MILLIGRAM(S): 10.5 CAPSULE ORAL at 16:58

## 2021-03-15 PROCEDURE — 99232 SBSQ HOSP IP/OBS MODERATE 35: CPT

## 2021-03-15 RX ADMIN — LUMATEPERONE 42 MILLIGRAM(S): 10.5 CAPSULE ORAL at 16:25

## 2021-03-15 RX ADMIN — HALOPERIDOL DECANOATE 15 MILLIGRAM(S): 100 INJECTION INTRAMUSCULAR at 21:18

## 2021-03-15 RX ADMIN — Medication 1 MILLIGRAM(S): at 21:18

## 2021-03-15 NOTE — BH INPATIENT PSYCHIATRY PROGRESS NOTE - NSBHASSESSSUMMFT_PSY_ALL_CORE
Carmen is a 26 yo F with a history of Schizoaffective disorder vs Schizophrenia, 3 prior hospitalizations, in [facility] ETP program but non-compliant with medications PTA, who presented BIB mother for insomnia, medication non-compliance, yelling, and talking to self/RIS. Patient has been inconsistently compliant with Zyprexa, with TOO granted on 1/26.     Patient remains psychotic. She continues to respond to internal stimuli, talking to herself.  However, she is showing small improvement.  She has been compliant with medications and tolerating them well. Continues to need supervision and redirection--in hygiene, hydration and self care.   Plan:  - Continue involuntary hospitalization  - Prolixin/ativan PRN agitation  - Continue Haldol 15 mg QHS for psychosis with plan to titrate up as needed; per court order to receive Haldol IM if she refuses PO medications  - Plan to c/w  Caplyta   - Monitor closely for side effects  - G/M therapy as tolerated  - Dispo when stable--awaiting ACT / AOT

## 2021-03-15 NOTE — BH INPATIENT PSYCHIATRY PROGRESS NOTE - NSBHFUPINTERVALHXFT_PSY_A_CORE
Care discussed in treatment team. Patient reported feeling "great", continue to talk to self while pacing the halls intermittently. Patient reported that she does not like groups as there are too many people. Patient reported sleeping well and with fair appetite. Encouraged hydration.

## 2021-03-15 NOTE — BH INPATIENT PSYCHIATRY PROGRESS NOTE - MSE UNSTRUCTURED FT
The patient appears stated age, fair hygiene, disheveled, hair not combed.  She was calm and superficially cooperative with the interview. She maintains limited eye contact. No psychomotor agitation or retardation.  Steady gait. The patient’s speech was fluent, normal in tone, rate and volume. The patient’s mood is “good” Affect is constricted, stable, appropriate. Her thoughts are impoverished, with poverty of content; She denies delusions and hallucinations; however, she is observed responding to internal stimuli. She denies any suicidal or homicidal ideation, intent, or plan. Insight is limited. Judgment is improving. Impulse control has been fair on the unit.

## 2021-03-16 PROCEDURE — 99232 SBSQ HOSP IP/OBS MODERATE 35: CPT

## 2021-03-16 RX ADMIN — LUMATEPERONE 42 MILLIGRAM(S): 10.5 CAPSULE ORAL at 16:45

## 2021-03-16 RX ADMIN — HALOPERIDOL DECANOATE 15 MILLIGRAM(S): 100 INJECTION INTRAMUSCULAR at 21:10

## 2021-03-16 RX ADMIN — Medication 1 MILLIGRAM(S): at 20:31

## 2021-03-16 NOTE — BH INPATIENT PSYCHIATRY PROGRESS NOTE - MSE UNSTRUCTURED FT
The patient appears stated age, fair hygiene, disheveled, hair not combed.  She was calm and superficially cooperative with the interview. She maintains limited eye contact. No psychomotor agitation or retardation.  Steady gait. The patient’s speech was fluent, normal in tone, rate and volume. The patient’s mood is “good” Affect is constricted, stable, appropriate. Her thoughts are impoverished, with poverty of content; She denies delusions and hallucinations; however, she is observed responding to internal stimuli. She denies any suicidal or homicidal ideation, intent, or plan. Insight is limited. Judgment is fair. Impulse control has been fair on the unit.

## 2021-03-16 NOTE — BH INPATIENT PSYCHIATRY PROGRESS NOTE - NSBHFUPINTERVALHXFT_PSY_A_CORE
Care discussed with tx team. Patient reported feeling "good", reported that she was yelling because "someone talks to me". Patient has been able to take showers with prompting. Patient visible on the unit, keeping to self, denied SE to meds. Denied dizziness or constipation. Patient reported fair sleep and appetite.

## 2021-03-16 NOTE — BH INPATIENT PSYCHIATRY PROGRESS NOTE - NSBHASSESSSUMMFT_PSY_ALL_CORE
Carmen is a 26 yo F with a history of Schizoaffective disorder vs Schizophrenia, 3 prior hospitalizations, in [facility] ETP program but non-compliant with medications PTA, who presented BIB mother for insomnia, medication non-compliance, yelling, and talking to self/RIS. Patient has been inconsistently compliant with Zyprexa, with TOO granted on 1/26.     Patient remains psychotic. She continues to respond to internal stimuli, talking to herself.  However, she is showing small improvement.  She has been compliant with medications and tolerating them well. Continues to need supervision and redirection--in hygiene, hydration and self care. More re-directable.   Plan:  - Continue involuntary hospitalization  - Prolixin/ativan PRN agitation  - Continue Haldol 15 mg QHS for psychosis with plan to titrate up as needed; per court order to receive Haldol IM if she refuses PO medications  - Plan to c/w  Caplyta   - Monitor closely for side effects  - G/M therapy as tolerated  - Dispo when stable--awaiting ACT / AOT

## 2021-03-17 PROCEDURE — 99232 SBSQ HOSP IP/OBS MODERATE 35: CPT

## 2021-03-17 RX ORDER — OLANZAPINE 15 MG/1
10 TABLET, FILM COATED ORAL AT BEDTIME
Refills: 0 | Status: DISCONTINUED | OUTPATIENT
Start: 2021-03-17 | End: 2021-03-18

## 2021-03-17 RX ADMIN — LUMATEPERONE 42 MILLIGRAM(S): 10.5 CAPSULE ORAL at 16:26

## 2021-03-17 RX ADMIN — OLANZAPINE 10 MILLIGRAM(S): 15 TABLET, FILM COATED ORAL at 22:04

## 2021-03-17 RX ADMIN — Medication 1 MILLIGRAM(S): at 22:05

## 2021-03-17 NOTE — BH INPATIENT PSYCHIATRY PROGRESS NOTE - NSBHASSESSSUMMFT_PSY_ALL_CORE
Carmen is a 24 yo F with a history of Schizoaffective disorder vs Schizophrenia, 3 prior hospitalizations, in [facility] ETP program but non-compliant with medications PTA, who presented BIB mother for insomnia, medication non-compliance, yelling, and talking to self/RIS. Patient has been inconsistently compliant with Zyprexa, with TOO granted on 1/26.     Patient remains psychotic. She continues to respond to internal stimuli, talking to herself.  However, she is showing small improvement.  She has been compliant with medications and tolerating them well. Continues to need supervision and redirection--in hygiene, hydration and self care. More re-directable.   Plan:  - Continue involuntary hospitalization  - Prolixin/ativan PRN agitation  - d/c Haldol, start Zyprexa 10mg hs (zydis), discussed with previous provider Dr Montana.  - Plan to c/w  Caplyta   - Monitor closely for side effects  - G/M therapy as tolerated  - Dispo when stable--awaiting ACT / AOT

## 2021-03-17 NOTE — BH INPATIENT PSYCHIATRY PROGRESS NOTE - NSBHFUPINTERVALHXFT_PSY_A_CORE
Care discussed with treatment team. Patient reported feeling "good", observed to be having fair hygiene, continue to talk to self loudly on the unit, pacing intermittently. Patient reported fair sleep and appetite. Patient continues to have impoverished thoughts, poverty of content. Encouraged fluids.

## 2021-03-17 NOTE — BH INPATIENT PSYCHIATRY PROGRESS NOTE - MSE UNSTRUCTURED FT
The patient appears stated age, fair hygiene, disheveled, hair not combed.  She was calm and superficially cooperative with the interview. She maintains limited eye contact. No psychomotor agitation or retardation.  Steady gait. The patient’s speech was fluent, normal in tone, rate and volume. The patient’s mood is “good” Affect is constricted, stable, appropriate. Her thoughts are impoverished, with poverty of content; She denies delusions and hallucinations; however, she is observed responding to internal stimuli, talking to self loud and reports that "they talk to her". She denies any suicidal or homicidal ideation, intent, or plan. Insight is limited. Judgment is fair. Impulse control has been fair on the unit.

## 2021-03-18 PROCEDURE — 99232 SBSQ HOSP IP/OBS MODERATE 35: CPT

## 2021-03-18 RX ORDER — OLANZAPINE 15 MG/1
15 TABLET, FILM COATED ORAL AT BEDTIME
Refills: 0 | Status: DISCONTINUED | OUTPATIENT
Start: 2021-03-18 | End: 2021-03-29

## 2021-03-18 RX ADMIN — OLANZAPINE 15 MILLIGRAM(S): 15 TABLET, FILM COATED ORAL at 20:55

## 2021-03-18 RX ADMIN — LUMATEPERONE 42 MILLIGRAM(S): 10.5 CAPSULE ORAL at 16:07

## 2021-03-18 RX ADMIN — Medication 1 MILLIGRAM(S): at 20:55

## 2021-03-18 NOTE — BH INPATIENT PSYCHIATRY PROGRESS NOTE - NSBHASSESSSUMMFT_PSY_ALL_CORE
Carmen is a 24 yo F with a history of Schizoaffective disorder vs Schizophrenia, 3 prior hospitalizations, in [facility] ETP program but non-compliant with medications PTA, who presented BIB mother for insomnia, medication non-compliance, yelling, and talking to self/RIS. Patient has been inconsistently compliant with Zyprexa, with TOO granted on 1/26.     Patient remains psychotic. She continues to respond to internal stimuli, talking to herself.  However, she is showing small improvement.  She has been compliant with medications and tolerating them well. Continues to need supervision and redirection--in hygiene, hydration and self care. More re-directable.   Plan:  - Continue involuntary hospitalization  - Prolixin/ativan PRN agitation  - d/c Haldol, increase Zyprexa 15mg hs (zydis)  - Plan to c/w  Caplyta   - Monitor closely for side effects  - G/M therapy as tolerated  - Dispo when stable--awaiting ACT / AOT

## 2021-03-18 NOTE — BH INPATIENT PSYCHIATRY PROGRESS NOTE - MSE UNSTRUCTURED FT
The patient appears stated age, fair hygiene, disheveled, hair not combed.  She was calm and superficially cooperative with the interview. She maintains limited eye contact. No psychomotor agitation or retardation.  Steady gait. ++fine tremors b/l hands; The patient’s speech was fluent, normal in tone, rate and volume. The patient’s mood is “good” Affect is constricted, stable, appropriate. Her thoughts are impoverished, with poverty of content; She denies delusions and hallucinations; however, she is observed responding to internal stimuli, talking to self loud and reports that "they talk to her". She denies any suicidal or homicidal ideation, intent, or plan. Insight is limited. Judgment is fair. Impulse control has been fair on the unit.

## 2021-03-18 NOTE — BH INPATIENT PSYCHIATRY PROGRESS NOTE - NSBHFUPINTERVALHXFT_PSY_A_CORE
Care discussed with treatment team. Patient reported feeling "good, great". Patient continues to pace the unit intermittently talking to herself. Patient continues to have no insight and continues with disorganized thoughts. Patient reports fair sleep and appetite. Patient denied chest pain or palpitations. No behavioral issues on the unit.

## 2021-03-19 PROCEDURE — 99232 SBSQ HOSP IP/OBS MODERATE 35: CPT

## 2021-03-19 RX ADMIN — OLANZAPINE 15 MILLIGRAM(S): 15 TABLET, FILM COATED ORAL at 22:10

## 2021-03-19 RX ADMIN — Medication 1 MILLIGRAM(S): at 22:10

## 2021-03-19 RX ADMIN — LUMATEPERONE 42 MILLIGRAM(S): 10.5 CAPSULE ORAL at 17:45

## 2021-03-19 NOTE — BH INPATIENT PSYCHIATRY PROGRESS NOTE - NSBHFUPINTERVALHXFT_PSY_A_CORE
Care discussed with treatment team, chart reviewed. Patient seen and examined. Found patient pacing on the unit and intermittently talking to self.  Patient reported feeling “good”.  Patient reports fair sleep and appetite. No behavioral issues on the unit. Patient reported not attending groups, did not provide any reason for not attending. Denies suicidal and homicidal thoughts, intention or plan. Patient is compliant with medications and denies any side effects from the medications. Patient denied chest pain or palpitations. VSS as per flowsheet.

## 2021-03-19 NOTE — BH INPATIENT PSYCHIATRY PROGRESS NOTE - NSBHASSESSSUMMFT_PSY_ALL_CORE
25 Y/ F with a history of schizoaffective disorder vs Schizophrenia, 3 prior hospitalizations, in [facility] ETP program but non-compliant with medications PTA, who presented BIB mother for insomnia, medication non-compliance, yelling, and talking to self/RIS. Patient has been inconsistently compliant with Zyprexa, with TOO granted on 1/26.  Patient remains psychotic. She continues to respond to internal stimuli, talking to herself.  However, she is showing small improvement.  She has been compliant with medications and tolerating them well. No lip smacking, no tongue protrusion observed. Fine termers b/l hands.  Continues to need supervision and redirection in hygiene, hydration and self-care. Responding to redirections.   Plan:  - Continue involuntary hospitalization  - Prolixin/ativan PRN agitation  - Zyprexa 15mg hs (zydis)  - Plan to c/w Caplyta 42 mg  - Monitor closely for side effects  - G/M therapy as tolerated  - Dispo when stable--awaiting ACT / AOT

## 2021-03-20 RX ADMIN — Medication 1 MILLIGRAM(S): at 20:49

## 2021-03-20 RX ADMIN — Medication 2 MILLIGRAM(S): at 16:15

## 2021-03-20 RX ADMIN — OLANZAPINE 15 MILLIGRAM(S): 15 TABLET, FILM COATED ORAL at 20:49

## 2021-03-20 RX ADMIN — LUMATEPERONE 42 MILLIGRAM(S): 10.5 CAPSULE ORAL at 16:15

## 2021-03-21 RX ADMIN — LUMATEPERONE 42 MILLIGRAM(S): 10.5 CAPSULE ORAL at 16:43

## 2021-03-21 RX ADMIN — Medication 50 MILLIGRAM(S): at 21:16

## 2021-03-21 RX ADMIN — HALOPERIDOL DECANOATE 5 MILLIGRAM(S): 100 INJECTION INTRAMUSCULAR at 21:18

## 2021-03-21 RX ADMIN — Medication 2 MILLIGRAM(S): at 16:43

## 2021-03-21 RX ADMIN — OLANZAPINE 15 MILLIGRAM(S): 15 TABLET, FILM COATED ORAL at 21:16

## 2021-03-21 RX ADMIN — Medication 1 MILLIGRAM(S): at 21:16

## 2021-03-22 PROCEDURE — 99232 SBSQ HOSP IP/OBS MODERATE 35: CPT

## 2021-03-22 RX ADMIN — Medication 1 MILLIGRAM(S): at 20:42

## 2021-03-22 RX ADMIN — Medication 2 MILLIGRAM(S): at 09:26

## 2021-03-22 RX ADMIN — OLANZAPINE 15 MILLIGRAM(S): 15 TABLET, FILM COATED ORAL at 20:42

## 2021-03-22 RX ADMIN — LUMATEPERONE 42 MILLIGRAM(S): 10.5 CAPSULE ORAL at 16:30

## 2021-03-22 RX ADMIN — Medication 50 MILLIGRAM(S): at 09:26

## 2021-03-22 RX ADMIN — HALOPERIDOL DECANOATE 5 MILLIGRAM(S): 100 INJECTION INTRAMUSCULAR at 09:26

## 2021-03-22 NOTE — BH INPATIENT PSYCHIATRY PROGRESS NOTE - CASE SUMMARY
25 Y/ F with a history of schizoaffective disorder vs Schizophrenia, 3 prior hospitalizations, in [facility] ETP program but non-compliant with medications PTA, who presented BIB mother for insomnia, medication non-compliance, yelling, and talking to self/RIS. Patient has been inconsistently compliant with Zyprexa, with TOO granted on 1/26.  Verbal sign out eceived from 1 S team - Dr. Montana.  Patient remains psychotic. She continues to respond to internal stimuli, talking to herself.  However, she is showing small improvement per report from primary care team on 1S.  She has been compliant with medications and tolerating them well. No TD/akathisia. Fine termers b/l hands.  Continues to need supervision and redirection in hygiene, hydration and self-care. Responding to redirections.   Plan:  - Continue involuntary hospitalization  - Prolixin/ativan PRN agitation  - Zyprexa 15mg hs (zydis)  - Caplyta 42 mg  - Monitor closely for side effects  - G/M therapy as tolerated  - Dispo when stable--awaiting ACT / AOT

## 2021-03-22 NOTE — BH INPATIENT PSYCHIATRY PROGRESS NOTE - NSBHFUPINTERVALHXFT_PSY_A_CORE
Patient seen for follow up of schizoaffective disorder, treatment resistant. This is the first day primary team is meeting patient. Care discussed with treatment team, chart reviewed. Per nursing report, patient tends to be loud during the day, internally preoccupied talking to herself, tends to irritate other patients. Received PRN haldol/benadryl last night. AOT/ACT team being considered for discharge.   This morning,    Patient seen for follow up of schizoaffective disorder, treatment resistant. This is the first day primary team is meeting patient. Care discussed with treatment team, chart reviewed. Per nursing report, patient tends to be loud during the day, internally preoccupied talking to herself, tends to irritate other patients. Received PRN haldol/benadryl last night. AOT/ACT team being considered for discharge.   This morning, patient is able to explain that she is currently in the hospital because "I threw my meds on the ground and my mom had me sent here." States she was transferred from  because another patient was targeting her. Today, she denies any complaints, states everything on the unit so far has been "just fine." Denies /    Patient seen for follow up of schizoaffective disorder, treatment resistant. This is the first day primary team is meeting patient. Care discussed with treatment team, chart reviewed. Per nursing report, patient tends to be loud during the day, internally preoccupied talking to herself, tends to irritate other patients. Received PRN haldol/benadryl last night. AOT/ACT team being considered for discharge.   This morning, patient is able to explain that she is currently in the hospital because "I threw my meds on the ground and my mom had me sent here." States she was transferred from  because another patient was targeting her. Today, she denies any complaints, states everything on the unit so far has been "just fine." Denies AH/VH, denies paranoia or other delusions. However, as soon as interviewer left the room, patient began talking to herself. Denies SI/HI.  Denies any subjective side effects from medications including restlessness, tremor or stiffness.   Patient seen for follow up of schizoaffective disorder, treatment resistant. This is the first day the current team is meeting patient since her transfer from [facility]. Care discussed with treatment team, chart reviewed. Per nursing report, patient tends to be loud during the day, internally preoccupied talking to herself, tends to irritate other patients. Received PRN haldol/benadryl last night. AOT/ACT team being considered for discharge.   This morning, patient is able to explain that she is currently in the hospital because "I threw my meds on the ground and my mom had me sent here." States she was transferred from 1[facility] because another patient was targeting her. Today, she denies any complaints, states everything on the unit so far has been "just fine." Denies AH/VH, denies paranoia or other delusions. However, as soon as interviewer left the room, patient began talking to herself. Denies SI/HI.  Denies any subjective side effects from medications including restlessness, tremor or stiffness.

## 2021-03-22 NOTE — BH INPATIENT PSYCHIATRY PROGRESS NOTE - MODIFICATIONS
Patient interviewed and evaluated on morning rounds with resident present to follow up on symptoms and the case has been reviewed with resident and treatment team this morning. I have reviewed the resident's progress note and the mental status examination and I agree with its contents and treatment plan and I have made changes to the note when needed and as indicated.

## 2021-03-22 NOTE — BH INPATIENT PSYCHIATRY PROGRESS NOTE - MSE UNSTRUCTURED FT
The patient appears stated age, fair hygiene, disheveled, hair not combed.  She was calm and superficially cooperative with the interview. She maintains limited eye contact. No psychomotor agitation or retardation.  Steady gait. ++fine tremors b/l hands; The patient’s speech was fluent, normal in tone, rate and volume. The patient’s mood is “good” Affect is constricted, stable, appropriate. Her thoughts are impoverished, with poverty of content; She denies delusions and hallucinations; however, she is observed responding to internal stimuli, talking to self loud and reports that "they talk to her". She denies any suicidal or homicidal ideation, intent, or plan. Insight is limited. Judgment is fair. Impulse control has been fair on the unit. The patient appears stated age, fair hygiene, disheveled, hair not combed.  She was calm and superficially cooperative with the interview. She maintains very minimal eye contact. No psychomotor agitation or retardation.  Steady gait. +fine tremors b/l hands; The patient’s speech was fluent, normal in tone, rate and volume. The patient’s mood is “Just fine” Affect is constricted, stable, appropriate. She denies delusions and hallucinations; however, she is observed responding to internal stimuli, talking to self upon completion of interview. She denies any suicidal or homicidal ideation, intent, or plan. Insight is limited. Judgment is fair. Impulse control has been fair since arriving on the unit.

## 2021-03-22 NOTE — BH INPATIENT PSYCHIATRY PROGRESS NOTE - NSBHASSESSSUMMFT_PSY_ALL_CORE
25 Y/ F with a history of schizoaffective disorder vs Schizophrenia, 3 prior hospitalizations, in [facility] ETP program but non-compliant with medications PTA, who presented BIB mother for insomnia, medication non-compliance, yelling, and talking to self/RIS. Patient has been inconsistently compliant with Zyprexa, with TOO granted on 1/26.  Patient remains psychotic. She continues to respond to internal stimuli, talking to herself.  However, she is showing small improvement.  She has been compliant with medications and tolerating them well. No lip smacking, no tongue protrusion observed. Fine termers b/l hands.  Continues to need supervision and redirection in hygiene, hydration and self-care. Responding to redirections.   Plan:  - Continue involuntary hospitalization  - Prolixin/ativan PRN agitation  - Zyprexa 15mg hs (zydis)  - Plan to c/w Caplyta 42 mg  - Monitor closely for side effects  - G/M therapy as tolerated  - Dispo when stable--awaiting ACT / AOT   25 Y/ F with a history of schizoaffective disorder vs Schizophrenia, 3 prior hospitalizations, in [facility] ETP program but non-compliant with medications PTA, who presented BIB mother for insomnia, medication non-compliance, yelling, and talking to self/RIS. Patient has been inconsistently compliant with Zyprexa, with TOO granted on 1/26.  Patient remains psychotic. She continues to respond to internal stimuli, talking to herself.  However, she is showing small improvement per report from primary care team on 1S.  She has been compliant with medications and tolerating them well. No lip smacking, no tongue protrusion observed. Fine termers b/l hands.  Continues to need supervision and redirection in hygiene, hydration and self-care. Responding to redirections.   Plan:  - Continue involuntary hospitalization  - Prolixin/ativan PRN agitation  - Zyprexa 15mg hs (zydis)  - Plan to c/w Caplyta 42 mg  - Monitor closely for side effects  - G/M therapy as tolerated  - Dispo when stable--awaiting ACT / AOT

## 2021-03-22 NOTE — BH INPATIENT PSYCHIATRY PROGRESS NOTE - NSTXPSYCHOINTERMD_PSY_ALL_CORE
c/w Caplyta--at max dose, d/c Haldol and start Zyprexa  c/w Caplyta--at max dose, continue with Zyprexa

## 2021-03-23 PROCEDURE — 99232 SBSQ HOSP IP/OBS MODERATE 35: CPT

## 2021-03-23 RX ADMIN — OLANZAPINE 15 MILLIGRAM(S): 15 TABLET, FILM COATED ORAL at 20:47

## 2021-03-23 RX ADMIN — Medication 1 MILLIGRAM(S): at 20:49

## 2021-03-23 RX ADMIN — HALOPERIDOL DECANOATE 5 MILLIGRAM(S): 100 INJECTION INTRAMUSCULAR at 18:09

## 2021-03-23 RX ADMIN — LUMATEPERONE 42 MILLIGRAM(S): 10.5 CAPSULE ORAL at 16:00

## 2021-03-23 RX ADMIN — Medication 2 MILLIGRAM(S): at 13:07

## 2021-03-23 RX ADMIN — Medication 50 MILLIGRAM(S): at 18:08

## 2021-03-23 NOTE — BH INPATIENT PSYCHIATRY PROGRESS NOTE - MSE UNSTRUCTURED FT
The patient appears stated age, fair hygiene, disheveled, hair not combed.  She was calm and superficially cooperative with the interview. She maintains very minimal eye contact. No psychomotor agitation or retardation.  Steady gait. +fine tremors b/l hands; The patient’s speech was fluent, normal in tone, rate and volume. The patient’s mood is “Just fine” Affect is constricted, stable, appropriate. She denies delusions and hallucinations; however, she is observed responding to internal stimuli, talking to self upon completion of interview. She denies any suicidal or homicidal ideation, intent, or plan. Insight is limited. Judgment is fair. Impulse control has been fair since arriving on the unit. The patient appears stated age, fair hygiene, disheveled, hair not combed.  Patient was seen talking to herself upon entry into her room. She was irritable and uncooperative with interview. She maintains very minimal eye contact. No psychomotor agitation or retardation.  Steady gait. +fine tremors b/l hands; The patient’s speech was fluent, normal in tone, rate and volume. The patient’s mood is “fine” Affect is irritable constricted, expressive. She denies delusions and hallucinations; however, she is observed responding to internal stimuli, talking to self. She endorses SI sarcastically, but denies any intent or plan. Denies HI . Insight is limited. Judgment is fair. Impulse control has been fair since arriving on the unit. The patient appears stated age, fair hygiene, disheveled, hair not combed.  Patient was seen talking to herself upon entry into her room. She was irritable and uncooperative with interview. She maintains very minimal eye contact. No psychomotor agitation or retardation.  Steady gait. +fine tremors b/l hands; The patient’s speech was fluent, normal in tone, rate and volume. The patient’s mood is “fine” Affect is irritable constricted, expressive. She denies delusions and hallucinations; however, she is observed responding to internal stimuli, talking to self. She endorses SI sarcastically, but denies any intent or plan. Denies HI . Insight is limited. Judgment today is poor. Impulse control has been fair since arriving on the unit.

## 2021-03-23 NOTE — BH INPATIENT PSYCHIATRY PROGRESS NOTE - NSBHASSESSSUMMFT_PSY_ALL_CORE
25 Y/ F with a history of schizoaffective disorder vs Schizophrenia, 3 prior hospitalizations, in [facility] ETP program but non-compliant with medications PTA, who presented BIB mother for insomnia, medication non-compliance, yelling, and talking to self/RIS. Patient has been inconsistently compliant with Zyprexa, with TOO granted on 1/26.  Patient remains psychotic. She continues to respond to internal stimuli, talking to herself.  However, she is showing small improvement per report from primary care team on 1S.  She has been compliant with medications and tolerating them well. No lip smacking, no tongue protrusion observed. Fine termers b/l hands.  Continues to need supervision and redirection in hygiene, hydration and self-care. Responding to redirections.   Plan:  - Continue involuntary hospitalization  - Prolixin/ativan PRN agitation  - Zyprexa 15mg hs (zydis)  - Plan to c/w Caplyta 42 mg  - Monitor closely for side effects  - G/M therapy as tolerated  - Dispo when stable--awaiting ACT / AOT   25 Y/ F with a history of schizoaffective disorder vs Schizophrenia, 3 prior hospitalizations, in [facility] ETP program but non-compliant with medications PTA, who presented BIB mother for insomnia, medication non-compliance, yelling, and talking to self/RIS. Patient has been inconsistently compliant with Zyprexa, with TOO granted on 1/26.  Patient remains psychotic. She continues to respond to internal stimuli, talking to herself.  However, she is showing small improvement per report from primary care team on 1S.  She has been compliant with medications and tolerating them well. No lip smacking, no tongue protrusion observed. Fine termers b/l hands.  Continues to need supervision and redirection in hygiene, hydration and self-care. Responding to redirections. More irritable today as compared to yesterday.   Plan:  - Continue involuntary hospitalization  - Prolixin/ativan PRN agitation  - Zyprexa 15mg hs (zydis)  - Plan to c/w Caplyta 42 mg  - Monitor closely for side effects  - G/M therapy as tolerated  - Dispo when stable--awaiting ACT / AOT

## 2021-03-23 NOTE — BH INPATIENT PSYCHIATRY PROGRESS NOTE - CASE SUMMARY
25 Y/ F with a history of schizoaffective disorder vs Schizophrenia, 3 prior hospitalizations, in [facility] ETP program but non-compliant with medications PTA, who presented BIB mother for insomnia, medication non-compliance, yelling, and talking to self/RIS. Patient has been inconsistently compliant with Zyprexa, with TOO granted on 1/26.  Verbal sign out eceived from 1 S team - Dr. Montana.  Patient remains psychotic. She continues to respond to internal stimuli, talking to herself.  However, she is showing small improvement per report from primary care team on 1S.  She has been compliant with medications and tolerating them well. No TD/akathisia. Fine termers b/l hands.  Continues to need supervision and redirection in hygiene, hydration and self-care. Responding to redirections.   Plan:  - Continue involuntary hospitalization  - Prolixin/ativan PRN agitation  - Zyprexa 15mg hs (zydis)  - Caplyta 42 mg  - Monitor closely for side effects  - G/M therapy as tolerated  - Dispo when stable--awaiting ACT / AOT   25 Y/ F with a history of schizoaffective disorder vs Schizophrenia, 3 prior hospitalizations, in [facility] ETP program but non-compliant with medications PTA, who presented BIB mother for insomnia, medication non-compliance, yelling, and talking to self/RIS. Patient has been inconsistently compliant with Zyprexa, with TOO granted on 1/26.  Verbal sign out eceived from 1 S team - Dr. Montana.  Patient remains psychotic. She continues to respond to internal stimuli, talking to herself.  However, she is showing small improvement per report from primary care team on 1S.  She has been compliant with medications and tolerating them well. No TD/akathisia/EPS.  Continues to need supervision and redirection in hygiene, hydration and self-care. Responding to redirections.   Plan:  - Continue involuntary hospitalization  - Prolixin/ativan PRN agitation  - Zyprexa 15mg hs (zydis)  - Caplyta 42 mg  [facility] - to follow on cardiac  conduction   - Monitor closely for side effects  - G/M therapy as tolerated  - Dispo when stable--awaiting ACT / AOT

## 2021-03-23 NOTE — BH INPATIENT PSYCHIATRY PROGRESS NOTE - NSBHFUPINTERVALHXFT_PSY_A_CORE
Patient seen for follow up of schizoaffective disorder, treatment resistant. No acute events overnight    Patient seen for follow up of schizoaffective disorder, treatment resistant. No acute events overnight. This morning, patient endorses good sleep last night, good appetite, has spent most of the morning in her room. Denies depressed mood. Becomes very irritable when asked about psychotic symptoms (AVH/delusions) which she denies. When asked about SI, patient states in a sarcastic tone "yes I want to f***ing kill myself because you won't leave me alone." Denies intent or plan.

## 2021-03-24 PROCEDURE — 99232 SBSQ HOSP IP/OBS MODERATE 35: CPT

## 2021-03-24 PROCEDURE — 93010 ELECTROCARDIOGRAM REPORT: CPT

## 2021-03-24 RX ADMIN — OLANZAPINE 15 MILLIGRAM(S): 15 TABLET, FILM COATED ORAL at 20:47

## 2021-03-24 RX ADMIN — LUMATEPERONE 42 MILLIGRAM(S): 10.5 CAPSULE ORAL at 16:50

## 2021-03-24 RX ADMIN — Medication 1 MILLIGRAM(S): at 20:48

## 2021-03-24 NOTE — BH INPATIENT PSYCHIATRY PROGRESS NOTE - MSE UNSTRUCTURED FT
The patient appears stated age, fair hygiene, disheveled, hair not combed.  Patient was seen talking to herself upon entry into her room. She was irritable and uncooperative with interview. She maintains very minimal eye contact. No psychomotor agitation or retardation.  Steady gait. +fine tremors b/l hands; The patient’s speech was fluent, normal in tone, rate and volume. The patient’s mood is “fine” Affect is irritable constricted, expressive. She denies delusions and hallucinations; however, she is observed responding to internal stimuli, talking to self. She endorses SI sarcastically, but denies any intent or plan. Denies HI . Insight is limited. Judgment today is poor. Impulse control has been fair since arriving on the unit. The patient appears stated age, fair hygiene, disheveled, hair not combed.  Patient continues to talk to herself constantly. She was irritable, abrasive and uncooperative with interview. She maintains very minimal eye contact. No psychomotor agitation or retardation.  Steady gait. +fine tremors b/l hands; The patient’s speech was fluent, normal in tone, rate and volume. The patient’s mood is “just fine” Affect is irritable, constricted, expressive. She denies delusions and hallucinations; however, she is observed responding to internal stimuli, talking to self. She denies  SI or HI . Insight is limited. Judgment today is fair. Impulse control has been fair since arriving on the unit.

## 2021-03-24 NOTE — BH INPATIENT PSYCHIATRY PROGRESS NOTE - NSBHASSESSSUMMFT_PSY_ALL_CORE
25 Y/ F with a history of schizoaffective disorder vs Schizophrenia, 3 prior hospitalizations, in [facility] ETP program but non-compliant with medications PTA, who presented BIB mother for insomnia, medication non-compliance, yelling, and talking to self/RIS. Patient has been inconsistently compliant with Zyprexa, with TOO granted on 1/26.  Patient remains psychotic. She continues to respond to internal stimuli, talking to herself.  However, she is showing small improvement per report from primary care team on 1S.  She has been compliant with medications and tolerating them well. No lip smacking, no tongue protrusion observed. Fine termers b/l hands.  Continues to need supervision and redirection in hygiene, hydration and self-care. Responding to redirections. More irritable today as compared to yesterday.   Plan:  - Continue involuntary hospitalization  - Prolixin/ativan PRN agitation  - Zyprexa 15mg hs (zydis)  - Plan to c/w Caplyta 42 mg  - Monitor closely for side effects  - G/M therapy as tolerated  - Dispo when stable--awaiting ACT / AOT   25 Y/ F with a history of schizoaffective disorder vs Schizophrenia, 3 prior hospitalizations, in [facility] ETP program but non-compliant with medications PTA, who presented BIB mother for insomnia, medication non-compliance, yelling, and talking to self/RIS. Patient has been inconsistently compliant with Zyprexa, with TOO granted on 1/26.  Patient remains psychotic. She continues to respond to internal stimuli, talking to herself.  However, she is showing small improvement per report from primary care team on 1S.  She has been compliant with medications and tolerating them well. No lip smacking, no tongue protrusion observed. Fine termers b/l hands.  Continues to need supervision and redirection in hygiene, hydration and self-care. Responding to redirections. Continues to be somewhat irritable and reluctant to speak with the treatment team. EKG ordered to monitor QTc while on 2 antipsychotics.   Plan:  - Continue involuntary hospitalization  - Prolixin/ativan PRN agitation  - Zyprexa 15mg hs (zydis)  - Plan to c/w Caplyta 42 mg  - Monitor closely for side effects, EKG ordered today to monitor QTc   - G/M therapy as tolerated  - Dispo when stable--awaiting ACT / AOT

## 2021-03-24 NOTE — BH INPATIENT PSYCHIATRY PROGRESS NOTE - CASE SUMMARY
25 Y/ F with a history of schizoaffective disorder vs Schizophrenia, 3 prior hospitalizations, in [facility] ETP program but non-compliant with medications PTA, who presented BIB mother for insomnia, medication non-compliance, yelling, and talking to self/RIS. Patient has been inconsistently compliant with Zyprexa, with TOO granted on 1/26.  Verbal sign out eceived from 1 S team - Dr. Montana.  Patient remains psychotic. She continues to respond to internal stimuli, talking to herself.  However, she is showing small improvement per report from primary care team on 1S.  She has been compliant with medications and tolerating them well. No TD/akathisia/EPS.  Continues to need supervision and redirection in hygiene, hydration and self-care. Responding to redirections.   Plan:  - Continue involuntary hospitalization  - Prolixin/ativan PRN agitation  - Zyprexa 15mg hs (zydis)  - Caplyta 42 mg  [facility] - to follow on cardiac  conduction   - Monitor closely for side effects  - G/M therapy as tolerated  - Dispo when stable--awaiting ACT / AOT   25 Y/ F with a history of schizoaffective disorder vs Schizophrenia, 3 prior hospitalizations, in [facility] ETP program but non-compliant with medications PTA, who presented BIB mother for insomnia, medication non-compliance, yelling, and talking to self/RIS. Patient has been inconsistently compliant with Zyprexa, with TOO granted on 1/26.    Patient remains psychotic. She continues to respond to internal stimuli, preoccupied, talking to herself, displays bizarre mannerisms. ( However, she is showing small improvement per report from primary care team on 1S. ) She has been compliant with medications and tolerating them well. No TD/akathisia/EPS.  Continues to need supervision and redirection in hygiene, hydration and self-care. Responding to redirections.   Plan:  - Continue involuntary hospitalization  - Prolixin/ativan PRN agitation  - Zyprexa 15mg hs (zydis)  - Caplyta 42 mg  E[facility] -pending  to follow on cardiac  conduction   - Monitor closely for side effects  - G/M therapy as tolerated  - Dispo when stable--awaiting ACT / AOT

## 2021-03-24 NOTE — BH INPATIENT PSYCHIATRY PROGRESS NOTE - NSBHFUPINTERVALHXFT_PSY_A_CORE
Patient seen for follow up of schizoaffective disorder, treatment resistant. No acute events overnight.  Patient seen for follow up of schizoaffective disorder, treatment resistant. No acute events overnight. This morning patient endorses good sleep, good appetite. Has mostly been keeping to herself. Denies interacting with staff or other patients while on the unit, spends her time sleeping or walking around the unit, not interested in groups, doing puzzles or games. Denies SI/HI.

## 2021-03-25 PROCEDURE — 99232 SBSQ HOSP IP/OBS MODERATE 35: CPT

## 2021-03-25 RX ADMIN — LUMATEPERONE 42 MILLIGRAM(S): 10.5 CAPSULE ORAL at 16:52

## 2021-03-25 RX ADMIN — HALOPERIDOL DECANOATE 5 MILLIGRAM(S): 100 INJECTION INTRAMUSCULAR at 13:14

## 2021-03-25 RX ADMIN — Medication 50 MILLIGRAM(S): at 13:14

## 2021-03-25 RX ADMIN — OLANZAPINE 15 MILLIGRAM(S): 15 TABLET, FILM COATED ORAL at 21:23

## 2021-03-25 RX ADMIN — Medication 1 MILLIGRAM(S): at 21:23

## 2021-03-25 NOTE — BH INPATIENT PSYCHIATRY PROGRESS NOTE - NSBHFUPINTERVALHXFT_PSY_A_CORE
Patient seen for follow up of schizoaffective disorder, treatment resistant. No acute events overnight. This morning patient endorses good sleep, good appetite. Has mostly been keeping to herself.  Endorses feeling bored. Denies AVH although she continues to talk to herself constantly.  Denies SI/HI.

## 2021-03-25 NOTE — BH INPATIENT PSYCHIATRY PROGRESS NOTE - CASE SUMMARY
25 Y/ F with a history of schizoaffective disorder vs Schizophrenia, 3 prior hospitalizations, in [facility] ETP program but non-compliant with medications PTA, who presented BIB mother for insomnia, medication non-compliance, yelling, and talking to self/RIS. Patient has been inconsistently compliant with Zyprexa, with TOO granted on 1/26.    Patient remains psychotic. She continues to respond to internal stimuli, preoccupied, talking to herself, displays bizarre mannerisms. ( However, she is showing small improvement per report from primary care team on 1S. ) She has been compliant with medications and tolerating them well. No TD/akathisia/EPS.  Continues to need supervision and redirection in hygiene, hydration and self-care. Responding to redirections.   Plan:  - Continue involuntary hospitalization  - Prolixin/ativan PRN agitation  - Zyprexa 15mg hs (zydis)  - Caplyta 42 mg  [facility] -pending  to follow on cardiac  conduction   - Monitor closely for side effects  - G/M therapy as tolerated  - Dispo when stable--awaiting ACT / AOT   25 Y/ F with a history of schizoaffective disorder vs Schizophrenia, 3 prior hospitalizations, in [facility] ETP program but non-compliant with medications PTA, who presented BIB mother for insomnia, medication non-compliance, yelling, and talking to self/RIS. Patient has been inconsistently compliant with Zyprexa, with TOO granted on 1/26.    Patient is secretive about her symptoms but can not contain them: she constantly talking to herself in response to voices. remains psychotic. ) However, she is showing small improvement per report from primary care team on 1S. ) She has been compliant with medications and tolerating them well. No TD/akathisia/EPS.  Continues to need supervision and redirection in hygiene, hydration and self-care. Responding to redirections.   Plan:  - Continue involuntary hospitalization  - Prolixin/ativan PRN agitation  - Zyprexa 15mg hs (zydis)  - Caplyta 42 mg  [facility] -reviewed - WNL     - Dispo when stable--awaiting ACT / AOT

## 2021-03-25 NOTE — BH INPATIENT PSYCHIATRY PROGRESS NOTE - MSE UNSTRUCTURED FT
The patient appears stated age, fair hygiene, disheveled, hair not combed.  Patient continues to talk to herself constantly. She was irritable, abrasive and uncooperative with interview. She maintains very minimal eye contact. No psychomotor agitation or retardation.  Stereotypical repetitive movement of the right hand. Steady gait. +fine tremors b/l hands; The patient’s speech was fluent, normal in tone, rate and volume. The patient’s mood is “fine” Affect is irritable, constricted, expressive. She denies delusions and hallucinations; however, she is observed responding to internal stimuli, talking to self. She denies  SI or HI . Insight is limited. Judgment today is fair. Impulse control has been fair since arriving on the unit.

## 2021-03-25 NOTE — BH INPATIENT PSYCHIATRY PROGRESS NOTE - NSBHASSESSSUMMFT_PSY_ALL_CORE
25 Y/ F with a history of schizoaffective disorder vs Schizophrenia, 3 prior hospitalizations, in [facility] ETP program but non-compliant with medications PTA, who presented BIB mother for insomnia, medication non-compliance, yelling, and talking to self/RIS. Patient has been inconsistently compliant with Zyprexa, with TOO granted on 1/26.  Patient remains psychotic. She continues to respond to internal stimuli, talking to herself.  However, she is showing small improvement per report from primary care team on 1S.  She has been compliant with medications and tolerating them well. No lip smacking, no tongue protrusion observed. Fine termers b/l hands.  Continues to need supervision and redirection in hygiene, hydration and self-care. Responding to redirections. Continues to be somewhat irritable and reluctant to speak with the treatment team. EKG on 3/24= 456, will continue to monitor.  Plan:  - Continue involuntary hospitalization  - Prolixin/ativan PRN agitation  - Zyprexa 15mg hs (zydis)  - Plan to c/w Caplyta 42 mg  - Monitor closely for side effects, will continue to monitor QTc   - G/M therapy as tolerated  - Dispo when stable--awaiting ACT / AOT

## 2021-03-26 PROCEDURE — 99231 SBSQ HOSP IP/OBS SF/LOW 25: CPT

## 2021-03-26 RX ADMIN — Medication 2 MILLIGRAM(S): at 10:20

## 2021-03-26 RX ADMIN — OLANZAPINE 15 MILLIGRAM(S): 15 TABLET, FILM COATED ORAL at 21:25

## 2021-03-26 RX ADMIN — Medication 1 MILLIGRAM(S): at 21:25

## 2021-03-26 RX ADMIN — HALOPERIDOL DECANOATE 5 MILLIGRAM(S): 100 INJECTION INTRAMUSCULAR at 10:20

## 2021-03-26 RX ADMIN — LUMATEPERONE 42 MILLIGRAM(S): 10.5 CAPSULE ORAL at 16:20

## 2021-03-26 RX ADMIN — Medication 50 MILLIGRAM(S): at 10:20

## 2021-03-26 NOTE — BH CHART NOTE - NSEVENTNOTEFT_PSY_ALL_CORE
Therapist attempted to engage pt in treatment. Pt declined offer to meet privately. She consented to take a walk outside with the therapist and was able to engage in brief conversation. Therapist will follow up.

## 2021-03-26 NOTE — BH INPATIENT PSYCHIATRY PROGRESS NOTE - MSE UNSTRUCTURED FT
The patient appears stated age, fair hygiene, disheveled, hair not combed.  Patient continues to talk to herself constantly. She was irritable, abrasive and uncooperative with interview. She maintains very minimal eye contact. No psychomotor agitation or retardation.  Stereotypical repetitive movement of the right hand. Steady gait. +fine tremors b/l hands; The patient’s speech was fluent, normal in tone, rate and volume. The patient’s mood is “fine” Affect is irritable, constricted, expressive. She denies delusions and hallucinations; however, she is observed responding to internal stimuli, talking to self. She denies  SI or HI . Insight is limited. Judgment today is fair. Impulse control has been fair since arriving on the unit. The patient appears stated age, fair hygiene, disheveled, hair not combed.  Patient continues to talk to herself constantly. She was irritable, abrasive and uncooperative with interview. She maintains very minimal eye contact. No psychomotor agitation or retardation.  Stereotypical repetitive movement of the right hand. Steady gait. +fine tremors b/l hands; The patient’s speech was fluent, normal in tone, rate and volume. The patient’s mood is “good” Affect is less irritable today, constricted, expressive. She denies delusions and hallucinations; however, she is observed responding to internal stimuli, talking to self. She denies  SI or HI . Insight is limited. Judgment today is fair. Impulse control has been fair since arriving on the unit. The patient appears stated age, fair hygiene, disheveled, hair not combed.  Patient continues to talk to herself constantly. She was calm, cooperative with interview reponds with short replies. She maintains very minimal eye contact. No psychomotor agitation or retardation.  Stereotypical repetitive movement of the right hand. Steady gait. +fine tremors b/l hands; The patient’s speech was fluent, normal in tone, rate and volume. The patient’s mood is “good” Affect is less irritable today, constricted, expressive. She denies delusions and hallucinations; however, she is observed responding to internal stimuli, talking to self. She denies  SI or HI . Insight is limited. Judgment today is fair. Impulse control has been fair since arriving on the unit.

## 2021-03-26 NOTE — BH INPATIENT PSYCHIATRY PROGRESS NOTE - NSBHASSESSSUMMFT_PSY_ALL_CORE
25 Y/ F with a history of schizoaffective disorder vs Schizophrenia, 3 prior hospitalizations, in [facility] ETP program but non-compliant with medications PTA, who presented BIB mother for insomnia, medication non-compliance, yelling, and talking to self/RIS. Patient has been inconsistently compliant with Zyprexa, with TOO granted on 1/26.  Patient remains psychotic. She continues to respond to internal stimuli, talking to herself.  However, she is showing small improvement per report from primary care team on 1S.  She has been compliant with medications and tolerating them well. No lip smacking, no tongue protrusion observed. Fine termers b/l hands.  Continues to need supervision and redirection in hygiene, hydration and self-care. Responding to redirections. Continues to be somewhat irritable and reluctant to speak with the treatment team. EKG on 3/24= 456, will continue to monitor.  Plan:  - Continue involuntary hospitalization  - Prolixin/ativan PRN agitation  - Zyprexa 15mg hs (zydis)  - Plan to c/w Caplyta 42 mg  - Monitor closely for side effects, will continue to monitor QTc   - G/M therapy as tolerated  - Dispo when stable--awaiting ACT / AOT

## 2021-03-26 NOTE — BH INPATIENT PSYCHIATRY PROGRESS NOTE - NSBHFUPINTERVALHXFT_PSY_A_CORE
Patient seen for follow up of schizoaffective disorder, treatment resistant. No acute events overnight. ***  Patient seen for follow up of schizoaffective disorder, treatment resistant. No acute events overnight. Endorses good sleep, good appetite, no complaints. Still denying AVH, though still talking to herself constantly. Denies depressed mood, SI or HI.

## 2021-03-26 NOTE — BH INPATIENT PSYCHIATRY PROGRESS NOTE - CASE SUMMARY
25 Y/ F with a history of schizoaffective disorder vs Schizophrenia, 3 prior hospitalizations, in [facility] ETP program but non-compliant with medications PTA, who presented BIB mother for insomnia, medication non-compliance, yelling, and talking to self/RIS. Patient has been inconsistently compliant with Zyprexa, with TOO granted on 1/26.    Patient is secretive about her symptoms but can not contain them: she constantly talking to herself in response to voices. remains psychotic. ) However, she is showing small improvement per report from primary care team on 1S. ) She has been compliant with medications and tolerating them well. No TD/akathisia/EPS.  Continues to need supervision and redirection in hygiene, hydration and self-care. Responding to redirections.   Plan:  - Continue involuntary hospitalization  - Prolixin/ativan PRN agitation  - Zyprexa 15mg hs (zydis)  - Caplyta 42 mg  [facility] -reviewed - WNL     - Dispo when stable--awaiting ACT / AOT   25 Y/ F with a history of schizoaffective disorder vs Schizophrenia, 3 prior hospitalizations, in [facility] ETP program but non-compliant with medications PTA, who presented BIB mother for insomnia, medication non-compliance, yelling, and talking to self/RIS. Patient has been inconsistently compliant with Zyprexa, with TOO granted on 1/26.    Patient is secretive about her symptoms but can not contain them: she constantly talking to herself in response to voices. remains psychotic. ) However, she is showing small improvement per report from primary care team on 1S. ) She has been compliant with medications and tolerating them well. No TD/akathisia/EPS.  Continues to need supervision and redirection in hygiene, hydration and self-care. Responding to redirections.   Tolerates treatment well.  Plan:  - Continue involuntary hospitalization  - Prolixin/ativan PRN agitation  - Zyprexa 15mg hs (zydis)  - Caplyta 42 mg  E[facility] -reviewed - WNL     - Dispo when stable--awaiting ACT / AOT

## 2021-03-27 RX ADMIN — LUMATEPERONE 42 MILLIGRAM(S): 10.5 CAPSULE ORAL at 17:00

## 2021-03-27 RX ADMIN — HALOPERIDOL DECANOATE 5 MILLIGRAM(S): 100 INJECTION INTRAMUSCULAR at 11:51

## 2021-03-27 RX ADMIN — Medication 1 MILLIGRAM(S): at 20:49

## 2021-03-27 RX ADMIN — OLANZAPINE 15 MILLIGRAM(S): 15 TABLET, FILM COATED ORAL at 20:49

## 2021-03-27 RX ADMIN — Medication 2 MILLIGRAM(S): at 11:51

## 2021-03-27 RX ADMIN — Medication 50 MILLIGRAM(S): at 11:51

## 2021-03-28 RX ADMIN — LUMATEPERONE 42 MILLIGRAM(S): 10.5 CAPSULE ORAL at 20:24

## 2021-03-28 RX ADMIN — OLANZAPINE 15 MILLIGRAM(S): 15 TABLET, FILM COATED ORAL at 20:21

## 2021-03-28 RX ADMIN — Medication 1 MILLIGRAM(S): at 20:21

## 2021-03-29 PROCEDURE — 99232 SBSQ HOSP IP/OBS MODERATE 35: CPT

## 2021-03-29 RX ORDER — OLANZAPINE 15 MG/1
20 TABLET, FILM COATED ORAL AT BEDTIME
Refills: 0 | Status: DISCONTINUED | OUTPATIENT
Start: 2021-03-29 | End: 2021-04-22

## 2021-03-29 RX ADMIN — Medication 1 MILLIGRAM(S): at 21:38

## 2021-03-29 RX ADMIN — OLANZAPINE 20 MILLIGRAM(S): 15 TABLET, FILM COATED ORAL at 21:38

## 2021-03-29 RX ADMIN — HALOPERIDOL DECANOATE 5 MILLIGRAM(S): 100 INJECTION INTRAMUSCULAR at 09:20

## 2021-03-29 RX ADMIN — LUMATEPERONE 42 MILLIGRAM(S): 10.5 CAPSULE ORAL at 16:50

## 2021-03-29 RX ADMIN — Medication 50 MILLIGRAM(S): at 09:21

## 2021-03-29 NOTE — BH INPATIENT PSYCHIATRY PROGRESS NOTE - MODIFICATIONS
Patient interviewed and evaluated on morning rounds with resident present to follow up on symptoms and the case has been reviewed with resident and treatment team this morning. I have reviewed the resident's progress note and the mental status examination and I agree with its contents and treatment plan and I have made changes to the note when needed and as indicated.    Patient interviewed and evaluated on morning rounds with medical student present to follow up on symptoms and the case has been reviewed with medical student and treatment team this morning. I have reviewed the resident's progress note and the mental status examination and I agree with its contents and treatment plan and I have made changes to the note when needed and as indicated.   In addition: patient remains severely ill, halucinating. Tolerates treatment well. Plan to increase Zyprexa to 20 mg qhs and continue caplita.

## 2021-03-29 NOTE — BH INPATIENT PSYCHIATRY PROGRESS NOTE - NSBHFUPINTERVALHXFT_PSY_A_CORE
Patient seen for follow up of schizoaffective disorder, treatment resistant. No acute events overnight. Pt irritable throughout conversation. Denies AVH but observed speaking to herself. Endorses continued appetite.      Patient seen for follow up of schizoaffective disorder, treatment resistant. No acute events overnight, however patient received PRN Haldol late this morning for distressing auditory hallucinations. Pt slept through the whole night.  Patient continues to be compliant with medications. Pt irritable throughout conversation in the morning. When approached later in the morning, pt refused to speak to the team putting her hands over her face.  Denies AVH but observed speaking to herself angerly. Endorses continued appetite and sleep. Unable to ask if patient is currently having SI or HI.

## 2021-03-29 NOTE — BH INPATIENT PSYCHIATRY PROGRESS NOTE - NSBHASSESSSUMMFT_PSY_ALL_CORE
25 Y/ F with a history of schizoaffective disorder vs Schizophrenia, 3 prior hospitalizations, in [facility] ETP program but non-compliant with medications PTA, who presented BIB mother for insomnia, medication non-compliance, yelling, and talking to self/RIS. Patient has been inconsistently compliant with Zyprexa, with TOO granted on 1/26.  Patient remains psychotic. She continues to respond to internal stimuli, talking to herself.  However, she is showing small improvement per report from primary care team on 1S.  She has been compliant with medications and tolerating them well. No lip smacking, no tongue protrusion observed. Fine tremors -b/l hands.  Continues to need supervision and redirection in hygiene, hydration and self-care. Responding to redirections. Continues to be somewhat irritable and reluctant to speak with the treatment team. EKG on 3/24= 456, will continue to monitor.  Plan:  - Continue involuntary hospitalization  - Prolixin/ativan PRN agitation  - Zyprexa 15mg hs (zydis)  - Plan to c/w Caplyta 42 mg  - Monitor closely for side effects, will continue to monitor QTc   - G/M therapy as tolerated  - Dispo when stable--awaiting ACT / AOT   25 Y/ F with a history of schizoaffective disorder vs Schizophrenia, 3 prior hospitalizations, in [facility] ETP program but non-compliant with medications PTA, who presented BIB mother for insomnia, medication non-compliance, yelling, and talking to self/RIS. Patient has been inconsistently compliant with Zyprexa, with TOO granted on 1/26.  Patient remains psychotic. She continues to respond to internal stimuli, talking to herself.  However, she is showing small improvement per report from primary care team on 1S.  She has been compliant with medications and tolerating them well. No lip smacking, no tongue protrusion observed. Fine tremors vs. stereotypical movements -b/l hands.  Continues to need supervision and redirection in hygiene, hydration and self-care. Responding to redirections. Continues to be somewhat irritable and reluctant to speak with the treatment team. EKG on 3/24= 456, will continue to monitor.  Plan:  - Continue involuntary hospitalization  - Prolixin/ativan PRN agitation  - Zyprexa 15mg hs (zydis)  - Plan to c/w Caplyta 42 mg  - Monitor closely for side effects, will continue to monitor QTc   - G/M therapy as tolerated  - Dispo when stable--awaiting ACT / AOT   25 Y/ F with a history of schizoaffective disorder vs Schizophrenia, 3 prior hospitalizations, in [facility] ETP program but non-compliant with medications PTA, who presented BIB mother for insomnia, medication non-compliance, yelling, and talking to self/RIS. Patient has been inconsistently compliant with Zyprexa, with TOO granted on 1/26.  Patient remains psychotic. She continues to respond to internal stimuli, talking to herself.  However, she is showing small improvement per report from primary care team on 1S.  She has been compliant with medications and tolerating them well. No lip smacking, no tongue protrusion observed. Fine tremors vs. stereotypical movements -b/l hands.  Continues to need supervision and redirection in hygiene, hydration and self-care. Responding to redirections. Continues to be somewhat irritable and reluctant to speak with the treatment team. EKG on 3/24= 456, will continue to monitor.  Plan:  - Continue involuntary hospitalization  - Prolixin/ativan PRN agitation  - Zyprexa 20mg hs (zydis)  - Plan to c/w Caplyta 42 mg  - Monitor closely for side effects, will continue to monitor QTc   - G/M therapy as tolerated  - Dispo when stable--awaiting ACT / AOT

## 2021-03-29 NOTE — BH INPATIENT PSYCHIATRY PROGRESS NOTE - CASE SUMMARY
25 Y/ F with a history of schizoaffective disorder vs Schizophrenia, 3 prior hospitalizations, in [facility] ETP program but non-compliant with medications PTA, who presented BIB mother for insomnia, medication non-compliance, yelling, and talking to self/RIS. Patient has been inconsistently compliant with Zyprexa, with TOO granted on 1/26.    Patient is secretive about her symptoms but can not contain them: she constantly talking to herself in response to voices. remains psychotic. ) However, she is showing small improvement per report from primary care team on 1S. ) She has been compliant with medications and tolerating them well. No TD/akathisia/EPS.  Continues to need supervision and redirection in hygiene, hydration and self-care. Responding to redirections.   Tolerates treatment well.  Plan:  - Continue involuntary hospitalization  - Prolixin/ativan PRN agitation  - Zyprexa 15mg hs (zydis)  - Caplyta 42 mg  EKL[facility] -reviewed - WNL     - Dispo when stable--awaiting ACT / AOT   25 Y/ F with a history of schizoaffective disorder vs Schizophrenia, 3 prior hospitalizations, in [facility] ETP program but non-compliant with medications PTA, who presented BIB mother for insomnia, medication non-compliance, yelling, and talking to self/RIS. Patient has been inconsistently compliant with Zyprexa, with TOO granted on 1/26.    Patient is secretive about her symptoms but can not contain them: she constantly talking to herself in response to voices. remains psychotic. ) However, she is showing small improvement per report from primary care team on 1S. ) She has been compliant with medications and tolerating them well. No TD/akathisia/EPS.  Continues to need supervision and redirection in hygiene, hydration and self-care. Responding to redirections.   Tolerates treatment well.  Plan:  - Continue involuntary hospitalization  - Prolixin/ativan PRN agitation  - Zyprexa 20mg hs (zydis)  - Caplyta 42 mg  E[facility] -reviewed - WNL     - Dispo when stable--awaiting ACT / AOT

## 2021-03-29 NOTE — BH INPATIENT PSYCHIATRY PROGRESS NOTE - MSE UNSTRUCTURED FT
The patient appears stated age, fair hygiene, disheveled, hair not combed.  Patient continues to talk to herself constantly. She was irritable, uncooperative with interview and responds with short replies. She maintains very minimal eye contact. No psychomotor agitation or retardation.  Stereotypical repetitive movement of the right hand. Steady gait. +fine tremors b/l hands; The patient’s speech was fluent, normal in tone, rate and volume. The patient’s mood is “fine”. Affect is irritable today, constricted, expressive. She denies delusions and hallucinations; however, she is observed responding to internal stimuli, talking to self. She denies  SI or HI . Insight is limited. Judgment today is fair. Impulse control has been fair since arriving on the unit. The patient appears stated age, fair hygiene, disheveled, hair not combed.  Patient continues to talk to herself constantly. She was irritable, uncooperative with interview this morning and responds with short replies. She maintains very minimal eye contact. No psychomotor agitation or retardation.  Stereotypical repetitive movement of the right hand. Steady gait. +fine tremors b/l hands; The patient’s speech was fluent, normal in tone, rate and volume. The patient’s mood is “fine”. Affect is irritable today, constricted, expressive. She denies delusions and hallucinations; however, she is observed responding to internal stimuli, talking to self. She denies SI or HI . Insight is limited. Judgment today is fair. Impulse control has been fair since arriving on the unit.

## 2021-03-30 PROCEDURE — 93010 ELECTROCARDIOGRAM REPORT: CPT

## 2021-03-30 RX ADMIN — Medication 2 MILLIGRAM(S): at 10:15

## 2021-03-30 RX ADMIN — Medication 50 MILLIGRAM(S): at 10:14

## 2021-03-30 RX ADMIN — Medication 1 MILLIGRAM(S): at 21:04

## 2021-03-30 RX ADMIN — HALOPERIDOL DECANOATE 5 MILLIGRAM(S): 100 INJECTION INTRAMUSCULAR at 10:15

## 2021-03-30 RX ADMIN — OLANZAPINE 20 MILLIGRAM(S): 15 TABLET, FILM COATED ORAL at 21:04

## 2021-03-30 RX ADMIN — LUMATEPERONE 42 MILLIGRAM(S): 10.5 CAPSULE ORAL at 16:31

## 2021-03-30 NOTE — BH INPATIENT PSYCHIATRY PROGRESS NOTE - CASE SUMMARY
25 Y/ F with a history of schizoaffective disorder vs Schizophrenia, 3 prior hospitalizations, in [facility] ETP program but non-compliant with medications PTA, who presented BIB mother for insomnia, medication non-compliance, yelling, and talking to self/RIS. Patient has been inconsistently compliant with Zyprexa, with TOO granted on 1/26.    Patient is secretive about her symptoms but can not contain them: she constantly talking to herself in response to voices. remains psychotic. ) However, she is showing small improvement per report from primary care team on 1S. ) She has been compliant with medications and tolerating them well. No TD/akathisia/EPS.  Continues to need supervision and redirection in hygiene, hydration and self-care. Responding to redirections.   Tolerates treatment well.  Plan:  - Continue involuntary hospitalization  - Prolixin/ativan PRN agitation  - Zyprexa 20mg hs (zydis)  - Caplyta 42 mg  EKLG -reviewed - WNL     - Dispo when stable--awaiting ACT / AOT   25 Y/ F with a history of schizoaffective disorder vs Schizophrenia, 3 prior hospitalizations, in [facility] ETP program but non-compliant with medications PTA, who presented BIB mother for insomnia, medication non-compliance, yelling, and talking to self/RIS. Patient has been inconsistently compliant with Zyprexa, with TOO granted on 1/26.    Patient is secretive about her symptoms but can not contain them: she constantly talking to herself in response to voices. remains psychotic. ) However, she is showing small improvement per report from primary care team on 1S. ) She has been compliant with medications and tolerating them well. No TD/akathisia/EPS.  Continues to need supervision and redirection in hygiene, hydration and self-care. Responding to redirections.   Tolerates treatment well.  Plan:  - Continue involuntary hospitalization  - Prolixin/ativan PRN agitation  - Zyprexa 20mg hs (zydis)  - Caplyta 42 mg  EKLG -reviewed - WNL   Applying for ACT and AOT in effort to improve chances of pt's compliance.    - Dispo when stable--awaiting ACT / AOT

## 2021-03-30 NOTE — BH INPATIENT PSYCHIATRY PROGRESS NOTE - NSBHFUPINTERVALHXFT_PSY_A_CORE
Patient seen for follow up of schizoaffective disorder, treatment resistant. No acute events overnight, however patient received PRN Haldol  5mg PO, Benadryl 50mg PO, and Ativan 2mg PO late this morning for distressing auditory hallucinations. Pt observed sleeping through the whole night.  Patient continues to be compliant with medications. After saying "good morning" to the team, pt refused conversation in the morning by pulling blanket over her head. When asked about disposition from the hospital, she continued to not comment. Unable to ask if patient is currently having SI/HI/AVH.      Patient seen for follow up of schizoaffective disorder. Chart reviewed and case discussed with team. No acute adverse events overnight, however patient received PRN Haldol  5mg PO, Benadryl 50mg PO, and Ativan 2mg PO this morning for distressing auditory hallucinations. Pt observed sleeping through the whole night.  Patient continues to be compliant with medications. After saying "good morning" to the team, pt refused to speak with treatment team further. She pulled her blanket over her head and refused to answer any questions. When asked about psychosis symptoms, including AVH, SI & HI, she remained mute. She did not appear in acute pain or distress.

## 2021-03-30 NOTE — BH INPATIENT PSYCHIATRY PROGRESS NOTE - NSBHASSESSSUMMFT_PSY_ALL_CORE
25 Y/ F with a history of schizoaffective disorder vs Schizophrenia, 3 prior hospitalizations, in [facility] ETP program but non-compliant with medications PTA, who presented BIB mother for insomnia, medication non-compliance, yelling, and talking to self/RIS. Patient has been inconsistently compliant with Zyprexa, with TOO granted on 1/26.    Patient remains psychotic. She continues to respond to internal stimuli, talking to herself.  However, she is showing small improvement per report from primary care team on 1S.  She has been compliant with medications and tolerating them well. No lip smacking, no tongue protrusion observed. Fine tremors vs. stereotypical movements -b/l hands.  Continues to need supervision and redirection in hygiene, hydration and self-care. Responding to redirections. Continues to be somewhat irritable and reluctant to speak with the treatment team. Per collateral with mother obtained by social work, pt has been speaking with her normally.  EKG on 3/30: QTc = 445 ms, will continue to monitor.    PLAN:  1. Legal: 9.27 (Involuntary)  2. Safety: routine observation  3. Psych: Continue Benztropine 1mg at bedtime, Lumateperone Tosylate  42mg daily at dinner,  Zyprexa Zydis 20mg PO at bedtime - EKG checked 3/30: QTc= 445ms; Continue to monitor EKG   PRN Meds- Diphenhydramine 50mg PO q4h for anxiety; Diphenhydramine 50mg IM once for EPS prophylaxis; Haloperidol 5mg PO q4h for agitation; Haloperidol 5mg IM at bedtime for psychosis if patient refuses PO; Lorazepam 2mg PO q6h for Agitation; Olanzapine 10mg IM at bedtime if patient refuses PO  4. Medical: no active medical issues  5. Social: milieu/structured therapy as tolerated  6. Treatment Interventions: behavioral management; Groups Therapy as tolerated  7. Disposition: when stable; patient has improving psychosis per 1S primary team; awaiting ACT/AOT   25 Y/ F with a history of schizoaffective disorder vs Schizophrenia, 3 prior hospitalizations, in [facility] ETP program but non-compliant with medications PTA, who presented BIB mother for insomnia, medication non-compliance, yelling, and talking to self/RIS. Patient has been inconsistently compliant with Zyprexa, with TOO granted on 1/26.    Patient remains actively psychotic. She continues to respond to internal stimuli, talking to herself.  She demonstrates slight improvement of symptoms, however continues to need supervision and redirection in hygiene, hydration and self-care. She has been compliant with medications and tolerating them well. No lip smacking, no tongue protrusion observed. Fine tremors vs. stereotypical movements noted in -b/l hands.  Continues to be somewhat irritable and reluctant to speak with the treatment team. Per collateral with mother obtained by social work, pt has been speaking with her normally.  EKG on 3/30: QTc = 445 ms, will continue to monitor.    PLAN:  1. Legal: 9.27 (Involuntary)  2. Safety: routine observation  3. Psych: Continue Benztropine 1mg at bedtime, lumateperone tosylate  42mg daily at dinner,  Zyprexa Zydis 20mg PO at bedtime - EKG checked 3/30: QTc= 445ms; Continuing to monitor QTc  PRN Meds- Diphenhydramine 50mg PO q4h for anxiety; Diphenhydramine 50mg IM once for EPS prophylaxis; Haloperidol 5mg PO q4h for agitation; Haloperidol 5mg IM at bedtime for psychosis if patient refuses PO; Lorazepam 2mg PO q6h for Agitation; Olanzapine 10mg IM at bedtime if patient refuses PO  4. Medical: no active medical issues  5. Social: milieu/structured therapy as tolerated  6. Treatment Interventions: behavioral management; Groups Therapy as tolerated  7. Disposition: when stable; patient has improving psychosis per 1S primary team; awaiting ACT/AOT

## 2021-03-30 NOTE — BH INPATIENT PSYCHIATRY PROGRESS NOTE - MODIFICATIONS
Patient interviewed and evaluated on morning rounds with medical student present to follow up on symptoms and the case has been reviewed with medical student and treatment team this morning. I have reviewed the resident's progress note and the mental status examination and I agree with its contents and treatment plan and I have made changes to the note when needed and as indicated.   In addition: patient remains severely ill, halucinating. Tolerates treatment well. Plan to increase Zyprexa to 20 mg qhs and continue caplita.   Patient interviewed and evaluated on morning rounds with medical student present to follow up on symptoms and the case has been reviewed with medical student and treatment team this morning. I have reviewed the above progress note and the mental status examination and I agree with its contents and treatment plan and I have made changes to the note when needed and as indicated.   In addition: patient remains severely ill, halucinating. Tolerates treatment well. Plan to increase Zyprexa to 20 mg qhs and continue caplita.

## 2021-03-31 PROCEDURE — 99231 SBSQ HOSP IP/OBS SF/LOW 25: CPT

## 2021-03-31 RX ADMIN — Medication 50 MILLIGRAM(S): at 11:09

## 2021-03-31 RX ADMIN — Medication 1 MILLIGRAM(S): at 20:40

## 2021-03-31 RX ADMIN — HALOPERIDOL DECANOATE 5 MILLIGRAM(S): 100 INJECTION INTRAMUSCULAR at 11:09

## 2021-03-31 RX ADMIN — OLANZAPINE 20 MILLIGRAM(S): 15 TABLET, FILM COATED ORAL at 20:41

## 2021-03-31 RX ADMIN — LUMATEPERONE 42 MILLIGRAM(S): 10.5 CAPSULE ORAL at 17:12

## 2021-03-31 NOTE — BH INPATIENT PSYCHIATRY PROGRESS NOTE - NSBHFUPINTERVALHXFT_PSY_A_CORE
Patient seen for follow up of schizoaffective disorder. Chart reviewed and case discussed with team. No acute adverse events overnight. Pt observed sleeping through the whole night.  Patient continues to be compliant with medications with no PRN medications given. Pt evaluated while walking to get breakfast. When patient asked about auditory hallucinations and voices, she acutely became irritated and stated, "I don't want to f****** speak to you," and refused to engage with treatment team further. Unable to assess for SI/HI/SIB/AVH.  Patient seen for follow up of schizoaffective disorder. Chart reviewed and case discussed with team. No acute adverse events overnight. Pt observed sleeping through the whole night.  Patient continues to be compliant with medications with no PRN medications given. Pt evaluated while walking to get breakfast. She reports that she slept well overnight and is comfortable on the unit. When patient asked about auditory hallucinations and voices, she acutely became irritated and stated, "I don't want to f****** speak to you," and refused to engage with treatment team further. Unable to assess for SI/HI/SIB/AVH.

## 2021-03-31 NOTE — BH INPATIENT PSYCHIATRY PROGRESS NOTE - MODIFICATIONS
Patient interviewed and evaluated on morning rounds with medical student present to follow up on symptoms and the case has been reviewed with medical student and treatment team this morning. I have reviewed the above progress note and the mental status examination and I agree with its contents and treatment plan and I have made changes to the note when needed and as indicated.   In addition: patient remains severely ill, halucinating. Tolerates treatment well. Plan to increase Zyprexa to 20 mg qhs and continue caplita.   Patient interviewed and evaluated on morning rounds with medical student and resident present to follow up on symptoms and the case has been reviewed with medical student and resident as well as with  treatment team this morning. I have reviewed the above progress note and the mental status examination and I agree with its contents and treatment plan and I have made changes to the note when needed and as indicated.   In addition: patient remains severely ill, halucinating. Tolerates treatment well. Plan to increase Zyprexa to 20 mg qhs and continue caplita.

## 2021-03-31 NOTE — BH INPATIENT PSYCHIATRY PROGRESS NOTE - MSE UNSTRUCTURED FT
The patient appears stated age, disheveled, with uncombed hair. She was irritable and minimally cooperative with interview this morning. She maintains very minimal eye contact. No psychomotor agitation or retardation.  Stereotypical repetitive movement of the right hand. Steady gait. +fine tremors b/l hands; The patient’s speech was fluent, normal in tone, rate and volume. Patient's mood continues to be irritable and angry. Affect is full today, with patient becoming angry with upon evaluation. Unable to assess extent of delusions and hallucinations; however, she is observed responding to internal stimuli, talking to self. She denies SI or HI . Insight is limited. Judgment today is fair. Impulse control has been fair since arriving on the unit. The patient appears stated age, disheveled, with uncombed hair. She was irritable and minimally cooperative with interview this morning. She maintains very minimal eye contact. Stereotypical repetitive movements of the right hand observed, +fine tremors b/l hands, without notable psychomotor agitation or retardation. Gait was steady. The patient’s speech was fluent, normal in tone, rate and volume. Patient's stated mood is "fine". Affect is restricted and irritable, not congruent to mood. Unable to assess extent of delusions and hallucinations due to refusal to answer questions; however, she is observed responding to internal stimuli, talking to self. She denies SI or HI . Insight is limited. Judgment today is fair. Impulse control has been fair since arriving on the unit.

## 2021-03-31 NOTE — BH INPATIENT PSYCHIATRY PROGRESS NOTE - CASE SUMMARY
25 Y/ F with a history of schizoaffective disorder vs Schizophrenia, 3 prior hospitalizations, in [facility] ETP program but non-compliant with medications PTA, who presented BIB mother for insomnia, medication non-compliance, yelling, and talking to self/RIS. Patient has been inconsistently compliant with Zyprexa, with TOO granted on 1/26.    Patient is secretive about her symptoms but can not contain them: she constantly talking to herself in response to voices. remains psychotic. ) However, she is showing small improvement per report from primary care team on 1S. ) She has been compliant with medications and tolerating them well. No TD/akathisia/EPS.  Continues to need supervision and redirection in hygiene, hydration and self-care. Responding to redirections.   Tolerates treatment well.  Plan:  - Continue involuntary hospitalization  - Prolixin/ativan PRN agitation  - Zyprexa 20mg hs (zydis)  - Caplyta 42 mg  EKLG -reviewed - WNL   Applying for ACT and AOT in effort to improve chances of pt's compliance.    - Dispo when stable--awaiting ACT / AOT   25 Y/ F with a history of schizoaffective disorder vs Schizophrenia, 3 prior hospitalizations, in [facility] ETP program but non-compliant with medications PTA, who presented BIB mother for insomnia, medication non-compliance, yelling, and talking to self/RIS. Patient has been inconsistently compliant with Zyprexa, with TOO granted on 1/26.    Patient is secretive about her symptoms but can not contain them: she constantly talking to herself in response to voices. remains psychotic. ) However, she is showing small improvement per report from primary care team on 1S. ) She is less disruptove and attentive to ALDs more. She has been compliant with medications and tolerating them well. No TD/akathisia/EPS.  Continues to need supervision and redirection in hygiene, hydration and self-care. Responding to redirections.   Tolerates treatment well.  Plan:  - Continue involuntary hospitalization  - Prolixin/ativan PRN agitation  - Zyprexa 20mg hs (zydis)  - Caplyta 42 mg  EKLG -reviewed - WNL   Applying for ACT and AOT in effort to improve chances of pt's compliance.    - Dispo when stable--awaiting ACT / AOT

## 2021-03-31 NOTE — BH INPATIENT PSYCHIATRY PROGRESS NOTE - NSBHASSESSSUMMFT_PSY_ALL_CORE
25 Y/ F with a history of schizoaffective disorder vs Schizophrenia, 3 prior hospitalizations, in [facility] ETP program but non-compliant with medications PTA, who presented BIB mother for insomnia, medication non-compliance, yelling, and talking to self/RIS. Patient has been inconsistently compliant with Zyprexa, with TOO granted on 1/26.    Patient remains actively psychotic. She continues to respond to internal stimuli, talking to herself.  She demonstrates slight improvement of symptoms, however continues to need supervision and redirection in hygiene, hydration and self-care. She has been compliant with medications and tolerating them well. No lip smacking, no tongue protrusion observed. Fine tremors vs. stereotypical movements noted in -b/l hands.  Continues to be somewhat irritable and reluctant to speak with the treatment team. Per collateral with mother obtained by social work, pt has been speaking with her normally.  EKG on 3/30: QTc = 445 ms, will continue to monitor.    PLAN:  1. Legal: 9.27 (Involuntary)  2. Safety: routine observation  3. Psych: Continue Benztropine 1mg at bedtime, lumateperone tosylate  42mg daily at dinner,  Zyprexa Zydis 20mg PO at bedtime - EKG checked 3/30: QTc= 445ms; Continuing to monitor QTc  PRN Meds- Diphenhydramine 50mg PO q4h for anxiety; Diphenhydramine 50mg IM once for EPS prophylaxis; Haloperidol 5mg PO q4h for agitation; Haloperidol 5mg IM at bedtime for psychosis if patient refuses PO; Lorazepam 2mg PO q6h for Agitation; Olanzapine 10mg IM at bedtime if patient refuses PO  4. Medical: no active medical issues  5. Social: milieu/structured therapy as tolerated  6. Treatment Interventions: behavioral management; Groups Therapy as tolerated  7. Disposition: when stable; patient has improving psychosis per 1S primary team; awaiting ACT/AOT   25 Y/ F with a history of schizoaffective disorder vs Schizophrenia, 3 prior hospitalizations, in [facility] ETP program but non-compliant with medications PTA, who presented BIB mother for insomnia, medication non-compliance, yelling, and talking to self/RIS. Patient has been inconsistently compliant with Zyprexa, with TOO granted on 1/26.    Patient remains actively psychotic. She continues to respond to internal stimuli, talking to herself frequently and becoming irritated when interrupted.  She demonstrates slight improvement of symptoms, however continues to need supervision and redirection in hygiene, hydration and self-care. She has been compliant with medications and tolerating them well. No lip smacking, no tongue protrusion observed. Fine tremors vs. stereotypical movements noted in -b/l hands.  Continues to be somewhat irritable and reluctant to speak with the treatment team. Per collateral with mother obtained by social work, pt has been speaking with her normally.  EKG on 3/30: QTc = 445 ms, will continue to monitor.    PLAN:  1. Legal: 9.27 (Involuntary)  2. Safety: routine observation  3. Psych: Continue Benztropine 1mg at bedtime, lumateperone tosylate  42mg daily at dinner,  Zyprexa Zydis 20mg PO at bedtime - EKG checked 3/30: QTc= 445ms; Continuing to monitor QTc  PRN Meds- Diphenhydramine 50mg PO q4h for anxiety; Diphenhydramine 50mg IM once for EPS prophylaxis; Haloperidol 5mg PO q4h for agitation; Haloperidol 5mg IM at bedtime for psychosis if patient refuses PO; Lorazepam 2mg PO q6h for Agitation; Olanzapine 10mg IM at bedtime if patient refuses PO  4. Medical: no active medical issues  5. Social: milieu/structured therapy as tolerated  6. Treatment Interventions: behavioral management; Groups Therapy as tolerated  7. Disposition: when stable; patient has improving psychosis per 1S primary team; awaiting ACT/AOT

## 2021-04-01 PROCEDURE — 99231 SBSQ HOSP IP/OBS SF/LOW 25: CPT

## 2021-04-01 RX ADMIN — LUMATEPERONE 42 MILLIGRAM(S): 10.5 CAPSULE ORAL at 17:05

## 2021-04-01 RX ADMIN — OLANZAPINE 20 MILLIGRAM(S): 15 TABLET, FILM COATED ORAL at 20:21

## 2021-04-01 RX ADMIN — Medication 1 MILLIGRAM(S): at 20:21

## 2021-04-01 NOTE — BH INPATIENT PSYCHIATRY PROGRESS NOTE - NSBHASSESSSUMMFT_PSY_ALL_CORE
25 Y/ F with a history of schizoaffective disorder vs Schizophrenia, 3 prior hospitalizations, in [facility] ETP program but non-compliant with medications PTA, who presented BIB mother for insomnia, medication non-compliance, yelling, and talking to self/RIS. Patient has been inconsistently compliant with Zyprexa, with TOO granted on 1/26.    Patient remains actively psychotic. She continues to respond to internal stimuli, talking to herself frequently and becoming irritated when interrupted.  She demonstrates slight improvement of symptoms, however continues to need supervision and redirection in hygiene, hydration and self-care. She has been compliant with medications and tolerating them well. No lip smacking, no tongue protrusion observed. Fine tremors vs. stereotypical movements noted in -b/l hands.  Continues to be somewhat irritable and reluctant to speak with the treatment team. Per collateral with mother obtained by social work, pt has been speaking with her normally.  EKG on 3/30: QTc = 445 ms, will continue to monitor.    PLAN:  1. Legal: 9.27 (Involuntary)  2. Safety: routine observation  3. Psych: Continue Benztropine 1mg at bedtime, lumateperone tosylate  42mg daily at dinner,  Zyprexa Zydis 20mg PO at bedtime - EKG checked 3/30: QTc= 445ms; Continuing to monitor QTc  PRN Meds- Diphenhydramine 50mg PO q4h for anxiety; Diphenhydramine 50mg IM once for EPS prophylaxis; Haloperidol 5mg PO q4h for agitation; Haloperidol 5mg IM at bedtime for psychosis if patient refuses PO; Lorazepam 2mg PO q6h for Agitation; Olanzapine 10mg IM at bedtime if patient refuses PO  4. Medical: no active medical issues  5. Social: milieu/structured therapy as tolerated  6. Treatment Interventions: behavioral management; Groups Therapy as tolerated  7. Disposition: when stable; patient has improving psychosis per 1S primary team; awaiting ACT/AOT   25 Y/ F with a history of schizoaffective disorder vs Schizophrenia, 3 prior hospitalizations, in [facility] ETP program but non-compliant with medications PTA, who presented BIB mother for insomnia, medication non-compliance, yelling, and talking to self/RIS. Patient has been inconsistently compliant with Zyprexa, with TOO granted on 1/26.    Patient remains actively psychotic. She continues to respond to internal stimuli, to become easily irritated, and to be reluctant to speak with treatment team.  She demonstrates slight improvement of symptoms, however continues to need supervision and redirection in hygiene, hydration and self-care. She has been compliant with medications and is tolerating them well. No lip smacking, no tongue protrusion observed. Fine tremors vs. stereotypical movements noted in -b/l hands. Per collateral with mother obtained by social work, pt has been speaking with her normally.     PLAN:  1. Legal: 9.27 (Involuntary)  2. Safety: routine observation  3. Psych: Continue Benztropine 1mg at bedtime, lumateperone tosylate  42mg daily at dinner,  Zyprexa Zydis 20mg PO at bedtime - EKG checked 3/30: QTc= 445ms; Continuing to monitor QTc.  PRN Meds- diphenhydramine 50mg PO q4h for anxiety; diphenhydramine 50mg IM once for EPS prophylaxis; haloperidol 5mg PO q4h for agitation; haloperidol 5mg IM at bedtime for psychosis if patient refuses PO; lorazepam 2mg PO q6h for agitation; olanzapine 10mg IM at bedtime if patient refuses PO  4. Medical: no active medical issues  5. Social: milieu/structured therapy as tolerated  6. Treatment Interventions: behavioral management; Groups Therapy as tolerated  7. Disposition: when stable; patient has improving psychosis per 1S primary team; awaiting ACT/AOT

## 2021-04-01 NOTE — BH INPATIENT PSYCHIATRY PROGRESS NOTE - MSE UNSTRUCTURED FT
The patient appears stated age, disheveled, with uncombed hair. She was irritable and minimally cooperative with interview this morning. She maintains very minimal eye contact. Stereotypical repetitive movements of the right hand observed, +fine tremors b/l hands, without notable psychomotor agitation or retardation. Gait was steady. The patient’s speech was fluent, normal in tone, rate and volume. Patient's stated mood is "okay". Affect is restricted and irritable, not congruent to mood. Unable to assess extent of delusions and hallucinations due to refusal to answer questions; however, she is observed responding to internal stimuli, talking to self. She denies SI or HI . Insight is limited. Judgment today is fair. Impulse control has been fair since arriving on the unit. The patient appears stated age, disheveled, with uncombed hair. She is initially calm and cooperative, but becomes increasingly irritated over course of interview. She maintains very minimal eye contact. Stereotypical repetitive movements of the right hand observed, +fine tremors b/l hands, without notable psychomotor agitation or retardation. Gait was steady. The patient’s speech was fluent, normal in tone, rate and volume. Patient's stated mood is "fine". Affect is constricted, dysphoric, irritable, and not congruent to mood. Unable to assess extent of delusions and hallucinations due to refusal to answer questions; however, she is observed responding to internal stimuli. Unable to assess SI or HI. Insight is limited. Judgment today is fair. Impulse control has been fair since arriving on the unit.

## 2021-04-01 NOTE — BH INPATIENT PSYCHIATRY PROGRESS NOTE - NSBHFUPINTERVALHXFT_PSY_A_CORE
Patient seen for follow up of schizoaffective disorder. Chart reviewed and case discussed with team. No acute adverse events overnight. Pt observed sleeping through the whole night.  Patient continues to be compliant with medications with PRN medications of diphenhydramine 50mg PO, haloperidol 5mg PO, and haloperidol 5mg IM. Pt evaluated while walking back from breakfast. She reports that she slept well overnight and is comfortable on the unit. Pt has continued problems with roommate and denies making any friends in the sarkar. She became visibly irritated as interview continued. Unable to assess for SI/HI/SIB/AVH.  Patient seen for follow up of schizoaffective disorder. Chart reviewed and case discussed with team. No acute adverse events overnight, however patient received PRN diphenhydramine 50mg PO, haloperidol 5mg PO, and haloperidol 5mg IM during the day yesterday for distress secondary to intense auditory hallucinations. Patient continues to be compliant with medications. Pt evaluated while walking back from breakfast. She reports frustration with roommate's psychiatric symptoms, but states that she slept well overnight and feels safe on the unit. She has been isolative, has not made any friends in the unit. She became visibly irritated as interview continued. Unable to assess for SI/HI/SIB/AVH.  Patient seen for follow up of schizoaffective disorder. Chart reviewed and case discussed with team. No acute adverse events overnight, however patient received PRN diphenhydramine 50mg PO, haloperidol 5mg PO, and haloperidol 5mg IM during the day yesterday for distress secondary to intense auditory hallucinations. Patient continues to be compliant with medications and denies any side effects. Pt evaluated while walking in the hallway, talking to herself. She reports frustration with her roommate (for being loud), but states she slept well overnight and feels safe on the unit. She has been isolative, has not made friends in the unit, and elaborates that she prefers to not socialize with others. Interview ended early, as patient become visibly irritated with treatment team. She continues to be unable to tolerate questions regarding psychosis symptoms. Unable to assess for SI/HI/SIB at this time.  Patient seen for follow up of schizoaffective disorder. Chart reviewed and case discussed with team. No acute adverse events overnight, however patient received PRN diphenhydramine 50mg PO, haloperidol 5mg PO, and haloperidol 5mg IM during the day yesterday for distress secondary to intense auditory hallucinations. Patient continues to be compliant with medications and denies any side effects. Pt evaluated while walking in the hallway, talking to herself. She reports frustration with her roommate (for being loud), but states she slept well overnight and feels safe on the unit. She has been isolative, has not made friends in the unit, and elaborates that she prefers to not socialize with others. Interview ended early, as patient become visibly irritated with treatment team. She continues to be unable to tolerate questions regarding psychosis symptoms. Unable to fully assess for SI/HI/SIB at this time but patient does not indicate any evidence for suicidality/aggression.

## 2021-04-01 NOTE — BH INPATIENT PSYCHIATRY PROGRESS NOTE - MODIFICATIONS
Patient interviewed and evaluated on morning rounds with medical student and resident present to follow up on symptoms and the case has been reviewed with medical student and resident as well as with  treatment team this morning. I have reviewed the above progress note and the mental status examination and I agree with its contents and treatment plan and I have made changes to the note when needed and as indicated.   In addition: patient remains severely ill, halucinating. Tolerates treatment well. Plan to increase Zyprexa to 20 mg qhs and continue caplita.

## 2021-04-01 NOTE — BH INPATIENT PSYCHIATRY PROGRESS NOTE - NSDCCRITERIA_PSY_ALL_CORE
Patient will no longer be influenced by psychosis Patient's psychosis will no longer impair daily functioning.

## 2021-04-01 NOTE — BH INPATIENT PSYCHIATRY PROGRESS NOTE - CASE SUMMARY
25 Y/ F with a history of schizoaffective disorder vs Schizophrenia, 3 prior hospitalizations, in [facility] ETP program but non-compliant with medications PTA, who presented BIB mother for insomnia, medication non-compliance, yelling, and talking to self/RIS. Patient has been inconsistently compliant with Zyprexa, with TOO granted on 1/26.    Patient is secretive about her symptoms but can not contain them: she constantly talking to herself in response to voices. remains psychotic. ) However, she is showing small improvement per report from primary care team on 1S. ) She is less disruptove and attentive to ALDs more. She has been compliant with medications and tolerating them well. No TD/akathisia/EPS.  Continues to need supervision and redirection in hygiene, hydration and self-care. Responding to redirections.   Tolerates treatment well.  Plan:  - Continue involuntary hospitalization  - Prolixin/ativan PRN agitation  - Zyprexa 20mg hs (zydis)  - Caplyta 42 mg  EKLG -reviewed - WNL   Applying for ACT and AOT in effort to improve chances of pt's compliance.    - Dispo when stable--awaiting ACT / AOT   25 Y/ F with a history of schizoaffective disorder vs Schizophrenia, 3 prior hospitalizations, in [facility] ETP program but non-compliant with medications PTA, who presented BIB mother for insomnia, medication non-compliance, yelling, and talking to self/RIS. Patient has been inconsistently compliant with Zyprexa, with TOO granted on 1/26.    Patient is secretive about her symptoms but can not contain them: she constantly talking to herself in response to voices. remains psychotic. Nevertheless, she tolerated interview today a little better, with decrease in degree of dysphoria. However, she has shown small improvement per report from primary care team on 1S. ) She is less disruptove and attentive to ALDs more. She has been compliant with medications and tolerating them well. No TD/akathisia/EPS.  Continues to need supervision and redirection in hygiene, hydration and self-care. Responding to redirections.   Tolerates treatment well.  Plan:  - Continue involuntary hospitalization  Continue trial of:  - Zyprexa 20mg hs (zydis)  - Caplyta 42 mg  EKLG -reviewed - WNL   Applying for ACT and AOT in effort to improve chances of pt's compliance.    - Dispo when stable--awaiting ACT / AOT

## 2021-04-02 PROCEDURE — 99231 SBSQ HOSP IP/OBS SF/LOW 25: CPT

## 2021-04-02 RX ORDER — LUMATEPERONE 10.5 MG/1
1 CAPSULE ORAL
Qty: 30 | Refills: 0
Start: 2021-04-02 | End: 2021-05-01

## 2021-04-02 RX ADMIN — LUMATEPERONE 42 MILLIGRAM(S): 10.5 CAPSULE ORAL at 16:56

## 2021-04-02 RX ADMIN — Medication 50 MILLIGRAM(S): at 09:46

## 2021-04-02 RX ADMIN — Medication 1 MILLIGRAM(S): at 20:13

## 2021-04-02 RX ADMIN — Medication 50 MILLIGRAM(S): at 20:13

## 2021-04-02 RX ADMIN — OLANZAPINE 20 MILLIGRAM(S): 15 TABLET, FILM COATED ORAL at 20:13

## 2021-04-02 RX ADMIN — HALOPERIDOL DECANOATE 5 MILLIGRAM(S): 100 INJECTION INTRAMUSCULAR at 09:45

## 2021-04-02 RX ADMIN — Medication 2 MILLIGRAM(S): at 20:13

## 2021-04-02 RX ADMIN — Medication 2 MILLIGRAM(S): at 09:45

## 2021-04-02 NOTE — BH INPATIENT PSYCHIATRY PROGRESS NOTE - NSBHFUPINTERVALHXFT_PSY_A_CORE
Patient seen for follow up of schizoaffective disorder. Chart reviewed and case discussed with team. No acute adverse events overnight, however patient received PRN diphenhydramine 50mg PO, haloperidol 5mg PO, and haloperidol 5mg IM during the day yesterday for distress secondary to intense auditory hallucinations. Patient continues to be compliant with medications and denies any side effects. Pt evaluated while sitting down. She reports frustration with her roommate (for being loud and naked), which prevented her from sleeping well overnight. She feels safe on the unit. She has been isolative, has not made friends in the unit, and elaborates that she prefers to not socialize with others either in group sessions or on the floor. Patient does not have insight as to why she is in the hospital. Pt denied symptoms of psychosis regarding AVH or delusions. Patient denies SI/HI/SIB at this time.  Patient seen for follow up of schizoaffective disorder. Chart reviewed and case discussed with team. No acute adverse events overnight, however patient received PRN diphenhydramine 50mg PO, haloperidol 5mg PO, and haloperidol 5mg IM during the day yesterday for distress secondary to intense auditory hallucinations. Patient continues to be compliant with medications and denies any side effects. Pt evaluated while sitting down. She reports frustration with her roommate (for being loud and naked), which prevented her from sleeping well overnight. She feels safe on the unit. She has been isolative, has not made friends in the unit, and elaborates that she prefers to not socialize with others either in group sessions or on the floor. Patient does not have insight as to why she is in the hospital. Pt denied symptoms of psychosis regarding AVH or delusions ( despite the fact of being often observed talking to herself). Patient denies SI/HI/SIB at this time.

## 2021-04-02 NOTE — BH INPATIENT PSYCHIATRY PROGRESS NOTE - NSBHFUPINTERVALCCFT_PSY_A_CORE
"Good morning. How are you?"  Patient was seen for a follow up on psychosis.    "Good morning. How are you?"

## 2021-04-02 NOTE — BH INPATIENT PSYCHIATRY PROGRESS NOTE - CASE SUMMARY
25 Y/ F with a history of schizoaffective disorder vs Schizophrenia, 3 prior hospitalizations, in [facility] ETP program but non-compliant with medications PTA, who presented BIB mother for insomnia, medication non-compliance, yelling, and talking to self/RIS. Patient has been inconsistently compliant with Zyprexa, with TOO granted on 1/26.    Patient is secretive about her symptoms but can not contain them: she constantly talking to herself in response to voices. remains psychotic. Nevertheless, she tolerated interview today a little better, with decrease in degree of dysphoria. However, she has shown small improvement per report from primary care team on 1S. ) She is less disruptove and attentive to ALDs more. She has been compliant with medications and tolerating them well. No TD/akathisia/EPS.  Continues to need supervision and redirection in hygiene, hydration and self-care. Responding to redirections.   Tolerates treatment well.  Plan:  - Continue involuntary hospitalization  Continue trial of:  - Zyprexa 20mg hs (zydis)  - Caplyta 42 mg  EKLG -reviewed - WNL   Applying for ACT and AOT in effort to improve chances of pt's compliance.    - Dispo when stable--awaiting ACT / AOT   25 Y/ F with a history of schizoaffective disorder vs Schizophrenia, 3 prior hospitalizations, in [facility] ETP program but non-compliant with medications PTA, who presented BIB mother for insomnia, medication non-compliance, yelling, and talking to self/RIS. Patient has been inconsistently compliant with Zyprexa, with TOO granted on 1/26.    Patient is secretive about her symptoms but can not contain them: she constantly talking to herself in response to voices. remains psychotic. Nevertheless, she tolerated interview today a little better, with decrease in degree of dysphoria. However, she has shown small improvement per report from primary care team on 1S. ) She is less disruptove and attentive to ALDs more. She has been compliant with medications and tolerating them well. No TD/akathisia/EPS.  Continues to need supervision and redirection in hygiene, hydration and self-care. Responding to redirections.   Tolerates treatment well.  Plan:  - Continue in-patient hospitalization  Continue trial of:  - Zyprexa 20mg hs (zydis)  - Caplyta 42 mg  EKLG -reviewed - WNL   Applying for ACT and AOT in effort to improve chances of pt's compliance.    - Dispo when stable--awaiting ACT / AOT

## 2021-04-02 NOTE — BH INPATIENT PSYCHIATRY PROGRESS NOTE - NSBHASSESSSUMMFT_PSY_ALL_CORE
25 Y/ F with a history of schizoaffective disorder vs Schizophrenia, 3 prior hospitalizations, in [facility] ETP program but non-compliant with medications PTA, who presented BIB mother for insomnia, medication non-compliance, yelling, and talking to self/RIS. Patient has been inconsistently compliant with Zyprexa, with TOO granted on 1/26.    Patient remains actively psychotic. She continues to respond to internal stimuli, to become easily irritated, and in general has been reluctant to speak with treatment team.  She demonstrates slight improvement of symptoms, however continues to need supervision and redirection in hygiene, hydration and self-care. She has been compliant with medications and is tolerating them well. No lip smacking, no tongue protrusion observed. Fine tremors vs. stereotypical movements noted in -b/l hands. Per collateral with mother obtained by social work, pt has been speaking with her normally.     PLAN:  1. Legal: 9.27 (Involuntary)  2. Safety: routine observation  3. Psych: Continue Benztropine 1mg at bedtime, lumateperone tosylate  42mg daily at dinner,  Zyprexa Zydis 20mg PO at bedtime - EKG checked 3/30: QTc= 445ms; Continuing to monitor QTc.  PRN Meds- diphenhydramine 50mg PO q4h for anxiety; diphenhydramine 50mg IM once for EPS prophylaxis; haloperidol 5mg PO q4h for agitation; haloperidol 5mg IM at bedtime for psychosis if patient refuses PO; lorazepam 2mg PO q6h for agitation; olanzapine 10mg IM at bedtime if patient refuses PO  4. Medical: no active medical issues  5. Social: milieu/structured therapy as tolerated  6. Treatment Interventions: behavioral management; Groups Therapy as tolerated  7. Disposition: when stable; patient has improving psychosis per 1S primary team; awaiting ACT/AOT   25 Y/ F with a history of schizoaffective disorder vs Schizophrenia, 3 prior hospitalizations, in [facility] ETP program but non-compliant with medications PTA, who presented BIB mother for insomnia, medication non-compliance, yelling, and talking to self/RIS. Patient has been inconsistently compliant with Zyprexa, with TOO granted on 1/26.    Patient remains actively psychotic. She continues to respond to internal stimuli, to become easily irritated, and in general has been reluctant to speak with treatment team.  She demonstrates slight improvement of symptoms, however continues to need supervision and redirection in hygiene, hydration and self-care. She has been compliant with medications and is tolerating them well. No lip smacking, no tongue protrusion observed. Fine tremors vs. stereotypical movements noted in -b/l hands. Per collateral with mother obtained by social work, pt has been speaking with her normally in short phone communications.     PLAN:  1. Legal: 9.27 (Involuntary)  2. Safety: routine observation  3. Psych: Continue Benztropine 1mg at bedtime, lumateperone tosylate  42mg daily at dinner,  Zyprexa Zydis 20mg PO at bedtime - EKG checked 3/30: QTc= 445ms; Continuing to monitor QTc.  PRN Meds- diphenhydramine 50mg PO q4h for anxiety; diphenhydramine 50mg IM once for EPS prophylaxis; haloperidol 5mg PO q4h for agitation; haloperidol 5mg IM at bedtime for psychosis if patient refuses PO; lorazepam 2mg PO q6h for agitation; olanzapine 10mg IM at bedtime if patient refuses PO  4. Medical: no active medical issues  5. Social: milieu/structured therapy as tolerated  6. Treatment Interventions: behavioral management; Groups Therapy as tolerated  7. Disposition: when stable; patient has improving psychosis per 1S primary team; awaiting ACT/AOT

## 2021-04-02 NOTE — BH INPATIENT PSYCHIATRY PROGRESS NOTE - MODIFICATIONS
Patient interviewed and evaluated on morning rounds with medical student and resident present to follow up on symptoms and the case has been reviewed with medical student and resident as well as with  treatment team this morning. I have reviewed the above progress note and the mental status examination and I agree with its contents and treatment plan and I have made changes to the note when needed and as indicated.   In addition: patient remains severely ill, halucinating. Tolerates treatment well. Plan to increase Zyprexa to 20 mg qhs and continue caplita.   Patient interviewed and evaluated on morning rounds with medical student to follow up on symptoms and the case has been reviewed with medical student and  treatment team this morning. I have reviewed the above progress note and the mental status examination and I agree with its contents and treatment plan and I have made changes to the note when needed and as indicated.   In addition: patient remains severely ill, halucinating. Tolerates treatment well. Plan to increase Zyprexa to 20 mg qhs and continue caplita.

## 2021-04-02 NOTE — BH INPATIENT PSYCHIATRY PROGRESS NOTE - MSE UNSTRUCTURED FT
The patient appears stated age, disheveled, with uncombed hair. She is calm and more cooperative, and remained calm over course of interview. She maintains very minimal eye contact. Stereotypical repetitive movements of the right hand observed, +fine tremors b/l hands, without notable psychomotor agitation or retardation. Gait was steady. The patient’s speech was fluent, less intense in tone, rate and volume. Patient's stated mood is "good". Affect is less constricted, calm, and not congruent to mood. Patient denied presence of delusions and hallucinations; however, she is observed responding to internal stimuli. Patient denies SI or HI. Insight is limited. Judgment today is fair. Impulse control has been fair since arriving on the unit.   The patient appears stated age, disheveled, with uncombed hair. She is calm and little more cooperative, and remained calm over course of interview. She maintains very minimal eye contact. Stereotypical repetitive movements of the right hand observed- appears to be voluntary, +fine tremors b/l hands, without notable psychomotor agitation or retardation. Gait was steady. The patient’s speech was fluent, less intense in tone, rate and volume. Patient's stated mood is "good". Affect is less constricted, and not congruent to mood. Patient denied presence of delusions and hallucinations; however, she is observed responding to internal stimuli. Patient denies SI or HI. Insight is limited. Judgment today is fair. Impulse control has been fair since arriving on the unit.

## 2021-04-03 RX ADMIN — HALOPERIDOL DECANOATE 5 MILLIGRAM(S): 100 INJECTION INTRAMUSCULAR at 13:37

## 2021-04-03 RX ADMIN — Medication 50 MILLIGRAM(S): at 13:38

## 2021-04-03 RX ADMIN — LUMATEPERONE 42 MILLIGRAM(S): 10.5 CAPSULE ORAL at 16:51

## 2021-04-03 RX ADMIN — Medication 2 MILLIGRAM(S): at 13:37

## 2021-04-03 RX ADMIN — OLANZAPINE 20 MILLIGRAM(S): 15 TABLET, FILM COATED ORAL at 20:03

## 2021-04-03 RX ADMIN — Medication 2 MILLIGRAM(S): at 20:03

## 2021-04-03 RX ADMIN — Medication 1 MILLIGRAM(S): at 20:03

## 2021-04-04 RX ADMIN — HALOPERIDOL DECANOATE 5 MILLIGRAM(S): 100 INJECTION INTRAMUSCULAR at 13:51

## 2021-04-04 RX ADMIN — OLANZAPINE 20 MILLIGRAM(S): 15 TABLET, FILM COATED ORAL at 20:30

## 2021-04-04 RX ADMIN — Medication 50 MILLIGRAM(S): at 13:51

## 2021-04-04 RX ADMIN — Medication 1 MILLIGRAM(S): at 20:30

## 2021-04-05 PROCEDURE — 99232 SBSQ HOSP IP/OBS MODERATE 35: CPT

## 2021-04-05 RX ORDER — LUMATEPERONE 10.5 MG/1
42 CAPSULE ORAL
Refills: 0 | Status: DISCONTINUED | OUTPATIENT
Start: 2021-04-05 | End: 2021-05-04

## 2021-04-05 RX ADMIN — Medication 2 MILLIGRAM(S): at 20:12

## 2021-04-05 RX ADMIN — HALOPERIDOL DECANOATE 5 MILLIGRAM(S): 100 INJECTION INTRAMUSCULAR at 11:27

## 2021-04-05 RX ADMIN — OLANZAPINE 20 MILLIGRAM(S): 15 TABLET, FILM COATED ORAL at 20:11

## 2021-04-05 RX ADMIN — LUMATEPERONE 42 MILLIGRAM(S): 10.5 CAPSULE ORAL at 16:31

## 2021-04-05 RX ADMIN — Medication 2 MILLIGRAM(S): at 11:27

## 2021-04-05 RX ADMIN — Medication 50 MILLIGRAM(S): at 11:26

## 2021-04-05 RX ADMIN — Medication 1 MILLIGRAM(S): at 20:11

## 2021-04-05 RX ADMIN — Medication 50 MILLIGRAM(S): at 20:12

## 2021-04-05 NOTE — BH INPATIENT PSYCHIATRY PROGRESS NOTE - MSE UNSTRUCTURED FT
The patient appears stated age, disheveled, with uncombed hair. She is calm and little more cooperative, and remained calm over course of interview. She maintains very minimal eye contact. Stereotypical repetitive movements of the right hand observed- appears to be voluntary, +fine tremors b/l hands, without notable psychomotor agitation or retardation. Gait was steady. The patient’s speech was fluent, less intense in tone, rate and volume. Patient's stated mood is "fine". Affect is less constricted, and not congruent to mood. Patient denied presence of delusions and hallucinations; however, she is observed responding to internal stimuli. Patient denies SI or HI. Insight is limited. Judgment today is fair. Impulse control has been fair since arriving on the unit.

## 2021-04-05 NOTE — BH INPATIENT PSYCHIATRY PROGRESS NOTE - MODIFICATIONS
Patient interviewed and evaluated on morning rounds with medical student to follow up on symptoms and the case has been reviewed with medical student and  treatment team this morning. I have reviewed the above progress note and the mental status examination and I agree with its contents and treatment plan and I have made changes to the note when needed and as indicated.   In addition: patient remains severely ill, halucinating. Tolerates treatment well. Plan to increase Zyprexa to 20 mg qhs and continue caplita.

## 2021-04-05 NOTE — BH INPATIENT PSYCHIATRY PROGRESS NOTE - NSBHFUPINTERVALHXFT_PSY_A_CORE
Patient seen for follow up of schizoaffective disorder. Chart reviewed and case discussed with team. No acute adverse events overnight.  Patient received PRN diphenhydramine 50mg PO, haloperidol 5mg PO, and haloperidol 5mg IM this morning for distress secondary to intense auditory hallucinations. Patient continues to be compliant with medications and denies any side effects. Pt evaluated while sitting down. She reports increased tolerance of her roommate and has been sleeping well. She feels safe on the unit. She has been isolative, has not made friends in the unit, and elaborates that she prefers to not socialize with others either in group sessions or on the floor. Patient does not have insight as to why she is in the hospital. Pt denied symptoms of psychosis regarding AVH or delusions ( despite the fact of being often observed talking to herself). Patient denies SI/HI/SIB at this time.

## 2021-04-05 NOTE — BH INPATIENT PSYCHIATRY PROGRESS NOTE - NSBHASSESSSUMMFT_PSY_ALL_CORE
25 Y/ F with a history of schizoaffective disorder vs Schizophrenia, 3 prior hospitalizations, in [facility] ETP program but non-compliant with medications PTA, who presented BIB mother for insomnia, medication non-compliance, yelling, and talking to self/RIS. Patient has been inconsistently compliant with Zyprexa, with TOO granted on 1/26.    Patient remains actively psychotic. She continues to respond to internal stimuli, to become easily irritated, and in general has been reluctant to speak with treatment team.  She demonstrates slight improvement of symptoms, however continues to need supervision and redirection in hygiene, hydration and self-care. She has been compliant with medications and is tolerating them well. No lip smacking, no tongue protrusion observed. Fine tremors vs. stereotypical movements noted in -b/l hands. Per collateral with mother obtained by social work, pt has been speaking with her normally in short phone communications.     PLAN:  1. Legal: 9.27 (Involuntary)  2. Safety: routine observation  3. Psych: Continue Benztropine 1mg at bedtime, lumateperone tosylate  42mg daily at dinner,  Zyprexa Zydis 20mg PO at bedtime - EKG checked 3/30: QTc= 445ms; Continuing to monitor QTc.  PRN Meds- diphenhydramine 50mg PO q4h for anxiety; diphenhydramine 50mg IM once for EPS prophylaxis; haloperidol 5mg PO q4h for agitation; haloperidol 5mg IM at bedtime for psychosis if patient refuses PO; lorazepam 2mg PO q6h for agitation; olanzapine 10mg IM at bedtime if patient refuses PO  4. Medical: no active medical issues  5. Social: milieu/structured therapy as tolerated  6. Treatment Interventions: behavioral management; Groups Therapy as tolerated  7. Disposition: when stable; patient has improving psychosis per 1S primary team; awaiting ACT/AOT

## 2021-04-05 NOTE — BH INPATIENT PSYCHIATRY PROGRESS NOTE - CASE SUMMARY
25 Y/ F with a history of schizoaffective disorder vs Schizophrenia, 3 prior hospitalizations, in [facility] ETP program but non-compliant with medications PTA, who presented BIB mother for insomnia, medication non-compliance, yelling, and talking to self/RIS. Patient has been inconsistently compliant with Zyprexa, with TOO granted on 1/26.    Patient is secretive about her symptoms but can not contain them: she constantly talking to herself in response to voices. remains psychotic. Nevertheless, she tolerated interview today a little better, with decrease in degree of dysphoria. However, she has shown small improvement per report from primary care team on 1S. ) She is less disruptove and attentive to ALDs more. She has been compliant with medications and tolerating them well. No TD/akathisia/EPS.  Continues to need supervision and redirection in hygiene, hydration and self-care. Responding to redirections.   Tolerates treatment well.  Plan:  - Continue in-patient hospitalization  Continue trial of:  - Zyprexa 20mg hs (zydis)  - Caplyta 42 mg  EKLG -reviewed - WNL   Applying for ACT and AOT in effort to improve chances of pt's compliance.    - Dispo when stable--awaiting ACT / AOT   25 Y/ F with a history of schizoaffective disorder vs Schizophrenia, 3 prior hospitalizations, in [facility] ETP program but non-compliant with medications PTA, who presented BIB mother for insomnia, medication non-compliance, yelling, and talking to self/RIS. Patient has been inconsistently compliant with Zyprexa, with TOO granted on 1/26.    Patient is secretive about her symptoms but can not contain them: she constantly talking to herself in response to voices. remains psychotic. Nevertheless, she tolerated interview today a little better, for a longer period of time and with decrease in degree of dysphoria. However, she has shown small improvement per report from primary care team on 1S. ) She is less disruptive and more attentive to ALDs more. She has been compliant with medications and tolerating them well. No TD/akathisia/EPS.  Continues to need supervision and redirection in hygiene, hydration and self-care. Responding to redirections.   Tolerates treatment well.  Yesterday she skipped Caplyta dose, which might be responsible for observed today increment in her talking to herself loudly.  Plan:  - Continue in-patient hospitalization  Continue trial of:  - Zyprexa 20mg hs (zydis)  - Caplyta 42 mg  EKLG -reviewed - WNL   Applying for ACT and AOT in effort to improve chances of pt's compliance.    - Dispo when stable--awaiting ACT / AOT

## 2021-04-06 PROCEDURE — 93010 ELECTROCARDIOGRAM REPORT: CPT

## 2021-04-06 PROCEDURE — 99232 SBSQ HOSP IP/OBS MODERATE 35: CPT

## 2021-04-06 RX ORDER — OLANZAPINE 15 MG/1
5 TABLET, FILM COATED ORAL DAILY
Refills: 0 | Status: DISCONTINUED | OUTPATIENT
Start: 2021-04-07 | End: 2021-04-19

## 2021-04-06 RX ADMIN — HALOPERIDOL DECANOATE 5 MILLIGRAM(S): 100 INJECTION INTRAMUSCULAR at 18:49

## 2021-04-06 RX ADMIN — Medication 50 MILLIGRAM(S): at 18:50

## 2021-04-06 RX ADMIN — Medication 50 MILLIGRAM(S): at 10:36

## 2021-04-06 RX ADMIN — Medication 2 MILLIGRAM(S): at 18:50

## 2021-04-06 RX ADMIN — LUMATEPERONE 42 MILLIGRAM(S): 10.5 CAPSULE ORAL at 16:18

## 2021-04-06 RX ADMIN — Medication 2 MILLIGRAM(S): at 10:36

## 2021-04-06 RX ADMIN — OLANZAPINE 20 MILLIGRAM(S): 15 TABLET, FILM COATED ORAL at 20:09

## 2021-04-06 RX ADMIN — HALOPERIDOL DECANOATE 5 MILLIGRAM(S): 100 INJECTION INTRAMUSCULAR at 10:37

## 2021-04-06 RX ADMIN — Medication 1 MILLIGRAM(S): at 20:09

## 2021-04-06 NOTE — BH INPATIENT PSYCHIATRY PROGRESS NOTE - NSBHFUPINTERVALHXFT_PSY_A_CORE
Patient seen for follow up of schizoaffective disorder. Chart reviewed and case discussed with team. No acute adverse events overnight.  Patient received PRN diphenhydramine 50mg PO and Ativan 2mg PO yesterday evening for anxiety and agitation and received Haldol 5mg and Ativan 2mg this morning for distress secondary to intense auditory hallucinations. Patient continues to be compliant with medications and denies any side effects. Pt evaluated while sitting down. She reports tolerance of her roommate and has been observed sleeping well. Pt describes mood as "fine". She feels safe on the unit. She has been isolative, has not made friends in the unit because other patients are "plants" -- re "they are fake". Pt denied symptoms of psychosis regarding AVH or delusions ( despite the fact of being often observed talking to herself). Patient denies SI/HI/SIB at this time.

## 2021-04-06 NOTE — BH INPATIENT PSYCHIATRY PROGRESS NOTE - CASE SUMMARY
25 Y/ F with a history of schizoaffective disorder vs Schizophrenia, 3 prior hospitalizations, in [facility] ETP program but non-compliant with medications PTA, who presented BIB mother for insomnia, medication non-compliance, yelling, and talking to self/RIS. Patient has been inconsistently compliant with Zyprexa, with TOO granted on 1/26.    Patient is secretive about her symptoms but can not contain them: she constantly talking to herself in response to voices. remains psychotic. Nevertheless, she tolerated interview today a little better, for a longer period of time and with decrease in degree of dysphoria. However, she has shown small improvement per report from primary care team on 1S. ) She is less disruptive and more attentive to ALDs more. She has been compliant with medications and tolerating them well. No TD/akathisia/EPS.  Continues to need supervision and redirection in hygiene, hydration and self-care. Responding to redirections.   Tolerates treatment well.  Yesterday she skipped Caplyta dose, which might be responsible for observed today increment in her talking to herself loudly.  Plan:  - Continue in-patient hospitalization  Continue trial of:  - Zyprexa 20mg hs (zydis)  - Caplyta 42 mg  EKLG -reviewed - WNL   Applying for ACT and AOT in effort to improve chances of pt's compliance.    - Dispo when stable--awaiting ACT / AOT   25 Y/ F with a history of schizoaffective disorder vs Schizophrenia, 3 prior hospitalizations, in [facility] ETP program but non-compliant with medications PTA, who presented BIB mother for insomnia, medication non-compliance, yelling, and talking to self/RIS. Patient has been inconsistently compliant with Zyprexa, with TOO granted on 1/26.    Patient is secretive about her symptoms but can not contain them: she constantly talking to herself in response to voices. remains psychotic. Nevertheless, she tolerated interview today a little better, for a longer period of time and with decrease in degree of dysphoria. However, she has shown small improvement per report from primary care team on 1S. ) She is less disruptive and more attentive to ALDs more. She has been compliant with medications and tolerating them well. No TD/akathisia/EPS.  Continues to need supervision and redirection in hygiene, hydration and self-care. Responding to redirections.   Tolerates treatment well.  Patient was able to sustain a little longer conversation despite still prominent tendency to engage in conversation with voices. no s/h/aggressive I/i/p.  Plan:  - Continue in-patient hospitalization  Continue trial of:  - Zyprexa 20mg hs (zydis), will add 5 mg in am  - Caplyta 42 mg  EKLG -reviewed - WNL   Applying for ACT and AOT in effort to improve chances of pt's compliance.    - Dispo when stable--awaiting ACT / AOT.  4/6/21: I spoke to  off ACT for care coordination.

## 2021-04-06 NOTE — BH INPATIENT PSYCHIATRY PROGRESS NOTE - NSBHASSESSSUMMFT_PSY_ALL_CORE
25 Y/ F with a history of schizoaffective disorder vs Schizophrenia, 3 prior hospitalizations, in [facility] ETP program but non-compliant with medications PTA, who presented BIB mother for insomnia, medication non-compliance, yelling, and talking to self/RIS. Patient has been inconsistently compliant with Zyprexa, with TOO granted on 1/26.    Patient remains actively psychotic. She continues to respond to internal stimuli, to become easily irritated, and in general has been reluctant to speak with treatment team primarily responding with one-word answers.  She demonstrates slight improvement of symptoms, however continues to need supervision and redirection in hygiene, hydration and self-care. She has been compliant with medications and is tolerating them well. No lip smacking, no tongue protrusion observed. Fine tremors vs. stereotypical movements noted in -b/l hands. Per collateral with mother obtained by social work, pt has been speaking with her normally in short phone communications.     PLAN:  1. Legal: 9.27 (Involuntary)  2. Safety: routine observation  3. Psych: Continue Benztropine 1mg at bedtime, lumateperone tosylate  42mg daily at dinner,  Zyprexa Zydis 20mg PO at bedtime - EKG checked 3/30: QTc= 445ms; Continuing to monitor QTc.  PRN Meds- diphenhydramine 50mg PO q4h for anxiety; diphenhydramine 50mg IM once for EPS prophylaxis; haloperidol 5mg PO q4h for agitation; haloperidol 5mg IM at bedtime for psychosis if patient refuses PO; lorazepam 2mg PO q6h for agitation; olanzapine 10mg IM at bedtime if patient refuses PO  4. Medical: no active medical issues  5. Social: milieu/structured therapy as tolerated  6. Treatment Interventions: behavioral management; Groups Therapy as tolerated  7. Disposition: when stable; patient has improving psychosis per 1S primary team; awaiting ACT/AOT   25 Y/ F with a history of schizoaffective disorder vs Schizophrenia, 3 prior hospitalizations, in [facility] ETP program but non-compliant with medications PTA, who presented BIB mother for insomnia, medication non-compliance, yelling, and talking to self/RIS. Patient has been inconsistently compliant with Zyprexa, with TOO granted on 1/26.    Patient remains actively psychotic. She continues to respond to internal stimuli, to become easily irritated, and in general has been reluctant to speak with treatment team primarily responding with one-word answers.  She demonstrates slight improvement of symptoms, however continues to need supervision and redirection in hygiene, hydration and self-care. She has been compliant with medications and is tolerating them well. No lip smacking, no tongue protrusion observed. Fine tremors vs. stereotypical movements noted in -b/l hands. Per collateral with mother obtained by social work, pt has been speaking with her normally in short phone communications.     PLAN:  1. Legal: 9.27 (Involuntary)  2. Safety: routine observation  3. Psych: Continue Benztropine 1mg at bedtime, lumateperone tosylate  42mg daily at dinner,  Zyprexa Zydis 20mg PO at bedtime - EKG checked 4/6  QTc= 4ms; Continuing to monitor QTc.  PRN Meds- diphenhydramine 50mg PO q4h for anxiety; diphenhydramine 50mg IM once for EPS prophylaxis; haloperidol 5mg PO q4h for agitation; haloperidol 5mg IM at bedtime for psychosis if patient refuses PO; lorazepam 2mg PO q6h for agitation; olanzapine 10mg IM at bedtime if patient refuses PO  4. Medical: no active medical issues  5. Social: milieu/structured therapy as tolerated  6. Treatment Interventions: behavioral management; Groups Therapy as tolerated  7. Disposition: when stable; patient has improving psychosis per 1S primary team; awaiting ACT/AOT

## 2021-04-06 NOTE — BH INPATIENT PSYCHIATRY PROGRESS NOTE - MSE UNSTRUCTURED FT
The patient appears stated age, disheveled, with uncombed hair. She is calm and little more cooperative, but becomes irritated over course of interview. She maintains very minimal eye contact. Stereotypical repetitive movements of the right hand observed- appears to be voluntary, +fine tremors b/l hands, without notable psychomotor agitation or retardation. Gait was steady. The patient’s speech was fluent, less intense in tone, rate and volume. Patient's stated mood is "fine". Affect is constricted, and not congruent to mood. Patient denied presence of delusions and hallucinations; however, she is observed consistently responding to internal stimuli. Patient denies SI or HI. Insight is limited. Judgment today is fair. Impulse control has been fair since arriving on the unit.

## 2021-04-07 PROCEDURE — 99232 SBSQ HOSP IP/OBS MODERATE 35: CPT

## 2021-04-07 RX ADMIN — OLANZAPINE 5 MILLIGRAM(S): 15 TABLET, FILM COATED ORAL at 08:49

## 2021-04-07 RX ADMIN — OLANZAPINE 20 MILLIGRAM(S): 15 TABLET, FILM COATED ORAL at 20:54

## 2021-04-07 RX ADMIN — LUMATEPERONE 42 MILLIGRAM(S): 10.5 CAPSULE ORAL at 17:07

## 2021-04-07 RX ADMIN — Medication 1 MILLIGRAM(S): at 20:54

## 2021-04-07 NOTE — BH INPATIENT PSYCHIATRY PROGRESS NOTE - NSBHFUPINTERVALHXFT_PSY_A_CORE
Patient seen for follow up of schizoaffective disorder. Chart reviewed and case discussed with team. No acute adverse events overnight.  Patient received PRN diphenhydramine 50mg PO, Haldol 5mg PO and Ativan 2mg PO yesterday evening for anxiety and agitation. Patient continues to be compliant with medications and denies any side effects. Pt evaluated at bedside. She wishes to have a new roommate and has been observed sleeping well. Pt describes mood as "okay". She feels safe on the unit. She remains isolative, has not made friends in the unit. Pt continues denial of psychosis symptoms regarding AVH or delusions ( despite the fact of being often observed talking to herself). Patient denies SI/HI/SIB at this time.

## 2021-04-07 NOTE — BH INPATIENT PSYCHIATRY PROGRESS NOTE - MODIFICATIONS
Patient interviewed and evaluated on morning rounds with medical student to follow up on symptoms and the case has been reviewed with medical student and  treatment team this morning. I have reviewed the above progress note and the mental status examination and I agree with its contents and treatment plan and I have made changes to the note when needed and as indicated.   In addition: patient remains severely ill, halucinating. Tolerates treatment well. Plan to increase Zyprexa to 20 mg qhs and continue caplita.   Patient interviewed and evaluated on morning rounds with medical student to follow up on symptoms and the case has been reviewed with medical student and  treatment team this morning. I have reviewed the above progress note and the mental status examination and I agree with its contents and treatment plan and I have made changes to the note when needed and as indicated.   In addition: patient remains severely ill, halucinating. Tolerates treatment well.

## 2021-04-07 NOTE — BH INPATIENT PSYCHIATRY PROGRESS NOTE - NSBHASSESSSUMMFT_PSY_ALL_CORE
25 Y/ F with a history of schizoaffective disorder vs Schizophrenia, 3 prior hospitalizations, in [facility] ETP program but non-compliant with medications PTA, who presented BIB mother for insomnia, medication non-compliance, yelling, and talking to self/RIS. Patient has been inconsistently compliant with Zyprexa, with TOO granted on 1/26.    Patient remains actively psychotic. She continues to respond to internal stimuli, to become easily irritated, and in general has been reluctant to speak with treatment team primarily responding with one-word answers.  She demonstrates slight improvement of symptoms, however continues to need supervision and redirection in hygiene, hydration and self-care. She has been compliant with medications and is tolerating them well. No lip smacking, no tongue protrusion observed. Fine tremors vs. stereotypical movements noted in -b/l hands. Per collateral with mother obtained by social work, pt has been speaking with her normally in short phone communications.     PLAN:  1. Legal: 9.27 (Involuntary)  2. Safety: routine observation  3. Psych: Continue Benztropine 1mg at bedtime, lumateperone tosylate  42mg daily at dinner,  Zyprexa Zydis 20mg PO at bedtime - EKG checked 4/6; Continuing to monitor QTc.  PRN Meds- diphenhydramine 50mg PO q4h for anxiety; diphenhydramine 50mg IM once for EPS prophylaxis; haloperidol 5mg PO q4h for agitation; haloperidol 5mg IM at bedtime for psychosis if patient refuses PO; lorazepam 2mg PO q6h for agitation; olanzapine 10mg IM at bedtime if patient refuses PO  4. Medical: no active medical issues  5. Social: milieu/structured therapy as tolerated  6. Treatment Interventions: behavioral management; Groups Therapy as tolerated  7. Disposition: when stable; patient has improving psychosis per 1S primary team; awaiting ACT/AOT   25 Y/ F with a history of schizoaffective disorder vs Schizophrenia, 3 prior hospitalizations, in [facility] ETP program but non-compliant with medications PTA, who presented BIB mother for insomnia, medication non-compliance, yelling, and talking to self/RIS. Patient has been inconsistently compliant with Zyprexa, with TOO granted on 1/26.    Patient remains actively psychotic. She continues to respond to internal stimuli, to become easily irritated, and in general has been reluctant to speak with treatment team primarily responding with one-word answers.  She demonstrates slight improvement of symptoms, however continues to need supervision and redirection in hygiene, hydration and self-care. She has been compliant with medications and is tolerating them well. No lip smacking, no tongue protrusion observed. Fine tremors vs. stereotypical movements noted in -b/l hands. Per collateral with mother obtained by social work, pt has been speaking with her normally in short phone communications.     PLAN:  1. Legal: 9.27 (Involuntary)  2. Safety: routine observation  3. Psych: Continue Benztropine 1mg at bedtime, lumateperone tosylate  42mg daily at dinner,  Zyprexa Zydis 5 mg qam and 20mg PO at bedtime - EKG checked 4/6; Continuing to monitor QTc.  PRN Meds- diphenhydramine 50mg PO q4h for anxiety; diphenhydramine 50mg IM once for EPS prophylaxis; haloperidol 5mg PO q4h for agitation; haloperidol 5mg IM at bedtime for psychosis if patient refuses PO; lorazepam 2mg PO q6h for agitation; olanzapine 10mg IM at bedtime if patient refuses PO  4. Medical: no active medical issues  5. Social: milieu/structured therapy as tolerated  6. Treatment Interventions: behavioral management; Groups Therapy as tolerated  7. Disposition: when stable; patient has improving psychosis per 1S primary team; awaiting ACT/AOT

## 2021-04-07 NOTE — BH INPATIENT PSYCHIATRY PROGRESS NOTE - CASE SUMMARY
25 Y/ F with a history of schizoaffective disorder vs Schizophrenia, 3 prior hospitalizations, in [facility] ETP program but non-compliant with medications PTA, who presented BIB mother for insomnia, medication non-compliance, yelling, and talking to self/RIS. Patient has been inconsistently compliant with Zyprexa, with TOO granted on 1/26.    Patient is secretive about her symptoms but can not contain them: she constantly talking to herself in response to voices. remains psychotic. Nevertheless, she tolerated interview today a little better, for a longer period of time and with decrease in degree of dysphoria. However, she has shown small improvement per report from primary care team on 1S. ) She is less disruptive and more attentive to ALDs more. She has been compliant with medications and tolerating them well. No TD/akathisia/EPS.  Continues to need supervision and redirection in hygiene, hydration and self-care. Responding to redirections.   Tolerates treatment well.  Patient was able to sustain a little longer conversation despite still prominent tendency to engage in conversation with voices. no s/h/aggressive I/i/p.  Plan:  - Continue in-patient hospitalization  Continue trial of:  - Zyprexa 20mg hs (zydis), will add 5 mg in am  - Caplyta 42 mg  EKLG -reviewed - WNL   Applying for ACT and AOT in effort to improve chances of pt's compliance.    - Dispo when stable--awaiting ACT / AOT.  4/6/21: I spoke to  off ACT for care coordination.   25 Y/ F with a history of schizoaffective disorder vs Schizophrenia, 3 prior hospitalizations, in [facility] ETP program but non-compliant with medications PTA, who presented BIB mother for insomnia, medication non-compliance, yelling, and talking to self/RIS. Patient has been inconsistently compliant with Zyprexa, with TOO granted on 1/26.    Patient is secretive about her symptoms but can not contain them: she constantly talking to herself in response to voices. remains psychotic. Nevertheless, she tolerated interview today a little better, for a longer period of time and with decrease in degree of dysphoria. However, she has shown small improvement per report from primary care team on 1S. ) She is less disruptive and more attentive to ALDs more. She has been compliant with medications and tolerating them well. No TD/akathisia/EPS.  Continues to need supervision and redirection in hygiene, hydration and self-care. Responding to redirections.   Tolerates treatment well.  Patient was able to sustain a little longer conversation despite still prominent tendency to engage in conversation with voices. no s/h/aggressive I/i/p.  Plan:  - Continue in-patient hospitalization  Continue trial of:  - Zyprexa 5 mg qam and 20mg hs (zydis)  - Caplyta 42 mg  EKLG -reviewed - WNL   Applying for ACT and AOT in effort to improve chances of pt's compliance.    - Dispo when stable--awaiting ACT / AOT.  4/6/21: I spoke to  off ACT for care coordination.

## 2021-04-07 NOTE — BH INPATIENT PSYCHIATRY PROGRESS NOTE - MSE UNSTRUCTURED FT
The patient appears stated age, disheveled, with uncombed hair. She is calm and little more cooperative, but becomes irritated (but improved) over course of interview. She maintains very minimal eye contact. Stereotypical repetitive movements of the right hand observed- appears to be voluntary, +fine tremors b/l hands, without notable psychomotor agitation or retardation. Gait was steady. The patient’s speech was fluent, less intense in tone, rate and volume. Patient's stated mood is "okay". Affect is constricted, and not congruent to mood. Patient denied presence of delusions and hallucinations; however, she is observed consistently responding to internal stimuli. Patient denies SI or HI. Insight is limited. Judgment today is fair. Impulse control has been fair since arriving on the unit.

## 2021-04-08 PROCEDURE — 99232 SBSQ HOSP IP/OBS MODERATE 35: CPT

## 2021-04-08 RX ORDER — BENZTROPINE MESYLATE 1 MG
1 TABLET ORAL
Refills: 0 | Status: DISCONTINUED | OUTPATIENT
Start: 2021-04-08 | End: 2021-05-05

## 2021-04-08 RX ADMIN — Medication 50 MILLIGRAM(S): at 17:13

## 2021-04-08 RX ADMIN — Medication 1 MILLIGRAM(S): at 21:39

## 2021-04-08 RX ADMIN — OLANZAPINE 5 MILLIGRAM(S): 15 TABLET, FILM COATED ORAL at 10:05

## 2021-04-08 RX ADMIN — HALOPERIDOL DECANOATE 5 MILLIGRAM(S): 100 INJECTION INTRAMUSCULAR at 17:13

## 2021-04-08 RX ADMIN — LUMATEPERONE 42 MILLIGRAM(S): 10.5 CAPSULE ORAL at 16:40

## 2021-04-08 RX ADMIN — Medication 2 MILLIGRAM(S): at 10:05

## 2021-04-08 RX ADMIN — OLANZAPINE 20 MILLIGRAM(S): 15 TABLET, FILM COATED ORAL at 21:39

## 2021-04-08 NOTE — BH INPATIENT PSYCHIATRY PROGRESS NOTE - MODIFICATIONS
Patient interviewed and evaluated on morning rounds with medical student to follow up on symptoms and the case has been reviewed with medical student and  treatment team this morning. I have reviewed the above progress note and the mental status examination and I agree with its contents and treatment plan and I have made changes to the note when needed and as indicated.   In addition: patient remains severely ill, halucinating. Tolerates treatment well.

## 2021-04-08 NOTE — BH INPATIENT PSYCHIATRY PROGRESS NOTE - MSE UNSTRUCTURED FT
The patient appears stated age, disheveled, with uncombed hair. She is calm and little more cooperative, but becomes irritated (but improved) over course of interview. She maintains very minimal eye contact. Stereotypical repetitive movements of the right hand observed- appears to be voluntary, +fine tremors b/l hands (increase when irritated), without notable psychomotor agitation or retardation. Gait was steady. The patient’s speech was fluent, less intense in tone, rate and volume. Patient's stated mood is "good". Affect is constricted, and not congruent to mood. Patient denied presence of delusions and hallucinations; however, she is observed consistently responding to internal stimuli. Patient denies SI or HI. Insight is limited. Judgment today is fair. Impulse control has been fair since arriving on the unit.   The patient appears stated age, disheveled, with uncombed hair. She is calm and little more cooperative, but becomes irritated over course of interview. She maintains very minimal eye contact. Stereotypical repetitive movements of the right hand observed- appears to be voluntary, +fine tremors b/l hands (increase when irritated), without notable psychomotor agitation or retardation. Gait was steady. The patient’s speech was fluent, less intense in tone, rate and volume. Patient's stated mood is "good". Affect is constricted, and not congruent to mood. Patient denied presence of delusions and hallucinations; however, she is observed consistently responding to internal stimuli. Patient denies SI or HI. Insight is limited. Judgment today is fair. Impulse control has been fair since arriving on the unit.

## 2021-04-08 NOTE — BH INPATIENT PSYCHIATRY PROGRESS NOTE - CASE SUMMARY
25 Y/ F with a history of schizoaffective disorder vs Schizophrenia, 3 prior hospitalizations, in [facility] ETP program but non-compliant with medications PTA, who presented BIB mother for insomnia, medication non-compliance, yelling, and talking to self/RIS. Patient has been inconsistently compliant with Zyprexa, with TOO granted on 1/26.    Patient is secretive about her symptoms but can not contain them: she constantly talking to herself in response to voices. remains psychotic. Nevertheless, she tolerated interview today a little better, for a longer period of time and with decrease in degree of dysphoria. However, she has shown small improvement per report from primary care team on 1S. ) She is less disruptive and more attentive to ALDs more. She has been compliant with medications and tolerating them well. No TD/akathisia/EPS.  Continues to need supervision and redirection in hygiene, hydration and self-care. Responding to redirections.   Tolerates treatment well.  Patient was able to sustain a little longer conversation despite still prominent tendency to engage in conversation with voices. no s/h/aggressive I/i/p.  Plan:  - Continue in-patient hospitalization  Continue trial of:  - Zyprexa 5 mg qam and 20mg hs (zydis)  - Caplyta 42 mg  EKLG -reviewed - WNL   Applying for ACT and AOT in effort to improve chances of pt's compliance.    - Dispo when stable--awaiting ACT / AOT.  4/6/21: I spoke to  off ACT for care coordination.   25 Y/ F with a history of schizoaffective disorder vs Schizophrenia, 3 prior hospitalizations, in [facility] ETP program but non-compliant with medications PTA, who presented BIB mother for insomnia, medication non-compliance, yelling, and talking to self/RIS. Patient has been inconsistently compliant with Zyprexa, with TOO granted on 1/26.    Patient is secretive about her symptoms but can not contain them: she constantly talking to herself in response to voices. remains psychotic. Nevertheless, she tolerated interview today a little better, for a longer period of time and with decrease in degree of dysphoria. However, she has shown small improvement per report from primary care team on 1S. ) She is less disruptive and more attentive to ALDs more. She has been compliant with medications and tolerating them well.  Continues to need supervision and redirection in hygiene, hydration and self-care. Responding to redirections.   Pt displays peculiar hands movements which appear to be voluntary but might represent tremor of EPS.   Tolerates treatment well.  Patient was able to sustain a longer conversation despite still prominent tendency to engage in conversation with voices. no s/h/aggressive I/i/p.  Plan:  - Continue in-patient hospitalization  Continue trial of:  - Zyprexa 5 mg qam and 20mg hs (zydis)  - Caplyta 42 mg  - Cogentine 1 mg BID  EKLG -reviewed - WNL   Applying for ACT and AOT in effort to improve chances of pt's compliance.    - Dispo when stable--awaiting ACT / AOT.  4/6/21: I spoke to  off ACT for care coordination.

## 2021-04-08 NOTE — BH INPATIENT PSYCHIATRY PROGRESS NOTE - NSBHASSESSSUMMFT_PSY_ALL_CORE
25 Y/ F with a history of schizoaffective disorder vs Schizophrenia, 3 prior hospitalizations, in [facility] ETP program but non-compliant with medications PTA, who presented BIB mother for insomnia, medication non-compliance, yelling, and talking to self/RIS. Patient has been inconsistently compliant with Zyprexa, with TOO granted on 1/26.    Patient remains actively psychotic. She continues to respond to internal stimuli, to become easily irritated, and in general has been reluctant to speak with treatment team primarily responding with one-word answers.  She demonstrates slight improvement of symptoms, however continues to need supervision and redirection in hygiene, hydration and self-care. She has been compliant with medications and is tolerating them well. No lip smacking, no tongue protrusion observed. Fine tremors vs. stereotypical movements noted in -b/l hands. Per collateral with mother obtained by social work, pt has been speaking with her normally in short phone communications.     PLAN:  1. Legal: 9.27 (Involuntary)  2. Safety: routine observation  3. Psych: Continue Benztropine 1mg at bedtime, lumateperone tosylate  42mg daily at dinner,  Zyprexa Zydis 5 mg qam and 20mg PO at bedtime - EKG checked 4/6; Continuing to monitor QTc.  PRN Meds- diphenhydramine 50mg PO q4h for anxiety; diphenhydramine 50mg IM once for EPS prophylaxis; haloperidol 5mg PO q4h for agitation; haloperidol 5mg IM at bedtime for psychosis if patient refuses PO; lorazepam 2mg PO q6h for agitation; olanzapine 10mg IM at bedtime if patient refuses PO  4. Medical: no active medical issues  5. Social: milieu/structured therapy as tolerated  6. Treatment Interventions: behavioral management; Groups Therapy as tolerated  7. Disposition: when stable; patient has improving psychosis per 1S primary team; awaiting ACT/AOT   25 Y/ F with a history of schizoaffective disorder vs Schizophrenia, 3 prior hospitalizations, in [facility] ETP program but non-compliant with medications PTA, who presented BIB mother for insomnia, medication non-compliance, yelling, and talking to self/RIS. Patient has been inconsistently compliant with Zyprexa, with TOO granted on 1/26.    Patient remains actively psychotic. She continues to respond to internal stimuli, to become easily irritated, and in general has been reluctant to speak with treatment team primarily responding with one-word answers.  She demonstrates slight improvement of symptoms, however continues to need supervision and redirection in hygiene, hydration and self-care. She has been compliant with medications and is tolerating them well. No lip smacking, no tongue protrusion observed. Fine tremors vs. stereotypical movements noted in -b/l hands. Per collateral with mother obtained by social work, pt has been speaking with her normally in short phone communications.     PLAN:  1. Legal: 9.27 (Involuntary)  2. Safety: routine observation  3. Psych: Increase Benztropine-  1mg BID, lumateperone tosylate  42mg daily at dinner,  Zyprexa Zydis 5 mg qam and 20mg PO at bedtime - EKG checked 4/6; Continuing to monitor QTc.  PRN Meds- diphenhydramine 50mg PO q4h for anxiety; diphenhydramine 50mg IM once for EPS prophylaxis; haloperidol 5mg PO q4h for agitation; haloperidol 5mg IM at bedtime for psychosis if patient refuses PO; lorazepam 2mg PO q6h for agitation; olanzapine 10mg IM at bedtime if patient refuses PO  4. Medical: no active medical issues  5. Social: milieu/structured therapy as tolerated  6. Treatment Interventions: behavioral management; Groups Therapy as tolerated  7. Disposition: when stable; patient has improving psychosis per 1S primary team; awaiting ACT/AOT

## 2021-04-08 NOTE — BH TREATMENT PLAN - NSPTSTATEDGOAL_PSY_ALL_CORE
Go home
Patient unable to have meaningful interview at this time due to psychotic sx
unable to participate in goals, disorganized and with poor insight; did report wanting to be discharged
"Get me out of the first person."
"I want to get out of here"
Stop this from happening.
Patient unable to have meaningful interview at this time due to psychotic sx
None
"I want to get out of here"
"I want to be left alone"
unable to participate in goals, disorganized and with poor insight; did report wanting to be discharged
"I want to be left alone"
"I want to get out of here"
Discharge

## 2021-04-08 NOTE — BH INPATIENT PSYCHIATRY PROGRESS NOTE - NSBHFUPINTERVALHXFT_PSY_A_CORE
Patient seen for follow up of schizoaffective disorder. Chart reviewed and case discussed with team. No acute adverse events overnight.  Patient required no PRN medication yesterday.  Patient continues to be compliant with medications and denies any side effects. Pt evaluated sitting down. She is happy to have a room to herself and was observed sleeping well. Pt describes mood as "good". She feels safe on the unit. She remains isolative, has not made friends in the unit. Pt continues denial of psychosis symptoms regarding AVH or delusions ( despite the fact of being often observed talking to herself). Pt gets visibly irritated when asked about auditory hallucinations leading to ending of the patient interview. Unable to assess if patient denies SI/HI/SIB at this time.  Patient seen for follow up of schizoaffective disorder. Chart reviewed and case discussed with team. No acute adverse events overnight.  Patient required no PRN medication yesterday.  Patient continues to be compliant with medications and denies any side effects. Pt evaluated sitting down. She is happy to have a room to herself and was observed sleeping well. Pt describes mood as "good". She feels safe on the unit. She remains isolative, has not made friends in the unit. Pt continues denial of psychosis symptoms regarding AVH or delusions ( despite the fact of being often observed talking to herself). Pt gets visibly irritated when asked about auditory hallucinations leading to ending of the patient interview on her demand. No SI/HI/SIB at this time.

## 2021-04-09 PROCEDURE — 99231 SBSQ HOSP IP/OBS SF/LOW 25: CPT

## 2021-04-09 RX ADMIN — HALOPERIDOL DECANOATE 5 MILLIGRAM(S): 100 INJECTION INTRAMUSCULAR at 11:56

## 2021-04-09 RX ADMIN — Medication 50 MILLIGRAM(S): at 11:57

## 2021-04-09 RX ADMIN — Medication 2 MILLIGRAM(S): at 11:56

## 2021-04-09 RX ADMIN — Medication 1 MILLIGRAM(S): at 20:49

## 2021-04-09 RX ADMIN — OLANZAPINE 20 MILLIGRAM(S): 15 TABLET, FILM COATED ORAL at 20:48

## 2021-04-09 RX ADMIN — LUMATEPERONE 42 MILLIGRAM(S): 10.5 CAPSULE ORAL at 16:09

## 2021-04-09 RX ADMIN — Medication 1 MILLIGRAM(S): at 08:35

## 2021-04-09 RX ADMIN — OLANZAPINE 5 MILLIGRAM(S): 15 TABLET, FILM COATED ORAL at 08:35

## 2021-04-09 NOTE — BH INPATIENT PSYCHIATRY PROGRESS NOTE - NSBHATTESTNPTRAINEE_PSY_A_CORE
agree

## 2021-04-09 NOTE — BH INPATIENT PSYCHIATRY PROGRESS NOTE - NSBHFUPINTERVALHXFT_PSY_A_CORE
Patient seen for follow up of schizoaffective disorder. Chart reviewed and case discussed with team. No acute adverse events overnight. Yesterday, patient required PRN Ativan for agitation in the morning and Benadryl and Haldol in the evening for auditory hallucinations.  Patient continues to be compliant with medications and denies any side effects. Pt evaluated sitting down. She is was observed sleeping well. Pt describes mood as "okay". She feels safe on the unit. She remains isolative, has not made friends in the unit.. No SI/HI/SIB at this time.

## 2021-04-09 NOTE — BH INPATIENT PSYCHIATRY PROGRESS NOTE - NSBHASSESSSUMMFT_PSY_ALL_CORE
25 Y/ F with a history of schizoaffective disorder vs Schizophrenia, 3 prior hospitalizations, in [facility] ETP program but non-compliant with medications PTA, who presented BIB mother for insomnia, medication non-compliance, yelling, and talking to self/RIS. Patient has been inconsistently compliant with Zyprexa, with TOO granted on 1/26.    Patient remains actively psychotic. She continues to respond to internal stimuli, to become easily irritated, and in general has been reluctant to speak with treatment team primarily responding with one-word answers.  She demonstrates slight improvement of symptoms, however continues to need supervision and redirection in hygiene, hydration and self-care. She has been compliant with medications and is tolerating them well. No lip smacking, no tongue protrusion observed. Fine tremors vs. stereotypical movements noted in -b/l hands. Per collateral with mother obtained by social work, pt has been speaking with her normally in short phone communications.     PLAN:  1. Legal: 9.27 (Involuntary)  2. Safety: routine observation  3. Psych: Continue Benztropine-  1mg BID, lumateperone tosylate  42mg daily at dinner,  Zyprexa Zydis 5 mg qam and 20mg PO at bedtime - EKG checked 4/6; Continuing to monitor QTc.  PRN Meds- diphenhydramine 50mg PO q4h for anxiety; diphenhydramine 50mg IM once for EPS prophylaxis; haloperidol 5mg PO q4h for agitation; haloperidol 5mg IM at bedtime for psychosis if patient refuses PO; lorazepam 2mg PO q6h for agitation; olanzapine 10mg IM at bedtime if patient refuses PO  4. Medical: no active medical issues  5. Social: milieu/structured therapy as tolerated  6. Treatment Interventions: behavioral management; Groups Therapy as tolerated  7. Disposition: when stable; patient has improving psychosis per 1S primary team; awaiting ACT/AOT

## 2021-04-09 NOTE — BH INPATIENT PSYCHIATRY PROGRESS NOTE - MSE UNSTRUCTURED FT
The patient appears stated age, disheveled, with uncombed hair. She is calm and little more cooperative, but becomes irritated over course of interview. She maintains very minimal eye contact. Stereotypical repetitive movements of the right hand observed- appears to be voluntary, +fine tremors b/l hands (increase when irritated), without notable psychomotor agitation or retardation. Gait was steady. The patient’s speech was fluent, less intense in tone, rate and volume. Patient's stated mood is "okay". Affect is constricted, and not congruent to mood. Patient denied presence of delusions and hallucinations; however, she is observed consistently responding to internal stimuli. Patient denies SI or HI. Insight is limited. Judgment today is fair. Impulse control has been fair since arriving on the unit.

## 2021-04-09 NOTE — BH INPATIENT PSYCHIATRY PROGRESS NOTE - CASE SUMMARY
25 Y/ F with a history of schizoaffective disorder vs Schizophrenia, 3 prior hospitalizations, in [facility] ETP program but non-compliant with medications PTA, who presented BIB mother for insomnia, medication non-compliance, yelling, and talking to self/RIS. Patient has been inconsistently compliant with Zyprexa, with TOO granted on 1/26.    Patient is secretive about her symptoms but can not contain them: she constantly talking to herself in response to voices. remains psychotic. Nevertheless, she tolerated interview today a little better, for a longer period of time and with decrease in degree of dysphoria. However, she has shown small improvement per report from primary care team on 1S. ) She is less disruptive and more attentive to ALDs more. She has been compliant with medications and tolerating them well.  Continues to need supervision and redirection in hygiene, hydration and self-care. Responding to redirections.   Pt displays peculiar hands movements which appear to be voluntary but might represent tremor of EPS.   Tolerates treatment well.  Patient was able to sustain a longer conversation despite still prominent tendency to engage in conversation with voices. no s/h/aggressive I/i/p.  Plan:  - Continue in-patient hospitalization  Continue trial of:  - Zyprexa 5 mg qam and 20mg hs (zydis)  - Caplyta 42 mg  - Cogentine 1 mg BID  EKLG -reviewed - WNL   Applying for ACT and AOT in effort to improve chances of pt's compliance.    - Dispo when stable--awaiting ACT / AOT.  4/6/21: I spoke to  off ACT for care coordination.   25 Y/ F with a history of schizoaffective disorder vs Schizophrenia, 3 prior hospitalizations, in [facility] ETP program but non-compliant with medications PTA, who presented BIB mother for insomnia, medication non-compliance, yelling, and talking to self/RIS. Patient has been inconsistently compliant with Zyprexa, with TOO granted on 1/26.    Patient is secretive about her symptoms but can not contain them: she constantly talking to herself in response to voices. remains psychotic. Nevertheless, she tolerated interview today a little better, for a longer period of time and with decrease in degree of dysphoria. However, she has shown small improvement per report from primary care team on 1S. ) She is less disruptive and more attentive to ALDs more. She has been compliant with medications and tolerating them well.  Continues to need supervision and redirection in hygiene, hydration and self-care. Responding to redirections. At times - needs prns when reponding to external stimuli.  Pt displays peculiar hands movements which appear to be voluntary but might represent tremor of EPS.   Tolerates treatment well.  Patient was able to sustain a longer conversation despite still prominent tendency to engage in conversation with voices. no s/h/aggressive I/i/p.  Plan:  - Continue in-patient hospitalization  Continue trial of:  - Zyprexa 5 mg qam and 20mg hs (zydis)  - Caplyta 42 mg  - Cogentine 1 mg BID ( increased)  EKLG -reviewed - WNL   Applying for ACT and AOT in effort to improve chances of pt's compliance.    - Dispo when stable--awaiting ACT / AOT.  4/6/21: I spoke to  off ACT for care coordination.

## 2021-04-10 RX ADMIN — OLANZAPINE 5 MILLIGRAM(S): 15 TABLET, FILM COATED ORAL at 08:52

## 2021-04-10 RX ADMIN — OLANZAPINE 20 MILLIGRAM(S): 15 TABLET, FILM COATED ORAL at 20:56

## 2021-04-10 RX ADMIN — Medication 50 MILLIGRAM(S): at 16:05

## 2021-04-10 RX ADMIN — Medication 1 MILLIGRAM(S): at 20:56

## 2021-04-10 RX ADMIN — HALOPERIDOL DECANOATE 5 MILLIGRAM(S): 100 INJECTION INTRAMUSCULAR at 16:05

## 2021-04-10 RX ADMIN — Medication 2 MILLIGRAM(S): at 20:56

## 2021-04-10 RX ADMIN — LUMATEPERONE 42 MILLIGRAM(S): 10.5 CAPSULE ORAL at 16:06

## 2021-04-10 RX ADMIN — Medication 1 MILLIGRAM(S): at 08:52

## 2021-04-11 RX ADMIN — LUMATEPERONE 42 MILLIGRAM(S): 10.5 CAPSULE ORAL at 16:21

## 2021-04-11 RX ADMIN — Medication 1 MILLIGRAM(S): at 10:37

## 2021-04-11 RX ADMIN — OLANZAPINE 5 MILLIGRAM(S): 15 TABLET, FILM COATED ORAL at 10:37

## 2021-04-11 RX ADMIN — OLANZAPINE 20 MILLIGRAM(S): 15 TABLET, FILM COATED ORAL at 21:04

## 2021-04-11 RX ADMIN — Medication 1 MILLIGRAM(S): at 21:03

## 2021-04-12 PROCEDURE — 99231 SBSQ HOSP IP/OBS SF/LOW 25: CPT

## 2021-04-12 RX ADMIN — OLANZAPINE 5 MILLIGRAM(S): 15 TABLET, FILM COATED ORAL at 08:30

## 2021-04-12 RX ADMIN — LUMATEPERONE 42 MILLIGRAM(S): 10.5 CAPSULE ORAL at 16:28

## 2021-04-12 RX ADMIN — Medication 1 MILLIGRAM(S): at 20:49

## 2021-04-12 RX ADMIN — OLANZAPINE 20 MILLIGRAM(S): 15 TABLET, FILM COATED ORAL at 20:49

## 2021-04-12 RX ADMIN — HALOPERIDOL DECANOATE 5 MILLIGRAM(S): 100 INJECTION INTRAMUSCULAR at 08:29

## 2021-04-12 RX ADMIN — Medication 50 MILLIGRAM(S): at 08:29

## 2021-04-12 RX ADMIN — Medication 1 MILLIGRAM(S): at 08:29

## 2021-04-12 NOTE — BH INPATIENT PSYCHIATRY PROGRESS NOTE - NSBHASSESSSUMMFT_PSY_ALL_CORE
25 Y/ F with a history of schizoaffective disorder vs Schizophrenia, 3 prior hospitalizations, in [facility] ETP program but non-compliant with medications PTA, who presented BIB mother for insomnia, medication non-compliance, yelling, and talking to self/RIS. Patient has been inconsistently compliant with Zyprexa, with TOO granted on 1/26.    Presently pt is on Zyprexa and Caplyta. Patient is secretive about her symptoms but can not contain them: she constantly talking to herself in response to voices. remains psychotic. Nevertheless, she tolerated interviews better, for a longer period of time and with decrease in degree of dysphoria, her ADLs are also better. She has been compliant with medications and tolerating them well.  Continues to need supervision and redirection in hygiene, hydration and self-care. Responding to redirections.   Pt displays peculiar hands movements which appear to be voluntary but might represent tremor of EPS- a little better since Cogentin was increased.   Tolerates treatment well otherwise.    Plan:  - Continue in-patient hospitalization  Continue trial of:  - Zyprexa 5 mg qam and 20mg hs (zydis)  - Caplyta 42 mg  - Cogentin 1 mg BID ( increased)  EKG - WNL   Applying for ACT and AOT in effort to improve chances of pt's compliance.    - Dispo when stable--awaiting ACT / AOT.  4/6/21: I spoke to  off ACT for care coordination.

## 2021-04-12 NOTE — BH INPATIENT PSYCHIATRY PROGRESS NOTE - MSE UNSTRUCTURED FT
The patient appears stated age, mildly disheveled, but having been taking showered. She is more cooperative, but appears preoccupied and distracted by internal stimuli. She maintains very minimal eye contact. Stereotypical repetitive movements of the right hand observed- appears to be voluntary, +fine tremor b/l hands, without notable psychomotor agitation or retardation. Gait was steady. The patient’s speech was fluent, less intense in tone, rate and volume. Patient's stated mood is "fine". Affect is constricted, and not congruent to mood. Patient denied presence of delusions and hallucinations; however, she is observed consistently responding to internal stimuli. Patient denies SI or HI. Insight is limited. Judgment is fair. Impulse control has been fair on the unit.

## 2021-04-12 NOTE — BH INPATIENT PSYCHIATRY PROGRESS NOTE - NSBHFUPINTERVALCCFT_PSY_A_CORE
Patient was seen for a follow up on psychosis.    Patient is often talking to herself but is sleeping well now and is not disruptive on the unit.

## 2021-04-12 NOTE — BH INPATIENT PSYCHIATRY PROGRESS NOTE - NSBHFUPINTERVALHXFT_PSY_A_CORE
Patient seen for follow up of schizoaffective disorder. Chart reviewed and case discussed with team. No acute adverse events overnight.   Patient agreed to be interviewed and denied any distress or hearing voices ( despite evidence otherwise.) She feels safe on the unit.  No SI/HI/SIB at this time. Pt denies any side effects.

## 2021-04-13 PROCEDURE — 93010 ELECTROCARDIOGRAM REPORT: CPT

## 2021-04-13 PROCEDURE — 99231 SBSQ HOSP IP/OBS SF/LOW 25: CPT

## 2021-04-13 RX ADMIN — OLANZAPINE 5 MILLIGRAM(S): 15 TABLET, FILM COATED ORAL at 08:48

## 2021-04-13 RX ADMIN — Medication 1 MILLIGRAM(S): at 08:48

## 2021-04-13 RX ADMIN — OLANZAPINE 20 MILLIGRAM(S): 15 TABLET, FILM COATED ORAL at 20:57

## 2021-04-13 RX ADMIN — Medication 1 MILLIGRAM(S): at 20:57

## 2021-04-13 RX ADMIN — LUMATEPERONE 42 MILLIGRAM(S): 10.5 CAPSULE ORAL at 16:20

## 2021-04-13 NOTE — BH INPATIENT PSYCHIATRY PROGRESS NOTE - NSBHFUPINTERVALHXFT_PSY_A_CORE
Patient is a 25y old  Female who presents with schizoaffective disorder, diagnosed in Dec 2019.         SUBJECTIVE / OVERNIGHT EVENTS: Patient was seen for the daily follow up on psychosis. There were no acute adverse events overnight. Patient required PRN diphenhydramine and haloperidol yesterday at appx 8:30 AM for agitation and anxiety and no PRN medications since then. Patient continues to be compliant with medications. Patient agreed to sit down to converse with writer. Pt describes mood as "good." She denies anyone on the unit mistreating her. Pt denies any hallucinations or delusions but was observed speaking to herself. Pt was intentionally moving her hands in purposeful motions, as if to accomplish some specific task, but when asked regarding these hand motions, pt declined to elaborate. The pt becomes irritated when pressed further regarding her hand motions or hallucinations, causing the pt to abruptly stand up and leave the room, ending the interview.

## 2021-04-13 NOTE — BH INPATIENT PSYCHIATRY PROGRESS NOTE - MSE UNSTRUCTURED FT
Pt appears stated age, is disheveled with unkempt hair, and is wearing baggy clothes. She is uncooperative in answering questions beyond one-word responses and does not elaborate; she does not maintain good eye contact. She performs repetitive movements with bilateral hands and at times with the right hand as if tapping an object held in her left hand; she exhibits hand tremors in both hands that worsen with increased agitation; she is without notable psychomotor retardation. Gait is steady. The patient's speech is fluent, low in volume most times but sometimes louder as she speaks to herself (often with expletives), but largely underproductive during the interview yet more productive when speaking to herself. She states that her mood is "good". Pt's affect is neutral but labile as evidenced by irritability; affect is incongruent to mood. No formal thought disorder noted at this time, limited evaluation due to lack of pt cooperation. Pt perception likely includes hallucinations as she speaks with an unseen person when alone; pt denies any hallucinations or delusions when questioned. No evidence of SI/HI. Insight and judgment are limited. Impulse control is poor as she leaves the conversation abruptly.

## 2021-04-13 NOTE — BH INPATIENT PSYCHIATRY PROGRESS NOTE - NSBHASSESSSUMMFT_PSY_ALL_CORE
25 Y/ F with a history of schizoaffective disorder vs Schizophrenia, 3 prior hospitalizations, in [facility] ETP program but non-compliant with medications PTA, who presented BIB mother for insomnia, medication non-compliance, yelling, and talking to self/RIS. Patient has been inconsistently compliant with Zyprexa, with TOO granted on 1/26.    Presently pt is on Zyprexa and Caplyta. Patient is secretive about her symptoms but can not contain them: she constantly talking to herself in response to voices. remains psychotic. Nevertheless, she tolerated interviews better, for a longer period of time and with decrease in degree of dysphoria, her ADLs are also better. She has been compliant with medications and tolerating them well.  Continues to need supervision and redirection in hygiene, hydration and self-care. Responding to redirections.   Pt displays peculiar hands movements which appear to be voluntary but might represent tremor of EPS- a little better since Cogentin was increased.   Tolerates treatment well otherwise.    Plan:  - Continue in-patient hospitalization  Continue trial of:  - Zyprexa 5 mg qam and 20mg hs (zydis)  - Caplyta 42 mg  - Cogentin 1 mg BID ( increased)  EKG - WNL ; plan to repeat EKG  Applying for ACT and AOT in effort to improve chances of pt's compliance.    - Dispo when stable--awaiting ACT / AOT.  4/6/21: I spoke to  off ACT for care coordination.

## 2021-04-14 LAB
A1C WITH ESTIMATED AVERAGE GLUCOSE RESULT: 4.4 % — SIGNIFICANT CHANGE UP (ref 4–5.6)
ALBUMIN SERPL ELPH-MCNC: 4.6 G/DL — SIGNIFICANT CHANGE UP (ref 3.3–5)
ALP SERPL-CCNC: 71 U/L — SIGNIFICANT CHANGE UP (ref 40–120)
ALT FLD-CCNC: 18 U/L — SIGNIFICANT CHANGE UP (ref 4–33)
ANION GAP SERPL CALC-SCNC: 10 MMOL/L — SIGNIFICANT CHANGE UP (ref 7–14)
ANISOCYTOSIS BLD QL: SLIGHT — SIGNIFICANT CHANGE UP
AST SERPL-CCNC: 16 U/L — SIGNIFICANT CHANGE UP (ref 4–32)
BASOPHILS # BLD AUTO: 0.01 K/UL — SIGNIFICANT CHANGE UP (ref 0–0.2)
BASOPHILS NFR BLD AUTO: 0.2 % — SIGNIFICANT CHANGE UP (ref 0–2)
BILIRUB SERPL-MCNC: 0.6 MG/DL — SIGNIFICANT CHANGE UP (ref 0.2–1.2)
BUN SERPL-MCNC: 18 MG/DL — SIGNIFICANT CHANGE UP (ref 7–23)
CALCIUM SERPL-MCNC: 9.3 MG/DL — SIGNIFICANT CHANGE UP (ref 8.4–10.5)
CHLORIDE SERPL-SCNC: 107 MMOL/L — SIGNIFICANT CHANGE UP (ref 98–107)
CO2 SERPL-SCNC: 23 MMOL/L — SIGNIFICANT CHANGE UP (ref 22–31)
CREAT SERPL-MCNC: 0.64 MG/DL — SIGNIFICANT CHANGE UP (ref 0.5–1.3)
EOSINOPHIL # BLD AUTO: 0.01 K/UL — SIGNIFICANT CHANGE UP (ref 0–0.5)
EOSINOPHIL NFR BLD AUTO: 0.2 % — SIGNIFICANT CHANGE UP (ref 0–6)
ESTIMATED AVERAGE GLUCOSE: 80 MG/DL — SIGNIFICANT CHANGE UP (ref 68–114)
GLUCOSE SERPL-MCNC: 88 MG/DL — SIGNIFICANT CHANGE UP (ref 70–99)
HCT VFR BLD CALC: 37.6 % — SIGNIFICANT CHANGE UP (ref 34.5–45)
HGB BLD-MCNC: 12.1 G/DL — SIGNIFICANT CHANGE UP (ref 11.5–15.5)
IANC: 3.56 K/UL — SIGNIFICANT CHANGE UP (ref 1.5–8.5)
IMM GRANULOCYTES NFR BLD AUTO: 0.4 % — SIGNIFICANT CHANGE UP (ref 0–1.5)
LYMPHOCYTES # BLD AUTO: 0.44 K/UL — LOW (ref 1–3.3)
LYMPHOCYTES # BLD AUTO: 9.7 % — LOW (ref 13–44)
MANUAL SMEAR VERIFICATION: SIGNIFICANT CHANGE UP
MCHC RBC-ENTMCNC: 26.4 PG — LOW (ref 27–34)
MCHC RBC-ENTMCNC: 32.2 GM/DL — SIGNIFICANT CHANGE UP (ref 32–36)
MCV RBC AUTO: 81.9 FL — SIGNIFICANT CHANGE UP (ref 80–100)
MONOCYTES # BLD AUTO: 0.49 K/UL — SIGNIFICANT CHANGE UP (ref 0–0.9)
MONOCYTES NFR BLD AUTO: 10.8 % — SIGNIFICANT CHANGE UP (ref 2–14)
NEUTROPHILS # BLD AUTO: 3.56 K/UL — SIGNIFICANT CHANGE UP (ref 1.8–7.4)
NEUTROPHILS NFR BLD AUTO: 78.7 % — HIGH (ref 43–77)
NRBC # BLD: 0 /100 WBCS — SIGNIFICANT CHANGE UP
NRBC # FLD: 0 K/UL — SIGNIFICANT CHANGE UP
OVALOCYTES BLD QL SMEAR: SLIGHT — SIGNIFICANT CHANGE UP
PLAT MORPH BLD: NORMAL — SIGNIFICANT CHANGE UP
PLATELET # BLD AUTO: 138 K/UL — LOW (ref 150–400)
PLATELET COUNT - ESTIMATE: ABNORMAL
POTASSIUM SERPL-MCNC: 3.8 MMOL/L — SIGNIFICANT CHANGE UP (ref 3.5–5.3)
POTASSIUM SERPL-SCNC: 3.8 MMOL/L — SIGNIFICANT CHANGE UP (ref 3.5–5.3)
PROT SERPL-MCNC: 6.3 G/DL — SIGNIFICANT CHANGE UP (ref 6–8.3)
RBC # BLD: 4.59 M/UL — SIGNIFICANT CHANGE UP (ref 3.8–5.2)
RBC # FLD: 14.6 % — HIGH (ref 10.3–14.5)
RBC BLD AUTO: NORMAL — SIGNIFICANT CHANGE UP
SODIUM SERPL-SCNC: 140 MMOL/L — SIGNIFICANT CHANGE UP (ref 135–145)
WBC # BLD: 4.53 K/UL — SIGNIFICANT CHANGE UP (ref 3.8–10.5)
WBC # FLD AUTO: 4.53 K/UL — SIGNIFICANT CHANGE UP (ref 3.8–10.5)

## 2021-04-14 PROCEDURE — 99231 SBSQ HOSP IP/OBS SF/LOW 25: CPT

## 2021-04-14 RX ADMIN — Medication 50 MILLIGRAM(S): at 20:23

## 2021-04-14 RX ADMIN — Medication 2 MILLIGRAM(S): at 06:43

## 2021-04-14 RX ADMIN — OLANZAPINE 20 MILLIGRAM(S): 15 TABLET, FILM COATED ORAL at 20:23

## 2021-04-14 RX ADMIN — Medication 1 MILLIGRAM(S): at 20:22

## 2021-04-14 RX ADMIN — Medication 1 MILLIGRAM(S): at 08:51

## 2021-04-14 RX ADMIN — LUMATEPERONE 42 MILLIGRAM(S): 10.5 CAPSULE ORAL at 16:56

## 2021-04-14 RX ADMIN — HALOPERIDOL DECANOATE 5 MILLIGRAM(S): 100 INJECTION INTRAMUSCULAR at 06:43

## 2021-04-14 RX ADMIN — OLANZAPINE 5 MILLIGRAM(S): 15 TABLET, FILM COATED ORAL at 08:51

## 2021-04-14 NOTE — BH INPATIENT PSYCHIATRY PROGRESS NOTE - NSTXPSYCHOGOALOTHER_PSY_ALL_CORE
Patient would benefit from engaging in social interactions with staff and peers and attend psychiatric rehabilitation groups in order to distract herself from auditory hallucinations for improved symptom management.
Patient would benefit from engaging with staff individually in order to explore and utilize coping skills to help ignore hallucinations to be able to participate in groups and engage with peers in the milieu.

## 2021-04-14 NOTE — BH INPATIENT PSYCHIATRY PROGRESS NOTE - MSE UNSTRUCTURED FT
Pt appears stated age, is disheveled with unkempt hair, and is wearing baggy clothes. She is a little more cooperative in answering questions, but mostly limits herself to one-word responses and does not elaborate; she has better eye contact. She performs repetitive movements with bilateral hands and at times with the right hand as if tapping an object held in her left hand; she is without notable psychomotor retardation. Gait is steady. The patient's speech is fluent, low in volume  but sometimes speaks louder as she speaks to herself. She states that her mood is "good". Pt's affect is neutral but labile; affect is incongruent to mood. No formal thought disorder noted at this time. Pt perception likely includes hallucinations as she speaks with an unseen person when alone; pt denies any hallucinations or delusions when questioned. No evidence of SI/HI. Insight and judgment are limited. Impulse control is poor as she leaves the conversation abruptly.

## 2021-04-14 NOTE — BH INPATIENT PSYCHIATRY PROGRESS NOTE - NSBHASSESSSUMMFT_PSY_ALL_CORE
25 Y/ F with a history of schizoaffective disorder vs Schizophrenia, 3 prior hospitalizations, in [facility] ETP program but non-compliant with medications PTA, who presented BIB mother for insomnia, medication non-compliance, yelling, and talking to self/RIS. Patient has been inconsistently compliant with Zyprexa, with TOO granted on 1/26.    Presently pt is on Zyprexa and Caplyta. Patient is secretive about her symptoms but can not contain them: she constantly talking to herself in response to voices. remains psychotic. Nevertheless, she tolerated interviews better, for a longer period of time and with decrease in degree of dysphoria, her ADLs are also better. She has been compliant with medications and tolerating them well.  Continues to need supervision and redirection in hygiene, hydration and self-care. Responding to redirections. Needs prns occasionally.  Pt displays peculiar hands movements which appear to be voluntary but might represent tremor of EPS- a little better since Cogentin was increased.   Tolerates treatment well otherwise.    Plan:  - Continue in-patient hospitalization  Continue trial of:  - Zyprexa 5 mg qam and 20mg hs (zydis)  - Caplyta 42 mg  - Cogentin 1 mg BID ( increased)  EKG - WNL ; plan to repeat EKG if pt cooperates  Applying for ACT and AOT in effort to improve chances of pt's compliance.    - Dispo when stable--awaiting ACT / AOT.  4/6/21: I spoke to  off ACT for care coordination.

## 2021-04-14 NOTE — BH INPATIENT PSYCHIATRY PROGRESS NOTE - NSBHFUPINTERVALHXFT_PSY_A_CORE
Patient is a 25y old  Female who presents with schizoaffective disorder, diagnosed in Dec 2019.       Patient was agitated this morning around 6:30 AM and required PRN lorazepam and haloperidol; the pt states that she woke up with a bad mood. Pt has had no PRN medications since then. Patient continues to be compliant with medications. Patient initiated conversation with the writer, asking when she can go home. Pt describes mood as "good." She denies anyone on the unit mistreating her. Pt denies any hallucinations or delusions but was observed speaking to herself. Pt was intentionally moving her hands in purposeful motions, as if to accomplish some specific task, but when asked regarding these hand motions, pt declined to elaborate. The pt becomes irritated when pressed further regarding her hand motions or hallucinations but pt less irritable than yesterday.

## 2021-04-15 PROCEDURE — 99231 SBSQ HOSP IP/OBS SF/LOW 25: CPT

## 2021-04-15 RX ADMIN — LUMATEPERONE 42 MILLIGRAM(S): 10.5 CAPSULE ORAL at 18:00

## 2021-04-15 RX ADMIN — Medication 1 MILLIGRAM(S): at 21:08

## 2021-04-15 RX ADMIN — Medication 1 MILLIGRAM(S): at 08:56

## 2021-04-15 RX ADMIN — OLANZAPINE 20 MILLIGRAM(S): 15 TABLET, FILM COATED ORAL at 21:08

## 2021-04-15 RX ADMIN — OLANZAPINE 5 MILLIGRAM(S): 15 TABLET, FILM COATED ORAL at 08:56

## 2021-04-15 NOTE — BH INPATIENT PSYCHIATRY PROGRESS NOTE - NSBHFUPINTERVALHXFT_PSY_A_CORE
Pt had PRN diphenhydramine at appx 2230 last night due to anxiety and has had no PRN medication since then. Patient continues to be compliant with medications. Pt pleasant this morning in interview. Pt describes mood as "good." She denies anyone on the unit mistreating her. Pt denies any hallucinations or delusions but was observed speaking to herself. Pt not moving her hands in stereotypical fashion today.    Later in the day patient did not tolerate interview with ACT team MD, she reacted with agitation and verbal hostility.

## 2021-04-15 NOTE — BH INPATIENT PSYCHIATRY PROGRESS NOTE - NSBHASSESSSUMMFT_PSY_ALL_CORE
25 Y/ F with a history of schizoaffective disorder vs Schizophrenia, 3 prior hospitalizations, in [facility] ETP program but non-compliant with medications PTA, who presented BIB mother for insomnia, medication non-compliance, yelling, and talking to self/RIS. Patient has been inconsistently compliant with Zyprexa, with TOO granted on 1/26.    Presently pt is on Zyprexa and Caplyta. Patient is secretive about her symptoms but can not contain them: she constantly talking to herself in response to voices. remains psychotic. Nevertheless, she tolerated interviews better, for a longer period of time and with decrease in degree of dysphoria, her ADLs are also better. She has been compliant with medications and tolerating them well.  Continues to need supervision and redirection in hygiene, hydration and self-care. Responding to redirections. Needs prns at times. Psychotic symptoms have not resolved.   EPS-  better since Cogentin was increased.   Tolerates treatment well otherwise.    Plan:  - Continue in-patient hospitalization  Continue trial of:  - Zyprexa 5 mg qam and 20mg hs (zydis)  - Caplyta 42 mg  - Cogentin 1 mg BID ( increased)  EKG - WNL ; plan to repeat EKG if pt cooperates  Applying for ACT and AOT but pt reportedly did not tolerate meeting with ACT team well and may be rejected.    - Dispo when stable--awaiting ACT / AOT.

## 2021-04-15 NOTE — BH INPATIENT PSYCHIATRY PROGRESS NOTE - MSE UNSTRUCTURED FT
Pt appears stated age, is better campt. She is a little more cooperative in answering questions, in a little better disposition; No  psychomotor agitation/ retardation. No involuntary movements .Gait is steady. The patient's speech is fluent, nl in volume. She states that her mood is "good". Pt's affect is neutral but labile; affect is incongruent to mood. No formal thought disorder noted at this time. Pt perception likely includes hallucinations as she speaks with an unseen person when alone; pt denies any hallucinations or delusions when questioned. No evidence of SI/HI. Insight and judgment are limited. Impulse control is poor as she leaves the conversation abruptly.

## 2021-04-16 PROCEDURE — 99231 SBSQ HOSP IP/OBS SF/LOW 25: CPT

## 2021-04-16 RX ADMIN — OLANZAPINE 20 MILLIGRAM(S): 15 TABLET, FILM COATED ORAL at 20:35

## 2021-04-16 RX ADMIN — HALOPERIDOL DECANOATE 5 MILLIGRAM(S): 100 INJECTION INTRAMUSCULAR at 10:31

## 2021-04-16 RX ADMIN — LUMATEPERONE 42 MILLIGRAM(S): 10.5 CAPSULE ORAL at 16:08

## 2021-04-16 RX ADMIN — Medication 50 MILLIGRAM(S): at 20:35

## 2021-04-16 RX ADMIN — Medication 1 MILLIGRAM(S): at 20:35

## 2021-04-16 RX ADMIN — Medication 50 MILLIGRAM(S): at 10:31

## 2021-04-16 RX ADMIN — Medication 2 MILLIGRAM(S): at 10:32

## 2021-04-16 RX ADMIN — OLANZAPINE 5 MILLIGRAM(S): 15 TABLET, FILM COATED ORAL at 09:38

## 2021-04-16 RX ADMIN — Medication 1 MILLIGRAM(S): at 09:38

## 2021-04-16 NOTE — BH INPATIENT PSYCHIATRY PROGRESS NOTE - NSBHFUPINTERVALHXFT_PSY_A_CORE
Patient was seen for a follow up on psychosis. Pt had PRN diphenhydramine, haloperidol, and lorazepam at appx 1030 today due to anxiety and agitation and has had no PRN medication since then. Patient continues to be compliant with medications. Pt pleasant this morning in interview. Pt describes mood as "good." Pt states that she has been watching "Law and Order" on TV and that she likes crime shows. Pt confirms that she FaceTimed with her mother yesterday and that the conversation went well. She denies anyone on the unit mistreating her. Pt denies any hallucinations or delusions but was observed speaking to herself earlier today but notably not as fixated on internal stimuli as much as previously. Pt was noted moving her hands in stereotypical fashion early in morning but not since then. When asked, pt states that she "doesn't do that anymore" and she stopped "today" because it's "not funny anymore", though is unable to elaborate further.

## 2021-04-16 NOTE — BH INPATIENT PSYCHIATRY PROGRESS NOTE - NSBHASSESSSUMMFT_PSY_ALL_CORE
25 Y/ F with a history of schizoaffective disorder vs Schizophrenia, 3 prior hospitalizations, in [facility] ETP program but non-compliant with medications PTA, who presented BIB mother for insomnia, medication non-compliance, yelling, and talking to self/RIS. Patient has been inconsistently compliant with Zyprexa, with TOO granted on 1/26.    Presently pt is on Zyprexa and Caplyta. Patient is secretive about her symptoms but can not contain them: she constantly talking to herself in response to voices. remains psychotic. Nevertheless, she tolerated interviews better, for a longer period of time and with decrease in degree of dysphoria, her ADLs are also better. She has been compliant with medications and tolerating them well.  Pt remains psychotic but not disruptive except for episodes of speaking very loudly to voices. Continues to need supervision and redirection in hygiene, hydration and self-care. Responding to redirections. Needs prns at times. Psychotic symptoms have not resolved.   EPS-  better since Cogentin was increased.   Tolerates treatment well otherwise.    Plan:  - Continue in-patient hospitalization  Continue trial of:  - Zyprexa 5 mg qam and 20mg hs (zydis)  - Caplyta 42 mg  - Cogentin 1 mg BID ( increased)  EKG - WNL ; plan to repeat EKG if pt cooperates  Applying for ACT and AOT but pt reportedly did not tolerate meeting with ACT team well and may be rejected.    - Dispo when stable--awaiting ACT / AOT.

## 2021-04-16 NOTE — BH INPATIENT PSYCHIATRY PROGRESS NOTE - MSE UNSTRUCTURED FT
MSE  Pt appears stated age, is better kempt. She is a little more cooperative in answering questions, in a little better disposition; No psychomotor agitation/ retardation. No involuntary movements. Gait is steady. The patient's speech is fluent, nl in volume. She states that her mood is "good". Pt's affect is neutral but labile; affect is incongruent to mood. No formal thought disorder noted at this time. Pt perception likely includes hallucinations as she speaks with an unseen person when alone; pt denies any hallucinations or delusions when questioned. No evidence of SI/HI. Insight and judgment are limited. Impulse control is poor as she leaves the conversation abruptly.

## 2021-04-17 RX ADMIN — Medication 1 MILLIGRAM(S): at 20:32

## 2021-04-17 RX ADMIN — Medication 1 MILLIGRAM(S): at 08:15

## 2021-04-17 RX ADMIN — Medication 2 MILLIGRAM(S): at 10:10

## 2021-04-17 RX ADMIN — OLANZAPINE 5 MILLIGRAM(S): 15 TABLET, FILM COATED ORAL at 08:17

## 2021-04-17 RX ADMIN — OLANZAPINE 20 MILLIGRAM(S): 15 TABLET, FILM COATED ORAL at 20:32

## 2021-04-17 RX ADMIN — Medication 50 MILLIGRAM(S): at 10:10

## 2021-04-17 RX ADMIN — HALOPERIDOL DECANOATE 5 MILLIGRAM(S): 100 INJECTION INTRAMUSCULAR at 10:10

## 2021-04-18 RX ADMIN — LUMATEPERONE 42 MILLIGRAM(S): 10.5 CAPSULE ORAL at 15:25

## 2021-04-18 RX ADMIN — Medication 2 MILLIGRAM(S): at 08:34

## 2021-04-18 RX ADMIN — Medication 50 MILLIGRAM(S): at 08:33

## 2021-04-18 RX ADMIN — Medication 1 MILLIGRAM(S): at 20:35

## 2021-04-18 RX ADMIN — Medication 1 MILLIGRAM(S): at 08:34

## 2021-04-18 RX ADMIN — OLANZAPINE 5 MILLIGRAM(S): 15 TABLET, FILM COATED ORAL at 08:34

## 2021-04-18 RX ADMIN — OLANZAPINE 20 MILLIGRAM(S): 15 TABLET, FILM COATED ORAL at 20:35

## 2021-04-18 RX ADMIN — HALOPERIDOL DECANOATE 5 MILLIGRAM(S): 100 INJECTION INTRAMUSCULAR at 08:34

## 2021-04-19 PROCEDURE — 99232 SBSQ HOSP IP/OBS MODERATE 35: CPT

## 2021-04-19 RX ORDER — OLANZAPINE 15 MG/1
10 TABLET, FILM COATED ORAL DAILY
Refills: 0 | Status: DISCONTINUED | OUTPATIENT
Start: 2021-04-19 | End: 2021-05-10

## 2021-04-19 RX ADMIN — HALOPERIDOL DECANOATE 5 MILLIGRAM(S): 100 INJECTION INTRAMUSCULAR at 01:43

## 2021-04-19 RX ADMIN — Medication 2 MILLIGRAM(S): at 12:39

## 2021-04-19 RX ADMIN — Medication 2 MILLIGRAM(S): at 01:43

## 2021-04-19 RX ADMIN — LUMATEPERONE 42 MILLIGRAM(S): 10.5 CAPSULE ORAL at 16:32

## 2021-04-19 RX ADMIN — Medication 1 MILLIGRAM(S): at 20:31

## 2021-04-19 RX ADMIN — OLANZAPINE 20 MILLIGRAM(S): 15 TABLET, FILM COATED ORAL at 20:31

## 2021-04-19 RX ADMIN — OLANZAPINE 5 MILLIGRAM(S): 15 TABLET, FILM COATED ORAL at 08:59

## 2021-04-19 RX ADMIN — Medication 50 MILLIGRAM(S): at 20:31

## 2021-04-19 RX ADMIN — Medication 1 MILLIGRAM(S): at 08:59

## 2021-04-19 RX ADMIN — Medication 50 MILLIGRAM(S): at 01:43

## 2021-04-19 NOTE — BH INPATIENT PSYCHIATRY PROGRESS NOTE - NSBHCHARTREVIEWINVESTIGATE_PSY_A_CORE FT
EKG - 3- @ 9:40  Normal Sinus Rhythm   Vent. Rate - 85 bpm  CO Interval - 132 ms   QRS duration - 84 ms   QT/QTc - 374/445 ms   P-R-T axes - 8 50 28
EKG - 3- @ 9:40  Normal Sinus Rhythm    Vent. Rate - 85 bpm  NJ Interval - 132 ms   QRS duration - 84 ms   QT/QTc - 374/445 ms   P-R-T axes - 8 50 28
EKG - 3- @ 9:40  Normal Sinus Rhythm    Vent. Rate - 85 bpm  IL Interval - 132 ms   QRS duration - 84 ms   QT/QTc - 374/445 ms   P-R-T axes - 8 50 28
EKG - 3- @ 9:40  Normal Sinus Rhythm   Vent. Rate - 85 bpm  SC Interval - 132 ms   QRS duration - 84 ms   QT/QTc - 374/445 ms   P-R-T axes - 8 50 28
EKG normal.
EKG - 3- @ 9:40  Normal Sinus Rhythm   Vent. Rate - 85 bpm  CO Interval - 132 ms   QRS duration - 84 ms   QT/QTc - 374/445 ms   P-R-T axes - 8 50 28
EKG - 3- @ 9:40  Normal Sinus Rhythm   Vent. Rate - 85 bpm  NC Interval - 132 ms   QRS duration - 84 ms   QT/QTc - 374/445 ms   P-R-T axes - 8 50 28
EKG - 3- @ 9:40  Normal Sinus Rhythm    Vent. Rate - 85 bpm  ID Interval - 132 ms   QRS duration - 84 ms   QT/QTc - 374/445 ms   P-R-T axes - 8 50 28
EKG - 3- @ 9:40  Normal Sinus Rhythm    Vent. Rate - 85 bpm  DE Interval - 132 ms   QRS duration - 84 ms   QT/QTc - 374/445 ms   P-R-T axes - 8 50 28
EKG - 3- @ 9:40  Normal Sinus Rhythm   Vent. Rate - 85 bpm  GA Interval - 132 ms   QRS duration - 84 ms   QT/QTc - 374/445 ms   P-R-T axes - 8 50 28
EKG - 3- @ 9:40  Normal Sinus Rhythm    Vent. Rate - 85 bpm  ME Interval - 132 ms   QRS duration - 84 ms   QT/QTc - 374/445 ms   P-R-T axes - 8 50 28
EKG QTc 467
EKG - 3- @ 9:40  Normal Sinus Rhythm    Vent. Rate - 85 bpm  AK Interval - 132 ms   QRS duration - 84 ms   QT/QTc - 374/445 ms   P-R-T axes - 8 50 28
EKG - 3- @ 9:40  Normal Sinus Rhythm    Vent. Rate - 85 bpm  MS Interval - 132 ms   QRS duration - 84 ms   QT/QTc - 374/445 ms   P-R-T axes - 8 50 28
EKG - 3- @ 9:40  Normal Sinus Rhythm    Vent. Rate - 85 bpm  NJ Interval - 132 ms   QRS duration - 84 ms   QT/QTc - 374/445 ms   P-R-T axes - 8 50 28
EKG - 3- @ 9:40  Normal Sinus Rhythm   Vent. Rate - 85 bpm  WV Interval - 132 ms   QRS duration - 84 ms   QT/QTc - 374/445 ms   P-R-T axes - 8 50 28
EKG - 3- @ 9:40  Normal Sinus Rhythm    Vent. Rate - 85 bpm  SD Interval - 132 ms   QRS duration - 84 ms   QT/QTc - 374/445 ms   P-R-T axes - 8 50 28

## 2021-04-19 NOTE — BH INPATIENT PSYCHIATRY PROGRESS NOTE - NSBHPROGMEDSE_PSY_A_CORE FT
tremors

## 2021-04-19 NOTE — BH INPATIENT PSYCHIATRY PROGRESS NOTE - NS ED BHA REVIEW OF ED CHART AVAILABLE INVESTIGATIONS REVIEWED
None available
Yes
None available
Yes
None available
Yes
None available
Yes
Yes
None available
Yes
None available
None available
Yes
None available
None available
Yes
None available
Yes
Yes
None available
Yes
None available
Yes

## 2021-04-19 NOTE — BH INPATIENT PSYCHIATRY PROGRESS NOTE - NSBHANTIPSYCHOTIC_PSY_ALL_CORE
Yes...

## 2021-04-19 NOTE — BH INPATIENT PSYCHIATRY PROGRESS NOTE - MSE UNSTRUCTURED FT
MSE  Pt appears stated age, is better kempt. She is a little more cooperative in answering questions, in a little better disposition; No psychomotor agitation/ retardation. + Stereotypical bizarre hands movments observed episodically. No involuntary movements. Gait is steady. The patient's speech is fluent, nl in volume. She states that her mood is "good". Pt's affect is full in range, labile; affect is incongruent to mood. No formal thought disorder noted at this time. Pt perception likely includes hallucinations as she speaks with an unseen person when alone; pt denies any hallucinations or delusions when questioned. No evidence of SI/HI. Insight and judgment are limited. Impulse control is poor as she leaves the conversation abruptly.

## 2021-04-19 NOTE — BH INPATIENT PSYCHIATRY PROGRESS NOTE - NSBHFUPINTERVALHXFT_PSY_A_CORE
Patient was seen for a follow up on psychosis. Pt had PRN diphenhydramine, haloperidol, and lorazepam since the last interview (once Saturday, Sunday, and 1:43 AM today) due to anxiety and agitation and has had no PRN medication since earlier this morning. Patient continues to be compliant with medications. Pt was outdoors and agreed to come indoors for the interview. Pt cooperative but more irritable today. Pt describes mood as "great" and "happy" when asked to use an emotion. Pt states that she did "nothing" over the weekend. Pt confirms that she FaceTimed with her mother over the weekend and that the conversation went well. She denies anyone on the unit mistreating her. Pt denies any hallucinations or delusions but was observed speaking to herself earlier today and after interview ended. Pt was noted moving her hands in stereotypical fashion.

## 2021-04-19 NOTE — BH INPATIENT PSYCHIATRY PROGRESS NOTE - NSBHASSESSSUMMFT_PSY_ALL_CORE
25 Y/ F with a history of schizoaffective disorder vs Schizophrenia, 3 prior hospitalizations, in [facility] ETP program but non-compliant with medications PTA, who presented BIB mother for insomnia, medication non-compliance, yelling, and talking to self/RIS. Patient has been inconsistently compliant with Zyprexa, with TOO granted on 1/26.    Presently pt is on Zyprexa and Caplyta. Patient is secretive about her symptoms but can not contain them: she constantly talking to herself in response to voices. remains psychotic. Nevertheless, she tolerated interviews better, for a longer period of time and with decrease in degree of dysphoria, her ADLs are also better. She has been compliant with medications and tolerating them well.  Pt remains psychotic but not disruptive except for episodes of speaking very loudly to voices. Continues to need supervision and redirection in hygiene, hydration and self-care. Responding to redirections. Needs prns every day due to periods of overwhelming internal stimuli, - with fair effect observed after prns. Psychotic symptoms have not resolved.   EPS-  better since Cogentin was increased.   Tolerates treatment well otherwise.    Plan:  - Continue in-patient hospitalization  Continue trial of:  - Zyprexa 10 mg qam and 20mg hs (zydis)  - Caplyta 42 mg  - Cogentin 1 mg BID ( increased)  EKG - WNL ; plan to repeat EKG if pt cooperates  Applied for ACT and AOT but pt reportedly did not tolerate meeting with ACT team well and is rejected by ACT due to still prominent psychosis    Considering to proceed with applying for transfer to a long-term hospital.

## 2021-04-20 PROCEDURE — 93010 ELECTROCARDIOGRAM REPORT: CPT

## 2021-04-20 PROCEDURE — 99231 SBSQ HOSP IP/OBS SF/LOW 25: CPT

## 2021-04-20 RX ADMIN — Medication 2 MILLIGRAM(S): at 15:37

## 2021-04-20 RX ADMIN — Medication 50 MILLIGRAM(S): at 15:38

## 2021-04-20 RX ADMIN — Medication 50 MILLIGRAM(S): at 20:22

## 2021-04-20 RX ADMIN — Medication 1 MILLIGRAM(S): at 08:33

## 2021-04-20 RX ADMIN — OLANZAPINE 20 MILLIGRAM(S): 15 TABLET, FILM COATED ORAL at 20:22

## 2021-04-20 RX ADMIN — HALOPERIDOL DECANOATE 5 MILLIGRAM(S): 100 INJECTION INTRAMUSCULAR at 15:36

## 2021-04-20 RX ADMIN — OLANZAPINE 10 MILLIGRAM(S): 15 TABLET, FILM COATED ORAL at 08:33

## 2021-04-20 RX ADMIN — Medication 1 MILLIGRAM(S): at 20:22

## 2021-04-20 RX ADMIN — LUMATEPERONE 42 MILLIGRAM(S): 10.5 CAPSULE ORAL at 16:46

## 2021-04-20 NOTE — BH INPATIENT PSYCHIATRY DISCHARGE NOTE - OTHER PAST PSYCHIATRIC HISTORY (INCLUDE DETAILS REGARDING ONSET, COURSE OF ILLNESS, INPATIENT/OUTPATIENT TREATMENT)
patient with two admissions prior to fall 2020 [facility] inpatient admission during which time she was started on clozapine.  reportedly history of failed response to abilify and haldol  family reports history of non suicidal sself injurious behavior but denies miladys suicide attempts/suicidality  in ETP

## 2021-04-20 NOTE — BH INPATIENT PSYCHIATRY PROGRESS NOTE - NSBHCONSDANGERSELF_PSY_A_CORE
unable to care for self

## 2021-04-20 NOTE — BH INPATIENT PSYCHIATRY PROGRESS NOTE - NSBHFUPINTERVALHXFT_PSY_A_CORE
Pt had PRN Lorazepam yesterday at appx 1240 for agitation and PRN diphenhydramine at 10:30 last night for difficulty sleeping. Patient continues to be compliant with medications. Pt was pacing [facility] and approached for interview twice: first time pt stated "Not right now man" and second time the pt aggressively stated "I mind, I mind, I don't want to talk to you." Pt was noted to be speaking with herself as she was pacing [facility]. Full interview was not possible due to lack of pt cooperation.

## 2021-04-20 NOTE — BH INPATIENT PSYCHIATRY DISCHARGE NOTE - NSDCCPCAREPLAN_GEN_ALL_CORE_FT
PRINCIPAL DISCHARGE DIAGNOSIS  Diagnosis: Schizophrenia, paranoid type  Assessment and Plan of Treatment:

## 2021-04-20 NOTE — BH INPATIENT PSYCHIATRY DISCHARGE NOTE - NSDCMRMEDTOKEN_GEN_ALL_CORE_FT
Caplyta 42 mg oral capsule: 1 cap(s) orally once a day    Clozaril 200 mg oral tablet: 1 tab(s) orally once a day (at bedtime)

## 2021-04-20 NOTE — BH INPATIENT PSYCHIATRY DISCHARGE NOTE - NSBHMETABOLIC_PSY_ALL_CORE_FT
BMI: BMI (kg/m2): 29.7 (12-22-20 @ 06:56)  HbA1c: A1C with Estimated Average Glucose Result: 4.4 % (04-14-21 @ 09:28)    Glucose:   BP: 114/66 (04-20-21 @ 08:34) (114/66 - 114/66)  Lipid Panel: Date/Time: 12-08-20 @ 18:57  Cholesterol, Serum: 131  Direct LDL: --  HDL Cholesterol, Serum: 29  Total Cholesterol/HDL Ration Measurement: --  Triglycerides, Serum: 143

## 2021-04-20 NOTE — BH INPATIENT PSYCHIATRY PROGRESS NOTE - NSBHASSESSSUMMFT_PSY_ALL_CORE
25 Y/ F with a history of schizoaffective disorder vs Schizophrenia, 3 prior hospitalizations, in [facility] ETP program but non-compliant with medications PTA, who presented BIB mother for insomnia, medication non-compliance, yelling, and talking to self/RIS. Patient has been inconsistently compliant with Zyprexa, with TOO granted on 1/26.    Presently pt is on Zyprexa and Caplyta. Patient is secretive about her symptoms but can not contain them: she constantly talking to herself in response to voices. remains psychotic. Nevertheless, she tolerated interviews better, for a longer period of time and with decrease in degree of dysphoria, her ADLs are also better. She has been compliant with medications and tolerating them well.  Pt remains psychotic but not disruptive except for episodes of speaking very loudly to voices. Continues to need supervision and redirection in hygiene, hydration and self-care. Responding to redirections. Needs prns every day due to periods of overwhelming internal stimuli, - with fair effect observed after prns. Psychotic symptoms have not resolved.   EPS-  better since Cogentin was increased.   Tolerates treatment well otherwise.    Plan:  - Continue in-patient hospitalization  Continue trial of:  - Zyprexa 10 mg qam and 20mg hs (zydis)  - Caplyta 42 mg  - Cogentin 1 mg BID ( increased)  EKG - WNL ; plan to repeat EKG if pt cooperates  Applied for ACT and AOT but pt reportedly did not tolerate meeting with ACT team well and is rejected by ACT due to still prominent psychosis    We will proceed with applying for transfer to a long-term hospital.

## 2021-04-20 NOTE — BH INPATIENT PSYCHIATRY DISCHARGE NOTE - HOSPITAL COURSE
HPI:  Patient is a 25 year old female, domiciled with mother, with a [facility] hospitalization and discharge on November 20, 2020, BIB mother to ED on October 11, 2020 for talking to herself, yelling, and not sleeping. Mother stated that the patient had been non-compliant with her medications for three days prior to initial presentation. Mother stated that the patient was taking Clozapine 200mg once a day but two weeks prior to admission, the patient was switched to Abilify 5mg. Patient was also prescribed Propanol 20mg twice a day. Mother stated that the patient sees Dr. Antonina Villaseñor. She stated that the patient refused her medications on 3 consecutive days. She stated that the patient is not using drugs or alcohol. She stated that since the patient had not been taking the medication and she had been talking to herself and had been yelling and screaming but nonviolent. Mother stated that the patient was worsening due to medication noncompliance.    Past psychiatric history prominent for multiple psychiatric admissions due to persistent and often excalating auditory hallucinations with neglect of ADLs. Patient had trials of Abilify, Zyprexa?, Risperidone, and Haldol. The trial of Clozapine after 200 mg did not control psychosis and patient could not sustain compliance.  No known medical history.  Family history is non contributory.  Substance abuse is not known.    Psychosocial:  Patient is unemployed and lives with family.    On initial evaluation, Pt appeared stated age was poorly kempt. She was not cooperative with interviewers as she was preoccupied with internal stimuli; she was virtually unaware of surroundings due to severe disorganization of thought process. This led to her being disruptive and intrusive and unable to respond to redirections appropriately. It was not possible at that time to engage her in conversation or to successfully assist her ADLs.  Pt treatment regimen during admission included trials of Aripiprazole, haloperidol, lorazepam, olanzapine. Later on, there was a trial of olanzapine up to 30 mg qd with lumateperone (Coplyta 42 mg).     With above therapeutic efforts, patient's condition improved very modestly only to the point that she a little less intrusive and somewhat more aware of activities around her although not engaged in any of them. In addition, her hygiene improved while her efforts were supplemented by staff directions and assistance. Pt, though more cooperative on interviews, is still severely psychotic and responding to internal stimuli constantly, only able to converse with people for 2 - 5 minutes max before she gets overwhelmed with internal stimuli. She spends most of her time conversing to people "from the past" and is forbidden by them to disclose who they are. At times, she is talking to the voices loudly and screaming at them. On such occasions, she often needs PRNs.  She has not been aggressive since her admission.    MSE  Pt appears stated age, is better kempt. She can only tolerate interview for several minutes, becomes restless, and abruptly leaves due to intrusions of internal stimuli and resultant frustration; No psychomotor agitation/ retardation. + Stereotypical bizarre hands movements observed episodically. No involuntary movements. Gait is steady. The patient's speech is fluent, nl in volume. She states that her mood is "good". Pt's affect is full in range, labile; affect is incongruent to mood. No formal thought disorder noted at this time. Pt perception likely includes hallucinations as she speaks with an unseen person when alone; pt denies any hallucinations or delusions when questioned. No evidence of SI/HI. Insight and judgement are poor    Medical  No complaints. QTc 0.488 on 4/21/21.    Diagnosis  Schizophrenia    Current Medications  - Zyprexa 10 mg qam and 20mg hs (zydis)  - Caplyta 42 mg  - Cogentin 1 mg BID    Conclusion  The extent of patient's improvement is insufficient for her to successfully negotiate safe discharge planning at present time. When an attempt was made to organize an outpatient followup with ACT team and AOT, patient was unable to tolerate the interview with ACT team, became agitated and extremely uncooperative. She was rejected by ACT team.   This patient cannot be safely discharged and she requires a long-term hospitalization for further treatment. HPI:  Patient is a 25 year old female, domiciled with mother, with a [facility] hospitalization and discharge on November 20, 2020, BIB mother to ED on October 11, 2020 for talking to herself, yelling, and not sleeping. Mother stated that the patient had been non-compliant with her medications for three days prior to initial presentation. Mother stated that the patient was taking Clozapine 200mg once a day but two weeks prior to admission, the patient was switched to Abilify 5mg. Patient was also prescribed Propanol 20mg twice a day. Mother stated that the patient sees Dr. Antonina Villaseñor. She stated that the patient refused her medications on 3 consecutive days. She stated that the patient is not using drugs or alcohol. She stated that since the patient had not been taking the medication and she had been talking to herself and had been yelling and screaming but nonviolent. Mother stated that the patient was worsening due to medication noncompliance.    Past psychiatric history: prominent for multiple psychiatric admissions due to persistent and often excalating auditory hallucinations with neglect of ADLs. Patient had trials of Abilify, Zyprexa, Risperidone, and Haldol. The trial of Clozapine after 200 mg did not control psychosis and patient could not sustain compliance.  No known medical history.  Family history is non contributory.  Substance abuse is not known.    Psychosocial:  Patient is unemployed and lives with family.    On initial evaluation, Pt appeared stated age was poorly kempt. She was not cooperative with interviewers as she was preoccupied with internal stimuli; she was virtually unaware of surroundings due to severe disorganization of thought process. This led to her being disruptive and intrusive and unable to respond to redirections appropriately. It was not possible at that time to engage her in conversation or to successfully assist her ADLs.      Pt treatment regimen during this admission included trials of Aripiprazole, haloperidol, lorazepam, olanzapine. Later on, there was a trial of olanzapine up to 30 mg qd with lumateperone (Coplyta 42 mg).   The combination trial was marginally effective but did not significantly controlled disruptive voices or patient's ability to communicate on rational level. She still needed multiple prns and was not able to tolerate appropriately meeting with ACT team for discharge planning. Subsequently, re-trial of Clozapine was undertaken while patient was referred for possible transfer to a [facility]. Patient tolerated Clozapine titration very well, with minimum of side effects and started to display meaningful clinical improvement in communications, visibility, ADLs, ability to control loud outbursts/yelling in response to voices. Patient herself admitted that the last medication was helpful to her and that voices decreased significantly in volume, frequency, annoying quality. At this point she developed strong conviction that she was ready for discharge.   As her legal status was expiring, the Court hearing for Further retention took place on 5/11/21, - where  denied Hospital application for further retention and ordered patient to be discharged on 5/12/21.    By the time patient had to be discharged on Court Order, her clozapine dose was only titrated up to 200 mg/day, despite original intention of titrating it to at least 300 mg/day. Clozapine blood level on200 mg/day was sent out on 5/11/21, results - pending. we hope that its values will be helpful in decision-making re further dose titration on an OP basis. ( Previously, during her first clozapine trial on max 200 mg/day dose patient still was responding to internal stimuli , as per pt's Mom, who appears to be reliable in her reports.)  Last CBC indicated ANC - 5.45 - WNL on 5/11/21.    We recommend to consider further Clozapine dose titration on an OP basis, in hope that further improvement in psychosis will follow.    Diagnosis  Schizophrenia    Current Medications  Clozapine 200 mg qhs

## 2021-04-20 NOTE — BH INPATIENT PSYCHIATRY DISCHARGE NOTE - HPI (INCLUDE ILLNESS QUALITY, SEVERITY, DURATION, TIMING, CONTEXT, MODIFYING FACTORS, ASSOCIATED SIGNS AND SYMPTOMS)
Patient is 24yo female, unemployed, single, not in school, domiciled with mom, PPH of schizoaffective disorder with symptom onset recently in December 2019, 3 past inpatient psych hospitalizations (most recent 10/12-11/20/20 at [facility] for psychosis), followed by Dr. Loya at [facility] AOPD but pt currently noncompliant with medications, no h/o suicide attempts, no h/o violence, no PMH, no h/o substance abuse, brought in by mother for talking/laughing to herself, yelling, not sleeping, med noncompliance.     She was being cross titrated from clozapine to abilify and clozapine appears to have been discontinued and abilify 5 was started.  She was disorganized in the ED and transferred to us on a 939 legal status and arrive in the 5am-6am hour.     She sits up in bed, notably malodorous with pants covered with menstrual blood, staring at us minimizing, stating that she is "perfect" and everything is "fine".  She does not participate in interview meaningfully.

## 2021-04-20 NOTE — BH INPATIENT PSYCHIATRY PROGRESS NOTE - MSE UNSTRUCTURED FT
MSE    Pt appears stated age, is better kempt. She refuses to speak with the writer and notably dysphoric, aggressively stating "I don't want to talk to you"; No psychomotor agitation/ retardation. + Stereotypical bizarre hands movements observed episodically. No involuntary movements. Gait is steady. When overheard speaking, the patient's speech is fluent, nl in volume. Mood unable to be assessed . Pt's dysphoric, irritable, appropriate. no formal thought disorder noted at this time. Pt perception likely includes hallucinations as she speaks with an unseen person when alone; delusional distortions are present. No evidence of SI/HI. Insight and judgment are limited. Impulse control is poor.

## 2021-04-21 PROCEDURE — 99232 SBSQ HOSP IP/OBS MODERATE 35: CPT

## 2021-04-21 RX ORDER — TUBERCULIN PURIFIED PROTEIN DERIVATIVE 5 [IU]/.1ML
5 INJECTION, SOLUTION INTRADERMAL ONCE
Refills: 0 | Status: COMPLETED | OUTPATIENT
Start: 2021-04-21 | End: 2021-04-21

## 2021-04-21 RX ADMIN — Medication 2 MILLIGRAM(S): at 04:48

## 2021-04-21 RX ADMIN — OLANZAPINE 20 MILLIGRAM(S): 15 TABLET, FILM COATED ORAL at 21:40

## 2021-04-21 RX ADMIN — Medication 1 MILLIGRAM(S): at 08:34

## 2021-04-21 RX ADMIN — Medication 1 MILLIGRAM(S): at 21:40

## 2021-04-21 RX ADMIN — Medication 50 MILLIGRAM(S): at 04:49

## 2021-04-21 RX ADMIN — LUMATEPERONE 42 MILLIGRAM(S): 10.5 CAPSULE ORAL at 17:17

## 2021-04-21 RX ADMIN — TUBERCULIN PURIFIED PROTEIN DERIVATIVE 5 UNIT(S): 5 INJECTION, SOLUTION INTRADERMAL at 14:16

## 2021-04-21 RX ADMIN — OLANZAPINE 10 MILLIGRAM(S): 15 TABLET, FILM COATED ORAL at 08:33

## 2021-04-21 RX ADMIN — HALOPERIDOL DECANOATE 5 MILLIGRAM(S): 100 INJECTION INTRAMUSCULAR at 04:48

## 2021-04-21 NOTE — BH INPATIENT PSYCHIATRY PROGRESS NOTE - NSBHFUPINTERVALHXFT_PSY_A_CORE
as per report: Pt received PRNs in last 24 hr: haloperidol + lorazepam + diphenhydramine yesterday ~3:30 PM for agitation/anxiety, diphenhydramine ~8:20 PM for anxiety, haloperidol + lorazepam + diphenhydramine ~4:50 AM for agitation/anxiety. Patient continues to be compliant with standing medications.     Pt cooperative and less irritable today. Pt agreed to speak with writer today. She states she spent time outside yesterday and enjoyed the weather. She states that she speaks with her mother every day. She states she finds people "annoying" and dislikes conversing with anybody including the writer. She denies anyone on the unit mistreating her. Pt denies any hallucinations or delusions but was observed speaking to herself earlier today and after interview ended.

## 2021-04-21 NOTE — BH INPATIENT PSYCHIATRY PROGRESS NOTE - MSE UNSTRUCTURED FT
Pt refused vitals today    MSE  Pt appears stated age, is better kempt. She is a little more cooperative in answering questions, in a little better disposition, not as dysphoric as yesterday; No psychomotor agitation/ retardation. + Stereotypical bizarre hands movements observed episodically. No involuntary movements. Gait is steady. The patient's speech is fluent, nl in volume. She states that her mood is "good". Pt's affect is full in range, labile; affect is incongruent to mood. No formal thought disorder noted at this time. Pt perception likely includes hallucinations as she speaks with an unseen person when alone; pt denies any hallucinations or delusions when questioned. No evidence of SI/HI. Insight and judgment are limited. Impulse control is poor.

## 2021-04-21 NOTE — BH INPATIENT PSYCHIATRY PROGRESS NOTE - NSBHASSESSSUMMFT_PSY_ALL_CORE
25 Y/ F with a history of schizoaffective disorder vs Schizophrenia, 3 prior hospitalizations, in [facility] ETP program but non-compliant with medications PTA, who presented BIB mother for insomnia, medication non-compliance, yelling, and talking to self/RIS.   During this hospitalization patient was tried on Abilify and Haldol with poor response. Started on Zyprexa but had been inconsistently compliant with Zyprexa, with Medications over objection granted by court on 1/26.     Presently pt is on Zyprexa and Caplyta. Patient is secretive about her symptoms but can not contain them: she constantly talking to herself in response to voices. remains psychotic. Nevertheless, she tolerated interviews better, for a longer period of time and with decrease in degree of dysphoria, her ADLs are also better. She has been compliant with medications and tolerating them well.  Pt remains psychotic but not disruptive except for episodes of speaking very loudly to voices. Continues to need supervision and redirection in hygiene, hydration and self-care. Responding to staff redirections. Needs prns every day due to periods of overwhelming internal stimuli, - with fair effect observed after prns. Psychotic symptoms have not resolved.   EPS-  better since Cogentin was increased.   Tolerates treatment well otherwise.    Plan:  - Continue in-patient hospitalization  Continue trial of:  - Zyprexa 10 mg qam and 20mg hs (zydis)  - Caplyta 42 mg  - Cogentin 1 mg BID ( increased)  EKG - WNL - QTc - 0.488 ( 4/20/21)  We are now trying to further clarify previous level of response to Clozapine trial to assess potential risks vs benefits of Clozapine retrial.  Applied for ACT and AOT but pt reportedly did not tolerate meeting with ACT team well and is rejected by ACT due to still prominent psychosis    We are proceeding with applying for transfer to a long-term hospital.  PPD to be placed with above purpose.

## 2021-04-22 LAB
ALBUMIN SERPL ELPH-MCNC: 4.4 G/DL — SIGNIFICANT CHANGE UP (ref 3.3–5)
ALP SERPL-CCNC: 67 U/L — SIGNIFICANT CHANGE UP (ref 40–120)
ALT FLD-CCNC: 14 U/L — SIGNIFICANT CHANGE UP (ref 4–33)
ANION GAP SERPL CALC-SCNC: 11 MMOL/L — SIGNIFICANT CHANGE UP (ref 7–14)
AST SERPL-CCNC: 13 U/L — SIGNIFICANT CHANGE UP (ref 4–32)
BASOPHILS # BLD AUTO: 0 K/UL — SIGNIFICANT CHANGE UP (ref 0–0.2)
BASOPHILS NFR BLD AUTO: 0 % — SIGNIFICANT CHANGE UP (ref 0–2)
BILIRUB SERPL-MCNC: 0.6 MG/DL — SIGNIFICANT CHANGE UP (ref 0.2–1.2)
BUN SERPL-MCNC: 15 MG/DL — SIGNIFICANT CHANGE UP (ref 7–23)
CALCIUM SERPL-MCNC: 9.2 MG/DL — SIGNIFICANT CHANGE UP (ref 8.4–10.5)
CHLORIDE SERPL-SCNC: 107 MMOL/L — SIGNIFICANT CHANGE UP (ref 98–107)
CO2 SERPL-SCNC: 23 MMOL/L — SIGNIFICANT CHANGE UP (ref 22–31)
CREAT SERPL-MCNC: 0.64 MG/DL — SIGNIFICANT CHANGE UP (ref 0.5–1.3)
EOSINOPHIL # BLD AUTO: 0 K/UL — SIGNIFICANT CHANGE UP (ref 0–0.5)
EOSINOPHIL NFR BLD AUTO: 0 % — SIGNIFICANT CHANGE UP (ref 0–6)
GLUCOSE SERPL-MCNC: 106 MG/DL — HIGH (ref 70–99)
HCT VFR BLD CALC: 35.7 % — SIGNIFICANT CHANGE UP (ref 34.5–45)
HGB BLD-MCNC: 12 G/DL — SIGNIFICANT CHANGE UP (ref 11.5–15.5)
IANC: 4.81 K/UL — SIGNIFICANT CHANGE UP (ref 1.5–8.5)
IMM GRANULOCYTES NFR BLD AUTO: 0.3 % — SIGNIFICANT CHANGE UP (ref 0–1.5)
LYMPHOCYTES # BLD AUTO: 0.52 K/UL — LOW (ref 1–3.3)
LYMPHOCYTES # BLD AUTO: 8.8 % — LOW (ref 13–44)
MCHC RBC-ENTMCNC: 26.8 PG — LOW (ref 27–34)
MCHC RBC-ENTMCNC: 33.6 GM/DL — SIGNIFICANT CHANGE UP (ref 32–36)
MCV RBC AUTO: 79.7 FL — LOW (ref 80–100)
MONOCYTES # BLD AUTO: 0.57 K/UL — SIGNIFICANT CHANGE UP (ref 0–0.9)
MONOCYTES NFR BLD AUTO: 9.6 % — SIGNIFICANT CHANGE UP (ref 2–14)
NEUTROPHILS # BLD AUTO: 4.81 K/UL — SIGNIFICANT CHANGE UP (ref 1.8–7.4)
NEUTROPHILS NFR BLD AUTO: 81.3 % — HIGH (ref 43–77)
NRBC # BLD: 0 /100 WBCS — SIGNIFICANT CHANGE UP
NRBC # FLD: 0 K/UL — SIGNIFICANT CHANGE UP
PLATELET # BLD AUTO: 147 K/UL — LOW (ref 150–400)
POTASSIUM SERPL-MCNC: 4 MMOL/L — SIGNIFICANT CHANGE UP (ref 3.5–5.3)
POTASSIUM SERPL-SCNC: 4 MMOL/L — SIGNIFICANT CHANGE UP (ref 3.5–5.3)
PROT SERPL-MCNC: 6.2 G/DL — SIGNIFICANT CHANGE UP (ref 6–8.3)
RBC # BLD: 4.48 M/UL — SIGNIFICANT CHANGE UP (ref 3.8–5.2)
RBC # FLD: 14.6 % — HIGH (ref 10.3–14.5)
SODIUM SERPL-SCNC: 141 MMOL/L — SIGNIFICANT CHANGE UP (ref 135–145)
WBC # BLD: 5.92 K/UL — SIGNIFICANT CHANGE UP (ref 3.8–10.5)
WBC # FLD AUTO: 5.92 K/UL — SIGNIFICANT CHANGE UP (ref 3.8–10.5)

## 2021-04-22 PROCEDURE — 99232 SBSQ HOSP IP/OBS MODERATE 35: CPT

## 2021-04-22 RX ORDER — OLANZAPINE 15 MG/1
15 TABLET, FILM COATED ORAL AT BEDTIME
Refills: 0 | Status: DISCONTINUED | OUTPATIENT
Start: 2021-04-22 | End: 2021-04-26

## 2021-04-22 RX ORDER — CLOZAPINE 150 MG/1
25 TABLET, ORALLY DISINTEGRATING ORAL AT BEDTIME
Refills: 0 | Status: DISCONTINUED | OUTPATIENT
Start: 2021-04-22 | End: 2021-04-22

## 2021-04-22 RX ORDER — CLOZAPINE 150 MG/1
25 TABLET, ORALLY DISINTEGRATING ORAL AT BEDTIME
Refills: 0 | Status: DISCONTINUED | OUTPATIENT
Start: 2021-04-22 | End: 2021-04-23

## 2021-04-22 RX ADMIN — Medication 1 MILLIGRAM(S): at 21:00

## 2021-04-22 RX ADMIN — OLANZAPINE 15 MILLIGRAM(S): 15 TABLET, FILM COATED ORAL at 21:00

## 2021-04-22 RX ADMIN — LUMATEPERONE 42 MILLIGRAM(S): 10.5 CAPSULE ORAL at 16:24

## 2021-04-22 RX ADMIN — CLOZAPINE 25 MILLIGRAM(S): 150 TABLET, ORALLY DISINTEGRATING ORAL at 21:00

## 2021-04-22 RX ADMIN — Medication 1 MILLIGRAM(S): at 08:45

## 2021-04-22 RX ADMIN — OLANZAPINE 10 MILLIGRAM(S): 15 TABLET, FILM COATED ORAL at 08:45

## 2021-04-22 RX ADMIN — Medication 50 MILLIGRAM(S): at 17:19

## 2021-04-22 RX ADMIN — HALOPERIDOL DECANOATE 5 MILLIGRAM(S): 100 INJECTION INTRAMUSCULAR at 17:19

## 2021-04-22 NOTE — BH TREATMENT PLAN - NSTXPATIENTPARTICIPATE_PSY_ALL_CORE
No, patient unwilling to participate Patient participated in defining interventions/No, patient unwilling to participate

## 2021-04-22 NOTE — BH INPATIENT PSYCHIATRY PROGRESS NOTE - NSCGISEVERITYSCORE_CAL_PSY_ALL_CORE
Pt. medicated per MD order, pt. waiting for lab results. breathing unlabored on RA. Comfort and safety measures in place. 6

## 2021-04-22 NOTE — BH INPATIENT PSYCHIATRY PROGRESS NOTE - NSBHASSESSSUMMFT_PSY_ALL_CORE
25 Y/ F with a history of schizoaffective disorder vs Schizophrenia, 3 prior hospitalizations, in [facility] ETP program but non-compliant with medications PTA, who presented BIB mother for insomnia, medication non-compliance, yelling, and talking to self/RIS.   During this hospitalization patient was tried on Abilify and Haldol with poor response. Started on Zyprexa but had been inconsistently compliant with Zyprexa, with Medications over objection granted by court on 1/26.     Presently pt is on Zyprexa and Caplyta. Patient is secretive about her symptoms but can not contain them: she constantly talking to herself in response to voices. remains psychotic. Nevertheless, she tolerated interviews better, for a longer period of time and with decrease in degree of dysphoria, her ADLs are also better. She has been compliant with medications and tolerating them well.  Pt remains psychotic but not disruptive except for episodes of speaking very loudly to voices. Continues to need supervision and redirection in hygiene, hydration and self-care. Responding to staff redirections. Needs prns every day due to periods of overwhelming internal stimuli, - with fair effect observed after prns. Psychotic symptoms have not resolved.   EPS-  better since Cogentin was increased.   Tolerates treatment well otherwise.  plan CBC with differential and if ANC - stable sherry consider ret-trial of clozapine    Plan:  - Continue in-patient hospitalization  Continue trial of:  - Zyprexa 10 mg qam and 20mg hs (zydis)  - Caplyta 42 mg  - Cogentin 1 mg BID ( increased)  EKG - WNL - QTc - 0.488 ( 4/20/21)    Applied for ACT and AOT but pt reportedly did not tolerate meeting with ACT team well and is rejected by ACT due to still prominent psychosis    We are proceeding with applying for transfer to a long-term hospital.  PPD to be placed with above purpose.

## 2021-04-22 NOTE — BH INPATIENT PSYCHIATRY PROGRESS NOTE - MSE UNSTRUCTURED FT
Pt refused vitals today    MSE  Pt appears stated age, is better kempt. She is more cooperative in answering questions today - but still can only do it for few minutes at a time due to intrusions of internal stimuli and resultant frustration; No psychomotor agitation/ retardation. + Stereotypical bizarre hands movements observed episodically. No involuntary movements. Gait is steady. The patient's speech is fluent, nl in volume. She states that her mood is "good". Pt's affect is full in range, labile; affect is incongruent to mood. No formal thought disorder noted at this time. Pt perception likely includes hallucinations as she speaks with an unseen person when alone; pt denies any hallucinations or delusions when questioned. No evidence of SI/HI. Insight and judgment are limited. Impulse control is poor.

## 2021-04-22 NOTE — BH INPATIENT PSYCHIATRY PROGRESS NOTE - NSBHFUPINTERVALHXFT_PSY_A_CORE
Pt received no PRNs in last 24hr. Patient continues to be compliant with standing medications. Pt cooperative and less irritable today. Pt agreed to speak with writer today. She states she has been walking around and spending time with the other patients. She describes her mood as happy. She denies anyone on the unit mistreating her. Pt denies any hallucinations or delusions but was observed speaking to herself earlier today and after interview     Pt was informed of writer speaking with pt's mother and discussing initiation of clozapine and requirement of regular blood work. Pt was agreeable to this plan.

## 2021-04-22 NOTE — BH CHART NOTE - NSEVENTNOTEFT_PSY_ALL_CORE
Course of Treatment    HPI:  Patient is a 25 year old female, domiciled with mother, with a [facility] hospitalization and discharge on November 20, 2020, BIB mother to ED on October 11, 2020 for talking to herself, yelling, and not sleeping. Mother stated that the patient had been non-compliant with her medications for three days prior to initial presentation. Mother stated that the patient was taking Clozapine 200mg once a day but two weeks prior to admission, the patient was switched to Abilify 5mg. Patient was also prescribed Propanol 20mg twice a day. Mother stated that the patient sees Dr. Antonina Villaseñor. She stated that the patient refused her medications on 3 consecutive days. She stated that the patient is not using drugs or alcohol. She stated that since the patient had not been taking the medication and she had been talking to herself and had been yelling and screaming but nonviolent. Mother stated that the patient was worsening due to medication noncompliance.    Past psychiatric history prominent for multiple psychiatric admissions due to persistent and often excalating auditory hallucinations with neglect of ADLs. Patient had trials of Abilify, Zyprexa?, Risperidone, and Haldol. The trial of Clozapine after 200 mg did not control psychosis and patient could not sustain compliance.  No known medical history.  Family history is non contributory.  Substance abuse is not known.    Psychosocial:  Patient is unemployed and lives with family.    Patient's Hospital Course:  On initial evaluation, Pt appeared stated age was poorly kempt. She was not cooperative with interviewers as she was preoccupied with internal stimuli; she was virtually unaware of surroundings due to severe disorganization of thought process. This led to her being disruptive and intrusive and unable to respond to redirections appropriately. It was not possible at that time to engage her in conversation or to successfully assist her ADLs.  Pt treatment regimen during admission included trials of Aripiprazole, haloperidol, lorazepam, olanzapine. Later on, there was an attempt to combine olanzapine up to 30 mg qd with lumateperone (Coplyta 42 mg).     With above therapeutic efforts, patient's condition improved very modestly only to the point that she a little less intrusive and somewhat more aware of activities around her although not engaged in any of them. In addition, her hygiene improved while her efforts were supplemented by staff directions and assistance. Pt, though more cooperative on interviews, is still severely psychotic and responding to internal stimuli constantly, only able to converse with people for 2 - 5 minutes max before she gets overwhelmed with internal stimuli. She spends most of her time conversing to people "from the past" and is forbidden by them to disclose who they are. At times, she is talking to the voices loudly and screaming at them. On such occasions, she often needs PRNs.  She has not been aggressive since her admission.    MSE  Pt appears stated age, is better kempt. She can only tolerate interview for several minutes, becomes restless, and abruptly leaves due to intrusions of internal stimuli and resultant frustration; No psychomotor agitation/ retardation. + Stereotypical bizarre hands movements observed episodically. No involuntary movements. Gait is steady. The patient's speech is fluent, nl in volume. She states that her mood is "good". Pt's affect is full in range, labile; affect is incongruent to mood. No formal thought disorder noted at this time. Pt perception likely includes hallucinations as she speaks with an unseen person when alone; pt denies any hallucinations or delusions when questioned. No evidence of SI/HI. Insight and judgement are poor    Medical  No complaints. QTc 0.488 on 4/21/21.    Diagnosis  schizophrenia    Current Medications  - Zyprexa 10 mg qam and 20mg hs (zydis)  - Caplyta 42 mg  - Cogentin 1 mg BID    Conclusion  The extent of patient improvement is insufficient for her to successfully negotiate safe discharge planning at present time. When an attempt was made to organize an outpatient followup with ACT team and AOT, patient was unable to tolerate the interview with ACT team, became agitated and extremely uncooperative. This proved that she cannot be safely discharged and she requires a long-term hospitalization for further treatment. Course of Treatment    HPI:  Patient is a 25 year old female, domiciled with mother, with a [facility] hospitalization and discharge on November 20, 2020, BIB mother to ED on October 11, 2020 for talking to herself, yelling, and not sleeping. Mother stated that the patient had been non-compliant with her medications for three days prior to initial presentation. Mother stated that the patient was taking Clozapine 200mg once a day but two weeks prior to admission, the patient was switched to Abilify 5mg. Patient was also prescribed Propanol 20mg twice a day. Mother stated that the patient sees Dr. Antonina Villaseñor. She stated that the patient refused her medications on 3 consecutive days. She stated that the patient is not using drugs or alcohol. She stated that since the patient had not been taking the medication and she had been talking to herself and had been yelling and screaming but nonviolent. Mother stated that the patient was worsening due to medication noncompliance.    Past psychiatric history prominent for multiple psychiatric admissions due to persistent and often excalating auditory hallucinations with neglect of ADLs. Patient had trials of Abilify, Zyprexa?, Risperidone, and Haldol. The trial of Clozapine after 200 mg did not control psychosis and patient could not sustain compliance.  No known medical history.  Family history is non contributory.  Substance abuse is not known.    Psychosocial:  Patient is unemployed and lives with family.    Patient's Hospital Course:  On initial evaluation, Pt appeared stated age was poorly kempt. She was not cooperative with interviewers as she was preoccupied with internal stimuli; she was virtually unaware of surroundings due to severe disorganization of thought process. This led to her being disruptive and intrusive and unable to respond to redirections appropriately. It was not possible at that time to engage her in conversation or to successfully assist her ADLs.  Pt treatment regimen during admission included trials of Aripiprazole, haloperidol, lorazepam, olanzapine. Later on, there was a trial of olanzapine up to 30 mg qd with lumateperone (Coplyta 42 mg).     With above therapeutic efforts, patient's condition improved very modestly only to the point that she a little less intrusive and somewhat more aware of activities around her although not engaged in any of them. In addition, her hygiene improved while her efforts were supplemented by staff directions and assistance. Pt, though more cooperative on interviews, is still severely psychotic and responding to internal stimuli constantly, only able to converse with people for 2 - 5 minutes max before she gets overwhelmed with internal stimuli. She spends most of her time conversing to people "from the past" and is forbidden by them to disclose who they are. At times, she is talking to the voices loudly and screaming at them. On such occasions, she often needs PRNs.  She has not been aggressive since her admission.    MSE  Pt appears stated age, is better kempt. She can only tolerate interview for several minutes, becomes restless, and abruptly leaves due to intrusions of internal stimuli and resultant frustration; No psychomotor agitation/ retardation. + Stereotypical bizarre hands movements observed episodically. No involuntary movements. Gait is steady. The patient's speech is fluent, nl in volume. She states that her mood is "good". Pt's affect is full in range, labile; affect is incongruent to mood. No formal thought disorder noted at this time. Pt perception likely includes hallucinations as she speaks with an unseen person when alone; pt denies any hallucinations or delusions when questioned. No evidence of SI/HI. Insight and judgement are poor    Medical  No complaints. QTc 0.488 on 4/21/21.    Diagnosis  Schizophrenia    Current Medications  - Zyprexa 10 mg qam and 20mg hs (zydis)  - Caplyta 42 mg  - Cogentin 1 mg BID    Conclusion  The extent of patient's improvement is insufficient for her to successfully negotiate safe discharge planning at present time. When an attempt was made to organize an outpatient followup with ACT team and AOT, patient was unable to tolerate the interview with ACT team, became agitated and extremely uncooperative. She was rejected by ACT team.   This patient cannot be safely discharged and she requires a long-term hospitalization for further treatment.

## 2021-04-23 PROCEDURE — 99232 SBSQ HOSP IP/OBS MODERATE 35: CPT

## 2021-04-23 RX ORDER — CLOZAPINE 150 MG/1
50 TABLET, ORALLY DISINTEGRATING ORAL AT BEDTIME
Refills: 0 | Status: DISCONTINUED | OUTPATIENT
Start: 2021-04-24 | End: 2021-04-26

## 2021-04-23 RX ORDER — CLOZAPINE 150 MG/1
25 TABLET, ORALLY DISINTEGRATING ORAL AT BEDTIME
Refills: 0 | Status: COMPLETED | OUTPATIENT
Start: 2021-04-23 | End: 2021-04-23

## 2021-04-23 RX ADMIN — OLANZAPINE 15 MILLIGRAM(S): 15 TABLET, FILM COATED ORAL at 19:34

## 2021-04-23 RX ADMIN — CLOZAPINE 25 MILLIGRAM(S): 150 TABLET, ORALLY DISINTEGRATING ORAL at 19:34

## 2021-04-23 RX ADMIN — Medication 1 MILLIGRAM(S): at 19:32

## 2021-04-23 RX ADMIN — OLANZAPINE 10 MILLIGRAM(S): 15 TABLET, FILM COATED ORAL at 09:03

## 2021-04-23 RX ADMIN — Medication 2 MILLIGRAM(S): at 16:31

## 2021-04-23 RX ADMIN — Medication 50 MILLIGRAM(S): at 16:32

## 2021-04-23 RX ADMIN — LUMATEPERONE 42 MILLIGRAM(S): 10.5 CAPSULE ORAL at 16:32

## 2021-04-23 RX ADMIN — TUBERCULIN PURIFIED PROTEIN DERIVATIVE 5 UNIT(S): 5 INJECTION, SOLUTION INTRADERMAL at 14:34

## 2021-04-23 RX ADMIN — HALOPERIDOL DECANOATE 5 MILLIGRAM(S): 100 INJECTION INTRAMUSCULAR at 16:32

## 2021-04-23 RX ADMIN — Medication 1 MILLIGRAM(S): at 09:03

## 2021-04-23 NOTE — BH INPATIENT PSYCHIATRY PROGRESS NOTE - MSE UNSTRUCTURED FT
Pt refused vitals today    MSE  Pt appears stated age, fairly groomed. She is marginally cooperative in answering questions today for few minutes at a time and was seen in multiple serial approaches ( due to intrusions of internal stimuli and resultant frustration); No psychomotor agitation/ retardation. + Stereotypical bizarre hands movements observed episodically. No involuntary movements. Gait is steady. The patient's speech is fluent, nl in volume. She states that her mood is "good". Pt's affect is full in range, irritable, approrpaite. No formal thought disorder noted at this time. Pt perception likely includes hallucinations as she speaks with an unseen person when alone; pt denies any hallucinations or delusions when questioned. No evidence of SI/HI. Insight and judgment are limited. Impulse control is poor.

## 2021-04-23 NOTE — BH INPATIENT PSYCHIATRY PROGRESS NOTE - NSBHASSESSSUMMFT_PSY_ALL_CORE
25 Y/ F with a history of schizoaffective disorder vs Schizophrenia, 3 prior hospitalizations, in [facility] ETP program but non-compliant with medications PTA, who presented BIB mother for insomnia, medication non-compliance, yelling, and talking to self/RIS.   During this hospitalization patient was tried on Abilify and Haldol with poor response. Started on Zyprexa but had been inconsistently compliant with Zyprexa, with Medications over objection granted by court on 1/26.     Presently pt is on Zyprexa and Caplyta. Patient is secretive about her symptoms but can not contain them: she constantly talking to herself in response to voices. remains psychotic. Nevertheless, she tolerated interviews better, for a longer period of time and with decrease in degree of dysphoria, her ADLs are also better. She has been compliant with medications and tolerating them well.  Pt remains psychotic but not disruptive except for episodes of speaking very loudly to voices. Continues to need supervision and redirection in hygiene, hydration and self-care. Responding to staff redirections. Needs prns every day due to periods of overwhelming internal stimuli, - with fair effect observed after prns. Psychotic symptoms have not resolved.     EPS-  better since Cogentin was increased.   Tolerates treatment well.  plan CBC with differential and if ANC - stable 4.81 - 4/22/21    Plan:  - Continue in-patient hospitalization  Continue trial of:  Started Clozapine on 4/22/21  - Zyprexa 10 mg qam and 20mg hs (zydis) -will be cross-titrated with Clozapine  - Caplyta 42 mg  - Cogentin 1 mg BID ( increased)  EKG - WNL - QTc - 0.488 ( 4/20/21)    Applied for ACT and AOT but pt reportedly did not tolerate meeting with ACT team well and is rejected by ACT due to still prominent psychosis    We are proceeding with applying for transfer to a long-term hospital.  PPD to be placed with above purpose.

## 2021-04-23 NOTE — BH INPATIENT PSYCHIATRY PROGRESS NOTE - NSBHFUPINTERVALHXFT_PSY_A_CORE
Pt had PRN diphenhydramine + haloperidol once at 5 PM yesterday for anxiety / agitation s. Patient continues to be compliant with standing medications.     Pt is somewhat cooperative initially but becomes irritable few minutes into the interview, citing her annoyance in conversing with others. She states she has been walking around and spending time with the other patients. She describes her mood as good. She denies anyone on the unit mistreating her. Pt denies any hallucinations or delusions but was observed speaking to herself non-stop  earlier today and after interview

## 2021-04-24 RX ORDER — IBUPROFEN 200 MG
400 TABLET ORAL ONCE
Refills: 0 | Status: DISCONTINUED | OUTPATIENT
Start: 2021-04-24 | End: 2021-05-12

## 2021-04-24 RX ADMIN — Medication 2 MILLIGRAM(S): at 13:10

## 2021-04-24 RX ADMIN — Medication 2 MILLIGRAM(S): at 22:29

## 2021-04-24 RX ADMIN — LUMATEPERONE 42 MILLIGRAM(S): 10.5 CAPSULE ORAL at 16:47

## 2021-04-24 RX ADMIN — Medication 1 MILLIGRAM(S): at 21:55

## 2021-04-24 RX ADMIN — CLOZAPINE 50 MILLIGRAM(S): 150 TABLET, ORALLY DISINTEGRATING ORAL at 21:55

## 2021-04-24 RX ADMIN — OLANZAPINE 15 MILLIGRAM(S): 15 TABLET, FILM COATED ORAL at 21:55

## 2021-04-24 RX ADMIN — HALOPERIDOL DECANOATE 5 MILLIGRAM(S): 100 INJECTION INTRAMUSCULAR at 22:29

## 2021-04-24 RX ADMIN — HALOPERIDOL DECANOATE 5 MILLIGRAM(S): 100 INJECTION INTRAMUSCULAR at 13:10

## 2021-04-24 RX ADMIN — Medication 50 MILLIGRAM(S): at 13:10

## 2021-04-24 RX ADMIN — Medication 50 MILLIGRAM(S): at 22:29

## 2021-04-24 RX ADMIN — OLANZAPINE 10 MILLIGRAM(S): 15 TABLET, FILM COATED ORAL at 08:38

## 2021-04-24 RX ADMIN — Medication 1 MILLIGRAM(S): at 08:38

## 2021-04-25 RX ADMIN — OLANZAPINE 15 MILLIGRAM(S): 15 TABLET, FILM COATED ORAL at 21:06

## 2021-04-25 RX ADMIN — CLOZAPINE 50 MILLIGRAM(S): 150 TABLET, ORALLY DISINTEGRATING ORAL at 21:05

## 2021-04-25 RX ADMIN — Medication 1 MILLIGRAM(S): at 21:05

## 2021-04-25 RX ADMIN — Medication 1 MILLIGRAM(S): at 08:23

## 2021-04-25 RX ADMIN — OLANZAPINE 10 MILLIGRAM(S): 15 TABLET, FILM COATED ORAL at 08:23

## 2021-04-25 RX ADMIN — LUMATEPERONE 42 MILLIGRAM(S): 10.5 CAPSULE ORAL at 16:46

## 2021-04-26 PROCEDURE — 99232 SBSQ HOSP IP/OBS MODERATE 35: CPT

## 2021-04-26 RX ORDER — CLOZAPINE 150 MG/1
75 TABLET, ORALLY DISINTEGRATING ORAL AT BEDTIME
Refills: 0 | Status: DISCONTINUED | OUTPATIENT
Start: 2021-04-26 | End: 2021-04-28

## 2021-04-26 RX ORDER — OLANZAPINE 15 MG/1
10 TABLET, FILM COATED ORAL AT BEDTIME
Refills: 0 | Status: DISCONTINUED | OUTPATIENT
Start: 2021-04-26 | End: 2021-05-07

## 2021-04-26 RX ADMIN — Medication 1 MILLIGRAM(S): at 09:16

## 2021-04-26 RX ADMIN — Medication 2 MILLIGRAM(S): at 03:47

## 2021-04-26 RX ADMIN — CLOZAPINE 75 MILLIGRAM(S): 150 TABLET, ORALLY DISINTEGRATING ORAL at 20:37

## 2021-04-26 RX ADMIN — Medication 2 MILLIGRAM(S): at 10:26

## 2021-04-26 RX ADMIN — Medication 1 MILLIGRAM(S): at 20:37

## 2021-04-26 RX ADMIN — Medication 50 MILLIGRAM(S): at 03:46

## 2021-04-26 RX ADMIN — HALOPERIDOL DECANOATE 5 MILLIGRAM(S): 100 INJECTION INTRAMUSCULAR at 09:16

## 2021-04-26 RX ADMIN — OLANZAPINE 10 MILLIGRAM(S): 15 TABLET, FILM COATED ORAL at 20:37

## 2021-04-26 RX ADMIN — OLANZAPINE 10 MILLIGRAM(S): 15 TABLET, FILM COATED ORAL at 09:16

## 2021-04-26 RX ADMIN — Medication 50 MILLIGRAM(S): at 10:25

## 2021-04-26 RX ADMIN — LUMATEPERONE 42 MILLIGRAM(S): 10.5 CAPSULE ORAL at 16:47

## 2021-04-26 NOTE — BH INPATIENT PSYCHIATRY PROGRESS NOTE - MSE UNSTRUCTURED FT
Pt refused vitals today    Pt appears stated age, fairly groomed. She is a little more cooperative in answering questions today, tolerated a little longer interview and appeared to be less stressed out and less dysphoric .No psychomotor agitation/ retardation. + Stereotypical bizarre hands movements observed episodically. No involuntary movements. Gait is steady. The patient's speech is fluent, nl in volume. She states that her mood is "good". Pt's affect is full in range, slightly less  irritable, appropriate, less dysphoric than previous interview. No formal thought disorder noted at this time. Pt perception likely includes hallucinations as she speaks with an unseen person when alone; pt denies any hallucinations or delusions when questioned. No evidence of SI/HI. Insight and judgment are limited. Impulse control is poor.

## 2021-04-26 NOTE — BH INPATIENT PSYCHIATRY PROGRESS NOTE - NSBHASSESSSUMMFT_PSY_ALL_CORE
25 Y/ F with a history of schizoaffective disorder vs Schizophrenia, 3 prior hospitalizations, in [facility] ETP program but non-compliant with medications PTA, who presented BIB mother for insomnia, medication non-compliance, yelling, and talking to self/RIS.   During this hospitalization patient was tried on Abilify and Haldol with poor response. Started on Zyprexa but had been inconsistently compliant with Zyprexa, with Medications over objection granted by court on 1/26.     Presently pt is on Zyprexa and Caplyta. Patient is secretive about her symptoms but can not contain them: she constantly talking to herself in response to voices. remains psychotic. Nevertheless, she tolerated interviews better, for a longer period of time and with decrease in degree of dysphoria, her ADLs are also better. She has been compliant with medications and tolerating them well.  Pt remains psychotic but not disruptive except for episodes of speaking very loudly to voices. Continues to need supervision and redirection in hygiene, hydration and self-care. Responding to staff redirections. Needs prns every day due to periods of overwhelming internal stimuli, - with fair effect observed after prns. Psychotic symptoms have not resolved.        CBC with differential and if ANC - stable 4.81 - 4/22/21; next CBC with diff on 4/29/21  Clozapine was initiated and being titrated up while Zyprexa doses are being titrated down.  Tolerates treatment well.  Plan:  - Continue in-patient hospitalization  Continue trial of:  Started Clozapine on 4/22/21  - Clozapine 75 mg qhs  - Zyprexa 10 mg qam and 10mg hs (zydis) -will be cross-titrated with Clozapine  - Caplyta 42 mg  - Cogentin 1 mg BID ( increased)  EKG - WNL - QTc - 0.488 ( 4/20/21)    Applied for ACT and AOT but pt reportedly did not tolerate meeting with ACT team well and is rejected by ACT due to still prominent psychosis    We are proceeding with applying for transfer to a long-term hospital.  PPD to be placed with above purpose.

## 2021-04-26 NOTE — BH INPATIENT PSYCHIATRY PROGRESS NOTE - NSBHFUPINTERVALHXFT_PSY_A_CORE
Objective:  Pt received PRN haloperidol + diphenhydramine + lorazepam Friday ~4:30 PM, Saturday 1:10 PM, and Saturday ~10:30 PM. Pt received PRN diphenhydramine + lorazepam this morning ~3:35 AM. Pt awoke this morning Pt continues to be compliant with medications. No acute events over the weekend.    C/o: "I feel good"    SUBJECTIVE: Pt is somewhat cooperative and becomes slightly less irritable than in prior interviews. Pt states that she feels well. Pt saw her mother over the weekend and states that the meeting with her went well. Pt states she went to group over the weekend and that it has been beneficial. She denies anyone on the unit mistreating her. Pt denies any hallucinations or delusions but was observed speaking to herself earlier today and after interview. Pt states she has difficulty sleeping.

## 2021-04-27 PROCEDURE — 99232 SBSQ HOSP IP/OBS MODERATE 35: CPT

## 2021-04-27 RX ADMIN — LUMATEPERONE 42 MILLIGRAM(S): 10.5 CAPSULE ORAL at 16:23

## 2021-04-27 RX ADMIN — OLANZAPINE 10 MILLIGRAM(S): 15 TABLET, FILM COATED ORAL at 09:18

## 2021-04-27 RX ADMIN — CLOZAPINE 75 MILLIGRAM(S): 150 TABLET, ORALLY DISINTEGRATING ORAL at 20:45

## 2021-04-27 RX ADMIN — OLANZAPINE 10 MILLIGRAM(S): 15 TABLET, FILM COATED ORAL at 20:45

## 2021-04-27 RX ADMIN — Medication 1 MILLIGRAM(S): at 09:18

## 2021-04-27 RX ADMIN — Medication 1 MILLIGRAM(S): at 20:45

## 2021-04-27 NOTE — BH INPATIENT PSYCHIATRY PROGRESS NOTE - MSE UNSTRUCTURED FT
Vitals are stable    MSE  Pt appears stated age, fairly groomed. She is uncooperative in answering questions today though several attempts at interview were made; patient answers in one-word responses, which are consistently negative and indicate her level of engagement with internal stimuli and her negativistic and secretive attitude. No psychomotor agitation/ retardation. + Stereotypical bizarre hands movements observed episodically. No involuntary movements. Gait is steady. On overhearing the pt speak to herself, speech is fluent, nl in volume though at times she is heard shouting. Mood is unable to be assessed. Pt's affect is irritable and dysphoric. No formal thought disorder noted at this time. Pt perception likely includes hallucinations as she speaks with an unseen person when alone. No evidence of SI/HI. Insight and judgment are limited. Impulse control is poor.

## 2021-04-27 NOTE — BH INPATIENT PSYCHIATRY PROGRESS NOTE - NSBHASSESSSUMMFT_PSY_ALL_CORE
25 Y/ F with a history of schizoaffective disorder vs Schizophrenia, 3 prior hospitalizations, in [facility] ETP program but non-compliant with medications PTA, who presented BIB mother for insomnia, medication non-compliance, yelling, and talking to self/RIS.   During this hospitalization patient was tried on Abilify and Haldol with poor response. Started on Zyprexa but had been inconsistently compliant with Zyprexa, with Medications over objection granted by court on 1/26.     Presently pt is on Zyprexa and Caplyta. Patient is secretive about her symptoms but can not contain them: she constantly talking to herself in response to voices. remains psychotic. Nevertheless, she tolerated interviews better, for a longer period of time and with decrease in degree of dysphoria, her ADLs are also better. She has been compliant with medications and tolerating them well.  Pt remains psychotic but not disruptive except for episodes of speaking very loudly to voices. Continues to need supervision and redirection in hygiene, hydration and self-care. Responding to staff redirections. Needs prns every day due to periods of overwhelming internal stimuli, - with fair effect observed after prns. Psychotic symptoms have not resolved.    Patient's court order of retention is expiring, but patient remains very ill, with psychosis and can not be safely discharged at this time. We are applying to Court for further retention to continue in-patient treatment.        CBC with differential and if ANC - stable 4.81 - 4/22/21; next CBC with diff on 4/29/21  Clozapine was initiated and being titrated up while Zyprexa doses are being titrated down.  Tolerates treatment well.  Plan:  - Continue in-patient hospitalization  Continue trial of:  Started Clozapine on 4/22/21  - Clozapine 75 mg qhs  - Zyprexa 10 mg qam and 10mg hs (zydis) -will be cross-titrated with Clozapine  - Caplyta 42 mg  - Cogentin 1 mg BID ( increased)  EKG - WNL - QTc - 0.488 ( 4/20/21)    Applied for ACT and AOT but pt reportedly did not tolerate meeting with ACT team well and is rejected by ACT due to still prominent psychosis    We are proceeding with applying for transfer to a long-term hospital.

## 2021-04-27 NOTE — BH INPATIENT PSYCHIATRY PROGRESS NOTE - NSBHFUPINTERVALHXFT_PSY_A_CORE
Objective:  Pt yesterday received PRN haloperidol ~9:15 AM and diphenhydramine + lorazepam ~10:25. Per nursing staff, pt still highly focused on internal stimuli and speaking to self. Pt compliant with all standing medications.    C/o: "yes I mind"    SUBJECTIVE: Pt approached multiple times and declined interview. Pt noted to be pacing hallway, speaking to herself, and making stereotypical hand movements.

## 2021-04-28 PROCEDURE — 99232 SBSQ HOSP IP/OBS MODERATE 35: CPT

## 2021-04-28 RX ORDER — CLOZAPINE 150 MG/1
100 TABLET, ORALLY DISINTEGRATING ORAL AT BEDTIME
Refills: 0 | Status: DISCONTINUED | OUTPATIENT
Start: 2021-04-28 | End: 2021-04-30

## 2021-04-28 RX ADMIN — Medication 1 MILLIGRAM(S): at 09:17

## 2021-04-28 RX ADMIN — CLOZAPINE 100 MILLIGRAM(S): 150 TABLET, ORALLY DISINTEGRATING ORAL at 21:23

## 2021-04-28 RX ADMIN — OLANZAPINE 10 MILLIGRAM(S): 15 TABLET, FILM COATED ORAL at 21:23

## 2021-04-28 RX ADMIN — Medication 1 MILLIGRAM(S): at 21:23

## 2021-04-28 RX ADMIN — OLANZAPINE 10 MILLIGRAM(S): 15 TABLET, FILM COATED ORAL at 09:17

## 2021-04-28 RX ADMIN — LUMATEPERONE 42 MILLIGRAM(S): 10.5 CAPSULE ORAL at 17:19

## 2021-04-28 NOTE — BH INPATIENT PSYCHIATRY PROGRESS NOTE - MSE UNSTRUCTURED FT
Vitals are stable    MSE  Pt appears stated age, fairly groomed. She is slightly more cooperative in answering questions today but still tolerates only short interview and required serial approaches; No psychomotor agitation/ retardation. + Stereotypical bizarre hands movements observed episodically. No involuntary movements. Gait is steady. On overhearing the pt speak to herself, speech is fluent, nl in volume though at times she is heard shouting. Mood is "good". Pt's affect is irritable and dysphoric at times but overall is perez in range. No formal thought disorder noted at this time. Pt perception likely includes hallucinations as she speaks with an unseen person when alone. No evidence of SI/HI. Insight and judgment are limited. Impulse control is poor.

## 2021-04-28 NOTE — BH INPATIENT PSYCHIATRY PROGRESS NOTE - NSBHFUPINTERVALHXFT_PSY_A_CORE
KM is a 25y old  Female who presents with a chief complaint of Laughing to self (29 Dec 2020 11:37)    Objective: Pt received no PRNs over last 24 hrs. Pt continues to be compliant with all standing medications. No acute events overnight. Nursing reports she is pacing hallway, talking to self, yelling at times.    C/o: "good"    SUBJECTIVE: Pt was playing basketball with other patients and agreed to interview. Pt states her mood is good and has no complaints. She states she enjoys playing basketball and has also been watching [employer] and [employer] on TV. The pt denies AH, VH, SI, HI.

## 2021-04-28 NOTE — BH INPATIENT PSYCHIATRY PROGRESS NOTE - NSBHASSESSSUMMFT_PSY_ALL_CORE
25 Y/ F with a history of schizoaffective disorder vs Schizophrenia, 3 prior hospitalizations, in [facility] ETP program but non-compliant with medications PTA, who presented BIB mother for insomnia, medication non-compliance, yelling, and talking to self/RIS.   During this hospitalization patient was tried on Abilify and Haldol with poor response. Started on Zyprexa but had been inconsistently compliant with Zyprexa, with Medications over objection granted by court on 1/26.     Presently pt is on Zyprexa and Caplyta. Patient is secretive about her symptoms but can not contain them: she constantly talking to herself in response to voices. remains psychotic. Nevertheless, she tolerated interviews better, for a longer period of time and with decrease in degree of dysphoria, her ADLs are also better. She has been compliant with medications and tolerating them well.  Pt remains psychotic but not disruptive except for episodes of speaking very loudly to voices. Continues to need supervision and redirection in hygiene, hydration and self-care. Responding to staff redirections. Needs prns every day due to periods of overwhelming internal stimuli, - with fair effect observed after prns. Psychotic symptoms have not resolved.    Patient's court order of retention is expiring, but patient remains very ill, with psychosis and can not be safely discharged at this time. We are applying to Court for further retention to continue in-patient treatment.        CBC with differential and if ANC - stable 4.81 - 4/22/21; next CBC with diff on 4/29/21  Clozapine was initiated and being titrated up while Zyprexa doses are being titrated down.  Tolerates treatment well.  Plan:  - Continue in-patient hospitalization  Continue trial of:  Started Clozapine on 4/22/21  - Clozapine 100 mg qhs  - Zyprexa 10 mg qam and 10mg hs (zydis) -will be cross-titrated with Clozapine  - Caplyta 42 mg  - Cogentin 1 mg BID ( increased)  EKG - WNL - QTc - 0.488 ( 4/20/21)    Applied for ACT and AOT but pt reportedly did not tolerate meeting with ACT team well and is rejected by ACT due to still prominent psychosis    We are proceeding with applying for transfer to a long-term hospital.

## 2021-04-29 LAB
BASOPHILS # BLD AUTO: 0 K/UL — SIGNIFICANT CHANGE UP (ref 0–0.2)
BASOPHILS NFR BLD AUTO: 0 % — SIGNIFICANT CHANGE UP (ref 0–2)
EOSINOPHIL # BLD AUTO: 0 K/UL — SIGNIFICANT CHANGE UP (ref 0–0.5)
EOSINOPHIL NFR BLD AUTO: 0 % — SIGNIFICANT CHANGE UP (ref 0–6)
HCT VFR BLD CALC: 37.9 % — SIGNIFICANT CHANGE UP (ref 34.5–45)
HGB BLD-MCNC: 11.9 G/DL — SIGNIFICANT CHANGE UP (ref 11.5–15.5)
IANC: 3.96 K/UL — SIGNIFICANT CHANGE UP (ref 1.5–8.5)
IMM GRANULOCYTES NFR BLD AUTO: 0.2 % — SIGNIFICANT CHANGE UP (ref 0–1.5)
LYMPHOCYTES # BLD AUTO: 0.39 K/UL — LOW (ref 1–3.3)
LYMPHOCYTES # BLD AUTO: 7.7 % — LOW (ref 13–44)
MCHC RBC-ENTMCNC: 25.9 PG — LOW (ref 27–34)
MCHC RBC-ENTMCNC: 31.4 GM/DL — LOW (ref 32–36)
MCV RBC AUTO: 82.6 FL — SIGNIFICANT CHANGE UP (ref 80–100)
MONOCYTES # BLD AUTO: 0.7 K/UL — SIGNIFICANT CHANGE UP (ref 0–0.9)
MONOCYTES NFR BLD AUTO: 13.8 % — SIGNIFICANT CHANGE UP (ref 2–14)
NEUTROPHILS # BLD AUTO: 3.96 K/UL — SIGNIFICANT CHANGE UP (ref 1.8–7.4)
NEUTROPHILS NFR BLD AUTO: 78.3 % — HIGH (ref 43–77)
NRBC # BLD: 0 /100 WBCS — SIGNIFICANT CHANGE UP
NRBC # FLD: 0 K/UL — SIGNIFICANT CHANGE UP
PLATELET # BLD AUTO: 125 K/UL — LOW (ref 150–400)
RBC # BLD: 4.59 M/UL — SIGNIFICANT CHANGE UP (ref 3.8–5.2)
RBC # FLD: 15.2 % — HIGH (ref 10.3–14.5)
WBC # BLD: 5.06 K/UL — SIGNIFICANT CHANGE UP (ref 3.8–10.5)
WBC # FLD AUTO: 5.06 K/UL — SIGNIFICANT CHANGE UP (ref 3.8–10.5)

## 2021-04-29 PROCEDURE — 99232 SBSQ HOSP IP/OBS MODERATE 35: CPT

## 2021-04-29 RX ADMIN — OLANZAPINE 10 MILLIGRAM(S): 15 TABLET, FILM COATED ORAL at 21:17

## 2021-04-29 RX ADMIN — OLANZAPINE 10 MILLIGRAM(S): 15 TABLET, FILM COATED ORAL at 08:26

## 2021-04-29 RX ADMIN — Medication 1 MILLIGRAM(S): at 21:17

## 2021-04-29 RX ADMIN — CLOZAPINE 100 MILLIGRAM(S): 150 TABLET, ORALLY DISINTEGRATING ORAL at 21:17

## 2021-04-29 RX ADMIN — Medication 1 MILLIGRAM(S): at 08:26

## 2021-04-29 RX ADMIN — LUMATEPERONE 42 MILLIGRAM(S): 10.5 CAPSULE ORAL at 17:33

## 2021-04-29 NOTE — BH INPATIENT PSYCHIATRY PROGRESS NOTE - MSE UNSTRUCTURED FT
Vitals- refused    MSE  Pt appears stated age, fairly groomed. She is mildly cooperative in answering questions today; No psychomotor agitation/ retardation. + Stereotypical bizarre hands movements observed episodically. No involuntary movements. Gait is steady. On overhearing the pt speak to herself, speech is fluent, nl in volume though at times she is heard shouting. Mood is "good". Pt's affect is irritable and dysphoric. No formal thought disorder noted at this time. Pt perception likely includes hallucinations as she speaks with an unseen person when alone. No evidence of SI/HI. Insight and judgment are limited. Impulse control is poor.    Labs today 4/29/21  WBC 5.06  ANC 3.96  CBC notable for normal H/H, mildly low MCHC, mildly low Plt but none within concerning ranges

## 2021-04-29 NOTE — BH TREATMENT PLAN - NSTXPSYCHOINTERRN_PSY_ALL_CORE
Pt will report perceptual difficulties to staff and take medications as ordered.
Assess for AH, VH, and CAH.
monitor mood and behavior. Observe for signs of disorganized and bizarre behavior. Provide staff support and redirection as needed.
Report perceptual difficulties.
Report perceptual difficulties.
Monitor pt for perceptuuaql difficulties.
monitor mood and behavior. Observe for signs of AVH, disorganization and altered thought process. Provide staff support and redirection as needed.
Continue psychoeducation
Encourage patient to verbalize A/H/ coping skills/ administer prns
Maintain safe and therapeutic milieu. Monitor intake and effect of medication. Monitor for changes in behavior and condition. Provide supportive care.
Monitor behavior. Encourtage pt to report perceptual difficulties.
Assess for A-VH, encoaurge to seek staff support.
reorient pt regarding auditory hallucinations.
Assess patient for presence and/or changes in thought processes.  Assess patient for level of understanding and willingness to learn. Encourage patient to actively participate in treatment. Establish therapeutic relationship.  Manage patient's environment, avoiding excessive stimulation. Maintain safe, supportive, and structured environment. Engage patient in calming activities. Utilize active listening to understand patient's perception and concerns.  Give positive reinforcement for appropriate behavior and logical thought. Utilize reality therapy to provide ongoing orientation to patient. Set limits on inappropriate comments, demanding, and threatening behavior.  Administer medications as ordered and as needed for aggressive behavior.

## 2021-04-29 NOTE — BH INPATIENT PSYCHIATRY PROGRESS NOTE - NSBHASSESSSUMMFT_PSY_ALL_CORE
25 Y/ F with a history of schizoaffective disorder vs Schizophrenia, 3 prior hospitalizations, in [facility] ETP program but non-compliant with medications PTA, who presented BIB mother for insomnia, medication non-compliance, yelling, and talking to self/RIS.   During this hospitalization patient was tried on Abilify and Haldol with poor response. Started on Zyprexa but had been inconsistently compliant with Zyprexa, with Medications over objection granted by court on 1/26.     Presently pt is on Zyprexa and Caplyta. Patient is secretive about her symptoms but can not contain them: she constantly talking to herself in response to voices. remains psychotic. Nevertheless, she tolerated interviews better, for a longer period of time and with decrease in degree of dysphoria, her ADLs are also better. She has been compliant with medications and tolerating them well.  Pt remains psychotic but not disruptive except for episodes of speaking very loudly to voices. Continues to need supervision and redirection in hygiene, hydration and self-care. Responding to staff redirections. Needs prns every day due to periods of overwhelming internal stimuli, - with fair effect observed after prns. Psychotic symptoms have not resolved.    Patient's court order of retention is expiring, but patient remains very ill, with psychosis and can not be safely discharged at this time. We are applying to Court for further retention to continue in-patient treatment.        CBC with differential and if ANC - stable 4.81 - 4/22/21; 3.96 4/29/21  Clozapine was initiated and being titrated up while Zyprexa doses are being titrated down.  Tolerates treatment well, only mildly sedated in am.  Plan:  - Continue in-patient hospitalization  Continue trial of:  Started Clozapine on 4/22/21  - Continue Clozapine 100 mg qhs  - Zyprexa 10 mg qam and 10mg hs (zydis) -will be cross-titrated with Clozapine  - Caplyta 42 mg  - Cogentin 1 mg BID ( increased)  EKG - WNL - QTc - 0.488 ( 4/20/21)    Applied for ACT and AOT but pt reportedly did not tolerate meeting with ACT team well and is rejected by ACT due to still prominent psychosis    We are proceeding with applying for transfer to a long-term hospital.

## 2021-04-29 NOTE — BH TREATMENT PLAN - NSTXNEGATINTERRN_PSY_ALL_CORE
Encourage pt to verablize about auditory hallucinations.
Encourage patient to verbalize their thoughts and feelings. Educate patient on coping strategies to deal with stressful situations. Encourage patient to participate unit activities. Engage patient in psycho education to increase knowledge of illness and coping skills. Encourage active participation in treatment plan. Validate feelings and concerns. Explore adaptive coping strategies that were effective in the past. Assist patient in identifying triggers for use of maladaptive coping mechanisms and ways to avoid these.

## 2021-04-29 NOTE — BH TREATMENT PLAN - NSCMSPTSTRENGTHS_PSY_ALL_CORE
Future/goal oriented/Physically healthy/Supportive family
Physically healthy
Physically healthy/Supportive family
Physically healthy/Supportive family
Supportive family
Future/goal oriented/Physically healthy/Supportive family
Future/goal oriented/Physically healthy/Supportive family
Physically healthy
Physically healthy/Positive attitude/Supportive family
Physically healthy/Supportive family
Physically healthy/Supportive family
Supportive family

## 2021-04-29 NOTE — LEGAL STATUS PROGRESS NOTE - NSLEGALSTATUS_PSY_ALL_CORE
9.27 Involuntary (2PC) Converted from Emergency
Court Retained
9.27 Involuntary (2PC) Converted from Voluntary
Court Retained
9.39 Emergency Admission

## 2021-04-29 NOTE — BH TREATMENT PLAN - NSTXDCOPNOINTERRN_PSY_ALL_CORE
Pt encouraged to follow treratment plans.
Encouraqged to follow discharge plans,
The patient is encouraged to continue to adhere to her medication regimen, and is educated about effective coping skills for managing symptoms of psychosis.
continmue interventions.
Assess patient for understanding of factors related to relapse and discharge. Reinforce the importance of medication compliance and adherence to treatment regimen in order to prevent relapse. Encourage patient to verbalize their thoughts and feelings. Engage patient in psycho education to increase knowledge of illness and coping skills. Reality orientation as needed.
monitor mood and behavior. Encourage healthy habits.
Encouraqged to follow discharge plans,
Encouraqged to follow discharge plans,
The patient is encouraged to continue to adhere to her medication regimen, and is educated about effective coping skills for managing symptoms of psychosis.
Healthy habits encouraged: take meds, exercise, follow diet orders and engage in stress reduction activities.
Encouraqged to follow discharge plans,

## 2021-04-29 NOTE — BH INPATIENT PSYCHIATRY PROGRESS NOTE - NSBHFUPINTERVALHXFT_PSY_A_CORE
Objective: Pt received no PRNs over last 24 hrs. Pt continues to be compliant with all standing medications. No acute events overnight. Nursing reports she is pacing hallway, talking to self, yelling at times.    C/o: "good"    SUBJECTIVE: Pt was seen in morning for interview. Pt states she feels well and is sleeping well. The pt denies AH, VH, SI, HI. Pt seen speaking with herself after interview's end.

## 2021-04-30 PROCEDURE — 99232 SBSQ HOSP IP/OBS MODERATE 35: CPT

## 2021-04-30 RX ORDER — CLOZAPINE 150 MG/1
125 TABLET, ORALLY DISINTEGRATING ORAL AT BEDTIME
Refills: 0 | Status: DISCONTINUED | OUTPATIENT
Start: 2021-04-30 | End: 2021-05-03

## 2021-04-30 RX ADMIN — Medication 2 MILLIGRAM(S): at 15:54

## 2021-04-30 RX ADMIN — LUMATEPERONE 42 MILLIGRAM(S): 10.5 CAPSULE ORAL at 15:55

## 2021-04-30 RX ADMIN — OLANZAPINE 10 MILLIGRAM(S): 15 TABLET, FILM COATED ORAL at 08:39

## 2021-04-30 RX ADMIN — OLANZAPINE 10 MILLIGRAM(S): 15 TABLET, FILM COATED ORAL at 21:00

## 2021-04-30 RX ADMIN — CLOZAPINE 125 MILLIGRAM(S): 150 TABLET, ORALLY DISINTEGRATING ORAL at 21:01

## 2021-04-30 RX ADMIN — Medication 1 MILLIGRAM(S): at 08:39

## 2021-04-30 RX ADMIN — Medication 1 MILLIGRAM(S): at 21:01

## 2021-04-30 NOTE — BH INPATIENT PSYCHIATRY PROGRESS NOTE - NSBHASSESSSUMMFT_PSY_ALL_CORE
25 Y/ F with a history of schizoaffective disorder vs Schizophrenia, 3 prior hospitalizations, in [facility] ETP program but non-compliant with medications PTA, who presented BIB mother for insomnia, medication non-compliance, yelling, and talking to self/RIS.   During this hospitalization patient was tried on Abilify and Haldol with poor response. Started on Zyprexa but had been inconsistently compliant with Zyprexa, with Medications over objection granted by court on 1/26.     Presently pt is on Zyprexa and Caplyta. Patient is secretive about her symptoms but can not contain them: she constantly talking to herself in response to voices. remains psychotic. Nevertheless, she tolerated interviews better, for a longer period of time and with decrease in degree of dysphoria, her ADLs are also better. She has been compliant with medications and tolerating them well.  Pt remains psychotic but not disruptive except for episodes of speaking very loudly to voices. Continues to need supervision and redirection in hygiene, hydration and self-care. Responding to staff redirections. Needs prns every day due to periods of overwhelming internal stimuli, - with fair effect observed after prns. Psychotic symptoms have not resolved.    Patient's court order of retention is expiring, but patient remains very ill, with psychosis and can not be safely discharged at this time. We are applying to Court for further retention to continue in-patient treatment.        CBC with differential  ANC - stable 4.81 - 4/22/21; 3.96 4/29/21  Clozapine was initiated and being titrated up while Zyprexa doses are being titrated down.  Tolerates treatment well.  Plan:  - Continue in-patient hospitalization  Continue trial of:  Started Clozapine on 4/22/21  - Continue Clozapine at 125 mg qhs  - Zyprexa 10 mg qam and 10mg hs (zydis) -will be cross-titrated with Clozapine  - Caplyta 42 mg  - Cogentin 1 mg BID ( increased)  EKG - WNL - QTc - 0.488 ( 4/20/21)    Applied for ACT and AOT but pt reportedly did not tolerate meeting with ACT team well and is rejected by ACT due to still prominent psychosis    We are proceeding with applying for transfer to a long-term hospital.

## 2021-04-30 NOTE — BH INPATIENT PSYCHIATRY PROGRESS NOTE - NSBHFUPINTERVALHXFT_PSY_A_CORE
Objective: Pt received no PRNs over last 24 hrs. Pt continues to be compliant with all standing medications. No acute events overnight. Nursing reports she is not pacing hallways or speaking with herself as often as yesterday and was seen in day room watching television.    C/o: "good"    SUBJECTIVE: Pt was seen in morning for interview. Pt states she feels well and is sleeping well. Pt states she plans to watch TV for today's plans. Pt states she is irritated by conversation. The pt denies AH, VH, SI, HI.

## 2021-04-30 NOTE — BH INPATIENT PSYCHIATRY PROGRESS NOTE - MSE UNSTRUCTURED FT
Vitals unable to be assessed as pt refused, but VS were stable last night    MSE  Pt observed watching TV quietly without making hand movements or speaking to herself; she is not pacing the halls speaking to herself as frequently as prior days. Pt appears stated age, fairly groomed with hair pulled up. She is marginally cooperative in answering questions today but cannot tolerate longer than 2 minutes; No psychomotor agitation/ retardation. + Stereotypical bizarre hands movements observed episodically, though less so today than yesterday. No involuntary movements. Gait is steady. Speech is fluent, nl in volume. Mood is "good". Pt's affect is irritable and dysphoric. No formal thought disorder noted at this time. Pt perception likely includes hallucinations as she speaks with an unseen person when alone. No evidence of SI/HI. Insight and judgment are limited. Impulse control is poor.      Labs: 4/29/21  WBC 5.06  ANC 3.96  CBC notable for normal H/H, mildly low MCHC, mildly low Plt but none within concerning ranges

## 2021-05-01 RX ADMIN — Medication 1 MILLIGRAM(S): at 08:46

## 2021-05-01 RX ADMIN — OLANZAPINE 10 MILLIGRAM(S): 15 TABLET, FILM COATED ORAL at 21:04

## 2021-05-01 RX ADMIN — LUMATEPERONE 42 MILLIGRAM(S): 10.5 CAPSULE ORAL at 16:37

## 2021-05-01 RX ADMIN — OLANZAPINE 10 MILLIGRAM(S): 15 TABLET, FILM COATED ORAL at 08:46

## 2021-05-01 RX ADMIN — Medication 1 MILLIGRAM(S): at 21:04

## 2021-05-01 RX ADMIN — CLOZAPINE 125 MILLIGRAM(S): 150 TABLET, ORALLY DISINTEGRATING ORAL at 21:04

## 2021-05-02 RX ADMIN — OLANZAPINE 10 MILLIGRAM(S): 15 TABLET, FILM COATED ORAL at 20:35

## 2021-05-02 RX ADMIN — OLANZAPINE 10 MILLIGRAM(S): 15 TABLET, FILM COATED ORAL at 08:52

## 2021-05-02 RX ADMIN — CLOZAPINE 125 MILLIGRAM(S): 150 TABLET, ORALLY DISINTEGRATING ORAL at 20:35

## 2021-05-02 RX ADMIN — Medication 2 MILLIGRAM(S): at 08:52

## 2021-05-02 RX ADMIN — Medication 1 MILLIGRAM(S): at 08:52

## 2021-05-02 RX ADMIN — LUMATEPERONE 42 MILLIGRAM(S): 10.5 CAPSULE ORAL at 16:38

## 2021-05-02 RX ADMIN — Medication 1 MILLIGRAM(S): at 20:35

## 2021-05-03 PROCEDURE — 99232 SBSQ HOSP IP/OBS MODERATE 35: CPT

## 2021-05-03 RX ORDER — CLOZAPINE 150 MG/1
137.5 TABLET, ORALLY DISINTEGRATING ORAL AT BEDTIME
Refills: 0 | Status: DISCONTINUED | OUTPATIENT
Start: 2021-05-03 | End: 2021-05-04

## 2021-05-03 RX ADMIN — Medication 1 MILLIGRAM(S): at 21:58

## 2021-05-03 RX ADMIN — OLANZAPINE 10 MILLIGRAM(S): 15 TABLET, FILM COATED ORAL at 08:26

## 2021-05-03 RX ADMIN — Medication 1 MILLIGRAM(S): at 08:26

## 2021-05-03 RX ADMIN — LUMATEPERONE 42 MILLIGRAM(S): 10.5 CAPSULE ORAL at 16:58

## 2021-05-03 RX ADMIN — CLOZAPINE 137.5 MILLIGRAM(S): 150 TABLET, ORALLY DISINTEGRATING ORAL at 21:58

## 2021-05-03 RX ADMIN — Medication 2 MILLIGRAM(S): at 16:45

## 2021-05-03 RX ADMIN — OLANZAPINE 10 MILLIGRAM(S): 15 TABLET, FILM COATED ORAL at 21:59

## 2021-05-03 NOTE — BH INPATIENT PSYCHIATRY PROGRESS NOTE - MSE UNSTRUCTURED FT
Vitals stable. Pt denies dizziness/lightheadedness.    MSE  On exam today, the patient is better-kempt with impaired hygiene. She appears her stated age. She is less uncooperative than Friday, tolerates longer interview, and has fair eye contact. Her speech is nl volume and rate. Stereotypical hand movement seen intermittently; no involuntary movements. She describes her mood as “good”. Her affect is constricted, unreactive and congruent to mood. Her thought process is more linear and goal-oriented than Friday. Her thought content includes knowledge of AH and thoughts on discharge. Her perception include AH, though denies any at time of interview. No signs of SI/HI. She is cognitively grossly intact with poor fund of knowledge. Her attention is fair, improved from Friday. Her memory is fair. Her insight and judgment are poor but slightly improved from Friday. Her impulse control is fair, slightly improved since Friday. She is alert and oriented to person, place, and situation.      Labs: 4/29/21  WBC 5.06  ANC 3.96  CBC notable for normal H/H, mildly low MCHC, mildly low Plt but none within concerning ranges

## 2021-05-03 NOTE — BH INPATIENT PSYCHIATRY PROGRESS NOTE - NSBHASSESSSUMMFT_PSY_ALL_CORE
25 Y/ F with a history of schizoaffective disorder vs Schizophrenia, 3 prior hospitalizations, in [facility] ETP program but non-compliant with medications PTA, who presented BIB mother for insomnia, medication non-compliance, yelling, and talking to self/RIS.   During this hospitalization patient was tried on Abilify and Haldol with poor response. Started on Zyprexa but had been inconsistently compliant with Zyprexa, with Medications over objection granted by court on 1/26.     Presently pt is on Zyprexa and Caplyta. Patient is secretive about her symptoms but can not contain them: she constantly talking to herself in response to voices. remains psychotic. Nevertheless, she tolerated interviews better, for a longer period of time and with decrease in degree of dysphoria, her ADLs are also better. She has been compliant with medications and tolerating them well.  Pt remains psychotic but not disruptive except for episodes of speaking very loudly to voices. Continues to need supervision and redirection in hygiene, hydration and self-care. Responding to staff redirections. Needs prns every day due to periods of overwhelming internal stimuli, - with fair effect observed after prns. Psychotic symptoms have not resolved.    Patient's court order of retention is expiring, but patient remains very ill, with psychosis and can not be safely discharged at this time. We are applying to Court for further retention to continue in-patient treatment.        CBC with differential  ANC - stable 4.81 - 4/22/21; 3.96 4/29/21  Clozapine was initiated and being titrated up while Zyprexa doses are being titrated down.  Tolerates treatment well.  Plan:  - Continue in-patient hospitalization.  behavioral and environmental efforts are being made to help patient feel and be safe on the unit. hallway monitoring established on the unit.  Continue trial of:  Started Clozapine on 4/22/21  - Continue Clozapine at 137.5 mg qhs ( increasing by 12.5 mg to avoid side effects)  - Zyprexa 10 mg qam and 10mg hs (zydis) -will be cross-titrated with Clozapine  - Caplyta 42 mg  - Cogentin 1 mg BID ( increased)  EKG - WNL - QTc - 0.488 ( 4/20/21)    Applied for ACT and AOT but pt reportedly did not tolerate meeting with ACT team well and is rejected by ACT due to still prominent psychosis    We are proceeding with applying for transfer to a long-term hospital.
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
## 2021-05-04 PROCEDURE — 99232 SBSQ HOSP IP/OBS MODERATE 35: CPT

## 2021-05-04 RX ORDER — POLYETHYLENE GLYCOL 3350 17 G/17G
17 POWDER, FOR SOLUTION ORAL
Refills: 0 | Status: DISCONTINUED | OUTPATIENT
Start: 2021-05-04 | End: 2021-05-12

## 2021-05-04 RX ORDER — CLOZAPINE 150 MG/1
150 TABLET, ORALLY DISINTEGRATING ORAL AT BEDTIME
Refills: 0 | Status: DISCONTINUED | OUTPATIENT
Start: 2021-05-04 | End: 2021-05-05

## 2021-05-04 RX ADMIN — OLANZAPINE 10 MILLIGRAM(S): 15 TABLET, FILM COATED ORAL at 20:48

## 2021-05-04 RX ADMIN — Medication 1 MILLIGRAM(S): at 20:48

## 2021-05-04 RX ADMIN — CLOZAPINE 150 MILLIGRAM(S): 150 TABLET, ORALLY DISINTEGRATING ORAL at 20:48

## 2021-05-04 RX ADMIN — Medication 1 MILLIGRAM(S): at 09:19

## 2021-05-04 RX ADMIN — OLANZAPINE 10 MILLIGRAM(S): 15 TABLET, FILM COATED ORAL at 09:19

## 2021-05-04 NOTE — BH PSYCHOLOGY - GROUP THERAPY NOTE - NSPSYCHOLGRPDBTGOAL_PSY_A_CORE
reduce mood and affective lability/improve ability to indentify feelings/reduce vulnerability to emotional dysregualation
reduce mood and affective lability/reduce vulnerability to emotional dysregualation

## 2021-05-04 NOTE — BH PSYCHOLOGY - GROUP THERAPY NOTE - NSPSYCHOLGRPDBTPT_PSY_A_CORE FT
DBT Group is a group in which patients learn skills to better manage their emotions and behaviors. Group begins with a mindfulness practice so that patients have an opportunity to practice observing their internal and external experiences.  Following the mindfulness exercise the group learns new skills in a didactic format. Group today focused on the “emotion regulation” module.  Specifically, patients learned the “[place] Ahead” skill, which helps patients plan ahead for difficult situations.  provided an example of a difficult situation and patients were asked to identify potential problem behaviors and effective ways of coping.  also explained how to rehearse coping effectively and participants were encouraged to think about their own examples of coping ahead.

## 2021-05-04 NOTE — BH INPATIENT PSYCHIATRY PROGRESS NOTE - MSE UNSTRUCTURED FT
Vitals stable. Pt denies dizziness/lightheadedness.      MSE  On exam today, the patient is fairly kempt with good hygiene. She appears her stated age. She is less uncooperative than prior week, tolerates longer interview but still becomes irritated after few-minute conversation, and has fair eye contact. Her speech is nl volume and rate. Stereotypical hand movement seen intermittently; no involuntary movements. She describes her mood as “good”. Her affect is constricted, underreactive, and congruent to mood. Her thought process is more linear and goal-oriented than prior week. Her thought content includes knowledge of AH and their irritating nature. Her perception includes AH, which she endorses currently; pt denies VH. No signs of SI/HI. She is cognitively grossly intact with poor fund of knowledge. Her attention is fair, improved from prior week. Her memory is fair. Her insight and judgment are poor but slightly improved from prior week. Her impulse control is poor, slightly worse than yesterday. She is alert and oriented to person, place, and situation.        Labs: 4/29/21  WBC 5.06  ANC 3.96  CBC notable for normal H/H, mildly low MCHC, mildly low Plt but none within concerning ranges

## 2021-05-04 NOTE — BH PSYCHOLOGY - GROUP THERAPY NOTE - NSPSYCHOLGRPDBTPT_PSY_A_CORE
stable mood and affect in group/patient showing good behavior control/no self-destructive impulses/behaviors/other...
Patient unable to identify mood states/Patient unable to participate due to clinical symptoms

## 2021-05-04 NOTE — BH INPATIENT PSYCHIATRY PROGRESS NOTE - NSBHASSESSSUMMFT_PSY_ALL_CORE
25 Y/ F with a history of schizoaffective disorder vs Schizophrenia, 3 prior hospitalizations, in [facility] ETP program but non-compliant with medications PTA, who presented BIB mother for insomnia, medication non-compliance, yelling, and talking to self/RIS.   During this hospitalization patient was tried on Abilify and Haldol with poor response. Started on Zyprexa but had been inconsistently compliant with Zyprexa, with Medications over objection granted by court on 1/26.     Trial of Zyprexa and Caplyta has not been sufficiently effective. Patient is secretive about her symptoms but can not contain them: she constantly talking to herself in response to voices. Clozapine trial was initiated and dose being titrated up as tolerated.     CBC with differential  ANC - stable 4.81 - 4/22/21; 3.96 4/29/21  Started Clozapine on 4/22/21. Clozapine doses will be titrated up while Zyprexa doses are being titrated down. d/c Caplita.  Tolerates treatment well.  Plan:  - Continue in-patient hospitalization.  behavioral and environmental efforts are being made to help patient feel and be safe on the unit. hallway monitoring established on the unit.    - Continue Clozapine at 150 mg qhs ( increasing by 12.5 mg to avoid side effects)  - Zyprexa 10 mg qam and 10mg hs (zydis) -will be cross-titrated with Clozapine  - d/c Caplyta 42 mg  - Cogentin 1 mg BID ( increased)  EKG - WNL - QTc - 0.488 ( 4/20/21)    Applied for ACT and AOT but pt reportedly did not tolerate meeting with ACT team well and is rejected by ACT due to still prominent psychosis    We are proceeding with applying for transfer to a long-term hospital.

## 2021-05-04 NOTE — BH INPATIENT PSYCHIATRY PROGRESS NOTE - NSBHFUPINTERVALHXFT_PSY_A_CORE
Pt required PRN lorazepam once at 4:45 PM yesterday. Pt compliant with all standing medications. Nursing reports that pt is grossly improved since prior week though is still seen responding to internal stimuli    C/o: "good"    Pt reported feeling well. Pt states she is sleeping well. Pt reports that AH are "talking over [her]" and are very irritating to her. Pt saw her mother last night, which the pt states went well. Pt states she has regular bowel movements.

## 2021-05-05 PROCEDURE — 99232 SBSQ HOSP IP/OBS MODERATE 35: CPT

## 2021-05-05 RX ORDER — BENZTROPINE MESYLATE 1 MG
0.5 TABLET ORAL
Refills: 0 | Status: DISCONTINUED | OUTPATIENT
Start: 2021-05-05 | End: 2021-05-10

## 2021-05-05 RX ORDER — CLOZAPINE 150 MG/1
175 TABLET, ORALLY DISINTEGRATING ORAL AT BEDTIME
Refills: 0 | Status: DISCONTINUED | OUTPATIENT
Start: 2021-05-05 | End: 2021-05-10

## 2021-05-05 RX ADMIN — CLOZAPINE 175 MILLIGRAM(S): 150 TABLET, ORALLY DISINTEGRATING ORAL at 20:45

## 2021-05-05 RX ADMIN — OLANZAPINE 10 MILLIGRAM(S): 15 TABLET, FILM COATED ORAL at 20:44

## 2021-05-05 RX ADMIN — Medication 0.5 MILLIGRAM(S): at 20:45

## 2021-05-05 RX ADMIN — Medication 1 MILLIGRAM(S): at 08:37

## 2021-05-05 RX ADMIN — OLANZAPINE 10 MILLIGRAM(S): 15 TABLET, FILM COATED ORAL at 08:37

## 2021-05-05 NOTE — BH INPATIENT PSYCHIATRY PROGRESS NOTE - NSBHASSESSSUMMFT_PSY_ALL_CORE
25 Y/ F with a history of schizoaffective disorder vs Schizophrenia, 3 prior hospitalizations, in [facility] ETP program but non-compliant with medications PTA, who presented BIB mother for insomnia, medication non-compliance, yelling, and talking to self/RIS.   During this hospitalization patient was tried on Abilify and Haldol with poor response. Started on Zyprexa but had been inconsistently compliant with Zyprexa, with Medications over objection granted by court on 1/26.     Trial of Zyprexa and Caplyta has not been sufficiently effective. Patient is secretive about her symptoms but can not contain them: she constantly talking to herself in response to voices. Clozapine trial was initiated and dose being titrated up as tolerated.     CBC with differential  ANC - stable 4.81 - 4/22/21; 3.96 4/29/21  Started Clozapine on 4/22/21. Clozapine doses will be titrated up while Zyprexa doses are being titrated down. d/c Caplita.  Tolerates treatment well.  Plan:  - Continue in-patient hospitalization.  behavioral and environmental efforts are being made to help patient feel and be safe on the unit. hallway monitoring established on the unit.    - Continue Clozapine at 175 mg qhs ( increasing by 25 mg)  - Zyprexa 10 mg qam (zydis) -will be cross-titrated with Clozapine  - d/c Caplyta 42 mg  - Cogentin 0.5 mg BID ( increased)  EKG - WNL - QTc - 0.488 ( 4/20/21)    Applied for ACT and AOT but pt reportedly did not tolerate meeting with ACT team well and is rejected by ACT due to still prominent psychosis    We are proceeding with applying for transfer to a long-term hospital.

## 2021-05-05 NOTE — BH INPATIENT PSYCHIATRY PROGRESS NOTE - NSBHFUPINTERVALHXFT_PSY_A_CORE
Pt received no PRNs in last 24 hrs. Pt compliant with all standing meds. Pt engaged a little in group therapy yesterday. Nursing reports less preoccupation with internal stimuli and not shouting to herself as much as previous weeks.    C/o: "good"    Pt states she is feeling well and sleeping well. The Pt asked the medical student where he goes to school and whether he wants to become a doctor or nurse. Pt states that she has not heard the voices for about an hour, which the pt states is relieving. However, she states that at times she is unable to control her response to the voices and is thus staying in her room "just in case [she has] an outburst". The pt states that group therapy has proven helpful. The pt states her mother visited last night and they "got into a fight" because the mother forgot to bring the pt's lotion and the pt "flipped out"; however, the pt then states "that's a stupid reason to get into a fight" and she "regret[s] it". Pt tolerates interview and does not end interview abruptly.

## 2021-05-05 NOTE — BH INPATIENT PSYCHIATRY PROGRESS NOTE - MSE UNSTRUCTURED FT
Vitals stable.    MSE  On exam today, the patient is better-kempt with good hygiene. She appears her stated age. She is more cooperative, less irritable and has fair eye contact. Her speech is nl volume and rate. Pt with intermittent stereotypical hand movements, far less than prior week. She describes her mood as “good”. Her affect is constricted, appropriate, reactive, congruent to mood. Her thought process is linear, goal oriented; no apparent FTD. Her thought content includes knowledge of intrusive AH and regret for arguing with mother. Her perception includes AH, less frequent now; pt denies VH. The patient denies SI/HI. She is cognitively grossly intact with fair fund of knowledge. Her memory is intact. Her attention is improved. Her insight and judgment are fair, improved from previous week. Her impulse control is impaired but improved from prior. She is alert and oriented to person, place, and situation.       Labs: 4/29/21  WBC 5.06  ANC 3.96  CBC notable for normal H/H, mildly low MCHC, mildly low Plt but none within concerning ranges

## 2021-05-06 LAB
BASOPHILS # BLD AUTO: 0.01 K/UL — SIGNIFICANT CHANGE UP (ref 0–0.2)
BASOPHILS NFR BLD AUTO: 0.2 % — SIGNIFICANT CHANGE UP (ref 0–2)
CRP SERPL-MCNC: <4 MG/L — SIGNIFICANT CHANGE UP
EOSINOPHIL # BLD AUTO: 0 K/UL — SIGNIFICANT CHANGE UP (ref 0–0.5)
EOSINOPHIL NFR BLD AUTO: 0 % — SIGNIFICANT CHANGE UP (ref 0–6)
HCT VFR BLD CALC: 43.3 % — SIGNIFICANT CHANGE UP (ref 34.5–45)
HGB BLD-MCNC: 13.3 G/DL — SIGNIFICANT CHANGE UP (ref 11.5–15.5)
IANC: 3.51 K/UL — SIGNIFICANT CHANGE UP (ref 1.5–8.5)
IMM GRANULOCYTES NFR BLD AUTO: 0.7 % — SIGNIFICANT CHANGE UP (ref 0–1.5)
LYMPHOCYTES # BLD AUTO: 0.51 K/UL — LOW (ref 1–3.3)
LYMPHOCYTES # BLD AUTO: 11.2 % — LOW (ref 13–44)
MCHC RBC-ENTMCNC: 25.8 PG — LOW (ref 27–34)
MCHC RBC-ENTMCNC: 30.7 GM/DL — LOW (ref 32–36)
MCV RBC AUTO: 84.1 FL — SIGNIFICANT CHANGE UP (ref 80–100)
MONOCYTES # BLD AUTO: 0.51 K/UL — SIGNIFICANT CHANGE UP (ref 0–0.9)
MONOCYTES NFR BLD AUTO: 11.2 % — SIGNIFICANT CHANGE UP (ref 2–14)
NEUTROPHILS # BLD AUTO: 3.51 K/UL — SIGNIFICANT CHANGE UP (ref 1.8–7.4)
NEUTROPHILS NFR BLD AUTO: 76.7 % — SIGNIFICANT CHANGE UP (ref 43–77)
NRBC # BLD: 0 /100 WBCS — SIGNIFICANT CHANGE UP
NRBC # FLD: 0 K/UL — SIGNIFICANT CHANGE UP
PLATELET # BLD AUTO: 161 K/UL — SIGNIFICANT CHANGE UP (ref 150–400)
RBC # BLD: 5.15 M/UL — SIGNIFICANT CHANGE UP (ref 3.8–5.2)
RBC # FLD: 14.9 % — HIGH (ref 10.3–14.5)
TROPONIN T, HIGH SENSITIVITY RESULT: <6 NG/L — SIGNIFICANT CHANGE UP
WBC # BLD: 4.57 K/UL — SIGNIFICANT CHANGE UP (ref 3.8–10.5)
WBC # FLD AUTO: 4.57 K/UL — SIGNIFICANT CHANGE UP (ref 3.8–10.5)

## 2021-05-06 PROCEDURE — 99232 SBSQ HOSP IP/OBS MODERATE 35: CPT

## 2021-05-06 RX ADMIN — Medication 0.5 MILLIGRAM(S): at 08:59

## 2021-05-06 RX ADMIN — CLOZAPINE 175 MILLIGRAM(S): 150 TABLET, ORALLY DISINTEGRATING ORAL at 20:56

## 2021-05-06 RX ADMIN — Medication 0.5 MILLIGRAM(S): at 20:56

## 2021-05-06 RX ADMIN — OLANZAPINE 10 MILLIGRAM(S): 15 TABLET, FILM COATED ORAL at 08:58

## 2021-05-06 RX ADMIN — OLANZAPINE 10 MILLIGRAM(S): 15 TABLET, FILM COATED ORAL at 20:56

## 2021-05-06 NOTE — BH TREATMENT PLAN - NSTXDCOPNODATENEW_PSY_ALL_CORE
11-Feb-2021
23-Mar-2021
25-Jan-2021
11-Feb-2021
29-Dec-2020
23-Mar-2021
23-Mar-2021
25-Jan-2021
23-Mar-2021
23-Mar-2021
11-Feb-2021
11-Feb-2021
23-Mar-2021
29-Dec-2020
23-Mar-2021
23-Mar-2021

## 2021-05-06 NOTE — BH TREATMENT PLAN - NSTXPSYCHOINTERPR_PSY_ALL_CORE
Patient has demonstrated some progress towards psychiatric rehabilitation goals. Patient was able to verbalize that she has nothing to do at the hospital and wishes she was home. Psychiatric rehabilitation will continue to meet with patient individually to provide support, encouragement, and counseling so that patient will be able to orient to reality and identify coping skills for better symptom management.
Psych rehab staff will continue to engage patient daily to encourage  social interactions with staff and peers and for patient to attend psychiatric rehabilitation groups in order to distract herself from auditory hallucinations for improved symptom management.
Pt has demonstrated minimal progress towards psychiatric rehabilitation goals. Psychiatric Rehabilitation staff will continue to meet with the patient individually to provide support, encouragement, and counseling so that pt will be able to identify and utilize effective coping skills for better symptom management over seven days.
Due to presentation and poor insight, patient would benefit from a goal change to engage in social interactions with staff/peers and attend psychiatric rehabilitation groups to distract herself from auditory hallucinations.
Psych rehab staff will continue to engage patient daily in order to build therapeutic rapport and engage in conversation without irrational content for improved symptom management.
Patient will benefit from  medication compliance and make atleast 5 reality based statements/request to staff and peers by day seven. Psychiatric Rehabilitation staff will continue to engage patient daily in order to develop therapeutic rapport.
Patient would benefit from engaging with staff individually in order to explore and utilize coping skills to help ignore hallucinations to be able to participate in groups and engage with peers in the milieu.
Patient has demonstrated minimal progress towards psychiatric rehabilitation goals. Psychiatric rehabilitation recommends patient to continue to attend groups, engage in individual sessions, and participate in activities in the milieu to continue exploring coping skills to effectively manage symptoms.
Pt has demonstrated minimal improvement towards psychiatric rehabilitation goals. Psychiatric Rehabilitation staff will continue to meet with the patient individually to provide support, encouragement, and counseling so that pt will be able to make more reality based statements over the next seven days.
Pt has demonstrated minimal progress towards psychiatric rehabilitation goals. Psychiatric Rehabilitation staff will continue to meet with the patient individually to provide support, encouragement, and counseling so that pt will be able to become oriented to reality and identify effective coping skills for better symptom management over seven days.
Pt has demonstrated minimal progress towards psychiatric rehabilitation goals. Psychiatric Rehabilitation staff will continue to meet with the patient individually to provide support, encouragement, and counseling so that pt will be able to become oriented to reality and identify effective coping skills for better symptom management over seven days.
Pt has demonstrated minimal improvement towards psychiatric rehabilitation goals. Psychiatric Rehabilitation staff will continue to meet with the patient individually to provide support, encouragement, and counseling so that pt will be able to identify and utilize effective coping skills for better symptom management over seven days.
Pt continues to demonstrate minimal progress towards psychiatric rehabilitation goals. Psychiatric Rehabilitation staff will continue to meet with the patient individually to provide support, encouragement, and counseling so that pt will be able to become oriented to reality and identify effective coping skills for better symptom management over seven days.
Patient has made progress towards psychiatric rehabilitation goals. Patient would benefit from continuing to attend psychiatric rehabilitation groups and engaging with staff individually in order to engage in conversation with no irrational content for improved symptom management.
Pt has demonstrated minimal improvement towards psychiatric rehabilitation goals. Psychiatric Rehabilitation staff will continue to meet with the patient individually to provide support, encouragement, and counseling so that pt will be able to make more reality based statements over the next seven days.
Patient has demonstrated minimal progress towards psychiatric rehabilitation goals. Psychiatric rehabilitation recommends patient continue to attend groups, engage in individual sessions, and participate in activities in the milieu to continue exploring coping skills to effectively manage symptoms.
Patient would benefit from attending psychiatric rehabilitation groups and engaging with staff individually in order to identify and utilize effective coping skills to better manage symptoms.
Pt has demonstrated minimal progress towards psychiatric rehabilitation goals. Psychiatric Rehabilitation staff will continue to meet with the patient individually to provide support, encouragement, and counseling so that pt will be able to become oriented to reality and identify effective coping skills for better symptom management over seven days.
Psych rehab staff will continue to engage patient daily in order to build therapeutic rapport and engage in conversation without irrational content for improved symptom management.

## 2021-05-06 NOTE — BH TREATMENT PLAN - NSBHPRIMARYDX_PSY_ALL_CORE
Schizophrenia    
Schizophrenia, paranoid type    
Schizophrenia    
Schizophrenia, paranoid type    
Schizophrenia, paranoid type

## 2021-05-06 NOTE — BH TREATMENT PLAN - NSTXPROBPSYCHO_PSY_ALL_CORE
PATIENT INFORMATION    Anticipatory guidance discussed  Add one food at a time every 3-5 days to see if tolerated  Adequate diet for breastfeeding  Avoid cow's milk until 12 months of age  Avoid infant walkers  Avoid potential choking hazards (large, spherical, or coin shaped foods) unit  Avoid putting to bed with bottle  Avoid small toys (choking hazard)  Call for decreased feeding, fever  Car seat issues, including proper placement  Consider saving potentially allergenic foods (e.g. fish, egg white, wheat) until last  Encouraged that any formula used be iron-fortified  Fluoride supplementation if unfluoridated water supply  Impossible to \"spoil\" infants at this age  Limiting daytime sleep to 3-4 hours at a time  Make middle-of-night feeds \"brief and boring\"  Most babies sleep through night by 6 months of age  Never leave unattended except in crib  Observe while eating; consider CPR classes  Obtain and know how to use thermometer  Place in crib before completely asleep  Risk of falling once learns to roll  Safe sleep furniture  Set hot water heater less than 120 degrees F  Sleep face up to decrease the chances of SIDS  Smoke detectors  Start solids gradually at 4-6 months    Follow-Up  - Return for your 6 month well child visit.    4 months old Health and Safety Tips - The following hyperlinks are available to access via [employer]    Parent Education from [employer]    Educación para padres sobre niños sanos    Common dosing for acetaminophen:  Acetaminophen (Tylenol) can be given every 4 hours.  · Infant Drops: 160mg/5ml for  Weight 6-11 lbs 12-17 lbs 18-23 lbs 24-35 lbs   Dose 1.25 ml 2.5 ml 3.75 ml 5 ml     Additional Educational Resources:  For additional resources regarding your symptoms, diagnosis, or further health information, please visit the Online Health Resources section in [employer].  
PSYCHOTIC SYMPTOMS

## 2021-05-06 NOTE — BH TREATMENT PLAN - NSTXPSYCHODATENEW_PSY_ALL_CORE
30-Dec-2020
11-Apr-2021
11-Apr-2021
18-Feb-2021
30-Dec-2020
18-Feb-2021
30-Dec-2020
11-Apr-2021
18-Feb-2021
29-Mar-2021
04-Apr-2021
30-Dec-2020
30-Dec-2020
18-Feb-2021
11-Apr-2021
18-Feb-2021
11-Apr-2021

## 2021-05-06 NOTE — BH TREATMENT PLAN - NSTXNEGATGOAL_PSY_ALL_CORE
Will seek support from staff when upset by negative self-talk
Will be able to identify coping skills to practice during the inpatient stay
Will seek support from staff when upset by negative self-talk
Will seek support from staff when upset by negative self-talk

## 2021-05-06 NOTE — BH TREATMENT PLAN - NSTXPSYCHOGOAL_PSY_ALL_CORE
Be able to utilize coping skills to ignore hallucinations so that he/she could attend and participate constructively in groups
Make at least 5 reality based statements/requests to staff and/or peers
Be able to utilize coping skills to ignore hallucinations so that he/she could attend and participate constructively in groups
Make at least 5 reality based statements/requests to staff and/or peers
Will verbalize 1 strategy to successfully cope with psychotic symptoms
Be able to utilize coping skills to ignore hallucinations so that he/she could attend and participate constructively in groups
Be able to utilize coping skills to ignore hallucinations so that he/she could attend and participate constructively in groups
Will ask for PRN medication to manage hallucinations
Will engage in a 15 minute conversation with no irrational content
Will identify 2 coping skills that help mitigate hallucinations
Will verbalize 1 strategy to successfully cope with psychotic symptoms
Make at least 5 reality based statements/requests to staff and/or peers
Will identify 2 coping skills that assist with focus on reality
Make at least 5 reality based statements/requests to staff and/or peers
Will engage in a 15 minute conversation with no irrational content
Make at least 5 reality based statements/requests to staff and/or peers
Be able to utilize coping skills to ignore hallucinations so that he/she could attend and participate constructively in groups
Be able to utilize coping skills to ignore hallucinations so that he/she could attend and participate constructively in groups
Will be able to report experiencing hallucinations to staff

## 2021-05-06 NOTE — BH TREATMENT PLAN - NSTXDCOPNODATETRGT_PSY_ALL_CORE
09-Mar-2021
16-Mar-2021
26-Jan-2021
05-Jan-2021
12-Jan-2021
16-Feb-2021
13-Apr-2021
19-Apr-2021
23-Mar-2021
02-Feb-2021
09-Feb-2021
19-Jan-2021
24-May-2021
05-May-2021
08-Apr-2021
12-Apr-2021
23-Feb-2021
23-Mar-2021

## 2021-05-06 NOTE — BH TREATMENT PLAN - NSTXDCOPNOINTERMD_PSY_ALL_CORE
ACT/ AOT support  Caplyta and Zyprexa 
ACT/ AOT support
considering EDGAR
ACT/ AOT support  Caplyta and Zyprexa 
considering EDGAR
state hospital application  Caplyta and Zyprexa   trial clozapine
ACT/ AOT support  Caplyta and Zyprexa 
considering EDGAR
ACT/ AOT support  Caplyta and Zyprexa 
state hospital application  Caplyta and Zyprexa   trial clozapine
considering EDGAR
considering EDGAR
state hospital application  Caplyta and Zyprexa   trial clozapine
considering EDGAR
considering EDGAR

## 2021-05-06 NOTE — BH INPATIENT PSYCHIATRY PROGRESS NOTE - NSBHFUPINTERVALHXFT_PSY_A_CORE
Pt received no PRNs in last 24 hr. Nursing reports that pt is still internally preoccupied but mumbling rather than yelling, thus improved from prior week. Nursing reports that pt saw her mother yesterday, who brought Corbett hair removal cream for the pt, who wants to use it.    c/o: "I'm doing great. How are you?"    Pt states she feels well and slept well overnight; however, she felt lightheaded earlier and thus stepped back into her room to lie on her bed. Pt states she is drinking sufficient water. Pt took a shower this morning. Pt states that her AH are well-controlled and she has not heard the voices in a "week", though it should be noted pt endorsed AH yesterday. Pt states she is able to concentraite better.     Pt is informed of uncertainty surrounding using the product on her face and the need for supervision while using on her armpits; pt understands and is agreeable.    Pt is educated on importance of proper hydration and is urged to continue oral hydration. Pt understands and is agreeable.

## 2021-05-06 NOTE — BH TREATMENT PLAN - NSTXPROBNEGAT_PSY_ALL_CORE
NEGATIVE SELF-TALK

## 2021-05-06 NOTE — BH INPATIENT PSYCHIATRY PROGRESS NOTE - NSBHASSESSSUMMFT_PSY_ALL_CORE
25 Y/ F with a history of schizoaffective disorder vs Schizophrenia, 3 prior hospitalizations, in [facility] ETP program but non-compliant with medications PTA, who presented BIB mother for insomnia, medication non-compliance, yelling, and talking to self/RIS.   During this hospitalization patient was tried on Abilify and Haldol with poor response. Started on Zyprexa but had been inconsistently compliant with Zyprexa, with Medications over objection granted by court on 1/26.     Trial of Zyprexa and Caplyta has not been sufficiently effective. Patient is secretive about her symptoms but can not contain them: she constantly talking to herself in response to voices. Clozapine trial was initiated and dose being titrated up as tolerated.     5/6/21: CBC with differential  ANC - stable 3.51; troponin and C-RP - negative  Started Clozapine on 4/22/21. Clozapine doses will be titrated up while Zyprexa doses are being titrated down. d/juwan Caplita.  Tolerates treatment well.  Plan:  - Continue in-patient hospitalization.  behavioral and environmental efforts are being made to help patient feel and be safe on the unit. hallway monitoring established on the unit.    - Continue Clozapine at 175 mg qhs ( increasing by 25 mg every other day)  - Zyprexa 10 mg qam (zydis) -will be cross-titrated with Clozapine  - d/juwan Caplyta 42 mg  - Cogentin 0.5 mg BID ( increased)  EKG - WNL - QTc - 0.488 ( 4/20/21)    Applied for ACT and AOT but pt reportedly did not tolerate meeting with ACT team well and is rejected by ACT due to still prominent psychosis    We are proceeding with applying for transfer to a long-term hospital.

## 2021-05-06 NOTE — BH INPATIENT PSYCHIATRY PROGRESS NOTE - MSE UNSTRUCTURED FT
Vitals 104/60 117 standing  111/70 99 sitting  Temperature normal    MSE  On exam today, the patient is well-kempt with good hygiene. She appears her stated age. She is more cooperative, less irritable and has fair eye contact. Her speech is nl volume and rate. Pt with intermittent stereotypical hand movements, far less than prior week. She describes her mood as “great”. Her affect is mildly constricted, blunted, appropriate, reactive, congruent to mood. Her thought process is linear, goal oriented; no apparent FTD. Her thought content includes knowledge of intrusive AH. Her perception includes AH, none currently; pt denies VH. The patient denies SI/HI. She is cognitively grossly intact with fair fund of knowledge. Her memory is intact. Her attention is improved. Her insight and judgment are fair, improved from previous week. Her impulse control is fair, improved from prior. She is alert and oriented to person, place, and situation.     Labs:  5/6/21  ANC 3.51  CBC notable for normal H/H, mildly low MCHC, mildly low Plt but none within concerning ranges  troponin abd C-RP - negative

## 2021-05-06 NOTE — BH TREATMENT PLAN - NSTXDCOPNOPROGRES_PSY_ALL_CORE
No Change
Worsening
No Change
Worsening
No Change
Worsening
No Change
Improving
Improving
No Change

## 2021-05-06 NOTE — BH TREATMENT PLAN - NSTXPSYCHODATEEST_PSY_ALL_CORE
22-Dec-2020
15-Apr-2021
21-Dec-2020
18-Feb-2021
21-Dec-2020
24-Mar-2021
21-Jan-2021
18-Apr-2021
06-Apr-2021
21-Dec-2020
29-Dec-2020
21-Apr-2021
04-Mar-2021
25-Feb-2021
01-Apr-2021
21-Jan-2021
18-Mar-2021
28-Jan-2021
28-Apr-2021

## 2021-05-06 NOTE — BH TREATMENT PLAN - NSTXPLANTHERAPYSESSIONSFT_PSY_ALL_CORE
04-25-21  Type of therapy: Leisure development  Type of session: Individual  Level of patient participation: Participated with encouragement  Duration of participation: Less than 15 minutes  Therapy conducted by: Psych rehab  Therapy Summary: Patient has demonstrated some progress towards psychiatric rehabilitation goals over the past week. Patient continues to demonstrate difficulty tolerating conversations for more than a few minutes before walking away. Patient is able to tolerate short conversations without irrational content, however once patient leaves the conversation, she is observed talking to herself and responding to internal stimuli. Patient is superficial in conversations and becomes irritable when discussing symptoms. Patient continues to demonstrate poor insight into illness. Patient has attended approximately 15 percent of psychiatric rehabilitation groups over the past week. Patient has only attended leisure groups and has difficulty tolerating group structure, indicated by leaving early or arriving late. Patient is minimally receptive to peer and staff encouragement to engage in group and is observed to be isolative when in group setting. Patient is visible in the milieu. Patient continues to be observed pacing the halls. Patient does not socialize with peers. Patient is able to make needs known to staff.  
  03-22-21  Type of therapy: Patient has not attended any psychiatric rehabilitation groups over the past week.   Type of session: Individual  Level of patient participation: Patient was asleep and unable to engage in psych rehab assessment.  Duration of participation: Less than 15 minutes  Therapy conducted by: Psych rehab  Therapy Summary: Writer attempted to meet with patient in order to discuss progress towards psychiatric rehabilitation goals, however patient was asleep and unable to be roused to participate in assessment, therefore the following information was obtained from patient’s medical record and observations in the milieu. Patient was recently transferred from  to  due to safety concerns. Patient has demonstrated minimal progress towards psychiatric rehabilitation goals over the past week. Patient has not been observed utilizing coping skills to manage symptoms. Patient was offered headphones to help mitigate intensity of auditory hallucinations, however declined. Patient continues to present as disorganized and internally preoccupied. Patient denies auditory hallucinations but continues be to observed talking to herself. Patient can become loud at times but is receptive to redirection and is agreeable to taking PRN medications. Patient is adjusting well to the unit and is able to make needs known to staff. Patient has not attended any psychiatric rehabilitation groups over the past week despite prompting and encouragement. Psych rehab staff will continue to engage patient daily to assist in developing effective coping skills to manage symptoms.  
  02-14-21  Type of therapy: Creative arts therapy  Type of session: Individual  Level of patient participation: Not engaged,Resistance to participation  Duration of participation: Less than 15 minutes  Therapy conducted by: Psych rehab  Therapy Summary: Writer attempted to engage pt in order to discuss progress towards psychiatric rehabilitation goals. Pt presents as internally preoccupied and continues to be unable to meaningfully engage in a conversation regarding treatment and goals. Pt continues to present as disorganized, talking/yelling to self, laughing to self, and pacing. Pt demonstrates an improvement in ADL’s, however continues to appear disheveled at times. Pt demonstrates poor insight and judgement into current symptoms and treatment despite education and engagement from staff. Pt has been compliant with medications as per court order. Pt is visible on the unit, however does not engage in any socialization with peers. Psychiatric Rehabilitation staff engages pt daily in order to encourage group participation. Pt has attended art therapy groups and is only able to participate for a limited amount of time before leaving. Writer will continue to engage pt daily in order to encourage participation in groups and individual sessions to assist in becoming oriented to reality and identify effective coping skills for better symptom management.  
  02-28-21  Type of therapy: Creative arts therapy,Symptom management  Type of session: Individual  Level of patient participation: Not engaged,Resistance to participation  Duration of participation: Less than 15 minutes  Therapy conducted by: Psych rehab  Therapy Summary: Writer attempted to meet with pt to discuss progress towards psychiatric rehabilitation goals. Pt responds with short answers and is dismissive of writer’s attempts to engage in an individual session. Pt continues to present as internally preoccupied, laughing, talking, and yelling to self. Pt is polite when approached, however walks away after a short period of time. Pt has been compliant with medications as per court order, however continues to demonstrate poor insight and judgement into current symptoms and treatment. Pt denies any AH/VH despite loudly responding to internal stimuli throughout the day. Pt presents with appropriate ADL’s and appearance. Pt is visible on the unit and is observed pacing hallways during most of the day. Pt does not engage in any socialization with peers. Psychiatric Rehabilitation staff approaches pt daily to encourage group participation. Pt attends group for the first five minutes then will leave despite much encouragement from staff. Writer will continue to engage pt daily in order to encourage participation in groups and individual sessions to assist in becoming oriented to reality and identify effective coping skills for better symptom management.  
  03-28-21  Type of therapy: Patient has not attended any psychiatric rehabilitation groups over the past week  Type of session: Individual  Level of patient participation: Resistance to participation  Duration of participation: Less than 15 minutes  Therapy conducted by: Psych rehab  Therapy Summary: Patient has demonstrated poor progress towards psychiatric rehabilitation goals over the past week. Patient is unable to identify effective coping skills and is difficult to engage in conversation regarding symptoms. Patient denies suicidal and homicidal ideation. Patient denies auditory and visual hallucinations, despite being observed responding to internal stimuli. Patient is often observed pacing the halls talking to herself. Patient can become loud at times but is receptive to redirection and is agreeable to taking PRN medications. Patient endorses being bored on the unit but denies resources offered by psych rehab staff to keep busy, such as coloring pages, word searches, headphones, books etc., and is not receptive to attending groups. Writer offered for patient to zoom call with her mother; patient declined. Psych rehab staff will continue to engage patient daily to assist in developing effective coping skills to manage symptoms.  
  04-18-21  Type of therapy: Patient has not attended any psychiatric rehabilitation groups over the past week  Type of session: Individual  Level of patient participation: Resistance to participation  Duration of participation: Less than 15 minutes  Therapy conducted by: Psych rehab  Therapy Summary: Patient has demonstrated minimal progress towards psychiatric rehabilitation goals over the past week. Patient has difficulty tolerating conversations for more than a few minutes before walking away. Patient engaged in conversation with writer about her pets at home that she misses. Patient is irritable about being in the hospital and wants to go home. Patient is difficult to engage in conversation about symptoms. Patient continues to demonstrate poor insight into illness and denies hearing voices despite being observed responding to internal stimuli. Patient can become loud at times but is receptive to redirection and is agreeable to taking PRN medications. Patient is noted to be quieter in responses to voices over the past week. Patient denies suicidal and homicidal ideation. Patient has not attended any psychiatric rehabilitation groups over the past week despite prompting and encouragement. Patient has attended more fresh air breaks and has been noted to pace outside responding to internal stimuli. Patient continues to be isolative to self and does not engage in social interactions with staff or peers.  
  01-04-21  Type of therapy: Dialectical behavior therapy,Creative arts therapy  Type of session: Individual  Level of patient participation: Not engaged,Resistance to participation  Duration of participation: 15 minutes  Therapy conducted by: Psych rehab  Therapy Summary: Writer met with pt to discuss progress towards psychiatric rehabilitation goals. Pt continues to be unable to meaningfully engage in session due to internal preoccupation. Pt only responded to writer with one word answers, then proceeded to walk away. Pt is observed pacing, laughing, talking, and smiling to herself throughout the day. Pt has demonstrated improvement in regards to ADL’s, however continues to appear disheveled. Over the past seven days, writer attempted to engage pt to explore various coping skills during individual and group sessions. Pt has not been receptive due to internal preoccupation. Pt has been visible around the vicinity of groups, however is unable to meaningfully participate despite much encouragement. Pt is visible on the unit, however demonstrated no socialization with peers over the past seven days. Pt has been compliant with medications and is tolerating them well. Pt demonstrates improvement in sleep and appetite. Pt is currently unable to verbalize thoughts, needs, and feelings despite encouragement from staff. Writer will continue to engage pt in order to provide support and assist pt in developing effective coping skills.  
  04-04-21  Type of therapy: Patient has not attended any psychiatric rehabilitation groups over the past week   Type of session: Individual  Level of patient participation: Resistance to participation  Duration of participation: Less than 15 minutes  Therapy conducted by: Psych rehab  Therapy Summary: Patient has demonstrated poor progress towards psychiatric rehabilitation goals over the past week. Patient continues to be unable to identify effective coping skills to manage symptoms. Patient demonstrates poor insight into illness and is difficult to engage in conversation about symptoms. Due to presentation and poor insight, patient would benefit from a goal change to engage in social interactions with staff/peers and attend psychiatric rehabilitation groups to distract herself from auditory hallucinations. Writer and patient discussed the types of groups offered by psych rehab in order to assess patients’ interests. Patient appeared interested in leisure groups, writer encouraged patient to attend. Patient denies suicidal and homicidal ideation. Patient denies auditory and visual hallucinations, despite being observed responding to internal stimuli. Patient is often observed pacing the halls talking to herself. Patient can become loud at times but is receptive to redirection and is agreeable to taking PRN medications. Patient endorses being bored on the unit but denies resources offered by psych rehab staff to keep busy, such as coloring pages, word searches, headphones, books etc. Writer offered for patient to zoom call with family, patient declined. Psych rehab staff will continue to engage patient daily and encourage group attendance and socialization.  
  05-02-21  Type of therapy: Creative arts therapy,Leisure development,Music therapy  Type of session: Individual  Level of patient participation: Participated with encouragement  Duration of participation: Less than 15 minutes  Therapy conducted by: Psych rehab  Therapy Summary: Patient has demonstrated some progress towards psychiatric rehabilitation goals over the past week. Patient continues to demonstrate difficulty tolerating conversations for more than a few minutes before walking away or becoming irritable. Patient mostly responds with one word answers. Patient is superficial in conversations when discussing symptoms. Patient continues to demonstrate poor insight into illness. Patient was able to tolerate a 5 minute conversation without irrational content, speaking with writer about her visits from her mother and sister and increased group attendance. Patient contributes increased group attendance to boredom and enjoying being outside. Patient has attended approximately 25 percent of psychiatric rehabilitation groups over the past week. Patient mostly attends leisure groups and has difficulty tolerating group structure, indicated by leaving early or arriving late. Patient is not observed responding to internal stimuli when in group setting but is noted to be talking to herself when she leaves the group. Patient is minimally receptive to peer and staff encouragement to engage in group and is observed to be isolative when in group setting. Patient is visible in the milieu. Patient continues to be observed pacing the halls talking to herself. Patient can become loud at times responding to internal stimuli. Patient does not socialize with peers. Patient is able to make needs known to staff.  
  02-08-21  Type of therapy: Creative arts therapy  Type of session: Individual  Level of patient participation: Not engaged,Resistance to participation  Duration of participation: Less than 15 minutes  Therapy conducted by: Psych rehab  Therapy Summary: Writer engaged pt in order to discuss progress towards psychiatric rehabilitation goals. Pt continues to demonstrate internal preoccupation and is unable to meaningfully engage in an individual session at this time. Pt presents as disorganized, paranoid, and responding to internal stimuli throughout the day. Writer continues to observe pt laughing, talking to self, and pacing. Pt is irritable at times requiring redirection. Pt is more receptive to verbal redirection by staff over the past week. Pt continues to present with poor ADL’s despite much encouragement from staff. Pt demonstrates poor insight and judgment into current symptoms and treatment. Pt has been compliant with medications as per court order. Pt is visible on the unit, however does not engage in any socialization with peers. Pt attempts to engage in groups, especially art, however is unable to stay for the full duration due to symptoms. Writer will continue to engage pt daily in order to encourage participation in groups and individual sessions to assist in becoming oriented to reality and identify effective coping skills for better symptom management.  
  03-14-21  Type of therapy: Patient has not participated in any psychiatric rehabilaition groups in the past 7 days  Type of session: Individual  Level of patient participation: Resistance to participation  Duration of participation: Less than 15 minutes  Therapy conducted by: Psych rehab  Therapy Summary: Writer met with patient for an individual session in order to review progress towards psychiatric rehabilitation goals. Patient was superficially cooperative and minimally verbal during the session. Patient has demonstrated minimal improvement in symptoms. Patient remains internally preoccupied though she is talking herself more quietly. Patient reported feeling fine and wishing she could be home. Patient has been adhering to medication and reported no side effects. Patient endorsed fair appetite and sleep. Patient denied auditory/visual hallucinations, though writer observed her responding to internal stimuli. Due to the COVID-19 pandemic, unit structure and activities are re-evaluated on a consistent basis in effort to maintain the safety of patients and staff. Patient does not attend psychiatric rehabilitation group, despite staff encouragement. When asked by writer why she does not participate, patient stated that “groups really aren’t for me.” Patient is visible on the unit and does not socialize with peers. Patient presents with improving ADLs.  
  01-17-21  Type of therapy: Coping skills  Type of session: Individual  Level of patient participation: Not engaged,Resistance to participation  Duration of participation: Less than 15 minutes  Therapy conducted by: Psych rehab  Therapy Summary: Writer met with patient for an individual session in order to review progress towards psychiatric rehabilitation goals. Patient was superficially cooperative and minimally able to engage in session. Patient responded with one word answers before terminating the session. Patient has demonstrated minimal improvement in symptoms. Patient remains disorganized and internally preoccupied. Patient endorsed fair sleep and fair appetite. Patient intermittently adheres to medication and is generally non-compliant to medication. Due to the COVID-19 pandemic, unit structure and activities are re-evaluated on a consistent basis in effort to maintain the safety of patients and staff. Patient minimally attends psychiatric rehabilitation groups and is unable to engage meaningfully. Patient is visible on the unit and can be observed pacing, laughing and talking to herself. Patient does not socialize with peers.  
  03-07-21  Type of therapy: Coping skills  Type of session: Individual  Level of patient participation: Not engaged,Resistance to participation  Duration of participation: Less than 15 minutes  Therapy conducted by: Psych rehab  Therapy Summary: Writer met with pt to discuss progress towards psychiatric rehabilitation goals. Pt has demonstrated minimal progress towards goals over the past week. Pt presents as guarded and isolative at this time. Pt continues to respond to internal preoccupation; laughing and talking to self, however is quieter. Pt is polite when approached, however is guarded when asked about symptoms. Pt denies any AH/VH despite writers observations of internal preoccupation. Pt has been compliant with medications as per court order and is tolerating them well. Pt presents with appropriate ADL’s and appearance. Pt is visible on the unit, however has been more isolative to room over the past week. Pt does not engage in any socialization with peers. Psychiatric Rehabilitation staff approaches pt daily to encourage group participation. Pt verbalizes intent to attend group, however has not participated despite much encouragement. Writer will continue to engage pt daily in order to encourage participation in groups and individual sessions to assist in becoming oriented to reality and identify effective coping skills for better symptom management.  
  12-28-20  --  Type of session: Individual  Level of patient participation: Not engaged,Resistance to participation  Duration of participation: Less than 15 minutes  Therapy conducted by: Psych rehab  Therapy Summary: Writer met with pt to discuss progress towards psychiatric rehabilitation goals. Pt was unable to meaningfully engage in session due to internal preoccupation. Pt is observed laughing, talking, and smiling to herself throughout the day. Pt endorses improvement in sleep, however continues to have poor appetite and poor attention to ADL’s. Over the past seven days, writer attempted to engage pt to explore various coping skills during individual and group sessions. Pt has not been receptive due to internal preoccupation and paranoia. Pt has not engaged in any psychiatric rehabilitation groups despite much encouragement. Pt is visible on the unit, however demonstrated no socialization with peers. Pt has been compliant with medications and is tolerating them well. Pt is currently unable to verbalize thoughts, needs, and feelings despite encouragement from staff. Writer encouraged pt to continue attending psychiatric rehabilitation groups for support and skill development.  
  01-11-21  Type of therapy: Dialectical behavior therapy,Creative arts therapy  Type of session: Individual  Level of patient participation: Not engaged  Duration of participation: Less than 15 minutes  Therapy conducted by: Psych rehab  Therapy Summary: Writer met with patient for an individual session in order to review progress towards psychiatric rehabilitation goals. Patient was superficially cooperative and minimally able to engage in session. Patient responded with one word answers before terminating the session. Patient has demonstrated minimal improvement in symptoms. Patient remains disorganized and internally preoccupied. Patient endorsed poor sleep and fair appetite. Patient intermittently adheres to medication and was unable to identify any changes in symptoms due to medication. Due to the COVID-19 pandemic, unit structure and activities are re-evaluated on a consistent basis in effort to maintain the safety of patients and staff. Patient minimally attends psychiatric rehabilitation groups and is unable to engage meaningfully. Patient is visible on the unit and can be observed pacing, laughing and talking to herself. Patient does not socialize with peers.  
  01-25-21  --  Type of session: Individual  Level of patient participation: Not engaged,Resistance to participation  Duration of participation: Less than 15 minutes  Therapy conducted by: Psych rehab  Therapy Summary: Writer met with pt to discuss progress towards psychiatric rehabilitation goals. Due to severity of symptoms, pt is unable to engage in a meaningful conversation. Pt currently presents as internally preoccupied, disorganized, and paranoid. Pt is observed pacing the unit, yelling at AH, laughing to self, and talking to self throughout the day. Pt presents with poor ADL’s despite encouragement. Pt refuses to comply with COVID-19 precautions of mask wearing despite education by staff. Pt is intermittently adherent with medications. Pt demonstrates poor insight and judgement into current symptoms and treatment. Pt becomes dysregulated and agitated at times requiring PRN medications. Pt does not currently attend any psychiatric rehabilitation groups despite encouragement and does not engage in socialization with peers. Writer will continue to engage pt daily in order to encourage participation in groups and individual sessions to assist pt in identifying healthy coping skills for better symptom management.  
  02-01-21  Type of therapy: Creative arts therapy  Type of session: Individual  Level of patient participation: Not engaged  Duration of participation: 15 minutes  Therapy conducted by: Psych rehab  Therapy Summary: Writer met with pt to discuss progress towards psychiatric rehabilitation goals. Pt continues to be unable to engage in a meaningful conversation and is dismissive towards writer when approached for individual session. Pt continues to present as internally preoccupied, disorganized, and paranoid. Pt is observed talking loudly to self, laughing to self, and pacing. Pt is noncompliant with COVID-19 precautions of mask wearing despite education by staff and continues to present with poor ADL’s. Pt is currently compliant with medications as per court mandate. Despite much education from staff, pt continues to demonstrate poor insight and judgement into current symptoms and treatment. Pt does not engage in any socialization with peers. When approached, pt is polite, however dismissive of staff and peers due to internal preoccupation. Pt has attempted to engage in art therapy groups, however is unable to participate for the full duration due to symptoms. Pt becomes agitated at times with loud outbursts of yelling and cursing. Pt has been more receptive to verbal redirection over the past week. Writer will continue to engage pt daily in order to encourage participation in groups and individual sessions to assist pt in identifying healthy coping skills for better symptom management.  
  04-11-21  Type of therapy: Leisure development  Type of session: Individual  Level of patient participation: Participates  Duration of participation: Less than 15 minutes  Therapy conducted by: Psych rehab  Therapy Summary: Patient has demonstrated some progress towards psychiatric rehabilitation goals over the past week. Patient continues to present as isolative with limited engagement with staff and peers. Patient attended two leisure groups over the past week. Patient had difficulty tolerating group structure as indicated by leaving group early. While in the group, patient was able to distract herself from auditory hallucinations and engage in a Wii game with peers. Writer praised patient and encouraged continued group attendance and participation. Patient denies suicidal and homicidal ideation. Patient denies auditory and visual hallucinations, despite being observed responding to internal stimuli. Patient is noted to become irritable at times when asked about auditory hallucinations. Patient continues to be observed pacing the halls talking to herself. Patient can become loud at times but is receptive to redirection and is agreeable to taking PRN medications. Patient is noted to be quieter in responses to voices over the past week. Patient endorses being bored on the unit but denies resources offered by psych rehab staff to keep busy, such as coloring pages, word searches, headphones, books etc. Writer offered for patient to zoom call with family, patient declined. Psych rehab staff will continue to engage patient daily and encourage group attendance and socialization.

## 2021-05-06 NOTE — BH TREATMENT PLAN - NSTXDCOPNODATEEST_PSY_ALL_CORE
01-Apr-2021
05-Jan-2021
09-Mar-2021
16-Mar-2021
19-Jan-2021
26-Jan-2021
29-Dec-2020
02-Mar-2021
12-Apr-2021
16-Feb-2021
12-Jan-2021
16-Mar-2021
09-Feb-2021
02-Feb-2021
06-Apr-2021
12-Apr-2021
05-Apr-2021
28-Apr-2021

## 2021-05-06 NOTE — BH TREATMENT PLAN - NSDCCRITERIA_PSY_ALL_CORE
cgi-i<=2	
cgi-i<=3	
Patient will no longer be influenced by psychosis
Patient's psychosis will no longer impair daily functioning.
Patient will no longer be influenced by psychosis
Patient will no longer be influenced by psychosis
cgi-i<=2	
Patient's psychosis will no longer impair daily functioning.
Patient will no longer be influenced by psychosis
Patient's psychosis will no longer impair daily functioning.
Patient's psychosis will no longer impair daily functioning.
Patient will no longer be influenced by psychosis
cgi-i<=3	
Patient's psychosis will no longer impair daily functioning.
Patient will no longer be influenced by psychosis
Patient will no longer be influenced by psychosis
Patient's psychosis will no longer impair daily functioning.
cgi-i<=3	
cgi-i<=3

## 2021-05-06 NOTE — BH TREATMENT PLAN - NSTXCAREGIVERPARTICIPATE_PSY_P_CORE
Family/Caregiver participated in identification of needs/problems/goals for treatment
Family/Caregiver participated in identification of needs/problems/goals for treatment
Family/Caregiver participated in identification of needs/problems/goals for treatment/Family/Caregiver participated in defining interventions
Family/Caregiver participated in identification of needs/problems/goals for treatment
Family/Caregiver participated in defining interventions
Family/Caregiver participated in identification of needs/problems/goals for treatment
Family/Caregiver participated in identification of needs/problems/goals for treatment/Family/Caregiver participated in defining interventions/Family/Caregiver participated in development of after care plan
Family/Caregiver participated in identification of needs/problems/goals for treatment/Family/Caregiver participated in defining interventions/Family/Caregiver participated in development of after care plan
Family/Caregiver participated in identification of needs/problems/goals for treatment
Family/Caregiver participated in identification of needs/problems/goals for treatment
Family/Caregiver participated in identification of needs/problems/goals for treatment/Family/Caregiver participated in defining interventions/Family/Caregiver participated in development of after care plan

## 2021-05-06 NOTE — BH TREATMENT PLAN - NSTXPLANTYPE_PSY_ALL_CORE
Ongoing
Initial
Ongoing

## 2021-05-06 NOTE — BH TREATMENT PLAN - NSTXPSYCHOPROGRES_PSY_ALL_CORE
Improving
No Change
Improving
Improving
No Change
Improving
Improving
No Change
Improving
Improving

## 2021-05-06 NOTE — BH TREATMENT PLAN - NSTXPSYCHODATETRGT_PSY_ALL_CORE
29-Dec-2020
15-Feb-2021
18-Jan-2021
11-Mar-2021
05-May-2021
29-Dec-2020
28-Jan-2021
21-Apr-2021
25-Feb-2021
28-Apr-2021
28-Jan-2021
13-Apr-2021
04-Feb-2021
04-Mar-2021
08-Apr-2021
25-Mar-2021
11-Jan-2021
31-Mar-2021
02-May-2021

## 2021-05-06 NOTE — BH TREATMENT PLAN - NSTXPSYCHOINTERMD_PSY_ALL_CORE
Titrate Abilify
Zyprexa trial
Titrate Abilify, consider EDGAR, encourage adherence to medications
 c/w Caplyta--at max dose, continue with Zyprexa
 c/w Caplyta--at max dose, continue with Zyprexa
Start Haldol trial.
Haldol trial, with plan for EDGAR.
Start Haldol trial, c/w Caplyta--at max dose
 c/w Caplyta--at max dose, continue with Zyprexa
  trial clozapine - 175 mg qhs 
 c/w Caplyta--at max dose, continue with Zyprexa 30 mg/day in divided doses.  trial clozapine
Start Haldol trial.
Start Haldol trial.
Zyprexa trial
Zyprexa trial
 c/w Caplyta--at max dose, continue with Zyprexa
Titrate Abilify, consider EDGAR, encourage adherence to medications
 c/w Caplyta--at max dose, continue with Zyprexa 30 mg/day in divided doses.  trial clozapine
 c/w Caplyta--at max dose, d/c Haldol and start Zyprexa

## 2021-05-07 PROCEDURE — 99232 SBSQ HOSP IP/OBS MODERATE 35: CPT

## 2021-05-07 RX ORDER — OLANZAPINE 15 MG/1
5 TABLET, FILM COATED ORAL AT BEDTIME
Refills: 0 | Status: DISCONTINUED | OUTPATIENT
Start: 2021-05-07 | End: 2021-05-10

## 2021-05-07 RX ADMIN — Medication 0.5 MILLIGRAM(S): at 08:27

## 2021-05-07 RX ADMIN — OLANZAPINE 10 MILLIGRAM(S): 15 TABLET, FILM COATED ORAL at 08:27

## 2021-05-07 RX ADMIN — OLANZAPINE 5 MILLIGRAM(S): 15 TABLET, FILM COATED ORAL at 21:48

## 2021-05-07 RX ADMIN — CLOZAPINE 175 MILLIGRAM(S): 150 TABLET, ORALLY DISINTEGRATING ORAL at 21:48

## 2021-05-07 RX ADMIN — Medication 0.5 MILLIGRAM(S): at 21:47

## 2021-05-07 NOTE — BH INPATIENT PSYCHIATRY PROGRESS NOTE - NSBHASSESSSUMMFT_PSY_ALL_CORE
25 Y/ F with a history of schizoaffective disorder vs Schizophrenia, 3 prior hospitalizations, in [facility] ETP program but non-compliant with medications PTA, who presented BIB mother for insomnia, medication non-compliance, yelling, and talking to self/RIS.   During this hospitalization patient was tried on Abilify and Haldol with poor response. Started on Zyprexa but had been inconsistently compliant with Zyprexa, with Medications over objection granted by court on 1/26. [[ Pt's mother's cell phone number is [phone].]]    Trial of Zyprexa and Caplyta has not been sufficiently effective. Patient is secretive about her symptoms but can not contain them: she constantly talking to herself in response to voices. Clozapine trial was initiated and dose being titrated up as tolerated.     5/6/21: CBC with differential  ANC - stable 3.51; troponin and C-RP - negative  Started Clozapine on 4/22/21. Clozapine doses will be titrated up while Zyprexa doses are being titrated down. d/juwan Caplita.  Tolerates treatment well.  Plan:  - Continue in-patient hospitalization.  behavioral and environmental efforts are being made to help patient feel and be safe on the unit. hallway monitoring established on the unit.    - Continue Clozapine at 175 mg qhs (to allow for tolerance for sedation and tachycardia to develop )  - Zyprexa 10 mg qam (zydis) -will be cross-titrated with Clozapine  - d/juwan Caplyta 42 mg  - Cogentin 0.5 mg BID ( increased)  EKG - WNL - QTc - 0.488 ( 4/20/21)    Applied for ACT and AOT but pt reportedly did not tolerate meeting with ACT team well and is rejected by ACT due to still prominent psychosis    We are proceeding with applying for transfer to a long-term hospital.

## 2021-05-07 NOTE — BH INPATIENT PSYCHIATRY PROGRESS NOTE - NSBHFUPINTERVALHXFT_PSY_A_CORE
Pt received no PRNs in last 24 hr. Pt compliant with all standing medications. Nursing reports that pt is still internally preoccupied but mumbling rather than yelling, thus improved from prior week.    c/o: "I'm good, how are you?"    Pt says she feels well and slept well. Pt is still in bed and feels tired due to up titration of clozapine; she reports that she is drinking fluids and stands up slowly to avoid feelings of lightheadedness. Pt denies AH currently and states that there is "only one voice left" that speaks to her at times. This voice does not command her to do things and does not impair her ability to concentrate on tasks.    Pt's mother was contacted today and updated regarding the pt's course. Pt's mother's cell phone number is [phone].

## 2021-05-07 NOTE — BH INPATIENT PSYCHIATRY PROGRESS NOTE - NSBHASSESSSUMMFT_PSY_ALL_CORE
25 Y/ F with a history of schizoaffective disorder vs Schizophrenia, 3 prior hospitalizations, in [facility] ETP program but non-compliant with medications PTA, who presented BIB mother for insomnia, medication non-compliance, yelling, and talking to self/RIS.   During this hospitalization patient was tried on Abilify and Haldol with poor response. Started on Zyprexa but had been inconsistently compliant with Zyprexa, with Medications over objection granted by court on 1/26. [[ Pt's mother's cell phone number is [phone].]]    Trial of Zyprexa and Caplyta has not been sufficiently effective. Patient is secretive about her symptoms but can not contain them: she constantly talking to herself in response to voices. Clozapine trial was initiated and dose being titrated up as tolerated.     5/6/21: CBC with differential  ANC - stable 3.51; troponin and C-RP - negative  Started Clozapine on 4/22/21. Clozapine doses will be titrated up while Zyprexa doses are being titrated down. d/juwan Caplita.  Tolerates treatment well.  Plan:  - Continue in-patient hospitalization.  behavioral and environmental efforts are being made to help patient feel and be safe on the unit. hallway monitoring established on the unit.    - Continue Clozapine at 175 mg qhs (to allow for tolerance for sedation and tachycardia to develop )  - Zyprexa 10 mg qam (zydis) -will be cross-titrated with Clozapine  - d/juwan Caplyta 42 mg  - Cogentin 0.5 mg BID ( increased)  EKG - WNL - QTc - 0.488 ( 4/20/21)    Applied for ACT and AOT but pt reportedly did not tolerate meeting with ACT team well and is rejected by ACT due to still prominent psychosis    We are proceeding with applying for transfer to a long-term hospital.

## 2021-05-07 NOTE — BH INPATIENT PSYCHIATRY PROGRESS NOTE - MSE UNSTRUCTURED FT
Vitals notable for tachycardia; no orthostasis.    MSE  On exam today, the patient is well-kempt with good hygiene. She appears her stated age. She is sedated but wakes up on verbal stimulation; she is more cooperative, less irritable and has fair eye contact. Her speech is nl volume and rate. Pt with intermittent stereotypical hand movements, far less than prior week. She describes her mood as “great”. Her affect is mildly constricted a little less dysphoric, blunted, appropriate, reactive, congruent to mood. Her thought process is linear,  no apparent FTD. Her thought content includes knowledge of intrusive AH. Her perception includes AH; pt denies VH. The patient denies SI/HI. She is cognitively grossly intact with fair fund of knowledge. Her memory is intact. Her attention is improved. Her insight and judgment are impaired, improved from previous week. Her impulse control is fair, improved from prior. She is alert and oriented to person, place, and situation.     Labs:   5/6/21  ANC 3.51  CBC notable for normal H/H, mildly low MCHC, mildly low Plt but none within concerning ranges  troponin and C-RP - negative

## 2021-05-08 RX ADMIN — Medication 0.5 MILLIGRAM(S): at 09:41

## 2021-05-08 RX ADMIN — Medication 0.5 MILLIGRAM(S): at 20:11

## 2021-05-08 RX ADMIN — OLANZAPINE 10 MILLIGRAM(S): 15 TABLET, FILM COATED ORAL at 09:41

## 2021-05-08 RX ADMIN — OLANZAPINE 5 MILLIGRAM(S): 15 TABLET, FILM COATED ORAL at 20:10

## 2021-05-08 RX ADMIN — CLOZAPINE 175 MILLIGRAM(S): 150 TABLET, ORALLY DISINTEGRATING ORAL at 20:10

## 2021-05-08 RX ADMIN — Medication 2 MILLIGRAM(S): at 20:37

## 2021-05-08 RX ADMIN — Medication 50 MILLIGRAM(S): at 20:37

## 2021-05-08 RX ADMIN — HALOPERIDOL DECANOATE 5 MILLIGRAM(S): 100 INJECTION INTRAMUSCULAR at 20:37

## 2021-05-09 RX ADMIN — OLANZAPINE 10 MILLIGRAM(S): 15 TABLET, FILM COATED ORAL at 08:48

## 2021-05-09 RX ADMIN — Medication 0.5 MILLIGRAM(S): at 20:19

## 2021-05-09 RX ADMIN — Medication 0.5 MILLIGRAM(S): at 08:48

## 2021-05-09 RX ADMIN — CLOZAPINE 175 MILLIGRAM(S): 150 TABLET, ORALLY DISINTEGRATING ORAL at 20:19

## 2021-05-09 RX ADMIN — Medication 50 MILLIGRAM(S): at 20:20

## 2021-05-09 RX ADMIN — OLANZAPINE 5 MILLIGRAM(S): 15 TABLET, FILM COATED ORAL at 20:19

## 2021-05-09 RX ADMIN — HALOPERIDOL DECANOATE 5 MILLIGRAM(S): 100 INJECTION INTRAMUSCULAR at 20:20

## 2021-05-10 PROCEDURE — 99232 SBSQ HOSP IP/OBS MODERATE 35: CPT

## 2021-05-10 RX ORDER — OLANZAPINE 15 MG/1
5 TABLET, FILM COATED ORAL DAILY
Refills: 0 | Status: DISCONTINUED | OUTPATIENT
Start: 2021-05-10 | End: 2021-05-11

## 2021-05-10 RX ORDER — CLOZAPINE 150 MG/1
200 TABLET, ORALLY DISINTEGRATING ORAL AT BEDTIME
Refills: 0 | Status: DISCONTINUED | OUTPATIENT
Start: 2021-05-10 | End: 2021-05-12

## 2021-05-10 RX ADMIN — CLOZAPINE 200 MILLIGRAM(S): 150 TABLET, ORALLY DISINTEGRATING ORAL at 20:21

## 2021-05-10 RX ADMIN — OLANZAPINE 10 MILLIGRAM(S): 15 TABLET, FILM COATED ORAL at 08:31

## 2021-05-10 RX ADMIN — Medication 0.5 MILLIGRAM(S): at 08:32

## 2021-05-10 NOTE — BH INPATIENT PSYCHIATRY PROGRESS NOTE - MSE UNSTRUCTURED FT
Vitals notable for tachycardia; no orthostasis.    MSE  On exam today, the patient is better kempt with good hygiene. She appears her stated age. She is not sedated; she is more cooperative - tolerates longer interviews but inevitably becomes  irritable upon longer conversation; she has fair eye contact. Her speech is nl volume and rate. Pt with intermittent stereotypical hand movements, far less than prior week. She describes her mood as “good”. Her affect is mildly constricted and less dysphoric, blunted, appropriate, reactive, congruent to mood. Her thought process is linear,  no apparent FTD. + AH and delusional reality distortions. Pt denies SI/HI. She is cognitively grossly intact with fair fund of knowledge. Her memory is intact. Her attention is improved. Her insight and judgment are impaired, improved from previous week. Her impulse control is fair, improved from prior. She is alert and oriented to person, place, and situation.     Labs:   5/6/21  ANC 3.51  CBC notable for normal H/H, mildly low MCHC, mildly low Plt but none within concerning ranges  troponin and C-RP - negative

## 2021-05-10 NOTE — BH INPATIENT PSYCHIATRY PROGRESS NOTE - NSBHFUPINTERVALHXFT_PSY_A_CORE
Pt received PRNs on 5/8 and 5/7/21 at bed time due to increase in voices. Pt compliant with all standing medications. Nursing reports that pt is still internally preoccupied but overall she is quieter than prior to Clozapine trial.    c/o: "I'm good, I want to be discharged". Patient has some understanding that she was not able to tolerate interview with ACT team as she became agitated and extremely inappropriate, and this caused her being rejected by ACT.      Pt says she feels well and slept well. Pt reluctantly admits to AH, denies CAH but refuses to disclose content of voices.      Pt's mother's cell phone number is [phone].

## 2021-05-10 NOTE — BH INPATIENT PSYCHIATRY PROGRESS NOTE - NSBHASSESSSUMMFT_PSY_ALL_CORE
25 Y/ F with a history of schizoaffective disorder vs Schizophrenia, 3 prior hospitalizations, in [facility] ETP program but non-compliant with medications PTA, who presented BIB mother for insomnia, medication non-compliance, yelling, and talking to self/RIS.   During this hospitalization patient was tried on Abilify and Haldol with poor response. Started on Zyprexa but had been inconsistently compliant with Zyprexa, with Medications over objection granted by court on 1/26. [[ Pt's mother's cell phone number is [phone].]]    Trial of Zyprexa and Caplyta has not been sufficiently effective. Patient is secretive about her symptoms but can not contain them: she constantly talking to herself in response to voices. Clozapine trial was initiated and dose being titrated up as tolerated.     5/6/21: CBC with differential  ANC - stable 3.51; troponin and C-RP - negative  Started Clozapine on 4/22/21. Clozapine doses will be titrated up while Zyprexa doses are being titrated down. d/juwan Caplita.  Tolerates treatment well.  Plan:  - Continue in-patient hospitalization.  behavioral and environmental efforts are being made to help patient feel and be safe on the unit. hallway monitoring established on the unit.    - Continue Clozapine at 200 mg qhs ( increased 5/10/21)  - Zyprexa  5 mg qam (zydis) -taper in progress  - d/juwan Caplyta 42 mg  - d/c Cogentin 0.5 mg BID  EKG - WNL - QTc - 0.488 ( 4/20/21)    Applied for ACT and AOT but pt reportedly did not tolerate meeting with ACT team well and is rejected by ACT due to still prominent psychosis    We are proceeding with applying for transfer to a long-term hospital.

## 2021-05-11 LAB
BASOPHILS # BLD AUTO: 0.01 K/UL — SIGNIFICANT CHANGE UP (ref 0–0.2)
BASOPHILS NFR BLD AUTO: 0.2 % — SIGNIFICANT CHANGE UP (ref 0–2)
EOSINOPHIL # BLD AUTO: 0 K/UL — SIGNIFICANT CHANGE UP (ref 0–0.5)
EOSINOPHIL NFR BLD AUTO: 0 % — SIGNIFICANT CHANGE UP (ref 0–6)
HCT VFR BLD CALC: 39.6 % — SIGNIFICANT CHANGE UP (ref 34.5–45)
HGB BLD-MCNC: 12.5 G/DL — SIGNIFICANT CHANGE UP (ref 11.5–15.5)
IANC: 5.45 K/UL — SIGNIFICANT CHANGE UP (ref 1.5–8.5)
IMM GRANULOCYTES NFR BLD AUTO: 0.3 % — SIGNIFICANT CHANGE UP (ref 0–1.5)
LYMPHOCYTES # BLD AUTO: 0.47 K/UL — LOW (ref 1–3.3)
LYMPHOCYTES # BLD AUTO: 7.1 % — LOW (ref 13–44)
MCHC RBC-ENTMCNC: 25.8 PG — LOW (ref 27–34)
MCHC RBC-ENTMCNC: 31.6 GM/DL — LOW (ref 32–36)
MCV RBC AUTO: 81.6 FL — SIGNIFICANT CHANGE UP (ref 80–100)
MONOCYTES # BLD AUTO: 0.68 K/UL — SIGNIFICANT CHANGE UP (ref 0–0.9)
MONOCYTES NFR BLD AUTO: 10.3 % — SIGNIFICANT CHANGE UP (ref 2–14)
NEUTROPHILS # BLD AUTO: 5.45 K/UL — SIGNIFICANT CHANGE UP (ref 1.8–7.4)
NEUTROPHILS NFR BLD AUTO: 82.1 % — HIGH (ref 43–77)
NRBC # BLD: 0 /100 WBCS — SIGNIFICANT CHANGE UP
NRBC # FLD: 0 K/UL — SIGNIFICANT CHANGE UP
PLATELET # BLD AUTO: 146 K/UL — LOW (ref 150–400)
RBC # BLD: 4.85 M/UL — SIGNIFICANT CHANGE UP (ref 3.8–5.2)
RBC # FLD: 14.5 % — SIGNIFICANT CHANGE UP (ref 10.3–14.5)
WBC # BLD: 6.63 K/UL — SIGNIFICANT CHANGE UP (ref 3.8–10.5)
WBC # FLD AUTO: 6.63 K/UL — SIGNIFICANT CHANGE UP (ref 3.8–10.5)

## 2021-05-11 PROCEDURE — 99232 SBSQ HOSP IP/OBS MODERATE 35: CPT

## 2021-05-11 RX ORDER — CLOZAPINE 150 MG/1
1 TABLET, ORALLY DISINTEGRATING ORAL
Qty: 7 | Refills: 0
Start: 2021-05-11 | End: 2021-05-17

## 2021-05-11 RX ADMIN — OLANZAPINE 5 MILLIGRAM(S): 15 TABLET, FILM COATED ORAL at 08:48

## 2021-05-11 RX ADMIN — CLOZAPINE 200 MILLIGRAM(S): 150 TABLET, ORALLY DISINTEGRATING ORAL at 21:02

## 2021-05-11 NOTE — BH INPATIENT PSYCHIATRY PROGRESS NOTE - NSBHFUPINTERVALHXFT_PSY_A_CORE
Pt is doing better.    c/o: Patient reports improvement in voices and she communicates her needs more appropriately.  "I'm good, at times I don't hear voices for half an hour. I want to be discharged". Denies s/h I/i/p. pt denies side effects.  Patient represented herself well in Court today.

## 2021-05-11 NOTE — BH DISCHARGE NOTE NURSING/SOCIAL WORK/PSYCH REHAB - NSDCPRGOAL_PSY_ALL_CORE
Patient has demonstrated progress towards psychiatric rehabilitation goals during current hospitalization. Patient has demonstrated compliance with court ordered medications. Patient reports reduced auditory hallucinations compared to admission. Patient continues to be observed talking to self with improvements noted. Patient was able to identify listening to music, spending time in her room and taking baths as effective coping skills to manage symptoms. Patient has demonstrated improved ability to tolerate conversations without irrational content. Patient has demonstrated improved insight into the current episode and was able to identify not taking medications as a warning sign that a crisis may be developing. Patient’s group attendance has increased throughout hospitalization. Patient did not attend any groups prior to the last two weeks. Over the past two weeks, patient has attended approximately 40 percent of psychiatric rehabilitation groups. Patient demonstrated improved ability to tolerate group structure and did not require redirection. Patient expressed readiness for discharge and is looking forward to going home. Patient was provided with a Press Ganey survey to complete prior to discharge.

## 2021-05-11 NOTE — BH DISCHARGE NOTE NURSING/SOCIAL WORK/PSYCH REHAB - PATIENT PORTAL LINK FT
You can access the FollowMyHealth Patient Portal offered by [facility] by registering at the following website: [email or web address]. By joining [facility]’s FollowMyHealth portal, you will also be able to view your health information using other applications (apps) compatible with our system.

## 2021-05-11 NOTE — BH DISCHARGE NOTE NURSING/SOCIAL WORK/PSYCH REHAB - NSBHDCAGENCY1FT_PSY_A_CORE
Adult  Outpatient Psychiatric  department  [facility] Adult  Outpatient Psychiatric  department  Kamlesh

## 2021-05-11 NOTE — BH DISCHARGE NOTE NURSING/SOCIAL WORK/PSYCH REHAB - NSCDUDCCRISIS_PSY_A_CORE
[facility] Well  1 [phone]) [facility]-WELL ([phone])  Text "WELL" to 42996  Website: www.PLAXD/.[facility] 1 (827) 431-BGTT (0782) Website: www.safehorizon.org/.National Suicide Prevention Lifeline 9 (021) 012-4909/.  [facility]  1 (164) LIFENET (199-5461)/.  [facility]  [address] [place] [address] [phone]   Hours:  Monday through Friday from 9 AM to 3 PM/.  [place] [employer] - [facility] Crisis Line  2 [phone] 760-2682, Option 1

## 2021-05-11 NOTE — BH DISCHARGE NOTE NURSING/SOCIAL WORK/PSYCH REHAB - NSDCPRRECOMMEND_PSY_ALL_CORE
Psychiatric rehabilitation staff recommends that patient continue to demonstrate daily medication compliance and continue to explore and utilize effective coping skills to manage symptoms. Patient would benefit from attending outpatient psychiatric treatment for ongoing medication management, support and psychotherapy.

## 2021-05-11 NOTE — BH DISCHARGE NOTE NURSING/SOCIAL WORK/PSYCH REHAB - DISCHARGE INSTRUCTIONS AFTERCARE APPOINTMENTS
In order to check the location, date, or time of your aftercare appointment, please refer to your Discharge Instructions Document given to you upon leaving the hospital.  If you have lost the instructions please call [phone]

## 2021-05-11 NOTE — BH INPATIENT PSYCHIATRY PROGRESS NOTE - MSE UNSTRUCTURED FT
Vitals notable for tachycardia; no orthostasis.    MSE  On exam today, the patient is better kempt with good hygiene. She appears her stated age. She is not sedated; she is more cooperative - tolerates longer interviews due to decrease in frequency and intensity of voices; she has fair eye contact. Her speech is nl volume and rate. Pt with intermittent stereotypical hand movements. She describes her mood as “good”. Her affect is mildly constricted and less dysphoric,  less irritable. Her thought process is linear,  no apparent FTD. + AH decreased in intencity/frequency. Pt denies SI/HI. She is cognitively grossly intact with fair fund of knowledge. Her memory is intact. Her attention is improved. Her insight and judgment are impaired but improved. Her impulse control is fair, improved from prior. She is alert and oriented to person, place, and situation.     Labs:   5/6/21  ANC 3.51  CBC notable for normal H/H, mildly low MCHC, mildly low Plt but none within concerning ranges  troponin and C-RP - negative

## 2021-05-11 NOTE — BH INPATIENT PSYCHIATRY PROGRESS NOTE - NSBHASSESSSUMMFT_PSY_ALL_CORE
25 Y/ F with a history of schizoaffective disorder vs Schizophrenia, 3 prior hospitalizations, in [facility] ETP program but non-compliant with medications PTA, who presented BIB mother for insomnia, medication non-compliance, yelling, and talking to self/RIS.   During this hospitalization patient was tried on Abilify and Haldol with poor response. Started on Zyprexa but had been inconsistently compliant with Zyprexa, with Medications over objection granted by court on 1/26. [[ Pt's mother's cell phone number is [phone].]]    Trial of Zyprexa and Caplyta has not been sufficiently effective. Patient is secretive about her symptoms but can not contain them: she constantly talking to herself in response to voices. Clozapine trial was initiated and dose being titrated up as tolerated.     5/6/21: CBC with differential  ANC - stable 3.51; troponin and C-RP - negative  Started Clozapine on 4/22/21. Clozapine doses will be titrated up while Zyprexa doses are being titrated down. d/juwan Caplita.  Tolerates treatment well.  Plan:  - Court denied hospital request for further retention, but allowed for pt to stay in the hospital until 5/12/21 for efforts to organize OP follow up for the pt  - Continue Clozapine at 200 mg qhs ( increased 5/10/21)  - d/c Zyprexa  5 mg qam   - d/juwan Caplyta 42 mg  - d/c Cogentin 0.5 mg BID  EKG - WNL - QTc - 0.488 ( 4/20/21)   5/12/21: CBC with diff and Clozapine blood level Stat for today - to prepare for transition to an OP level of care.    Applied for ACT and AOT but pt reportedly did not tolerate meeting with ACT team well and is rejected by ACT due to still prominent psychosis    We are proceeding with applying for transfer to a long-term hospital.

## 2021-05-12 VITALS — TEMPERATURE: 98 F

## 2021-05-12 NOTE — BH INPATIENT PSYCHIATRY PROGRESS NOTE - NSTXDCOPNOPROGRES_PSY_ALL_CORE
Improving
No Change
Improving
No Change
Worsening
Worsening
Improving
No Change
Worsening
No Change
Worsening
Improving
No Change
No Change
Worsening
No Change
No Change
Worsening
No Change
Worsening
Improving
No Change
Worsening
Improving
No Change
Worsening
Improving
Improving
No Change
Improving
Improving
No Change
Worsening
No Change
No Change
Worsening
No Change
Worsening
No Change
No Change
Worsening
No Change
Improving
Improving
No Change
Worsening
Worsening
No Change
Worsening
No Change
Worsening
No Change
Worsening
No Change
No Change
Improving
No Change
No Change
Worsening
No Change
Worsening
No Change
Improving
No Change
Worsening
Worsening
Improving
No Change
Worsening

## 2021-05-12 NOTE — BH INPATIENT PSYCHIATRY PROGRESS NOTE - NS ED BHA MED ROS PSYCHIATRIC
See HPI

## 2021-05-12 NOTE — BH INPATIENT PSYCHIATRY PROGRESS NOTE - NSCGIIMPROVESX_PSY_ALL_CORE
3 = Minimally improved - slightly better with little or no clinically meaningful reduction of symptoms.  Represents very little change in basic clinical status, level of care, or functional capacity.
4 = No change - symptoms remain essentially unchanged
3 = Minimally improved - slightly better with little or no clinically meaningful reduction of symptoms.  Represents very little change in basic clinical status, level of care, or functional capacity.
4 = No change - symptoms remain essentially unchanged
4 = No change - symptoms remain essentially unchanged
2 = Much improved - notably better with signficant reduction of symptoms; increase in the level of functioning but some symptoms remain
4 = No change - symptoms remain essentially unchanged
4 = No change - symptoms remain essentially unchanged
3 = Minimally improved - slightly better with little or no clinically meaningful reduction of symptoms.  Represents very little change in basic clinical status, level of care, or functional capacity.
4 = No change - symptoms remain essentially unchanged
3 = Minimally improved - slightly better with little or no clinically meaningful reduction of symptoms.  Represents very little change in basic clinical status, level of care, or functional capacity.
4 = No change - symptoms remain essentially unchanged
3 = Minimally improved - slightly better with little or no clinically meaningful reduction of symptoms.  Represents very little change in basic clinical status, level of care, or functional capacity.
4 = No change - symptoms remain essentially unchanged
3 = Minimally improved - slightly better with little or no clinically meaningful reduction of symptoms.  Represents very little change in basic clinical status, level of care, or functional capacity.
3 = Minimally improved - slightly better with little or no clinically meaningful reduction of symptoms.  Represents very little change in basic clinical status, level of care, or functional capacity.
4 = No change - symptoms remain essentially unchanged
4 = No change - symptoms remain essentially unchanged
3 = Minimally improved - slightly better with little or no clinically meaningful reduction of symptoms.  Represents very little change in basic clinical status, level of care, or functional capacity.
4 = No change - symptoms remain essentially unchanged
3 = Minimally improved - slightly better with little or no clinically meaningful reduction of symptoms.  Represents very little change in basic clinical status, level of care, or functional capacity.
4 = No change - symptoms remain essentially unchanged
3 = Minimally improved - slightly better with little or no clinically meaningful reduction of symptoms.  Represents very little change in basic clinical status, level of care, or functional capacity.
4 = No change - symptoms remain essentially unchanged
3 = Minimally improved - slightly better with little or no clinically meaningful reduction of symptoms.  Represents very little change in basic clinical status, level of care, or functional capacity.
3 = Minimally improved - slightly better with little or no clinically meaningful reduction of symptoms.  Represents very little change in basic clinical status, level of care, or functional capacity.
4 = No change - symptoms remain essentially unchanged
3 = Minimally improved - slightly better with little or no clinically meaningful reduction of symptoms.  Represents very little change in basic clinical status, level of care, or functional capacity.
4 = No change - symptoms remain essentially unchanged
3 = Minimally improved - slightly better with little or no clinically meaningful reduction of symptoms.  Represents very little change in basic clinical status, level of care, or functional capacity.
4 = No change - symptoms remain essentially unchanged
3 = Minimally improved - slightly better with little or no clinically meaningful reduction of symptoms.  Represents very little change in basic clinical status, level of care, or functional capacity.
4 = No change - symptoms remain essentially unchanged
4 = No change - symptoms remain essentially unchanged
3 = Minimally improved - slightly better with little or no clinically meaningful reduction of symptoms.  Represents very little change in basic clinical status, level of care, or functional capacity.
4 = No change - symptoms remain essentially unchanged
3 = Minimally improved - slightly better with little or no clinically meaningful reduction of symptoms.  Represents very little change in basic clinical status, level of care, or functional capacity.
4 = No change - symptoms remain essentially unchanged
4 = No change - symptoms remain essentially unchanged
3 = Minimally improved - slightly better with little or no clinically meaningful reduction of symptoms.  Represents very little change in basic clinical status, level of care, or functional capacity.
4 = No change - symptoms remain essentially unchanged
3 = Minimally improved - slightly better with little or no clinically meaningful reduction of symptoms.  Represents very little change in basic clinical status, level of care, or functional capacity.
4 = No change - symptoms remain essentially unchanged

## 2021-05-12 NOTE — BH INPATIENT PSYCHIATRY PROGRESS NOTE - NSTXPROBDCOPNO_PSY_ALL_CORE
DISCHARGE ISSUE - NON-ADHERENT WITH OUTPATIENT SERVICES

## 2021-05-12 NOTE — BH INPATIENT PSYCHIATRY PROGRESS NOTE - NSBHCONSULTIPREASON_PSY_A_CORE
danger to self; mental illness expected to respond to inpatient care

## 2021-05-12 NOTE — BH SCALES AND SCREENS - NSBPRSBLUNTAFFECT_PSY_ALL_CORE
2 = Very Mild – e.g., occasionally seems indifferent to material that is usually accompanied by some show of emotion
4 = Moderate – e.g., as above, but more intense, prolonged or frequent
2 = Very Mild – e.g., occasionally seems indifferent to material that is usually accompanied by some show of emotion
1 = Not Observed (Not Present)
5 = Moderately Severe – e.g., flattening of affect, including at least two of the three features, severe lack of facial expression, monotonous voice, or restricted body gestures
2 = Very Mild – e.g., occasionally seems indifferent to material that is usually accompanied by some show of emotion

## 2021-05-12 NOTE — BH INPATIENT PSYCHIATRY PROGRESS NOTE - NSTXPSYCHODATETRGT_PSY_ALL_CORE
18-Feb-2021
25-Feb-2021
25-Feb-2021
29-Dec-2020
02-May-2021
04-Feb-2021
04-Mar-2021
05-May-2021
13-Apr-2021
21-Mar-2021
02-May-2021
11-Jan-2021
11-Jan-2021
18-Mar-2021
31-Dec-2020
04-Apr-2021
11-Jan-2021
11-Jan-2021
04-Mar-2021
11-Jan-2021
16-May-2021
18-Jan-2021
28-Jan-2021
31-Mar-2021
21-Feb-2021
31-Mar-2021
18-Feb-2021
28-Jan-2021
11-Mar-2021
12-May-2021
16-May-2021
21-Mar-2021
29-Dec-2020
25-Apr-2021
09-Feb-2021
14-Mar-2021
15-Feb-2021
24-Jan-2021
28-Mar-2021
04-Feb-2021
04-Feb-2021
11-Mar-2021
18-Apr-2021
18-Mar-2021
28-Apr-2021
05-May-2021
25-Jan-2021
24-Jan-2021
28-Mar-2021
07-Mar-2021
18-Mar-2021
18-Mar-2021
28-Jan-2021
21-Feb-2021
25-Feb-2021
25-Mar-2021
01-Mar-2021
02-May-2021
02-May-2021
04-Feb-2021
28-Jan-2021
11-Mar-2021
22-Mar-2021
25-Jan-2021
07-Jan-2021
28-Jan-2021
02-May-2021
11-Jan-2021
13-Apr-2021
14-Mar-2021
28-Jan-2021
04-Jan-2021
25-Apr-2021
25-Jan-2021
25-Feb-2021
25-Mar-2021
25-Jan-2021
08-Apr-2021
08-Feb-2021
21-Feb-2021
24-Jan-2021
25-Apr-2021
28-Jan-2021
18-Feb-2021
13-Apr-2021
18-Apr-2021
18-Feb-2021
28-Apr-2021
30-Dec-2020
25-Apr-2021
08-Apr-2021
11-Feb-2021
04-Jan-2021
18-Apr-2021
18-Jan-2021
28-Mar-2021
18-Jan-2021
13-Apr-2021
25-Mar-2021
01-Mar-2021
02-May-2021
09-Feb-2021
15-Feb-2021
28-Apr-2021
30-Dec-2020
05-May-2021
11-Jan-2021
31-Mar-2021
04-Mar-2021
07-Mar-2021
21-Apr-2021
21-Apr-2021

## 2021-05-12 NOTE — BH SCALES AND SCREENS - NSBPRSCONCDISORG_PSY_ALL_CORE
1 = Not Observed (not present)
7 = Very Severe - very little coherent information can be obtained
4 = Moderate - e.g., occasional irrelevant statements, infrequent use of neologisms, or moderate loosening of associations
3 = Mild - e.g., frequently vague, but the interview is able to progress smoothly
1 = Not Observed (not present)

## 2021-05-12 NOTE — BH INPATIENT PSYCHIATRY PROGRESS NOTE - NSCGIIMPROVESXSCORE_CAL_PSY_ALL_CORE
3
4
4
3
4
3
4
3
4
3
4
3
3
4
3
3
4
4
3
3
4
4
3
4
3
4
2
3
4
3
4
3
3
4
4

## 2021-05-12 NOTE — BH INPATIENT PSYCHIATRY PROGRESS NOTE - NSTXDCOPNODATETRGT_PSY_ALL_CORE
12-Apr-2021
23-Feb-2021
26-Jan-2021
23-Mar-2021
11-May-2021
16-Feb-2021
23-Mar-2021
24-May-2021
19-Jan-2021
19-Apr-2021
19-Jan-2021
05-Jan-2021
16-Feb-2021
19-Jan-2021
16-Mar-2021
19-Apr-2021
26-Jan-2021
02-Mar-2021
24-May-2021
12-Apr-2021
23-Feb-2021
24-May-2021
05-Apr-2021
24-May-2021
24-May-2021
04-May-2021
09-Feb-2021
16-Mar-2021
23-Mar-2021
08-Apr-2021
16-Feb-2021
04-Feb-2021
05-May-2021
13-Apr-2021
19-Apr-2021
19-Jan-2021
23-Feb-2021
23-Feb-2021
26-Jan-2021
04-Feb-2021
11-Mar-2021
19-Jan-2021
23-Mar-2021
02-Mar-2021
09-Mar-2021
05-Jan-2021
30-Dec-2020
13-Apr-2021
26-Jan-2021
16-Feb-2021
16-Feb-2021
13-Apr-2021
16-Feb-2021
26-Jan-2021
30-Dec-2020
02-Mar-2021
11-Mar-2021
11-May-2021
12-Jan-2021
19-Jan-2021
05-Jan-2021
11-Feb-2021
11-Mar-2021
23-Feb-2021
23-Feb-2021
16-Mar-2021
19-Apr-2021
05-May-2021
09-Feb-2021
19-Apr-2021
19-Jan-2021
04-Feb-2021
26-Jan-2021
16-Feb-2021
23-Mar-2021
23-Mar-2021
24-May-2021
12-Jan-2021
04-May-2021
16-Mar-2021
26-Jan-2021
11-Mar-2021
02-Mar-2021
09-Feb-2021
23-Mar-2021
05-Apr-2021
12-Jan-2021
23-Mar-2021
12-Apr-2021
05-Apr-2021
30-Dec-2020
02-Mar-2021
05-May-2021
16-Mar-2021
02-Feb-2021
11-Feb-2021
12-Jan-2021
04-Feb-2021
12-Jan-2021
12-Jan-2021
23-Mar-2021
23-Mar-2021
12-Apr-2021
24-May-2021
12-Apr-2021
08-Apr-2021
13-Apr-2021
12-Jan-2021
31-Dec-2020
12-Jan-2021

## 2021-05-12 NOTE — BH INPATIENT PSYCHIATRY PROGRESS NOTE - NSTXNEGATGOAL_PSY_ALL_CORE
Will be able to identify coping skills to practice during the inpatient stay
Will seek support from staff when upset by negative self-talk
Will seek support from staff when upset by negative self-talk
Will be able to identify coping skills to practice during the inpatient stay
Will be able to identify coping skills to practice during the inpatient stay
Will seek support from staff when upset by negative self-talk
Will be able to identify coping skills to practice during the inpatient stay
Will be able to identify coping skills to practice during the inpatient stay
Will seek support from staff when upset by negative self-talk
Will seek support from staff when upset by negative self-talk
Will be able to identify coping skills to practice during the inpatient stay
Will be able to identify coping skills to practice during the inpatient stay
Will seek support from staff when upset by negative self-talk
Will seek support from staff when upset by negative self-talk
Will be able to identify coping skills to practice during the inpatient stay
Will be able to identify coping skills to practice during the inpatient stay
Will seek support from staff when upset by negative self-talk
Will seek support from staff when upset by negative self-talk
Will be able to identify coping skills to practice during the inpatient stay
Will seek support from staff when upset by negative self-talk
Will be able to identify coping skills to practice during the inpatient stay
Will seek support from staff when upset by negative self-talk
Will be able to identify coping skills to practice during the inpatient stay

## 2021-05-12 NOTE — BH INPATIENT PSYCHIATRY PROGRESS NOTE - NSBHMETABOLIC_PSY_ALL_CORE_FT
BMI: BMI (kg/m2): 29.7 (12-22-20 @ 06:56)  HbA1c: A1C with Estimated Average Glucose Result: 4.4 % (04-14-21 @ 09:28)    Glucose:   BP: 114/66 (04-20-21 @ 08:34) (114/66 - 114/66)  Lipid Panel: Date/Time: 12-08-20 @ 18:57  Cholesterol, Serum: 131  Direct LDL: --  HDL Cholesterol, Serum: 29  Total Cholesterol/HDL Ration Measurement: --  Triglycerides, Serum: 143  
BMI: BMI (kg/m2): 29.7 (12-22-20 @ 06:56)  HbA1c: A1C with Estimated Average Glucose Result: 4.1 % (12-08-20 @ 18:57)    Glucose:   BP: 105/61 (03-24-21 @ 06:44) (104/65 - 111/66)  Lipid Panel: Date/Time: 12-08-20 @ 18:57  Cholesterol, Serum: 131  Direct LDL: --  HDL Cholesterol, Serum: 29  Total Cholesterol/HDL Ration Measurement: --  Triglycerides, Serum: 143  
BMI: BMI (kg/m2): 29.7 (12-22-20 @ 06:56)  HbA1c: A1C with Estimated Average Glucose Result: 4.1 % (12-08-20 @ 18:57)    Glucose:   BP: --  Lipid Panel: Date/Time: 12-08-20 @ 18:57  Cholesterol, Serum: 131  Direct LDL: --  HDL Cholesterol, Serum: 29  Total Cholesterol/HDL Ration Measurement: --  Triglycerides, Serum: 143  
BMI: BMI (kg/m2): 29.7 (12-22-20 @ 06:56)  HbA1c: A1C with Estimated Average Glucose Result: 4.1 % (12-08-20 @ 18:57)    Glucose:   BP: 118/76 (02-19-21 @ 07:52) (91/60 - 118/76)  Lipid Panel: Date/Time: 12-08-20 @ 18:57  Cholesterol, Serum: 131  Direct LDL: --  HDL Cholesterol, Serum: 29  Total Cholesterol/HDL Ration Measurement: --  Triglycerides, Serum: 143  
BMI: BMI (kg/m2): 29.7 (12-22-20 @ 06:56)  HbA1c: A1C with Estimated Average Glucose Result: 4.1 % (12-08-20 @ 18:57)    Glucose:   BP: 100/60 (03-08-21 @ 07:50) (100/60 - 128/88)  Lipid Panel: Date/Time: 12-08-20 @ 18:57  Cholesterol, Serum: 131  Direct LDL: --  HDL Cholesterol, Serum: 29  Total Cholesterol/HDL Ration Measurement: --  Triglycerides, Serum: 143  
BMI: BMI (kg/m2): 29.7 (12-22-20 @ 06:56)  HbA1c: A1C with Estimated Average Glucose Result: 4.1 % (12-08-20 @ 18:57)    Glucose:   BP: 92/61 (02-14-21 @ 07:33) (92/61 - 117/77)  Lipid Panel: Date/Time: 12-08-20 @ 18:57  Cholesterol, Serum: 131  Direct LDL: --  HDL Cholesterol, Serum: 29  Total Cholesterol/HDL Ration Measurement: --  Triglycerides, Serum: 143  
BMI: BMI (kg/m2): 29.7 (12-22-20 @ 06:56)  HbA1c: A1C with Estimated Average Glucose Result: 4.4 % (04-14-21 @ 09:28)    Glucose:   BP: --  Lipid Panel: Date/Time: 12-08-20 @ 18:57  Cholesterol, Serum: 131  Direct LDL: --  HDL Cholesterol, Serum: 29  Total Cholesterol/HDL Ration Measurement: --  Triglycerides, Serum: 143  
BMI: BMI (kg/m2): 29.7 (12-22-20 @ 06:56)  HbA1c: A1C with Estimated Average Glucose Result: 4.1 % (12-08-20 @ 18:57)    Glucose:   BP: 90/58 (03-16-21 @ 07:48) (90/58 - 98/74)  Lipid Panel: Date/Time: 12-08-20 @ 18:57  Cholesterol, Serum: 131  Direct LDL: --  HDL Cholesterol, Serum: 29  Total Cholesterol/HDL Ration Measurement: --  Triglycerides, Serum: 143  
BMI: BMI (kg/m2): 29.7 (12-22-20 @ 06:56)  HbA1c: A1C with Estimated Average Glucose Result: 4.4 % (04-14-21 @ 09:28)    Glucose:   BP: 113/75 (04-27-21 @ 08:01) (107/78 - 113/75)  Lipid Panel: Date/Time: 12-08-20 @ 18:57  Cholesterol, Serum: 131  Direct LDL: --  HDL Cholesterol, Serum: 29  Total Cholesterol/HDL Ration Measurement: --  Triglycerides, Serum: 143  
BMI: BMI (kg/m2): 29.7 (12-22-20 @ 06:56)  HbA1c: A1C with Estimated Average Glucose Result: 4.1 % (12-08-20 @ 18:57)    Glucose:   BP: 106/69 (01-04-21 @ 06:26) (106/69 - 106/69)  Lipid Panel: Date/Time: 12-08-20 @ 18:57  Cholesterol, Serum: 131  Direct LDL: --  HDL Cholesterol, Serum: 29  Total Cholesterol/HDL Ration Measurement: --  Triglycerides, Serum: 143  
BMI: BMI (kg/m2): 29.7 (12-22-20 @ 06:56)  HbA1c: A1C with Estimated Average Glucose Result: 4.1 % (12-08-20 @ 18:57)    Glucose:   BP: 108/61 (02-09-21 @ 07:59) (108/61 - 108/61)  Lipid Panel: Date/Time: 12-08-20 @ 18:57  Cholesterol, Serum: 131  Direct LDL: --  HDL Cholesterol, Serum: 29  Total Cholesterol/HDL Ration Measurement: --  Triglycerides, Serum: 143  
BMI: BMI (kg/m2): 29.7 (12-22-20 @ 06:56)  HbA1c: A1C with Estimated Average Glucose Result: 4.1 % (12-08-20 @ 18:57)    Glucose:   BP: 120/86 (03-09-21 @ 07:35) (100/60 - 120/86)  Lipid Panel: Date/Time: 12-08-20 @ 18:57  Cholesterol, Serum: 131  Direct LDL: --  HDL Cholesterol, Serum: 29  Total Cholesterol/HDL Ration Measurement: --  Triglycerides, Serum: 143  
BMI: BMI (kg/m2): 29.7 (12-22-20 @ 06:56)  HbA1c: A1C with Estimated Average Glucose Result: 4.1 % (12-08-20 @ 18:57)    Glucose:   BP: 90/58 (01-16-21 @ 07:40) (90/58 - 106/71)  Lipid Panel: Date/Time: 12-08-20 @ 18:57  Cholesterol, Serum: 131  Direct LDL: --  HDL Cholesterol, Serum: 29  Total Cholesterol/HDL Ration Measurement: --  Triglycerides, Serum: 143  
BMI: BMI (kg/m2): 29.7 (12-22-20 @ 06:56)  HbA1c: A1C with Estimated Average Glucose Result: 4.1 % (12-08-20 @ 18:57)    Glucose:   BP: 99/67 (03-04-21 @ 07:42) (90/60 - 116/79)  Lipid Panel: Date/Time: 12-08-20 @ 18:57  Cholesterol, Serum: 131  Direct LDL: --  HDL Cholesterol, Serum: 29  Total Cholesterol/HDL Ration Measurement: --  Triglycerides, Serum: 143  
BMI: BMI (kg/m2): 29.7 (12-22-20 @ 06:56)  HbA1c: A1C with Estimated Average Glucose Result: 4.1 % (12-08-20 @ 18:57)    Glucose:   BP: 117/71 (02-11-21 @ 06:20) (101/71 - 117/71)  Lipid Panel: Date/Time: 12-08-20 @ 18:57  Cholesterol, Serum: 131  Direct LDL: --  HDL Cholesterol, Serum: 29  Total Cholesterol/HDL Ration Measurement: --  Triglycerides, Serum: 143  
BMI: BMI (kg/m2): 29.7 (12-22-20 @ 06:56)  HbA1c: A1C with Estimated Average Glucose Result: 4.1 % (12-08-20 @ 18:57)    Glucose:   BP: 100/60 (01-14-21 @ 07:50) (100/60 - 119/69)  Lipid Panel: Date/Time: 12-08-20 @ 18:57  Cholesterol, Serum: 131  Direct LDL: --  HDL Cholesterol, Serum: 29  Total Cholesterol/HDL Ration Measurement: --  Triglycerides, Serum: 143  
BMI: BMI (kg/m2): 29.7 (12-22-20 @ 06:56)  HbA1c: A1C with Estimated Average Glucose Result: 4.1 % (12-08-20 @ 18:57)    Glucose:   BP: 100/68 (12-29-20 @ 06:19) (98/65 - 100/68)  Lipid Panel: Date/Time: 12-08-20 @ 18:57  Cholesterol, Serum: 131  Direct LDL: --  HDL Cholesterol, Serum: 29  Total Cholesterol/HDL Ration Measurement: --  Triglycerides, Serum: 143  
BMI: BMI (kg/m2): 29.7 (12-22-20 @ 06:56)  HbA1c: A1C with Estimated Average Glucose Result: 4.1 % (12-08-20 @ 18:57)    Glucose:   BP: 102/61 (04-02-21 @ 08:34) (102/61 - 112/71)  Lipid Panel: Date/Time: 12-08-20 @ 18:57  Cholesterol, Serum: 131  Direct LDL: --  HDL Cholesterol, Serum: 29  Total Cholesterol/HDL Ration Measurement: --  Triglycerides, Serum: 143  
BMI: BMI (kg/m2): 29.7 (12-22-20 @ 06:56)  HbA1c: A1C with Estimated Average Glucose Result: 4.1 % (12-08-20 @ 18:57)    Glucose:   BP: 103/65 (03-12-21 @ 07:40) (91/57 - 103/65)  Lipid Panel: Date/Time: 12-08-20 @ 18:57  Cholesterol, Serum: 131  Direct LDL: --  HDL Cholesterol, Serum: 29  Total Cholesterol/HDL Ration Measurement: --  Triglycerides, Serum: 143  
BMI: BMI (kg/m2): 29.7 (12-22-20 @ 06:56)  HbA1c: A1C with Estimated Average Glucose Result: 4.1 % (12-08-20 @ 18:57)    Glucose:   BP: 107/64 (03-27-21 @ 07:52) (107/64 - 107/64)  Lipid Panel: Date/Time: 12-08-20 @ 18:57  Cholesterol, Serum: 131  Direct LDL: --  HDL Cholesterol, Serum: 29  Total Cholesterol/HDL Ration Measurement: --  Triglycerides, Serum: 143  
BMI: BMI (kg/m2): 29.7 (12-22-20 @ 06:56)  HbA1c: A1C with Estimated Average Glucose Result: 4.1 % (12-08-20 @ 18:57)    Glucose:   BP: 93/58 (01-08-21 @ 07:43) (93/58 - 120/66)  Lipid Panel: Date/Time: 12-08-20 @ 18:57  Cholesterol, Serum: 131  Direct LDL: --  HDL Cholesterol, Serum: 29  Total Cholesterol/HDL Ration Measurement: --  Triglycerides, Serum: 143  
BMI: BMI (kg/m2): 29.7 (12-22-20 @ 06:56)  HbA1c: A1C with Estimated Average Glucose Result: 4.1 % (12-08-20 @ 18:57)    Glucose:   BP: 93/58 (01-08-21 @ 07:43) (93/58 - 120/66)  Lipid Panel: Date/Time: 12-08-20 @ 18:57  Cholesterol, Serum: 131  Direct LDL: --  HDL Cholesterol, Serum: 29  Total Cholesterol/HDL Ration Measurement: --  Triglycerides, Serum: 143  
BMI: BMI (kg/m2): 29.7 (12-22-20 @ 06:56)  HbA1c: A1C with Estimated Average Glucose Result: 4.1 % (12-08-20 @ 18:57)    Glucose:   BP: 113/70 (01-27-21 @ 07:30) (109/60 - 113/70)  Lipid Panel: Date/Time: 12-08-20 @ 18:57  Cholesterol, Serum: 131  Direct LDL: --  HDL Cholesterol, Serum: 29  Total Cholesterol/HDL Ration Measurement: --  Triglycerides, Serum: 143  
BMI: BMI (kg/m2): 29.7 (12-22-20 @ 06:56)  HbA1c: A1C with Estimated Average Glucose Result: 4.1 % (12-08-20 @ 18:57)    Glucose:   BP: 102/71 (12-24-20 @ 07:35) (89/60 - 119/94)  Lipid Panel: Date/Time: 12-08-20 @ 18:57  Cholesterol, Serum: 131  Direct LDL: --  HDL Cholesterol, Serum: 29  Total Cholesterol/HDL Ration Measurement: --  Triglycerides, Serum: 143  
BMI: BMI (kg/m2): 29.7 (12-22-20 @ 06:56)  HbA1c: A1C with Estimated Average Glucose Result: 4.1 % (12-08-20 @ 18:57)    Glucose:   BP: 95/52 (03-15-21 @ 08:20) (95/52 - 110/67)  Lipid Panel: Date/Time: 12-08-20 @ 18:57  Cholesterol, Serum: 131  Direct LDL: --  HDL Cholesterol, Serum: 29  Total Cholesterol/HDL Ration Measurement: --  Triglycerides, Serum: 143  
BMI: BMI (kg/m2): 29.7 (12-22-20 @ 06:56)  HbA1c: A1C with Estimated Average Glucose Result: 4.4 % (04-14-21 @ 09:28)    Glucose:   BP: 97/62 (05-03-21 @ 06:48) (97/62 - 101/61)  Lipid Panel: Date/Time: 12-08-20 @ 18:57  Cholesterol, Serum: 131  Direct LDL: --  HDL Cholesterol, Serum: 29  Total Cholesterol/HDL Ration Measurement: --  Triglycerides, Serum: 143  
BMI: BMI (kg/m2): 29.7 (12-22-20 @ 06:56)  HbA1c: A1C with Estimated Average Glucose Result: 4.1 % (12-08-20 @ 18:57)    Glucose:   BP: 102/62 (02-16-21 @ 09:08) (92/61 - 102/62)  Lipid Panel: Date/Time: 12-08-20 @ 18:57  Cholesterol, Serum: 131  Direct LDL: --  HDL Cholesterol, Serum: 29  Total Cholesterol/HDL Ration Measurement: --  Triglycerides, Serum: 143  
BMI: BMI (kg/m2): 29.7 (12-22-20 @ 06:56)  HbA1c: A1C with Estimated Average Glucose Result: 4.1 % (12-08-20 @ 18:57)    Glucose:   BP: 119/69 (01-12-21 @ 07:49) (119/69 - 124/74)  Lipid Panel: Date/Time: 12-08-20 @ 18:57  Cholesterol, Serum: 131  Direct LDL: --  HDL Cholesterol, Serum: 29  Total Cholesterol/HDL Ration Measurement: --  Triglycerides, Serum: 143  
BMI: BMI (kg/m2): 29.7 (12-22-20 @ 06:56)  HbA1c: A1C with Estimated Average Glucose Result: 4.1 % (12-08-20 @ 18:57)    Glucose:   BP: 92/60 (03-10-21 @ 07:43) (92/60 - 120/86)  Lipid Panel: Date/Time: 12-08-20 @ 18:57  Cholesterol, Serum: 131  Direct LDL: --  HDL Cholesterol, Serum: 29  Total Cholesterol/HDL Ration Measurement: --  Triglycerides, Serum: 143  
BMI: BMI (kg/m2): 29.7 (12-22-20 @ 06:56)  HbA1c: A1C with Estimated Average Glucose Result: 4.1 % (12-08-20 @ 18:57)    Glucose:   BP: 92/61 (02-14-21 @ 07:33) (92/61 - 117/77)  Lipid Panel: Date/Time: 12-08-20 @ 18:57  Cholesterol, Serum: 131  Direct LDL: --  HDL Cholesterol, Serum: 29  Total Cholesterol/HDL Ration Measurement: --  Triglycerides, Serum: 143  
BMI: BMI (kg/m2): 29.7 (12-22-20 @ 06:56)  HbA1c: A1C with Estimated Average Glucose Result: 4.1 % (12-08-20 @ 18:57)    Glucose:   BP: 98/60 (03-26-21 @ 07:00) (98/60 - 105/61)  Lipid Panel: Date/Time: 12-08-20 @ 18:57  Cholesterol, Serum: 131  Direct LDL: --  HDL Cholesterol, Serum: 29  Total Cholesterol/HDL Ration Measurement: --  Triglycerides, Serum: 143  
BMI: BMI (kg/m2): 29.7 (12-22-20 @ 06:56)  HbA1c: A1C with Estimated Average Glucose Result: 4.4 % (04-14-21 @ 09:28)    Glucose:   BP: 107/78 (04-26-21 @ 08:33) (107/78 - 107/78)  Lipid Panel: Date/Time: 12-08-20 @ 18:57  Cholesterol, Serum: 131  Direct LDL: --  HDL Cholesterol, Serum: 29  Total Cholesterol/HDL Ration Measurement: --  Triglycerides, Serum: 143  
BMI: BMI (kg/m2): 29.7 (12-22-20 @ 06:56)  HbA1c: A1C with Estimated Average Glucose Result: 4.1 % (12-08-20 @ 18:57)    Glucose:   BP: 128/87 (03-01-21 @ 07:03) (93/60 - 128/87)  Lipid Panel: Date/Time: 12-08-20 @ 18:57  Cholesterol, Serum: 131  Direct LDL: --  HDL Cholesterol, Serum: 29  Total Cholesterol/HDL Ration Measurement: --  Triglycerides, Serum: 143  
BMI: BMI (kg/m2): 29.7 (12-22-20 @ 06:56)  HbA1c: A1C with Estimated Average Glucose Result: 4.1 % (12-08-20 @ 18:57)    Glucose:   BP: 101/71 (02-10-21 @ 07:31) (101/71 - 108/61)  Lipid Panel: Date/Time: 12-08-20 @ 18:57  Cholesterol, Serum: 131  Direct LDL: --  HDL Cholesterol, Serum: 29  Total Cholesterol/HDL Ration Measurement: --  Triglycerides, Serum: 143  
BMI: BMI (kg/m2): 29.7 (12-22-20 @ 06:56)  HbA1c: A1C with Estimated Average Glucose Result: 4.1 % (12-08-20 @ 18:57)    Glucose:   BP: 104/72 (04-05-21 @ 06:22) (96/75 - 144/64)  Lipid Panel: Date/Time: 12-08-20 @ 18:57  Cholesterol, Serum: 131  Direct LDL: --  HDL Cholesterol, Serum: 29  Total Cholesterol/HDL Ration Measurement: --  Triglycerides, Serum: 143  
BMI: BMI (kg/m2): 29.7 (12-22-20 @ 06:56)  HbA1c: A1C with Estimated Average Glucose Result: 4.4 % (04-14-21 @ 09:28)    Glucose:   BP: 114/66 (04-20-21 @ 08:34) (114/66 - 114/66)  Lipid Panel: Date/Time: 12-08-20 @ 18:57  Cholesterol, Serum: 131  Direct LDL: --  HDL Cholesterol, Serum: 29  Total Cholesterol/HDL Ration Measurement: --  Triglycerides, Serum: 143  
BMI: BMI (kg/m2): 29.7 (12-22-20 @ 06:56)  HbA1c: A1C with Estimated Average Glucose Result: 4.1 % (12-08-20 @ 18:57)    Glucose:   BP: 90/60 (03-03-21 @ 08:05) (90/60 - 128/87)  Lipid Panel: Date/Time: 12-08-20 @ 18:57  Cholesterol, Serum: 131  Direct LDL: --  HDL Cholesterol, Serum: 29  Total Cholesterol/HDL Ration Measurement: --  Triglycerides, Serum: 143  
BMI: BMI (kg/m2): 29.7 (12-22-20 @ 06:56)  HbA1c: A1C with Estimated Average Glucose Result: 4.4 % (04-14-21 @ 09:28)    Glucose:   BP: --  Lipid Panel: Date/Time: 12-08-20 @ 18:57  Cholesterol, Serum: 131  Direct LDL: --  HDL Cholesterol, Serum: 29  Total Cholesterol/HDL Ration Measurement: --  Triglycerides, Serum: 143  
BMI: BMI (kg/m2): 29.7 (12-22-20 @ 06:56)  HbA1c: A1C with Estimated Average Glucose Result: 4.4 % (04-14-21 @ 09:28)    Glucose:   BP: 114/- (04-28-21 @ 08:53) (107/78 - 114/-)  Lipid Panel: Date/Time: 12-08-20 @ 18:57  Cholesterol, Serum: 131  Direct LDL: --  HDL Cholesterol, Serum: 29  Total Cholesterol/HDL Ration Measurement: --  Triglycerides, Serum: 143  
BMI: BMI (kg/m2): 29.7 (12-22-20 @ 06:56)  HbA1c: A1C with Estimated Average Glucose Result: 4.1 % (12-08-20 @ 18:57)    Glucose:   BP: 128/80 (02-02-21 @ 08:47) (102/62 - 128/80)  Lipid Panel: Date/Time: 12-08-20 @ 18:57  Cholesterol, Serum: 131  Direct LDL: --  HDL Cholesterol, Serum: 29  Total Cholesterol/HDL Ration Measurement: --  Triglycerides, Serum: 143  
BMI: BMI (kg/m2): 29.7 (12-22-20 @ 06:56)  HbA1c: A1C with Estimated Average Glucose Result: 4.4 % (04-14-21 @ 09:28)    Glucose:   BP: 114/- (04-28-21 @ 08:53) (114/- - 114/-)  Lipid Panel: Date/Time: 12-08-20 @ 18:57  Cholesterol, Serum: 131  Direct LDL: --  HDL Cholesterol, Serum: 29  Total Cholesterol/HDL Ration Measurement: --  Triglycerides, Serum: 143  
BMI: BMI (kg/m2): 29.7 (12-22-20 @ 06:56)  HbA1c: A1C with Estimated Average Glucose Result: 4.4 % (04-14-21 @ 09:28)    Glucose:   BP: 97/62 (05-03-21 @ 06:48) (97/62 - 101/61)  Lipid Panel: Date/Time: 12-08-20 @ 18:57  Cholesterol, Serum: 131  Direct LDL: --  HDL Cholesterol, Serum: 29  Total Cholesterol/HDL Ration Measurement: --  Triglycerides, Serum: 143  
BMI: BMI (kg/m2): 29.7 (12-22-20 @ 06:56)  HbA1c: A1C with Estimated Average Glucose Result: 4.1 % (12-08-20 @ 18:57)    Glucose:   BP: 112/71 (03-31-21 @ 08:45) (112/71 - 112/71)  Lipid Panel: Date/Time: 12-08-20 @ 18:57  Cholesterol, Serum: 131  Direct LDL: --  HDL Cholesterol, Serum: 29  Total Cholesterol/HDL Ration Measurement: --  Triglycerides, Serum: 143  
BMI: BMI (kg/m2): 29.7 (12-22-20 @ 06:56)  HbA1c: A1C with Estimated Average Glucose Result: 4.4 % (04-14-21 @ 09:28)    Glucose:   BP: 102/71 (05-05-21 @ 06:50) (97/62 - 102/71)  Lipid Panel: Date/Time: 12-08-20 @ 18:57  Cholesterol, Serum: 131  Direct LDL: --  HDL Cholesterol, Serum: 29  Total Cholesterol/HDL Ration Measurement: --  Triglycerides, Serum: 143  
BMI: BMI (kg/m2): 29.7 (12-22-20 @ 06:56)  HbA1c: A1C with Estimated Average Glucose Result: 4.1 % (12-08-20 @ 18:57)    Glucose:   BP: 113/70 (01-27-21 @ 07:30) (113/70 - 113/70)  Lipid Panel: Date/Time: 12-08-20 @ 18:57  Cholesterol, Serum: 131  Direct LDL: --  HDL Cholesterol, Serum: 29  Total Cholesterol/HDL Ration Measurement: --  Triglycerides, Serum: 143  
BMI: BMI (kg/m2): 29.7 (12-22-20 @ 06:56)  HbA1c: A1C with Estimated Average Glucose Result: 4.1 % (12-08-20 @ 18:57)    Glucose:   BP: 117/69 (12-22-20 @ 20:38) (89/60 - 119/94)  Lipid Panel: Date/Time: 12-08-20 @ 18:57  Cholesterol, Serum: 131  Direct LDL: --  HDL Cholesterol, Serum: 29  Total Cholesterol/HDL Ration Measurement: --  Triglycerides, Serum: 143  
BMI: BMI (kg/m2): 29.7 (12-22-20 @ 06:56)  HbA1c: A1C with Estimated Average Glucose Result: 4.1 % (12-08-20 @ 18:57)    Glucose:   BP: 98/65 (02-05-21 @ 07:22) (98/65 - 101/65)  Lipid Panel: Date/Time: 12-08-20 @ 18:57  Cholesterol, Serum: 131  Direct LDL: --  HDL Cholesterol, Serum: 29  Total Cholesterol/HDL Ration Measurement: --  Triglycerides, Serum: 143  
BMI: BMI (kg/m2): 29.7 (12-22-20 @ 06:56)  HbA1c: A1C with Estimated Average Glucose Result: 4.1 % (12-08-20 @ 18:57)    Glucose:   BP: 102/62 (02-01-21 @ 07:28) (102/62 - 106/66)  Lipid Panel: Date/Time: 12-08-20 @ 18:57  Cholesterol, Serum: 131  Direct LDL: --  HDL Cholesterol, Serum: 29  Total Cholesterol/HDL Ration Measurement: --  Triglycerides, Serum: 143  
BMI: BMI (kg/m2): 29.7 (12-22-20 @ 06:56)  HbA1c: A1C with Estimated Average Glucose Result: 4.1 % (12-08-20 @ 18:57)    Glucose:   BP: 124/74 (01-10-21 @ 08:02) (124/74 - 124/74)  Lipid Panel: Date/Time: 12-08-20 @ 18:57  Cholesterol, Serum: 131  Direct LDL: --  HDL Cholesterol, Serum: 29  Total Cholesterol/HDL Ration Measurement: --  Triglycerides, Serum: 143  
BMI: BMI (kg/m2): 29.7 (12-22-20 @ 06:56)  HbA1c: A1C with Estimated Average Glucose Result: 4.1 % (12-08-20 @ 18:57)    Glucose:   BP: 105/64 (01-20-21 @ 07:38) (105/64 - 105/64)  Lipid Panel: Date/Time: 12-08-20 @ 18:57  Cholesterol, Serum: 131  Direct LDL: --  HDL Cholesterol, Serum: 29  Total Cholesterol/HDL Ration Measurement: --  Triglycerides, Serum: 143  
BMI: BMI (kg/m2): 29.7 (12-22-20 @ 06:56)  HbA1c: A1C with Estimated Average Glucose Result: 4.1 % (12-08-20 @ 18:57)    Glucose:   BP: 107/60 (02-12-21 @ 07:42) (101/71 - 117/71)  Lipid Panel: Date/Time: 12-08-20 @ 18:57  Cholesterol, Serum: 131  Direct LDL: --  HDL Cholesterol, Serum: 29  Total Cholesterol/HDL Ration Measurement: --  Triglycerides, Serum: 143  
BMI: BMI (kg/m2): 29.7 (12-22-20 @ 06:56)  HbA1c: A1C with Estimated Average Glucose Result: 4.4 % (04-14-21 @ 09:28)    Glucose:   BP: --  Lipid Panel: Date/Time: 12-08-20 @ 18:57  Cholesterol, Serum: 131  Direct LDL: --  HDL Cholesterol, Serum: 29  Total Cholesterol/HDL Ration Measurement: --  Triglycerides, Serum: 143  
BMI: BMI (kg/m2): 29.7 (12-22-20 @ 06:56)  HbA1c: A1C with Estimated Average Glucose Result: 4.1 % (12-08-20 @ 18:57)    Glucose:   BP: --  Lipid Panel: Date/Time: 12-08-20 @ 18:57  Cholesterol, Serum: 131  Direct LDL: --  HDL Cholesterol, Serum: 29  Total Cholesterol/HDL Ration Measurement: --  Triglycerides, Serum: 143  
BMI: BMI (kg/m2): 29.7 (12-22-20 @ 06:56)  HbA1c: A1C with Estimated Average Glucose Result: 4.1 % (12-08-20 @ 18:57)    Glucose:   BP: 117/77 (02-13-21 @ 06:24) (107/60 - 117/77)  Lipid Panel: Date/Time: 12-08-20 @ 18:57  Cholesterol, Serum: 131  Direct LDL: --  HDL Cholesterol, Serum: 29  Total Cholesterol/HDL Ration Measurement: --  Triglycerides, Serum: 143  
BMI: BMI (kg/m2): 29.7 (12-22-20 @ 06:56)  HbA1c: A1C with Estimated Average Glucose Result: 4.4 % (04-14-21 @ 09:28)    Glucose:   BP: --  Lipid Panel: Date/Time: 12-08-20 @ 18:57  Cholesterol, Serum: 131  Direct LDL: --  HDL Cholesterol, Serum: 29  Total Cholesterol/HDL Ration Measurement: --  Triglycerides, Serum: 143  
BMI: BMI (kg/m2): 29.7 (12-22-20 @ 06:56)  HbA1c: A1C with Estimated Average Glucose Result: 4.1 % (12-08-20 @ 18:57)    Glucose:   BP: 109/60 (01-25-21 @ 07:28) (109/60 - 109/60)  Lipid Panel: Date/Time: 12-08-20 @ 18:57  Cholesterol, Serum: 131  Direct LDL: --  HDL Cholesterol, Serum: 29  Total Cholesterol/HDL Ration Measurement: --  Triglycerides, Serum: 143  
BMI: BMI (kg/m2): 29.7 (12-22-20 @ 06:56)  HbA1c: A1C with Estimated Average Glucose Result: 4.1 % (12-08-20 @ 18:57)    Glucose:   BP: 90/58 (03-16-21 @ 07:48) (90/58 - 95/52)  Lipid Panel: Date/Time: 12-08-20 @ 18:57  Cholesterol, Serum: 131  Direct LDL: --  HDL Cholesterol, Serum: 29  Total Cholesterol/HDL Ration Measurement: --  Triglycerides, Serum: 143  
BMI: BMI (kg/m2): 29.7 (12-22-20 @ 06:56)  HbA1c: A1C with Estimated Average Glucose Result: 4.1 % (12-08-20 @ 18:57)    Glucose:   BP: 110/67 (03-13-21 @ 07:37) (91/57 - 110/67)  Lipid Panel: Date/Time: 12-08-20 @ 18:57  Cholesterol, Serum: 131  Direct LDL: --  HDL Cholesterol, Serum: 29  Total Cholesterol/HDL Ration Measurement: --  Triglycerides, Serum: 143  
BMI: BMI (kg/m2): 29.7 (12-22-20 @ 06:56)  HbA1c: A1C with Estimated Average Glucose Result: 4.1 % (12-08-20 @ 18:57)    Glucose:   BP: 101/65 (01-29-21 @ 06:07) (101/65 - 113/70)  Lipid Panel: Date/Time: 12-08-20 @ 18:57  Cholesterol, Serum: 131  Direct LDL: --  HDL Cholesterol, Serum: 29  Total Cholesterol/HDL Ration Measurement: --  Triglycerides, Serum: 143  
BMI: BMI (kg/m2): 29.7 (12-22-20 @ 06:56)  HbA1c: A1C with Estimated Average Glucose Result: 4.1 % (12-08-20 @ 18:57)    Glucose:   BP: 103/52 (02-23-21 @ 07:47) (103/52 - 103/52)  Lipid Panel: Date/Time: 12-08-20 @ 18:57  Cholesterol, Serum: 131  Direct LDL: --  HDL Cholesterol, Serum: 29  Total Cholesterol/HDL Ration Measurement: --  Triglycerides, Serum: 143  
BMI: BMI (kg/m2): 29.7 (12-22-20 @ 06:56)  HbA1c: A1C with Estimated Average Glucose Result: 4.1 % (12-08-20 @ 18:57)    Glucose:   BP: 126/78 (01-17-21 @ 06:37) (90/58 - 126/78)  Lipid Panel: Date/Time: 12-08-20 @ 18:57  Cholesterol, Serum: 131  Direct LDL: --  HDL Cholesterol, Serum: 29  Total Cholesterol/HDL Ration Measurement: --  Triglycerides, Serum: 143  
BMI: BMI (kg/m2): 29.7 (12-22-20 @ 06:56)  HbA1c: A1C with Estimated Average Glucose Result: 4.1 % (12-08-20 @ 18:57)    Glucose:   BP: 91/60 (02-18-21 @ 07:44) (91/60 - 102/62)  Lipid Panel: Date/Time: 12-08-20 @ 18:57  Cholesterol, Serum: 131  Direct LDL: --  HDL Cholesterol, Serum: 29  Total Cholesterol/HDL Ration Measurement: --  Triglycerides, Serum: 143  
BMI: BMI (kg/m2): 29.7 (12-22-20 @ 06:56)  HbA1c: A1C with Estimated Average Glucose Result: 4.4 % (04-14-21 @ 09:28)    Glucose:   BP: --  Lipid Panel: Date/Time: 12-08-20 @ 18:57  Cholesterol, Serum: 131  Direct LDL: --  HDL Cholesterol, Serum: 29  Total Cholesterol/HDL Ration Measurement: --  Triglycerides, Serum: 143  
BMI: BMI (kg/m2): 29.7 (12-22-20 @ 06:56)  HbA1c: A1C with Estimated Average Glucose Result: 4.4 % (04-14-21 @ 09:28)    Glucose:   BP: 101/64 (05-07-21 @ 06:45) (101/64 - 103/68)  Lipid Panel: Date/Time: 12-08-20 @ 18:57  Cholesterol, Serum: 131  Direct LDL: --  HDL Cholesterol, Serum: 29  Total Cholesterol/HDL Ration Measurement: --  Triglycerides, Serum: 143  
BMI: BMI (kg/m2): 29.7 (12-22-20 @ 06:56)  HbA1c: A1C with Estimated Average Glucose Result: 4.4 % (04-14-21 @ 09:28)    Glucose:   BP: 101/64 (05-07-21 @ 06:45) (101/64 - 103/68)  Lipid Panel: Date/Time: 12-08-20 @ 18:57  Cholesterol, Serum: 131  Direct LDL: --  HDL Cholesterol, Serum: 29  Total Cholesterol/HDL Ration Measurement: --  Triglycerides, Serum: 143  
BMI: BMI (kg/m2): 29.7 (12-22-20 @ 06:56)  HbA1c: A1C with Estimated Average Glucose Result: 4.1 % (12-08-20 @ 18:57)    Glucose:   BP: 100/65 (01-18-21 @ 07:50) (90/58 - 126/78)  Lipid Panel: Date/Time: 12-08-20 @ 18:57  Cholesterol, Serum: 131  Direct LDL: --  HDL Cholesterol, Serum: 29  Total Cholesterol/HDL Ration Measurement: --  Triglycerides, Serum: 143  
BMI: BMI (kg/m2): 29.7 (12-22-20 @ 06:56)  HbA1c: A1C with Estimated Average Glucose Result: 4.1 % (12-08-20 @ 18:57)    Glucose:   BP: 101/65 (02-04-21 @ 06:24) (99/64 - 128/80)  Lipid Panel: Date/Time: 12-08-20 @ 18:57  Cholesterol, Serum: 131  Direct LDL: --  HDL Cholesterol, Serum: 29  Total Cholesterol/HDL Ration Measurement: --  Triglycerides, Serum: 143  
BMI: BMI (kg/m2): 29.7 (12-22-20 @ 06:56)  HbA1c: A1C with Estimated Average Glucose Result: 4.1 % (12-08-20 @ 18:57)    Glucose:   BP: 111/66 (03-22-21 @ 07:04) (104/65 - 111/66)  Lipid Panel: Date/Time: 12-08-20 @ 18:57  Cholesterol, Serum: 131  Direct LDL: --  HDL Cholesterol, Serum: 29  Total Cholesterol/HDL Ration Measurement: --  Triglycerides, Serum: 143  
BMI: BMI (kg/m2): 29.7 (12-22-20 @ 06:56)  HbA1c: A1C with Estimated Average Glucose Result: 4.1 % (12-08-20 @ 18:57)    Glucose:   BP: 114/62 (12-31-20 @ 07:59) (100/54 - 114/62)  Lipid Panel: Date/Time: 12-08-20 @ 18:57  Cholesterol, Serum: 131  Direct LDL: --  HDL Cholesterol, Serum: 29  Total Cholesterol/HDL Ration Measurement: --  Triglycerides, Serum: 143  
BMI: BMI (kg/m2): 29.7 (12-22-20 @ 06:56)  HbA1c: A1C with Estimated Average Glucose Result: 4.4 % (04-14-21 @ 09:28)    Glucose:   BP: 114/- (04-28-21 @ 08:53) (113/75 - 114/-)  Lipid Panel: Date/Time: 12-08-20 @ 18:57  Cholesterol, Serum: 131  Direct LDL: --  HDL Cholesterol, Serum: 29  Total Cholesterol/HDL Ration Measurement: --  Triglycerides, Serum: 143  
BMI: BMI (kg/m2): 29.7 (12-22-20 @ 06:56)  HbA1c: A1C with Estimated Average Glucose Result: 4.1 % (12-08-20 @ 18:57)    Glucose:   BP: 105/64 (01-20-21 @ 07:38) (105/64 - 105/64)  Lipid Panel: Date/Time: 12-08-20 @ 18:57  Cholesterol, Serum: 131  Direct LDL: --  HDL Cholesterol, Serum: 29  Total Cholesterol/HDL Ration Measurement: --  Triglycerides, Serum: 143  
BMI: BMI (kg/m2): 29.7 (12-22-20 @ 06:56)  HbA1c: A1C with Estimated Average Glucose Result: 4.1 % (12-08-20 @ 18:57)    Glucose:   BP: 99/67 (03-04-21 @ 07:42) (90/60 - 99/67)  Lipid Panel: Date/Time: 12-08-20 @ 18:57  Cholesterol, Serum: 131  Direct LDL: --  HDL Cholesterol, Serum: 29  Total Cholesterol/HDL Ration Measurement: --  Triglycerides, Serum: 143  
BMI: BMI (kg/m2): 29.7 (12-22-20 @ 06:56)  HbA1c: A1C with Estimated Average Glucose Result: 4.4 % (04-14-21 @ 09:28)    Glucose:   BP: --  Lipid Panel: Date/Time: 12-08-20 @ 18:57  Cholesterol, Serum: 131  Direct LDL: --  HDL Cholesterol, Serum: 29  Total Cholesterol/HDL Ration Measurement: --  Triglycerides, Serum: 143  
BMI: BMI (kg/m2): 29.7 (12-22-20 @ 06:56)  HbA1c: A1C with Estimated Average Glucose Result: 4.1 % (12-08-20 @ 18:57)    Glucose:   BP: 112/71 (03-31-21 @ 08:45) (112/71 - 112/71)  Lipid Panel: Date/Time: 12-08-20 @ 18:57  Cholesterol, Serum: 131  Direct LDL: --  HDL Cholesterol, Serum: 29  Total Cholesterol/HDL Ration Measurement: --  Triglycerides, Serum: 143  
BMI: BMI (kg/m2): 29.7 (12-22-20 @ 06:56)  HbA1c: A1C with Estimated Average Glucose Result: 4.1 % (12-08-20 @ 18:57)    Glucose:   BP: 133/80 (01-23-21 @ 06:20) (133/80 - 133/80)  Lipid Panel: Date/Time: 12-08-20 @ 18:57  Cholesterol, Serum: 131  Direct LDL: --  HDL Cholesterol, Serum: 29  Total Cholesterol/HDL Ration Measurement: --  Triglycerides, Serum: 143  
BMI: BMI (kg/m2): 29.7 (12-22-20 @ 06:56)  HbA1c: A1C with Estimated Average Glucose Result: 4.1 % (12-08-20 @ 18:57)    Glucose:   BP: 94/60 (02-17-21 @ 07:51) (94/60 - 102/62)  Lipid Panel: Date/Time: 12-08-20 @ 18:57  Cholesterol, Serum: 131  Direct LDL: --  HDL Cholesterol, Serum: 29  Total Cholesterol/HDL Ration Measurement: --  Triglycerides, Serum: 143  
BMI: BMI (kg/m2): 29.7 (12-22-20 @ 06:56)  HbA1c: A1C with Estimated Average Glucose Result: 4.1 % (12-08-20 @ 18:57)    Glucose:   BP: 106/71 (01-15-21 @ 07:31) (100/60 - 111/76)  Lipid Panel: Date/Time: 12-08-20 @ 18:57  Cholesterol, Serum: 131  Direct LDL: --  HDL Cholesterol, Serum: 29  Total Cholesterol/HDL Ration Measurement: --  Triglycerides, Serum: 143  
BMI: BMI (kg/m2): 29.7 (12-22-20 @ 06:56)  HbA1c: A1C with Estimated Average Glucose Result: 4.1 % (12-08-20 @ 18:57)    Glucose:   BP: 107/74 (03-18-21 @ 06:30) (90/58 - 107/74)  Lipid Panel: Date/Time: 12-08-20 @ 18:57  Cholesterol, Serum: 131  Direct LDL: --  HDL Cholesterol, Serum: 29  Total Cholesterol/HDL Ration Measurement: --  Triglycerides, Serum: 143  
BMI: BMI (kg/m2): 29.7 (12-22-20 @ 06:56)  HbA1c: A1C with Estimated Average Glucose Result: 4.1 % (12-08-20 @ 18:57)    Glucose:   BP: 109/60 (01-25-21 @ 07:28) (109/60 - 133/80)  Lipid Panel: Date/Time: 12-08-20 @ 18:57  Cholesterol, Serum: 131  Direct LDL: --  HDL Cholesterol, Serum: 29  Total Cholesterol/HDL Ration Measurement: --  Triglycerides, Serum: 143  
BMI: BMI (kg/m2): 29.7 (12-22-20 @ 06:56)  HbA1c: A1C with Estimated Average Glucose Result: 4.1 % (12-08-20 @ 18:57)    Glucose:   BP: 133/80 (01-23-21 @ 06:20) (133/80 - 133/80)  Lipid Panel: Date/Time: 12-08-20 @ 18:57  Cholesterol, Serum: 131  Direct LDL: --  HDL Cholesterol, Serum: 29  Total Cholesterol/HDL Ration Measurement: --  Triglycerides, Serum: 143  
BMI: BMI (kg/m2): 29.7 (12-22-20 @ 06:56)  HbA1c: A1C with Estimated Average Glucose Result: 4.4 % (04-14-21 @ 09:28)    Glucose:   BP: 114/66 (04-20-21 @ 08:34) (114/66 - 114/66)  Lipid Panel: Date/Time: 12-08-20 @ 18:57  Cholesterol, Serum: 131  Direct LDL: --  HDL Cholesterol, Serum: 29  Total Cholesterol/HDL Ration Measurement: --  Triglycerides, Serum: 143  
BMI: BMI (kg/m2): 29.7 (12-22-20 @ 06:56)  HbA1c: A1C with Estimated Average Glucose Result: 4.1 % (12-08-20 @ 18:57)    Glucose:   BP: 108/62 (02-20-21 @ 07:38) (108/62 - 108/62)  Lipid Panel: Date/Time: 12-08-20 @ 18:57  Cholesterol, Serum: 131  Direct LDL: --  HDL Cholesterol, Serum: 29  Total Cholesterol/HDL Ration Measurement: --  Triglycerides, Serum: 143  
BMI: BMI (kg/m2): 29.7 (12-22-20 @ 06:56)  HbA1c: A1C with Estimated Average Glucose Result: 4.1 % (12-08-20 @ 18:57)    Glucose:   BP: 91/57 (03-11-21 @ 07:41) (91/57 - 120/86)  Lipid Panel: Date/Time: 12-08-20 @ 18:57  Cholesterol, Serum: 131  Direct LDL: --  HDL Cholesterol, Serum: 29  Total Cholesterol/HDL Ration Measurement: --  Triglycerides, Serum: 143  
BMI: BMI (kg/m2): 29.7 (12-22-20 @ 06:56)  HbA1c: A1C with Estimated Average Glucose Result: 4.1 % (12-08-20 @ 18:57)    Glucose:   BP: 99/64 (02-03-21 @ 07:19) (99/64 - 128/80)  Lipid Panel: Date/Time: 12-08-20 @ 18:57  Cholesterol, Serum: 131  Direct LDL: --  HDL Cholesterol, Serum: 29  Total Cholesterol/HDL Ration Measurement: --  Triglycerides, Serum: 143  
BMI: BMI (kg/m2): 29.7 (12-22-20 @ 06:56)  HbA1c: A1C with Estimated Average Glucose Result: 4.1 % (12-08-20 @ 18:57)    Glucose:   BP: --  Lipid Panel: Date/Time: 12-08-20 @ 18:57  Cholesterol, Serum: 131  Direct LDL: --  HDL Cholesterol, Serum: 29  Total Cholesterol/HDL Ration Measurement: --  Triglycerides, Serum: 143  
BMI: BMI (kg/m2): 29.7 (12-22-20 @ 06:56)  HbA1c: A1C with Estimated Average Glucose Result: 4.1 % (12-08-20 @ 18:57)    Glucose:   BP: 100/65 (01-18-21 @ 07:50) (100/65 - 126/78)  Lipid Panel: Date/Time: 12-08-20 @ 18:57  Cholesterol, Serum: 131  Direct LDL: --  HDL Cholesterol, Serum: 29  Total Cholesterol/HDL Ration Measurement: --  Triglycerides, Serum: 143  
BMI: BMI (kg/m2): 29.7 (12-22-20 @ 06:56)  HbA1c: A1C with Estimated Average Glucose Result: 4.1 % (12-08-20 @ 18:57)    Glucose:   BP: 103/61 (03-19-21 @ 06:24) (103/61 - 107/74)  Lipid Panel: Date/Time: 12-08-20 @ 18:57  Cholesterol, Serum: 131  Direct LDL: --  HDL Cholesterol, Serum: 29  Total Cholesterol/HDL Ration Measurement: --  Triglycerides, Serum: 143  
BMI: BMI (kg/m2): 29.7 (12-22-20 @ 06:56)  HbA1c: A1C with Estimated Average Glucose Result: 4.1 % (12-08-20 @ 18:57)    Glucose:   BP: 105/64 (01-20-21 @ 07:38) (100/65 - 105/64)  Lipid Panel: Date/Time: 12-08-20 @ 18:57  Cholesterol, Serum: 131  Direct LDL: --  HDL Cholesterol, Serum: 29  Total Cholesterol/HDL Ration Measurement: --  Triglycerides, Serum: 143  
BMI: BMI (kg/m2): 29.7 (12-22-20 @ 06:56)  HbA1c: A1C with Estimated Average Glucose Result: 4.1 % (12-08-20 @ 18:57)    Glucose:   BP: 111/76 (01-13-21 @ 07:36) (111/76 - 119/69)  Lipid Panel: Date/Time: 12-08-20 @ 18:57  Cholesterol, Serum: 131  Direct LDL: --  HDL Cholesterol, Serum: 29  Total Cholesterol/HDL Ration Measurement: --  Triglycerides, Serum: 143  
BMI: BMI (kg/m2): 29.7 (12-22-20 @ 06:56)  HbA1c: A1C with Estimated Average Glucose Result: 4.4 % (04-14-21 @ 09:28)    Glucose:   BP: --  Lipid Panel: Date/Time: 12-08-20 @ 18:57  Cholesterol, Serum: 131  Direct LDL: --  HDL Cholesterol, Serum: 29  Total Cholesterol/HDL Ration Measurement: --  Triglycerides, Serum: 143  
BMI: BMI (kg/m2): 29.7 (12-22-20 @ 06:56)  HbA1c: A1C with Estimated Average Glucose Result: 4.1 % (12-08-20 @ 18:57)    Glucose:   BP: 98/67 (04-07-21 @ 07:09) (96/75 - 109/-)  Lipid Panel: Date/Time: 12-08-20 @ 18:57  Cholesterol, Serum: 131  Direct LDL: --  HDL Cholesterol, Serum: 29  Total Cholesterol/HDL Ration Measurement: --  Triglycerides, Serum: 143  
BMI: BMI (kg/m2): 29.7 (12-22-20 @ 06:56)  HbA1c: A1C with Estimated Average Glucose Result: 4.4 % (04-14-21 @ 09:28)    Glucose:   BP: 103/68 (05-06-21 @ 08:20) (102/71 - 103/68)  Lipid Panel: Date/Time: 12-08-20 @ 18:57  Cholesterol, Serum: 131  Direct LDL: --  HDL Cholesterol, Serum: 29  Total Cholesterol/HDL Ration Measurement: --  Triglycerides, Serum: 143  
BMI: BMI (kg/m2): 29.7 (12-22-20 @ 06:56)  HbA1c: A1C with Estimated Average Glucose Result: 4.1 % (12-08-20 @ 18:57)    Glucose:   BP: 98/72 (12-28-20 @ 06:20) (98/65 - 109/66)  Lipid Panel: Date/Time: 12-08-20 @ 18:57  Cholesterol, Serum: 131  Direct LDL: --  HDL Cholesterol, Serum: 29  Total Cholesterol/HDL Ration Measurement: --  Triglycerides, Serum: 143  
BMI: BMI (kg/m2): 29.7 (12-22-20 @ 06:56)  HbA1c: A1C with Estimated Average Glucose Result: 4.1 % (12-08-20 @ 18:57)    Glucose:   BP: 111/66 (03-22-21 @ 07:04) (104/65 - 117/75)  Lipid Panel: Date/Time: 12-08-20 @ 18:57  Cholesterol, Serum: 131  Direct LDL: --  HDL Cholesterol, Serum: 29  Total Cholesterol/HDL Ration Measurement: --  Triglycerides, Serum: 143  
BMI: BMI (kg/m2): 29.7 (12-22-20 @ 06:56)  HbA1c: A1C with Estimated Average Glucose Result: 4.1 % (12-08-20 @ 18:57)    Glucose:   BP: 108/62 (02-20-21 @ 07:38) (91/60 - 118/76)  Lipid Panel: Date/Time: 12-08-20 @ 18:57  Cholesterol, Serum: 131  Direct LDL: --  HDL Cholesterol, Serum: 29  Total Cholesterol/HDL Ration Measurement: --  Triglycerides, Serum: 143  
BMI: BMI (kg/m2): 29.7 (12-22-20 @ 06:56)  HbA1c: A1C with Estimated Average Glucose Result: 4.1 % (12-08-20 @ 18:57)    Glucose:   BP: 116/79 (03-02-21 @ 06:25) (93/60 - 128/87)  Lipid Panel: Date/Time: 12-08-20 @ 18:57  Cholesterol, Serum: 131  Direct LDL: --  HDL Cholesterol, Serum: 29  Total Cholesterol/HDL Ration Measurement: --  Triglycerides, Serum: 143  
BMI: BMI (kg/m2): 29.7 (12-22-20 @ 06:56)  HbA1c: A1C with Estimated Average Glucose Result: 4.1 % (12-08-20 @ 18:57)    Glucose:   BP: 92/54 (02-26-21 @ 07:50) (92/54 - 123/73)  Lipid Panel: Date/Time: 12-08-20 @ 18:57  Cholesterol, Serum: 131  Direct LDL: --  HDL Cholesterol, Serum: 29  Total Cholesterol/HDL Ration Measurement: --  Triglycerides, Serum: 143  
BMI: BMI (kg/m2): 29.7 (12-22-20 @ 06:56)  HbA1c: A1C with Estimated Average Glucose Result: 4.1 % (12-08-20 @ 18:57)    Glucose:   BP: 98/67 (04-07-21 @ 07:09) (98/67 - 109/-)  Lipid Panel: Date/Time: 12-08-20 @ 18:57  Cholesterol, Serum: 131  Direct LDL: --  HDL Cholesterol, Serum: 29  Total Cholesterol/HDL Ration Measurement: --  Triglycerides, Serum: 143  
BMI: BMI (kg/m2): 29.7 (12-22-20 @ 06:56)  HbA1c: A1C with Estimated Average Glucose Result: 4.1 % (12-08-20 @ 18:57)    Glucose:   BP: 100/54 (12-30-20 @ 06:15) (98/72 - 100/68)  Lipid Panel: Date/Time: 12-08-20 @ 18:57  Cholesterol, Serum: 131  Direct LDL: --  HDL Cholesterol, Serum: 29  Total Cholesterol/HDL Ration Measurement: --  Triglycerides, Serum: 143  
BMI: BMI (kg/m2): 29.7 (12-22-20 @ 06:56)  HbA1c: A1C with Estimated Average Glucose Result: 4.1 % (12-08-20 @ 18:57)    Glucose:   BP: 105/61 (03-24-21 @ 06:44) (105/61 - 105/61)  Lipid Panel: Date/Time: 12-08-20 @ 18:57  Cholesterol, Serum: 131  Direct LDL: --  HDL Cholesterol, Serum: 29  Total Cholesterol/HDL Ration Measurement: --  Triglycerides, Serum: 143  
BMI: BMI (kg/m2): 29.7 (12-22-20 @ 06:56)  HbA1c: A1C with Estimated Average Glucose Result: 4.1 % (12-08-20 @ 18:57)    Glucose:   BP: 124/74 (01-10-21 @ 08:02) (93/58 - 124/74)  Lipid Panel: Date/Time: 12-08-20 @ 18:57  Cholesterol, Serum: 131  Direct LDL: --  HDL Cholesterol, Serum: 29  Total Cholesterol/HDL Ration Measurement: --  Triglycerides, Serum: 143  
BMI: BMI (kg/m2): 29.7 (12-22-20 @ 06:56)  HbA1c: A1C with Estimated Average Glucose Result: 4.4 % (04-14-21 @ 09:28)    Glucose:   BP: --  Lipid Panel: Date/Time: 12-08-20 @ 18:57  Cholesterol, Serum: 131  Direct LDL: --  HDL Cholesterol, Serum: 29  Total Cholesterol/HDL Ration Measurement: --  Triglycerides, Serum: 143  
BMI: BMI (kg/m2): 29.7 (12-22-20 @ 06:56)  HbA1c: A1C with Estimated Average Glucose Result: 4.1 % (12-08-20 @ 18:57)    Glucose:   BP: 106/69 (01-04-21 @ 06:26) (106/69 - 106/69)  Lipid Panel: Date/Time: 12-08-20 @ 18:57  Cholesterol, Serum: 131  Direct LDL: --  HDL Cholesterol, Serum: 29  Total Cholesterol/HDL Ration Measurement: --  Triglycerides, Serum: 143  
BMI: BMI (kg/m2): 29.7 (12-22-20 @ 06:56)  HbA1c: A1C with Estimated Average Glucose Result: 4.1 % (12-08-20 @ 18:57)    Glucose:   BP: 109/- (04-06-21 @ 07:27) (96/75 - 144/64)  Lipid Panel: Date/Time: 12-08-20 @ 18:57  Cholesterol, Serum: 131  Direct LDL: --  HDL Cholesterol, Serum: 29  Total Cholesterol/HDL Ration Measurement: --  Triglycerides, Serum: 143  
BMI: BMI (kg/m2): 29.7 (12-22-20 @ 06:56)  HbA1c: A1C with Estimated Average Glucose Result: 4.1 % (12-08-20 @ 18:57)    Glucose:   BP: --  Lipid Panel: Date/Time: 12-08-20 @ 18:57  Cholesterol, Serum: 131  Direct LDL: --  HDL Cholesterol, Serum: 29  Total Cholesterol/HDL Ration Measurement: --  Triglycerides, Serum: 143  
BMI: BMI (kg/m2): 29.7 (12-22-20 @ 06:56)  HbA1c: A1C with Estimated Average Glucose Result: 4.1 % (12-08-20 @ 18:57)    Glucose:   BP: 98/67 (04-07-21 @ 07:09) (98/67 - 98/67)  Lipid Panel: Date/Time: 12-08-20 @ 18:57  Cholesterol, Serum: 131  Direct LDL: --  HDL Cholesterol, Serum: 29  Total Cholesterol/HDL Ration Measurement: --  Triglycerides, Serum: 143  
BMI: BMI (kg/m2): 29.7 (12-22-20 @ 06:56)  HbA1c: A1C with Estimated Average Glucose Result: 4.1 % (12-08-20 @ 18:57)    Glucose:   BP: 123/73 (02-25-21 @ 06:05) (103/52 - 123/73)  Lipid Panel: Date/Time: 12-08-20 @ 18:57  Cholesterol, Serum: 131  Direct LDL: --  HDL Cholesterol, Serum: 29  Total Cholesterol/HDL Ration Measurement: --  Triglycerides, Serum: 143  
BMI: BMI (kg/m2): 29.7 (12-22-20 @ 06:56)  HbA1c: A1C with Estimated Average Glucose Result: 4.1 % (12-08-20 @ 18:57)    Glucose:   BP: 103/52 (02-23-21 @ 07:47) (103/52 - 103/52)  Lipid Panel: Date/Time: 12-08-20 @ 18:57  Cholesterol, Serum: 131  Direct LDL: --  HDL Cholesterol, Serum: 29  Total Cholesterol/HDL Ration Measurement: --  Triglycerides, Serum: 143  
BMI: BMI (kg/m2): 29.7 (12-22-20 @ 06:56)  HbA1c: A1C with Estimated Average Glucose Result: 4.1 % (12-08-20 @ 18:57)    Glucose:   BP: 103/66 (01-06-21 @ 07:43) (103/66 - 106/69)  Lipid Panel: Date/Time: 12-08-20 @ 18:57  Cholesterol, Serum: 131  Direct LDL: --  HDL Cholesterol, Serum: 29  Total Cholesterol/HDL Ration Measurement: --  Triglycerides, Serum: 143  
BMI: BMI (kg/m2): 29.7 (12-22-20 @ 06:56)  HbA1c: A1C with Estimated Average Glucose Result: 4.1 % (12-08-20 @ 18:57)    Glucose:   BP: 120/66 (01-07-21 @ 07:34) (103/66 - 120/66)  Lipid Panel: Date/Time: 12-08-20 @ 18:57  Cholesterol, Serum: 131  Direct LDL: --  HDL Cholesterol, Serum: 29  Total Cholesterol/HDL Ration Measurement: --  Triglycerides, Serum: 143  
BMI: BMI (kg/m2): 29.7 (12-22-20 @ 06:56)  HbA1c: A1C with Estimated Average Glucose Result: 4.1 % (12-08-20 @ 18:57)    Glucose:   BP: 109/66 (12-26-20 @ 07:31) (102/71 - 128/68)  Lipid Panel: Date/Time: 12-08-20 @ 18:57  Cholesterol, Serum: 131  Direct LDL: --  HDL Cholesterol, Serum: 29  Total Cholesterol/HDL Ration Measurement: --  Triglycerides, Serum: 143  
BMI: BMI (kg/m2): 29.7 (12-22-20 @ 06:56)  HbA1c: A1C with Estimated Average Glucose Result: 4.1 % (12-08-20 @ 18:57)    Glucose:   BP: 128/68 (12-25-20 @ 06:43) (102/71 - 128/68)  Lipid Panel: Date/Time: 12-08-20 @ 18:57  Cholesterol, Serum: 131  Direct LDL: --  HDL Cholesterol, Serum: 29  Total Cholesterol/HDL Ration Measurement: --  Triglycerides, Serum: 143

## 2021-05-12 NOTE — BH INPATIENT PSYCHIATRY PROGRESS NOTE - NSTREATMENTCERTY_PSY_ALL_CORE

## 2021-05-12 NOTE — BH INPATIENT PSYCHIATRY PROGRESS NOTE - MSE UNSTRUCTURED FT
Vitals notable for tachycardia; no orthostasis.    MSE  On exam today, the patient is a little dishevelled but with good hygiene. She appears her stated age. She is not sedated; she is more cooperative - tolerates longer interviews due to decrease in frequency and intensity of voices; she has fair eye contact. Her speech is nl volume and rate. Pt with intermittent stereotypical hand movements ( ?TDD vs mannerisms). She describes her mood as “good”. Her affect is mildly constricted and less dysphoric,  lsignificantly ess irritable. no apparent FTD. + AH decreased in intencity/frequency. + delusional belief that someone is "talking through {her}". Pt denies SI/HI. She is cognitively grossly intact with fair fund of knowledge. Her memory is intact. Her attention is improved. Her insight and judgment are impaired but improved. Her impulse control is fair, improved from prior. She is alert and oriented to person, place, and situation.     Labs:   5/6/21  ANC 3.51  CBC notable for normal H/H, mildly low MCHC, mildly low Plt but none within concerning ranges  troponin and C-RP - negative

## 2021-05-12 NOTE — [facility] INPATIENT PSYCHIATRY PROGRESS NOTE - PRN MEDS
MEDICATIONS  (PRN):  diphenhydrAMINE 50 milliGRAM(s) Oral every 4 hours PRN anxiety  diphenhydrAMINE   Injectable 50 milliGRAM(s) IntraMuscular once PRN eps prophylaxis  diphenhydrAMINE   Injectable 50 milliGRAM(s) IntraMuscular once PRN eps prophylaxis  haloperidol     Tablet 5 milliGRAM(s) Oral every 4 hours PRN agitation  haloperidol    Injectable 5 milliGRAM(s) IntraMuscular once PRN combative behavior  haloperidol    Injectable 5 milliGRAM(s) IntraMuscular at bedtime PRN psychosis - refusing  po  ibuprofen  Tablet. 400 milliGRAM(s) Oral once PRN Mild Pain (1 - 3)  LORazepam     Tablet 2 milliGRAM(s) Oral every 6 hours PRN Agitation  LORazepam   Injectable 2 milliGRAM(s) IntraMuscular once PRN severe agitation  OLANZapine Injectable 10 milliGRAM(s) IntraMuscular at bedtime PRN psychosis if refuses po  
MEDICATIONS  (PRN):  diphenhydrAMINE 50 milliGRAM(s) Oral every 4 hours PRN anxiety  diphenhydrAMINE   Injectable 50 milliGRAM(s) IntraMuscular once PRN eps prophylaxis  diphenhydrAMINE   Injectable 50 milliGRAM(s) IntraMuscular once PRN eps prophylaxis  haloperidol     Tablet 5 milliGRAM(s) Oral every 4 hours PRN agitation  haloperidol    Injectable 5 milliGRAM(s) IntraMuscular once PRN combative behavior  haloperidol    Injectable 5 milliGRAM(s) IntraMuscular at bedtime PRN psychosis - refusing  po  LORazepam     Tablet 2 milliGRAM(s) Oral every 6 hours PRN Agitation  OLANZapine Injectable 10 milliGRAM(s) IntraMuscular at bedtime PRN psychosis if refuses po  
MEDICATIONS  (PRN):  diphenhydrAMINE 50 milliGRAM(s) Oral every 4 hours PRN anxiety  diphenhydrAMINE   Injectable 50 milliGRAM(s) IntraMuscular once PRN eps prophylaxis  diphenhydrAMINE   Injectable 50 milliGRAM(s) IntraMuscular once PRN eps prophylaxis  haloperidol     Tablet 5 milliGRAM(s) Oral every 4 hours PRN agitation  haloperidol    Injectable 5 milliGRAM(s) IntraMuscular at bedtime PRN psychosis - refusing  po  haloperidol    Injectable 5 milliGRAM(s) IntraMuscular once PRN combative behavior  LORazepam     Tablet 2 milliGRAM(s) Oral every 6 hours PRN Agitation  LORazepam   Injectable 2 milliGRAM(s) IntraMuscular once PRN severe agitation  OLANZapine Injectable 10 milliGRAM(s) IntraMuscular at bedtime PRN psychosis if refuses po  
MEDICATIONS  (PRN):  diphenhydrAMINE 50 milliGRAM(s) Oral every 4 hours PRN anxiety  diphenhydrAMINE   Injectable 50 milliGRAM(s) IntraMuscular once PRN eps prophylaxis  fluPHENAZine 5 milliGRAM(s) Oral every 6 hours PRN agitation  fluPHENAZine  Injectable 5 milliGRAM(s) IntraMuscular once PRN severe agitation  LORazepam     Tablet 2 milliGRAM(s) Oral every 6 hours PRN Agitation  
MEDICATIONS  (PRN):  diphenhydrAMINE 50 milliGRAM(s) Oral every 4 hours PRN anxiety  diphenhydrAMINE   Injectable 50 milliGRAM(s) IntraMuscular once PRN eps prophylaxis  diphenhydrAMINE   Injectable 50 milliGRAM(s) IntraMuscular once PRN eps prophylaxis  haloperidol     Tablet 5 milliGRAM(s) Oral every 4 hours PRN agitation  haloperidol    Injectable 5 milliGRAM(s) IntraMuscular at bedtime PRN psychosis - refusing  po  haloperidol    Injectable 5 milliGRAM(s) IntraMuscular once PRN combative behavior  LORazepam     Tablet 2 milliGRAM(s) Oral every 6 hours PRN Agitation  LORazepam   Injectable 2 milliGRAM(s) IntraMuscular once PRN severe agitation  OLANZapine Injectable 10 milliGRAM(s) IntraMuscular at bedtime PRN psychosis if refuses po  
MEDICATIONS  (PRN):  diphenhydrAMINE 50 milliGRAM(s) Oral every 4 hours PRN anxiety  diphenhydrAMINE   Injectable 50 milliGRAM(s) IntraMuscular once PRN eps prophylaxis  diphenhydrAMINE   Injectable 50 milliGRAM(s) IntraMuscular once PRN eps prophylaxis  haloperidol     Tablet 5 milliGRAM(s) Oral every 4 hours PRN agitation  haloperidol    Injectable 5 milliGRAM(s) IntraMuscular once PRN combative behavior  haloperidol    Injectable 5 milliGRAM(s) IntraMuscular at bedtime PRN psychosis - refusing  po  LORazepam     Tablet 2 milliGRAM(s) Oral every 6 hours PRN Agitation  LORazepam   Injectable 2 milliGRAM(s) IntraMuscular once PRN severe agitation  OLANZapine Injectable 10 milliGRAM(s) IntraMuscular at bedtime PRN psychosis if refuses po  
MEDICATIONS  (PRN):  diphenhydrAMINE 50 milliGRAM(s) Oral every 4 hours PRN anxiety  diphenhydrAMINE   Injectable 50 milliGRAM(s) IntraMuscular once PRN eps prophylaxis  diphenhydrAMINE   Injectable 50 milliGRAM(s) IntraMuscular once PRN eps prophylaxis  haloperidol     Tablet 5 milliGRAM(s) Oral every 4 hours PRN agitation  haloperidol    Injectable 5 milliGRAM(s) IntraMuscular once PRN combative behavior  haloperidol    Injectable 5 milliGRAM(s) IntraMuscular at bedtime PRN psychosis - refusing  po  LORazepam     Tablet 2 milliGRAM(s) Oral every 6 hours PRN Agitation  OLANZapine Injectable 10 milliGRAM(s) IntraMuscular at bedtime PRN psychosis if refuses po  
MEDICATIONS  (PRN):  diphenhydrAMINE 50 milliGRAM(s) Oral every 4 hours PRN anxiety  diphenhydrAMINE   Injectable 50 milliGRAM(s) IntraMuscular once PRN eps prophylaxis  diphenhydrAMINE   Injectable 50 milliGRAM(s) IntraMuscular once PRN eps prophylaxis  fluPHENAZine 5 milliGRAM(s) Oral every 6 hours PRN agitation  fluPHENAZine  Injectable 5 milliGRAM(s) IntraMuscular once PRN severe agitation  LORazepam     Tablet 2 milliGRAM(s) Oral every 6 hours PRN Agitation  OLANZapine Injectable 10 milliGRAM(s) IntraMuscular at bedtime PRN psychosis if refuses po  
MEDICATIONS  (PRN):  diphenhydrAMINE 50 milliGRAM(s) Oral every 4 hours PRN anxiety  diphenhydrAMINE   Injectable 50 milliGRAM(s) IntraMuscular once PRN eps prophylaxis  diphenhydrAMINE   Injectable 50 milliGRAM(s) IntraMuscular once PRN eps prophylaxis  haloperidol     Tablet 5 milliGRAM(s) Oral every 4 hours PRN agitation  haloperidol    Injectable 5 milliGRAM(s) IntraMuscular at bedtime PRN psychosis - refusing  po  haloperidol    Injectable 5 milliGRAM(s) IntraMuscular once PRN combative behavior  LORazepam     Tablet 2 milliGRAM(s) Oral every 6 hours PRN Agitation  LORazepam   Injectable 2 milliGRAM(s) IntraMuscular once PRN severe agitation  OLANZapine Injectable 10 milliGRAM(s) IntraMuscular at bedtime PRN psychosis if refuses po  
MEDICATIONS  (PRN):  diphenhydrAMINE 50 milliGRAM(s) Oral every 4 hours PRN anxiety  diphenhydrAMINE   Injectable 50 milliGRAM(s) IntraMuscular once PRN eps prophylaxis  diphenhydrAMINE   Injectable 50 milliGRAM(s) IntraMuscular once PRN eps prophylaxis  haloperidol     Tablet 5 milliGRAM(s) Oral every 4 hours PRN agitation  haloperidol    Injectable 5 milliGRAM(s) IntraMuscular once PRN combative behavior  haloperidol    Injectable 5 milliGRAM(s) IntraMuscular at bedtime PRN psychosis - refusing  po  LORazepam     Tablet 2 milliGRAM(s) Oral every 6 hours PRN Agitation  OLANZapine Injectable 10 milliGRAM(s) IntraMuscular at bedtime PRN psychosis if refuses po  
MEDICATIONS  (PRN):  diphenhydrAMINE 50 milliGRAM(s) Oral every 4 hours PRN anxiety  diphenhydrAMINE   Injectable 50 milliGRAM(s) IntraMuscular once PRN eps prophylaxis  diphenhydrAMINE   Injectable 50 milliGRAM(s) IntraMuscular once PRN eps prophylaxis  haloperidol     Tablet 5 milliGRAM(s) Oral every 4 hours PRN agitation  haloperidol    Injectable 5 milliGRAM(s) IntraMuscular at bedtime PRN psychosis - refusing  po  haloperidol    Injectable 5 milliGRAM(s) IntraMuscular once PRN combative behavior  ibuprofen  Tablet. 400 milliGRAM(s) Oral once PRN Mild Pain (1 - 3)  LORazepam     Tablet 2 milliGRAM(s) Oral every 6 hours PRN Agitation  LORazepam   Injectable 2 milliGRAM(s) IntraMuscular once PRN severe agitation  OLANZapine Injectable 10 milliGRAM(s) IntraMuscular at bedtime PRN psychosis if refuses po  polyethylene glycol 3350 17 Gram(s) Oral two times a day PRN constipation  
MEDICATIONS  (PRN):  diphenhydrAMINE 50 milliGRAM(s) Oral every 4 hours PRN anxiety  diphenhydrAMINE   Injectable 50 milliGRAM(s) IntraMuscular once PRN eps prophylaxis  diphenhydrAMINE   Injectable 50 milliGRAM(s) IntraMuscular once PRN eps prophylaxis  haloperidol     Tablet 5 milliGRAM(s) Oral every 4 hours PRN agitation  haloperidol    Injectable 5 milliGRAM(s) IntraMuscular once PRN combative behavior  haloperidol    Injectable 5 milliGRAM(s) IntraMuscular at bedtime PRN psychosis - refusing  po  LORazepam     Tablet 2 milliGRAM(s) Oral every 6 hours PRN Agitation  LORazepam   Injectable 2 milliGRAM(s) IntraMuscular once PRN severe agitation  OLANZapine Injectable 10 milliGRAM(s) IntraMuscular at bedtime PRN psychosis if refuses po  
MEDICATIONS  (PRN):  diphenhydrAMINE 50 milliGRAM(s) Oral every 4 hours PRN anxiety  diphenhydrAMINE   Injectable 50 milliGRAM(s) IntraMuscular once PRN eps prophylaxis  diphenhydrAMINE   Injectable 50 milliGRAM(s) IntraMuscular once PRN eps prophylaxis  haloperidol     Tablet 5 milliGRAM(s) Oral every 4 hours PRN agitation  haloperidol    Injectable 5 milliGRAM(s) IntraMuscular once PRN combative behavior  haloperidol    Injectable 5 milliGRAM(s) IntraMuscular at bedtime PRN psychosis - refusing  po  ibuprofen  Tablet. 400 milliGRAM(s) Oral once PRN Mild Pain (1 - 3)  LORazepam     Tablet 2 milliGRAM(s) Oral every 6 hours PRN Agitation  LORazepam   Injectable 2 milliGRAM(s) IntraMuscular once PRN severe agitation  OLANZapine Injectable 10 milliGRAM(s) IntraMuscular at bedtime PRN psychosis if refuses po  polyethylene glycol 3350 17 Gram(s) Oral two times a day PRN constipation  
MEDICATIONS  (PRN):  diphenhydrAMINE 50 milliGRAM(s) Oral every 4 hours PRN anxiety  diphenhydrAMINE   Injectable 50 milliGRAM(s) IntraMuscular once PRN eps prophylaxis  diphenhydrAMINE   Injectable 50 milliGRAM(s) IntraMuscular once PRN eps prophylaxis  fluPHENAZine 5 milliGRAM(s) Oral every 6 hours PRN agitation  fluPHENAZine  Injectable 5 milliGRAM(s) IntraMuscular once PRN severe agitation  LORazepam     Tablet 2 milliGRAM(s) Oral every 6 hours PRN Agitation  OLANZapine Injectable 10 milliGRAM(s) IntraMuscular at bedtime PRN psychosis if refuses po  
MEDICATIONS  (PRN):  diphenhydrAMINE 50 milliGRAM(s) Oral every 4 hours PRN anxiety  diphenhydrAMINE   Injectable 50 milliGRAM(s) IntraMuscular once PRN eps prophylaxis  diphenhydrAMINE   Injectable 50 milliGRAM(s) IntraMuscular once PRN eps prophylaxis  haloperidol     Tablet 5 milliGRAM(s) Oral every 4 hours PRN agitation  haloperidol    Injectable 5 milliGRAM(s) IntraMuscular at bedtime PRN psychosis - refusing  po  haloperidol    Injectable 5 milliGRAM(s) IntraMuscular once PRN combative behavior  ibuprofen  Tablet. 400 milliGRAM(s) Oral once PRN Mild Pain (1 - 3)  LORazepam     Tablet 2 milliGRAM(s) Oral every 6 hours PRN Agitation  LORazepam   Injectable 2 milliGRAM(s) IntraMuscular once PRN severe agitation  OLANZapine Injectable 10 milliGRAM(s) IntraMuscular at bedtime PRN psychosis if refuses po  
MEDICATIONS  (PRN):  diphenhydrAMINE 50 milliGRAM(s) Oral every 4 hours PRN anxiety  diphenhydrAMINE   Injectable 50 milliGRAM(s) IntraMuscular once PRN eps prophylaxis  diphenhydrAMINE   Injectable 50 milliGRAM(s) IntraMuscular once PRN eps prophylaxis  haloperidol     Tablet 5 milliGRAM(s) Oral every 4 hours PRN agitation  haloperidol    Injectable 5 milliGRAM(s) IntraMuscular once PRN combative behavior  haloperidol    Injectable 5 milliGRAM(s) IntraMuscular at bedtime PRN psychosis - refusing  po  LORazepam     Tablet 2 milliGRAM(s) Oral every 6 hours PRN Agitation  LORazepam   Injectable 2 milliGRAM(s) IntraMuscular once PRN severe agitation  OLANZapine Injectable 10 milliGRAM(s) IntraMuscular at bedtime PRN psychosis if refuses po  
MEDICATIONS  (PRN):  diphenhydrAMINE 50 milliGRAM(s) Oral every 4 hours PRN anxiety  diphenhydrAMINE   Injectable 50 milliGRAM(s) IntraMuscular once PRN eps prophylaxis  diphenhydrAMINE   Injectable 50 milliGRAM(s) IntraMuscular once PRN eps prophylaxis  haloperidol     Tablet 5 milliGRAM(s) Oral every 4 hours PRN agitation  haloperidol    Injectable 5 milliGRAM(s) IntraMuscular once PRN combative behavior  haloperidol    Injectable 5 milliGRAM(s) IntraMuscular at bedtime PRN psychosis - refusing  po  LORazepam     Tablet 2 milliGRAM(s) Oral every 6 hours PRN Agitation  OLANZapine Injectable 10 milliGRAM(s) IntraMuscular at bedtime PRN psychosis if refuses po  
MEDICATIONS  (PRN):  diphenhydrAMINE 50 milliGRAM(s) Oral every 4 hours PRN anxiety  diphenhydrAMINE   Injectable 50 milliGRAM(s) IntraMuscular once PRN eps prophylaxis  diphenhydrAMINE   Injectable 50 milliGRAM(s) IntraMuscular once PRN eps prophylaxis  fluPHENAZine 5 milliGRAM(s) Oral every 6 hours PRN agitation  fluPHENAZine  Injectable 5 milliGRAM(s) IntraMuscular once PRN severe agitation  LORazepam     Tablet 2 milliGRAM(s) Oral every 6 hours PRN Agitation  OLANZapine Injectable 10 milliGRAM(s) IntraMuscular at bedtime PRN psychosis if refuses po  
MEDICATIONS  (PRN):  diphenhydrAMINE 50 milliGRAM(s) Oral every 4 hours PRN anxiety  diphenhydrAMINE   Injectable 50 milliGRAM(s) IntraMuscular once PRN eps prophylaxis  diphenhydrAMINE   Injectable 50 milliGRAM(s) IntraMuscular once PRN eps prophylaxis  haloperidol     Tablet 5 milliGRAM(s) Oral every 4 hours PRN agitation  haloperidol    Injectable 5 milliGRAM(s) IntraMuscular once PRN combative behavior  haloperidol    Injectable 5 milliGRAM(s) IntraMuscular at bedtime PRN psychosis - refusing  po  LORazepam     Tablet 2 milliGRAM(s) Oral every 6 hours PRN Agitation  LORazepam   Injectable 2 milliGRAM(s) IntraMuscular once PRN severe agitation  OLANZapine Injectable 10 milliGRAM(s) IntraMuscular at bedtime PRN psychosis if refuses po  
MEDICATIONS  (PRN):  diphenhydrAMINE 50 milliGRAM(s) Oral every 4 hours PRN anxiety  diphenhydrAMINE   Injectable 50 milliGRAM(s) IntraMuscular once PRN eps prophylaxis  diphenhydrAMINE   Injectable 50 milliGRAM(s) IntraMuscular once PRN eps prophylaxis  haloperidol     Tablet 5 milliGRAM(s) Oral every 4 hours PRN agitation  haloperidol    Injectable 5 milliGRAM(s) IntraMuscular once PRN combative behavior  haloperidol    Injectable 5 milliGRAM(s) IntraMuscular at bedtime PRN psychosis - refusing  po  LORazepam     Tablet 2 milliGRAM(s) Oral every 6 hours PRN Agitation  LORazepam   Injectable 2 milliGRAM(s) IntraMuscular once PRN severe agitation  OLANZapine Injectable 10 milliGRAM(s) IntraMuscular at bedtime PRN psychosis if refuses po  
MEDICATIONS  (PRN):  diphenhydrAMINE 50 milliGRAM(s) Oral every 4 hours PRN anxiety  diphenhydrAMINE   Injectable 50 milliGRAM(s) IntraMuscular once PRN eps prophylaxis  diphenhydrAMINE   Injectable 50 milliGRAM(s) IntraMuscular once PRN eps prophylaxis  haloperidol     Tablet 5 milliGRAM(s) Oral every 4 hours PRN agitation  haloperidol    Injectable 5 milliGRAM(s) IntraMuscular once PRN combative behavior  haloperidol    Injectable 5 milliGRAM(s) IntraMuscular at bedtime PRN psychosis - refusing  po  LORazepam     Tablet 2 milliGRAM(s) Oral every 6 hours PRN Agitation  OLANZapine Injectable 10 milliGRAM(s) IntraMuscular at bedtime PRN psychosis if refuses po  
MEDICATIONS  (PRN):  diphenhydrAMINE 50 milliGRAM(s) Oral every 4 hours PRN anxiety  diphenhydrAMINE   Injectable 50 milliGRAM(s) IntraMuscular once PRN eps prophylaxis  diphenhydrAMINE   Injectable 50 milliGRAM(s) IntraMuscular once PRN eps prophylaxis  haloperidol     Tablet 5 milliGRAM(s) Oral every 4 hours PRN agitation  haloperidol    Injectable 5 milliGRAM(s) IntraMuscular once PRN combative behavior  haloperidol    Injectable 5 milliGRAM(s) IntraMuscular at bedtime PRN psychosis - refusing  po  LORazepam     Tablet 2 milliGRAM(s) Oral every 6 hours PRN Agitation  OLANZapine Injectable 10 milliGRAM(s) IntraMuscular at bedtime PRN psychosis if refuses po  
MEDICATIONS  (PRN):  diphenhydrAMINE 50 milliGRAM(s) Oral every 4 hours PRN anxiety  diphenhydrAMINE   Injectable 50 milliGRAM(s) IntraMuscular once PRN eps prophylaxis  fluPHENAZine 5 milliGRAM(s) Oral every 6 hours PRN agitation  fluPHENAZine  Injectable 5 milliGRAM(s) IntraMuscular once PRN severe agitation  LORazepam     Tablet 2 milliGRAM(s) Oral every 6 hours PRN Agitation  
MEDICATIONS  (PRN):  diphenhydrAMINE 50 milliGRAM(s) Oral every 4 hours PRN anxiety  diphenhydrAMINE   Injectable 50 milliGRAM(s) IntraMuscular once PRN eps prophylaxis  diphenhydrAMINE   Injectable 50 milliGRAM(s) IntraMuscular once PRN eps prophylaxis  fluPHENAZine 5 milliGRAM(s) Oral every 6 hours PRN agitation  fluPHENAZine  Injectable 5 milliGRAM(s) IntraMuscular once PRN severe agitation  LORazepam     Tablet 2 milliGRAM(s) Oral every 6 hours PRN Agitation  OLANZapine Injectable 10 milliGRAM(s) IntraMuscular at bedtime PRN psychosis if refuses po  
MEDICATIONS  (PRN):  diphenhydrAMINE 50 milliGRAM(s) Oral every 4 hours PRN anxiety  diphenhydrAMINE   Injectable 50 milliGRAM(s) IntraMuscular once PRN eps prophylaxis  diphenhydrAMINE   Injectable 50 milliGRAM(s) IntraMuscular once PRN eps prophylaxis  fluPHENAZine 5 milliGRAM(s) Oral every 6 hours PRN agitation  fluPHENAZine  Injectable 5 milliGRAM(s) IntraMuscular once PRN severe agitation  LORazepam     Tablet 2 milliGRAM(s) Oral every 6 hours PRN Agitation  OLANZapine Injectable 10 milliGRAM(s) IntraMuscular at bedtime PRN psychosis if refuses po  
MEDICATIONS  (PRN):  diphenhydrAMINE 50 milliGRAM(s) Oral every 4 hours PRN anxiety  diphenhydrAMINE   Injectable 50 milliGRAM(s) IntraMuscular once PRN eps prophylaxis  diphenhydrAMINE   Injectable 50 milliGRAM(s) IntraMuscular once PRN eps prophylaxis  haloperidol     Tablet 5 milliGRAM(s) Oral every 4 hours PRN agitation  haloperidol    Injectable 5 milliGRAM(s) IntraMuscular at bedtime PRN psychosis - refusing  po  haloperidol    Injectable 5 milliGRAM(s) IntraMuscular once PRN combative behavior  LORazepam     Tablet 2 milliGRAM(s) Oral every 6 hours PRN Agitation  LORazepam   Injectable 2 milliGRAM(s) IntraMuscular once PRN severe agitation  OLANZapine Injectable 10 milliGRAM(s) IntraMuscular at bedtime PRN psychosis if refuses po  
MEDICATIONS  (PRN):  diphenhydrAMINE 50 milliGRAM(s) Oral every 4 hours PRN anxiety  diphenhydrAMINE   Injectable 50 milliGRAM(s) IntraMuscular once PRN eps prophylaxis  diphenhydrAMINE   Injectable 50 milliGRAM(s) IntraMuscular once PRN eps prophylaxis  haloperidol     Tablet 5 milliGRAM(s) Oral every 4 hours PRN agitation  haloperidol    Injectable 5 milliGRAM(s) IntraMuscular once PRN combative behavior  haloperidol    Injectable 5 milliGRAM(s) IntraMuscular at bedtime PRN psychosis - refusing  po  LORazepam     Tablet 2 milliGRAM(s) Oral every 6 hours PRN Agitation  OLANZapine Injectable 10 milliGRAM(s) IntraMuscular at bedtime PRN psychosis if refuses po  
MEDICATIONS  (PRN):  diphenhydrAMINE 50 milliGRAM(s) Oral every 4 hours PRN anxiety  diphenhydrAMINE   Injectable 50 milliGRAM(s) IntraMuscular once PRN eps prophylaxis  diphenhydrAMINE   Injectable 50 milliGRAM(s) IntraMuscular once PRN eps prophylaxis  haloperidol     Tablet 5 milliGRAM(s) Oral every 4 hours PRN agitation  haloperidol    Injectable 5 milliGRAM(s) IntraMuscular once PRN combative behavior  haloperidol    Injectable 5 milliGRAM(s) IntraMuscular at bedtime PRN psychosis - refusing  po  LORazepam     Tablet 2 milliGRAM(s) Oral every 6 hours PRN Agitation  OLANZapine Injectable 10 milliGRAM(s) IntraMuscular at bedtime PRN psychosis if refuses po  
MEDICATIONS  (PRN):  diphenhydrAMINE 50 milliGRAM(s) Oral every 4 hours PRN anxiety  diphenhydrAMINE   Injectable 50 milliGRAM(s) IntraMuscular once PRN eps prophylaxis  diphenhydrAMINE   Injectable 50 milliGRAM(s) IntraMuscular once PRN eps prophylaxis  haloperidol     Tablet 5 milliGRAM(s) Oral every 4 hours PRN agitation  haloperidol    Injectable 5 milliGRAM(s) IntraMuscular once PRN combative behavior  haloperidol    Injectable 5 milliGRAM(s) IntraMuscular at bedtime PRN psychosis - refusing  po  ibuprofen  Tablet. 400 milliGRAM(s) Oral once PRN Mild Pain (1 - 3)  LORazepam     Tablet 2 milliGRAM(s) Oral every 6 hours PRN Agitation  LORazepam   Injectable 2 milliGRAM(s) IntraMuscular once PRN severe agitation  OLANZapine Injectable 10 milliGRAM(s) IntraMuscular at bedtime PRN psychosis if refuses po  polyethylene glycol 3350 17 Gram(s) Oral two times a day PRN constipation  
MEDICATIONS  (PRN):  diphenhydrAMINE 50 milliGRAM(s) Oral every 4 hours PRN anxiety  diphenhydrAMINE   Injectable 50 milliGRAM(s) IntraMuscular once PRN eps prophylaxis  fluPHENAZine 5 milliGRAM(s) Oral every 6 hours PRN agitation  fluPHENAZine  Injectable 5 milliGRAM(s) IntraMuscular once PRN severe agitation  LORazepam     Tablet 2 milliGRAM(s) Oral every 6 hours PRN Agitation  
MEDICATIONS  (PRN):  diphenhydrAMINE 50 milliGRAM(s) Oral every 4 hours PRN anxiety  diphenhydrAMINE   Injectable 50 milliGRAM(s) IntraMuscular once PRN eps prophylaxis  fluPHENAZine 5 milliGRAM(s) Oral every 6 hours PRN agitation  fluPHENAZine  Injectable 5 milliGRAM(s) IntraMuscular once PRN severe agitation  LORazepam     Tablet 2 milliGRAM(s) Oral every 6 hours PRN Agitation  
MEDICATIONS  (PRN):  diphenhydrAMINE 50 milliGRAM(s) Oral every 4 hours PRN anxiety  diphenhydrAMINE   Injectable 50 milliGRAM(s) IntraMuscular once PRN eps prophylaxis  diphenhydrAMINE   Injectable 50 milliGRAM(s) IntraMuscular once PRN eps prophylaxis  haloperidol     Tablet 5 milliGRAM(s) Oral every 4 hours PRN agitation  haloperidol    Injectable 5 milliGRAM(s) IntraMuscular at bedtime PRN psychosis - refusing  po  haloperidol    Injectable 5 milliGRAM(s) IntraMuscular once PRN combative behavior  LORazepam     Tablet 2 milliGRAM(s) Oral every 6 hours PRN Agitation  LORazepam   Injectable 2 milliGRAM(s) IntraMuscular once PRN severe agitation  OLANZapine Injectable 10 milliGRAM(s) IntraMuscular at bedtime PRN psychosis if refuses po  
MEDICATIONS  (PRN):  diphenhydrAMINE 50 milliGRAM(s) Oral every 4 hours PRN anxiety  diphenhydrAMINE   Injectable 50 milliGRAM(s) IntraMuscular once PRN eps prophylaxis  diphenhydrAMINE   Injectable 50 milliGRAM(s) IntraMuscular once PRN eps prophylaxis  haloperidol     Tablet 5 milliGRAM(s) Oral every 4 hours PRN agitation  haloperidol    Injectable 5 milliGRAM(s) IntraMuscular at bedtime PRN psychosis - refusing  po  haloperidol    Injectable 5 milliGRAM(s) IntraMuscular once PRN combative behavior  ibuprofen  Tablet. 400 milliGRAM(s) Oral once PRN Mild Pain (1 - 3)  LORazepam     Tablet 2 milliGRAM(s) Oral every 6 hours PRN Agitation  LORazepam   Injectable 2 milliGRAM(s) IntraMuscular once PRN severe agitation  OLANZapine Injectable 10 milliGRAM(s) IntraMuscular at bedtime PRN psychosis if refuses po  
MEDICATIONS  (PRN):  diphenhydrAMINE 50 milliGRAM(s) Oral every 4 hours PRN anxiety  diphenhydrAMINE   Injectable 50 milliGRAM(s) IntraMuscular once PRN eps prophylaxis  diphenhydrAMINE   Injectable 50 milliGRAM(s) IntraMuscular once PRN eps prophylaxis  haloperidol     Tablet 5 milliGRAM(s) Oral every 4 hours PRN agitation  haloperidol    Injectable 5 milliGRAM(s) IntraMuscular once PRN combative behavior  haloperidol    Injectable 5 milliGRAM(s) IntraMuscular at bedtime PRN psychosis - refusing  po  LORazepam     Tablet 2 milliGRAM(s) Oral every 6 hours PRN Agitation  LORazepam   Injectable 2 milliGRAM(s) IntraMuscular once PRN severe agitation  OLANZapine Injectable 10 milliGRAM(s) IntraMuscular at bedtime PRN psychosis if refuses po  
MEDICATIONS  (PRN):  diphenhydrAMINE 50 milliGRAM(s) Oral every 4 hours PRN anxiety  diphenhydrAMINE   Injectable 50 milliGRAM(s) IntraMuscular once PRN eps prophylaxis  diphenhydrAMINE   Injectable 50 milliGRAM(s) IntraMuscular once PRN eps prophylaxis  haloperidol     Tablet 5 milliGRAM(s) Oral every 4 hours PRN agitation  haloperidol    Injectable 5 milliGRAM(s) IntraMuscular once PRN combative behavior  haloperidol    Injectable 5 milliGRAM(s) IntraMuscular at bedtime PRN psychosis - refusing  po  LORazepam     Tablet 2 milliGRAM(s) Oral every 6 hours PRN Agitation  LORazepam   Injectable 2 milliGRAM(s) IntraMuscular once PRN severe agitation  OLANZapine Injectable 10 milliGRAM(s) IntraMuscular at bedtime PRN psychosis if refuses po  
MEDICATIONS  (PRN):  diphenhydrAMINE 50 milliGRAM(s) Oral every 4 hours PRN anxiety  diphenhydrAMINE   Injectable 50 milliGRAM(s) IntraMuscular once PRN eps prophylaxis  diphenhydrAMINE   Injectable 50 milliGRAM(s) IntraMuscular once PRN eps prophylaxis  fluPHENAZine 5 milliGRAM(s) Oral every 6 hours PRN agitation  fluPHENAZine  Injectable 5 milliGRAM(s) IntraMuscular once PRN severe agitation  LORazepam     Tablet 2 milliGRAM(s) Oral every 6 hours PRN Agitation  
MEDICATIONS  (PRN):  diphenhydrAMINE 50 milliGRAM(s) Oral every 4 hours PRN anxiety  diphenhydrAMINE   Injectable 50 milliGRAM(s) IntraMuscular once PRN eps prophylaxis  diphenhydrAMINE   Injectable 50 milliGRAM(s) IntraMuscular once PRN eps prophylaxis  haloperidol     Tablet 5 milliGRAM(s) Oral every 4 hours PRN agitation  haloperidol    Injectable 5 milliGRAM(s) IntraMuscular at bedtime PRN psychosis - refusing  po  haloperidol    Injectable 5 milliGRAM(s) IntraMuscular once PRN combative behavior  LORazepam     Tablet 2 milliGRAM(s) Oral every 6 hours PRN Agitation  LORazepam   Injectable 2 milliGRAM(s) IntraMuscular once PRN severe agitation  OLANZapine Injectable 10 milliGRAM(s) IntraMuscular at bedtime PRN psychosis if refuses po  
MEDICATIONS  (PRN):  diphenhydrAMINE 50 milliGRAM(s) Oral every 4 hours PRN anxiety  diphenhydrAMINE   Injectable 50 milliGRAM(s) IntraMuscular once PRN eps prophylaxis  fluPHENAZine 5 milliGRAM(s) Oral every 6 hours PRN agitation  fluPHENAZine  Injectable 5 milliGRAM(s) IntraMuscular once PRN severe agitation  LORazepam     Tablet 2 milliGRAM(s) Oral every 6 hours PRN Agitation  
MEDICATIONS  (PRN):  diphenhydrAMINE 50 milliGRAM(s) Oral every 4 hours PRN anxiety  diphenhydrAMINE   Injectable 50 milliGRAM(s) IntraMuscular once PRN eps prophylaxis  diphenhydrAMINE   Injectable 50 milliGRAM(s) IntraMuscular once PRN eps prophylaxis  haloperidol     Tablet 5 milliGRAM(s) Oral every 4 hours PRN agitation  haloperidol    Injectable 5 milliGRAM(s) IntraMuscular at bedtime PRN psychosis - refusing  po  haloperidol    Injectable 5 milliGRAM(s) IntraMuscular once PRN combative behavior  ibuprofen  Tablet. 400 milliGRAM(s) Oral once PRN Mild Pain (1 - 3)  LORazepam     Tablet 2 milliGRAM(s) Oral every 6 hours PRN Agitation  LORazepam   Injectable 2 milliGRAM(s) IntraMuscular once PRN severe agitation  OLANZapine Injectable 10 milliGRAM(s) IntraMuscular at bedtime PRN psychosis if refuses po  polyethylene glycol 3350 17 Gram(s) Oral two times a day PRN constipation  
MEDICATIONS  (PRN):  diphenhydrAMINE 50 milliGRAM(s) Oral every 4 hours PRN anxiety  diphenhydrAMINE   Injectable 50 milliGRAM(s) IntraMuscular once PRN eps prophylaxis  diphenhydrAMINE   Injectable 50 milliGRAM(s) IntraMuscular once PRN eps prophylaxis  haloperidol     Tablet 5 milliGRAM(s) Oral every 4 hours PRN agitation  haloperidol    Injectable 5 milliGRAM(s) IntraMuscular once PRN combative behavior  haloperidol    Injectable 5 milliGRAM(s) IntraMuscular at bedtime PRN psychosis - refusing  po  LORazepam     Tablet 2 milliGRAM(s) Oral every 6 hours PRN Agitation  LORazepam   Injectable 2 milliGRAM(s) IntraMuscular once PRN severe agitation  OLANZapine Injectable 10 milliGRAM(s) IntraMuscular at bedtime PRN psychosis if refuses po  
MEDICATIONS  (PRN):  diphenhydrAMINE 50 milliGRAM(s) Oral every 4 hours PRN anxiety  diphenhydrAMINE   Injectable 50 milliGRAM(s) IntraMuscular once PRN eps prophylaxis  fluPHENAZine 5 milliGRAM(s) Oral every 6 hours PRN agitation  fluPHENAZine  Injectable 5 milliGRAM(s) IntraMuscular once PRN severe agitation  LORazepam     Tablet 2 milliGRAM(s) Oral every 6 hours PRN Agitation  
MEDICATIONS  (PRN):  diphenhydrAMINE 50 milliGRAM(s) Oral every 4 hours PRN anxiety  diphenhydrAMINE   Injectable 50 milliGRAM(s) IntraMuscular once PRN eps prophylaxis  diphenhydrAMINE   Injectable 50 milliGRAM(s) IntraMuscular once PRN eps prophylaxis  fluPHENAZine 5 milliGRAM(s) Oral every 6 hours PRN agitation  fluPHENAZine  Injectable 5 milliGRAM(s) IntraMuscular once PRN severe agitation  LORazepam     Tablet 2 milliGRAM(s) Oral every 6 hours PRN Agitation  OLANZapine Injectable 10 milliGRAM(s) IntraMuscular at bedtime PRN psychosis if refuses po  
MEDICATIONS  (PRN):  diphenhydrAMINE 50 milliGRAM(s) Oral every 4 hours PRN anxiety  diphenhydrAMINE   Injectable 50 milliGRAM(s) IntraMuscular once PRN eps prophylaxis  diphenhydrAMINE   Injectable 50 milliGRAM(s) IntraMuscular once PRN eps prophylaxis  haloperidol     Tablet 5 milliGRAM(s) Oral every 4 hours PRN agitation  haloperidol    Injectable 5 milliGRAM(s) IntraMuscular at bedtime PRN psychosis - refusing  po  haloperidol    Injectable 5 milliGRAM(s) IntraMuscular once PRN combative behavior  LORazepam     Tablet 2 milliGRAM(s) Oral every 6 hours PRN Agitation  LORazepam   Injectable 2 milliGRAM(s) IntraMuscular once PRN severe agitation  OLANZapine Injectable 10 milliGRAM(s) IntraMuscular at bedtime PRN psychosis if refuses po  
MEDICATIONS  (PRN):  diphenhydrAMINE 50 milliGRAM(s) Oral every 4 hours PRN anxiety  diphenhydrAMINE   Injectable 50 milliGRAM(s) IntraMuscular once PRN eps prophylaxis  diphenhydrAMINE   Injectable 50 milliGRAM(s) IntraMuscular once PRN eps prophylaxis  haloperidol     Tablet 5 milliGRAM(s) Oral every 4 hours PRN agitation  haloperidol    Injectable 5 milliGRAM(s) IntraMuscular at bedtime PRN psychosis - refusing  po  haloperidol    Injectable 5 milliGRAM(s) IntraMuscular once PRN combative behavior  LORazepam     Tablet 2 milliGRAM(s) Oral every 6 hours PRN Agitation  LORazepam   Injectable 2 milliGRAM(s) IntraMuscular once PRN severe agitation  OLANZapine Injectable 10 milliGRAM(s) IntraMuscular at bedtime PRN psychosis if refuses po  
MEDICATIONS  (PRN):  diphenhydrAMINE 50 milliGRAM(s) Oral every 4 hours PRN anxiety  diphenhydrAMINE   Injectable 50 milliGRAM(s) IntraMuscular once PRN eps prophylaxis  diphenhydrAMINE   Injectable 50 milliGRAM(s) IntraMuscular once PRN eps prophylaxis  fluPHENAZine 5 milliGRAM(s) Oral every 6 hours PRN agitation  fluPHENAZine  Injectable 5 milliGRAM(s) IntraMuscular once PRN severe agitation  LORazepam     Tablet 2 milliGRAM(s) Oral every 6 hours PRN Agitation  OLANZapine Injectable 10 milliGRAM(s) IntraMuscular at bedtime PRN psychosis if refuses po  
MEDICATIONS  (PRN):  diphenhydrAMINE 50 milliGRAM(s) Oral every 4 hours PRN anxiety  diphenhydrAMINE   Injectable 50 milliGRAM(s) IntraMuscular once PRN eps prophylaxis  diphenhydrAMINE   Injectable 50 milliGRAM(s) IntraMuscular once PRN eps prophylaxis  haloperidol     Tablet 5 milliGRAM(s) Oral every 4 hours PRN agitation  haloperidol    Injectable 5 milliGRAM(s) IntraMuscular at bedtime PRN psychosis - refusing  po  haloperidol    Injectable 5 milliGRAM(s) IntraMuscular once PRN combative behavior  ibuprofen  Tablet. 400 milliGRAM(s) Oral once PRN Mild Pain (1 - 3)  LORazepam     Tablet 2 milliGRAM(s) Oral every 6 hours PRN Agitation  LORazepam   Injectable 2 milliGRAM(s) IntraMuscular once PRN severe agitation  OLANZapine Injectable 10 milliGRAM(s) IntraMuscular at bedtime PRN psychosis if refuses po  polyethylene glycol 3350 17 Gram(s) Oral two times a day PRN constipation  
MEDICATIONS  (PRN):  diphenhydrAMINE 50 milliGRAM(s) Oral every 4 hours PRN anxiety  diphenhydrAMINE   Injectable 50 milliGRAM(s) IntraMuscular once PRN eps prophylaxis  fluPHENAZine 5 milliGRAM(s) Oral every 6 hours PRN agitation  fluPHENAZine  Injectable 5 milliGRAM(s) IntraMuscular once PRN severe agitation  LORazepam     Tablet 2 milliGRAM(s) Oral every 6 hours PRN Agitation  
MEDICATIONS  (PRN):  diphenhydrAMINE 50 milliGRAM(s) Oral every 4 hours PRN anxiety  diphenhydrAMINE   Injectable 50 milliGRAM(s) IntraMuscular once PRN eps prophylaxis  diphenhydrAMINE   Injectable 50 milliGRAM(s) IntraMuscular once PRN eps prophylaxis  fluPHENAZine 5 milliGRAM(s) Oral every 6 hours PRN agitation  fluPHENAZine  Injectable 5 milliGRAM(s) IntraMuscular once PRN severe agitation  fluPHENAZine  Injectable 5 milliGRAM(s) IntraMuscular once PRN severe agitation  LORazepam     Tablet 2 milliGRAM(s) Oral every 6 hours PRN Agitation  
MEDICATIONS  (PRN):  diphenhydrAMINE 50 milliGRAM(s) Oral every 4 hours PRN anxiety  diphenhydrAMINE   Injectable 50 milliGRAM(s) IntraMuscular once PRN eps prophylaxis  diphenhydrAMINE   Injectable 50 milliGRAM(s) IntraMuscular once PRN eps prophylaxis  haloperidol     Tablet 5 milliGRAM(s) Oral every 4 hours PRN agitation  haloperidol    Injectable 5 milliGRAM(s) IntraMuscular at bedtime PRN psychosis - refusing  po  haloperidol    Injectable 5 milliGRAM(s) IntraMuscular once PRN combative behavior  LORazepam     Tablet 2 milliGRAM(s) Oral every 6 hours PRN Agitation  LORazepam   Injectable 2 milliGRAM(s) IntraMuscular once PRN severe agitation  OLANZapine Injectable 10 milliGRAM(s) IntraMuscular at bedtime PRN psychosis if refuses po  
MEDICATIONS  (PRN):  diphenhydrAMINE 50 milliGRAM(s) Oral every 4 hours PRN anxiety  diphenhydrAMINE   Injectable 50 milliGRAM(s) IntraMuscular once PRN eps prophylaxis  fluPHENAZine 5 milliGRAM(s) Oral every 6 hours PRN agitation  fluPHENAZine  Injectable 5 milliGRAM(s) IntraMuscular once PRN severe agitation  LORazepam     Tablet 2 milliGRAM(s) Oral every 6 hours PRN Agitation  
MEDICATIONS  (PRN):  diphenhydrAMINE 50 milliGRAM(s) Oral every 4 hours PRN anxiety  diphenhydrAMINE   Injectable 50 milliGRAM(s) IntraMuscular once PRN eps prophylaxis  diphenhydrAMINE   Injectable 50 milliGRAM(s) IntraMuscular once PRN eps prophylaxis  haloperidol     Tablet 5 milliGRAM(s) Oral every 4 hours PRN agitation  haloperidol    Injectable 5 milliGRAM(s) IntraMuscular at bedtime PRN psychosis - refusing  po  haloperidol    Injectable 5 milliGRAM(s) IntraMuscular once PRN combative behavior  LORazepam     Tablet 2 milliGRAM(s) Oral every 6 hours PRN Agitation  LORazepam   Injectable 2 milliGRAM(s) IntraMuscular once PRN severe agitation  OLANZapine Injectable 10 milliGRAM(s) IntraMuscular at bedtime PRN psychosis if refuses po  
MEDICATIONS  (PRN):  diphenhydrAMINE 50 milliGRAM(s) Oral every 4 hours PRN anxiety  diphenhydrAMINE   Injectable 50 milliGRAM(s) IntraMuscular once PRN eps prophylaxis  diphenhydrAMINE   Injectable 50 milliGRAM(s) IntraMuscular once PRN eps prophylaxis  haloperidol     Tablet 5 milliGRAM(s) Oral every 4 hours PRN agitation  haloperidol    Injectable 5 milliGRAM(s) IntraMuscular once PRN combative behavior  haloperidol    Injectable 5 milliGRAM(s) IntraMuscular at bedtime PRN psychosis - refusing  po  LORazepam     Tablet 2 milliGRAM(s) Oral every 6 hours PRN Agitation  OLANZapine Injectable 10 milliGRAM(s) IntraMuscular at bedtime PRN psychosis if refuses po  
MEDICATIONS  (PRN):  diphenhydrAMINE 50 milliGRAM(s) Oral every 4 hours PRN anxiety  diphenhydrAMINE   Injectable 50 milliGRAM(s) IntraMuscular once PRN eps prophylaxis  diphenhydrAMINE   Injectable 50 milliGRAM(s) IntraMuscular once PRN eps prophylaxis  haloperidol     Tablet 5 milliGRAM(s) Oral every 4 hours PRN agitation  haloperidol    Injectable 5 milliGRAM(s) IntraMuscular at bedtime PRN psychosis - refusing  po  haloperidol    Injectable 5 milliGRAM(s) IntraMuscular once PRN combative behavior  LORazepam     Tablet 2 milliGRAM(s) Oral every 6 hours PRN Agitation  LORazepam   Injectable 2 milliGRAM(s) IntraMuscular once PRN severe agitation  OLANZapine Injectable 10 milliGRAM(s) IntraMuscular at bedtime PRN psychosis if refuses po  
MEDICATIONS  (PRN):  diphenhydrAMINE 50 milliGRAM(s) Oral every 4 hours PRN anxiety  diphenhydrAMINE   Injectable 50 milliGRAM(s) IntraMuscular once PRN eps prophylaxis  diphenhydrAMINE   Injectable 50 milliGRAM(s) IntraMuscular once PRN eps prophylaxis  haloperidol     Tablet 5 milliGRAM(s) Oral every 4 hours PRN agitation  haloperidol    Injectable 5 milliGRAM(s) IntraMuscular at bedtime PRN psychosis - refusing  po  haloperidol    Injectable 5 milliGRAM(s) IntraMuscular once PRN combative behavior  LORazepam     Tablet 2 milliGRAM(s) Oral every 6 hours PRN Agitation  LORazepam   Injectable 2 milliGRAM(s) IntraMuscular once PRN severe agitation  OLANZapine Injectable 10 milliGRAM(s) IntraMuscular at bedtime PRN psychosis if refuses po  
MEDICATIONS  (PRN):  diphenhydrAMINE 50 milliGRAM(s) Oral every 4 hours PRN anxiety  diphenhydrAMINE   Injectable 50 milliGRAM(s) IntraMuscular once PRN eps prophylaxis  diphenhydrAMINE   Injectable 50 milliGRAM(s) IntraMuscular once PRN eps prophylaxis  haloperidol     Tablet 5 milliGRAM(s) Oral every 4 hours PRN agitation  haloperidol    Injectable 5 milliGRAM(s) IntraMuscular once PRN combative behavior  haloperidol    Injectable 5 milliGRAM(s) IntraMuscular at bedtime PRN psychosis - refusing  po  LORazepam     Tablet 2 milliGRAM(s) Oral every 6 hours PRN Agitation  LORazepam   Injectable 2 milliGRAM(s) IntraMuscular once PRN severe agitation  OLANZapine Injectable 10 milliGRAM(s) IntraMuscular at bedtime PRN psychosis if refuses po  
MEDICATIONS  (PRN):  diphenhydrAMINE 50 milliGRAM(s) Oral every 4 hours PRN anxiety  diphenhydrAMINE   Injectable 50 milliGRAM(s) IntraMuscular once PRN eps prophylaxis  diphenhydrAMINE   Injectable 50 milliGRAM(s) IntraMuscular once PRN eps prophylaxis  fluPHENAZine 5 milliGRAM(s) Oral every 6 hours PRN agitation  fluPHENAZine  Injectable 5 milliGRAM(s) IntraMuscular once PRN severe agitation  LORazepam     Tablet 2 milliGRAM(s) Oral every 6 hours PRN Agitation  OLANZapine Injectable 10 milliGRAM(s) IntraMuscular at bedtime PRN psychosis if refuses po  
MEDICATIONS  (PRN):  diphenhydrAMINE 50 milliGRAM(s) Oral every 4 hours PRN anxiety  diphenhydrAMINE   Injectable 50 milliGRAM(s) IntraMuscular once PRN eps prophylaxis  diphenhydrAMINE   Injectable 50 milliGRAM(s) IntraMuscular once PRN eps prophylaxis  haloperidol     Tablet 5 milliGRAM(s) Oral every 4 hours PRN agitation  haloperidol    Injectable 5 milliGRAM(s) IntraMuscular once PRN combative behavior  haloperidol    Injectable 5 milliGRAM(s) IntraMuscular at bedtime PRN psychosis - refusing  po  LORazepam     Tablet 2 milliGRAM(s) Oral every 6 hours PRN Agitation  LORazepam   Injectable 2 milliGRAM(s) IntraMuscular once PRN severe agitation  OLANZapine Injectable 10 milliGRAM(s) IntraMuscular at bedtime PRN psychosis if refuses po  
MEDICATIONS  (PRN):  diphenhydrAMINE 50 milliGRAM(s) Oral every 4 hours PRN anxiety  diphenhydrAMINE   Injectable 50 milliGRAM(s) IntraMuscular once PRN eps prophylaxis  diphenhydrAMINE   Injectable 50 milliGRAM(s) IntraMuscular once PRN eps prophylaxis  haloperidol     Tablet 5 milliGRAM(s) Oral every 4 hours PRN agitation  haloperidol    Injectable 5 milliGRAM(s) IntraMuscular once PRN combative behavior  haloperidol    Injectable 5 milliGRAM(s) IntraMuscular at bedtime PRN psychosis - refusing  po  ibuprofen  Tablet. 400 milliGRAM(s) Oral once PRN Mild Pain (1 - 3)  LORazepam     Tablet 2 milliGRAM(s) Oral every 6 hours PRN Agitation  LORazepam   Injectable 2 milliGRAM(s) IntraMuscular once PRN severe agitation  OLANZapine Injectable 10 milliGRAM(s) IntraMuscular at bedtime PRN psychosis if refuses po  
MEDICATIONS  (PRN):  diphenhydrAMINE 50 milliGRAM(s) Oral every 4 hours PRN anxiety  diphenhydrAMINE   Injectable 50 milliGRAM(s) IntraMuscular once PRN eps prophylaxis  diphenhydrAMINE   Injectable 50 milliGRAM(s) IntraMuscular once PRN eps prophylaxis  fluPHENAZine 5 milliGRAM(s) Oral every 6 hours PRN agitation  fluPHENAZine  Injectable 5 milliGRAM(s) IntraMuscular once PRN severe agitation  LORazepam     Tablet 2 milliGRAM(s) Oral every 6 hours PRN Agitation  
MEDICATIONS  (PRN):  diphenhydrAMINE 50 milliGRAM(s) Oral every 4 hours PRN anxiety  diphenhydrAMINE   Injectable 50 milliGRAM(s) IntraMuscular once PRN eps prophylaxis  diphenhydrAMINE   Injectable 50 milliGRAM(s) IntraMuscular once PRN eps prophylaxis  haloperidol     Tablet 5 milliGRAM(s) Oral every 4 hours PRN agitation  haloperidol    Injectable 5 milliGRAM(s) IntraMuscular once PRN combative behavior  haloperidol    Injectable 5 milliGRAM(s) IntraMuscular at bedtime PRN psychosis - refusing  po  LORazepam     Tablet 2 milliGRAM(s) Oral every 6 hours PRN Agitation  OLANZapine Injectable 10 milliGRAM(s) IntraMuscular at bedtime PRN psychosis if refuses po  
MEDICATIONS  (PRN):  diphenhydrAMINE 50 milliGRAM(s) Oral every 4 hours PRN anxiety  diphenhydrAMINE   Injectable 50 milliGRAM(s) IntraMuscular once PRN eps prophylaxis  diphenhydrAMINE   Injectable 50 milliGRAM(s) IntraMuscular once PRN eps prophylaxis  haloperidol     Tablet 5 milliGRAM(s) Oral every 4 hours PRN agitation  haloperidol    Injectable 5 milliGRAM(s) IntraMuscular at bedtime PRN psychosis - refusing  po  haloperidol    Injectable 5 milliGRAM(s) IntraMuscular once PRN combative behavior  LORazepam     Tablet 2 milliGRAM(s) Oral every 6 hours PRN Agitation  LORazepam   Injectable 2 milliGRAM(s) IntraMuscular once PRN severe agitation  OLANZapine Injectable 10 milliGRAM(s) IntraMuscular at bedtime PRN psychosis if refuses po  
MEDICATIONS  (PRN):  diphenhydrAMINE 50 milliGRAM(s) Oral every 4 hours PRN anxiety  diphenhydrAMINE   Injectable 50 milliGRAM(s) IntraMuscular once PRN eps prophylaxis  diphenhydrAMINE   Injectable 50 milliGRAM(s) IntraMuscular once PRN eps prophylaxis  haloperidol     Tablet 5 milliGRAM(s) Oral every 4 hours PRN agitation  haloperidol    Injectable 5 milliGRAM(s) IntraMuscular at bedtime PRN psychosis - refusing  po  haloperidol    Injectable 5 milliGRAM(s) IntraMuscular once PRN combative behavior  LORazepam     Tablet 2 milliGRAM(s) Oral every 6 hours PRN Agitation  LORazepam   Injectable 2 milliGRAM(s) IntraMuscular once PRN severe agitation  OLANZapine Injectable 10 milliGRAM(s) IntraMuscular at bedtime PRN psychosis if refuses po  
MEDICATIONS  (PRN):  diphenhydrAMINE 50 milliGRAM(s) Oral every 4 hours PRN anxiety  diphenhydrAMINE   Injectable 50 milliGRAM(s) IntraMuscular once PRN eps prophylaxis  diphenhydrAMINE   Injectable 50 milliGRAM(s) IntraMuscular once PRN eps prophylaxis  haloperidol     Tablet 5 milliGRAM(s) Oral every 4 hours PRN agitation  haloperidol    Injectable 5 milliGRAM(s) IntraMuscular once PRN combative behavior  haloperidol    Injectable 5 milliGRAM(s) IntraMuscular at bedtime PRN psychosis - refusing  po  LORazepam     Tablet 2 milliGRAM(s) Oral every 6 hours PRN Agitation  LORazepam   Injectable 2 milliGRAM(s) IntraMuscular once PRN severe agitation  OLANZapine Injectable 10 milliGRAM(s) IntraMuscular at bedtime PRN psychosis if refuses po  
MEDICATIONS  (PRN):  diphenhydrAMINE 50 milliGRAM(s) Oral every 4 hours PRN anxiety  diphenhydrAMINE   Injectable 50 milliGRAM(s) IntraMuscular once PRN eps prophylaxis  diphenhydrAMINE   Injectable 50 milliGRAM(s) IntraMuscular once PRN eps prophylaxis  fluPHENAZine 5 milliGRAM(s) Oral every 6 hours PRN agitation  fluPHENAZine  Injectable 5 milliGRAM(s) IntraMuscular once PRN severe agitation  LORazepam     Tablet 2 milliGRAM(s) Oral every 6 hours PRN Agitation  
MEDICATIONS  (PRN):  diphenhydrAMINE 50 milliGRAM(s) Oral every 4 hours PRN anxiety  diphenhydrAMINE   Injectable 50 milliGRAM(s) IntraMuscular once PRN eps prophylaxis  diphenhydrAMINE   Injectable 50 milliGRAM(s) IntraMuscular once PRN eps prophylaxis  haloperidol     Tablet 5 milliGRAM(s) Oral every 4 hours PRN agitation  haloperidol    Injectable 5 milliGRAM(s) IntraMuscular at bedtime PRN psychosis - refusing  po  haloperidol    Injectable 5 milliGRAM(s) IntraMuscular once PRN combative behavior  LORazepam     Tablet 2 milliGRAM(s) Oral every 6 hours PRN Agitation  LORazepam   Injectable 2 milliGRAM(s) IntraMuscular once PRN severe agitation  OLANZapine Injectable 10 milliGRAM(s) IntraMuscular at bedtime PRN psychosis if refuses po  
MEDICATIONS  (PRN):  diphenhydrAMINE 50 milliGRAM(s) Oral every 4 hours PRN anxiety  diphenhydrAMINE   Injectable 50 milliGRAM(s) IntraMuscular once PRN eps prophylaxis  diphenhydrAMINE   Injectable 50 milliGRAM(s) IntraMuscular once PRN eps prophylaxis  haloperidol     Tablet 5 milliGRAM(s) Oral every 4 hours PRN agitation  haloperidol    Injectable 5 milliGRAM(s) IntraMuscular at bedtime PRN psychosis - refusing  po  haloperidol    Injectable 5 milliGRAM(s) IntraMuscular once PRN combative behavior  LORazepam     Tablet 2 milliGRAM(s) Oral every 6 hours PRN Agitation  LORazepam   Injectable 2 milliGRAM(s) IntraMuscular once PRN severe agitation  OLANZapine Injectable 10 milliGRAM(s) IntraMuscular at bedtime PRN psychosis if refuses po  
MEDICATIONS  (PRN):  diphenhydrAMINE 50 milliGRAM(s) Oral every 4 hours PRN anxiety  diphenhydrAMINE   Injectable 50 milliGRAM(s) IntraMuscular once PRN eps prophylaxis  diphenhydrAMINE   Injectable 50 milliGRAM(s) IntraMuscular once PRN eps prophylaxis  haloperidol     Tablet 5 milliGRAM(s) Oral every 4 hours PRN agitation  haloperidol    Injectable 5 milliGRAM(s) IntraMuscular at bedtime PRN psychosis - refusing  po  haloperidol    Injectable 5 milliGRAM(s) IntraMuscular once PRN combative behavior  ibuprofen  Tablet. 400 milliGRAM(s) Oral once PRN Mild Pain (1 - 3)  LORazepam     Tablet 2 milliGRAM(s) Oral every 6 hours PRN Agitation  LORazepam   Injectable 2 milliGRAM(s) IntraMuscular once PRN severe agitation  OLANZapine Injectable 10 milliGRAM(s) IntraMuscular at bedtime PRN psychosis if refuses po  
MEDICATIONS  (PRN):  diphenhydrAMINE 50 milliGRAM(s) Oral every 4 hours PRN anxiety  diphenhydrAMINE   Injectable 50 milliGRAM(s) IntraMuscular once PRN eps prophylaxis  diphenhydrAMINE   Injectable 50 milliGRAM(s) IntraMuscular once PRN eps prophylaxis  fluPHENAZine 5 milliGRAM(s) Oral every 6 hours PRN agitation  fluPHENAZine  Injectable 5 milliGRAM(s) IntraMuscular once PRN severe agitation  LORazepam     Tablet 2 milliGRAM(s) Oral every 6 hours PRN Agitation  OLANZapine Injectable 10 milliGRAM(s) IntraMuscular at bedtime PRN psychosis if refuses po  
MEDICATIONS  (PRN):  diphenhydrAMINE 50 milliGRAM(s) Oral every 4 hours PRN anxiety  diphenhydrAMINE   Injectable 50 milliGRAM(s) IntraMuscular once PRN eps prophylaxis  diphenhydrAMINE   Injectable 50 milliGRAM(s) IntraMuscular once PRN eps prophylaxis  haloperidol     Tablet 5 milliGRAM(s) Oral every 4 hours PRN agitation  haloperidol    Injectable 5 milliGRAM(s) IntraMuscular once PRN combative behavior  haloperidol    Injectable 5 milliGRAM(s) IntraMuscular at bedtime PRN psychosis - refusing  po  LORazepam     Tablet 2 milliGRAM(s) Oral every 6 hours PRN Agitation  LORazepam   Injectable 2 milliGRAM(s) IntraMuscular once PRN severe agitation  OLANZapine Injectable 10 milliGRAM(s) IntraMuscular at bedtime PRN psychosis if refuses po  
MEDICATIONS  (PRN):  diphenhydrAMINE 50 milliGRAM(s) Oral every 4 hours PRN anxiety  diphenhydrAMINE   Injectable 50 milliGRAM(s) IntraMuscular once PRN eps prophylaxis  diphenhydrAMINE   Injectable 50 milliGRAM(s) IntraMuscular once PRN eps prophylaxis  haloperidol     Tablet 5 milliGRAM(s) Oral every 4 hours PRN agitation  haloperidol    Injectable 5 milliGRAM(s) IntraMuscular once PRN combative behavior  haloperidol    Injectable 5 milliGRAM(s) IntraMuscular at bedtime PRN psychosis - refusing  po  LORazepam     Tablet 2 milliGRAM(s) Oral every 6 hours PRN Agitation  OLANZapine Injectable 10 milliGRAM(s) IntraMuscular at bedtime PRN psychosis if refuses po  
MEDICATIONS  (PRN):  diphenhydrAMINE 50 milliGRAM(s) Oral every 4 hours PRN anxiety  diphenhydrAMINE   Injectable 50 milliGRAM(s) IntraMuscular once PRN eps prophylaxis  diphenhydrAMINE   Injectable 50 milliGRAM(s) IntraMuscular once PRN eps prophylaxis  fluPHENAZine 5 milliGRAM(s) Oral every 6 hours PRN agitation  fluPHENAZine  Injectable 5 milliGRAM(s) IntraMuscular once PRN severe agitation  fluPHENAZine  Injectable 5 milliGRAM(s) IntraMuscular once PRN severe agitation  LORazepam     Tablet 2 milliGRAM(s) Oral every 6 hours PRN Agitation  
MEDICATIONS  (PRN):  diphenhydrAMINE 50 milliGRAM(s) Oral every 4 hours PRN anxiety  diphenhydrAMINE   Injectable 50 milliGRAM(s) IntraMuscular once PRN eps prophylaxis  diphenhydrAMINE   Injectable 50 milliGRAM(s) IntraMuscular once PRN eps prophylaxis  haloperidol     Tablet 5 milliGRAM(s) Oral every 4 hours PRN agitation  haloperidol    Injectable 5 milliGRAM(s) IntraMuscular once PRN combative behavior  haloperidol    Injectable 5 milliGRAM(s) IntraMuscular at bedtime PRN psychosis - refusing  po  LORazepam     Tablet 2 milliGRAM(s) Oral every 6 hours PRN Agitation  LORazepam   Injectable 2 milliGRAM(s) IntraMuscular once PRN severe agitation  OLANZapine Injectable 10 milliGRAM(s) IntraMuscular at bedtime PRN psychosis if refuses po  
MEDICATIONS  (PRN):  diphenhydrAMINE 50 milliGRAM(s) Oral every 4 hours PRN anxiety  diphenhydrAMINE   Injectable 50 milliGRAM(s) IntraMuscular once PRN eps prophylaxis  diphenhydrAMINE   Injectable 50 milliGRAM(s) IntraMuscular once PRN eps prophylaxis  haloperidol     Tablet 5 milliGRAM(s) Oral every 4 hours PRN agitation  haloperidol    Injectable 5 milliGRAM(s) IntraMuscular once PRN combative behavior  haloperidol    Injectable 5 milliGRAM(s) IntraMuscular at bedtime PRN psychosis - refusing  po  LORazepam     Tablet 2 milliGRAM(s) Oral every 6 hours PRN Agitation  LORazepam   Injectable 2 milliGRAM(s) IntraMuscular once PRN severe agitation  OLANZapine Injectable 10 milliGRAM(s) IntraMuscular at bedtime PRN psychosis if refuses po  
MEDICATIONS  (PRN):  diphenhydrAMINE 50 milliGRAM(s) Oral every 4 hours PRN anxiety  diphenhydrAMINE   Injectable 50 milliGRAM(s) IntraMuscular once PRN eps prophylaxis  fluPHENAZine 5 milliGRAM(s) Oral every 6 hours PRN agitation  fluPHENAZine  Injectable 5 milliGRAM(s) IntraMuscular once PRN severe agitation  LORazepam     Tablet 2 milliGRAM(s) Oral every 6 hours PRN Agitation  
MEDICATIONS  (PRN):  diphenhydrAMINE 50 milliGRAM(s) Oral every 4 hours PRN anxiety  diphenhydrAMINE   Injectable 50 milliGRAM(s) IntraMuscular once PRN eps prophylaxis  diphenhydrAMINE   Injectable 50 milliGRAM(s) IntraMuscular once PRN eps prophylaxis  haloperidol     Tablet 5 milliGRAM(s) Oral every 4 hours PRN agitation  haloperidol    Injectable 5 milliGRAM(s) IntraMuscular at bedtime PRN psychosis - refusing  po  haloperidol    Injectable 5 milliGRAM(s) IntraMuscular once PRN combative behavior  ibuprofen  Tablet. 400 milliGRAM(s) Oral once PRN Mild Pain (1 - 3)  LORazepam     Tablet 2 milliGRAM(s) Oral every 6 hours PRN Agitation  LORazepam   Injectable 2 milliGRAM(s) IntraMuscular once PRN severe agitation  OLANZapine Injectable 10 milliGRAM(s) IntraMuscular at bedtime PRN psychosis if refuses po  polyethylene glycol 3350 17 Gram(s) Oral two times a day PRN constipation  
MEDICATIONS  (PRN):  diphenhydrAMINE 50 milliGRAM(s) Oral every 4 hours PRN anxiety  diphenhydrAMINE   Injectable 50 milliGRAM(s) IntraMuscular once PRN eps prophylaxis  fluPHENAZine 5 milliGRAM(s) Oral every 6 hours PRN agitation  fluPHENAZine  Injectable 5 milliGRAM(s) IntraMuscular once PRN severe agitation  LORazepam     Tablet 2 milliGRAM(s) Oral every 6 hours PRN Agitation  
MEDICATIONS  (PRN):  diphenhydrAMINE 50 milliGRAM(s) Oral every 4 hours PRN anxiety  diphenhydrAMINE   Injectable 50 milliGRAM(s) IntraMuscular once PRN eps prophylaxis  fluPHENAZine 5 milliGRAM(s) Oral every 6 hours PRN agitation  fluPHENAZine  Injectable 5 milliGRAM(s) IntraMuscular once PRN severe agitation  LORazepam     Tablet 2 milliGRAM(s) Oral every 6 hours PRN Agitation  
MEDICATIONS  (PRN):  diphenhydrAMINE 50 milliGRAM(s) Oral every 4 hours PRN anxiety  diphenhydrAMINE   Injectable 50 milliGRAM(s) IntraMuscular once PRN eps prophylaxis  diphenhydrAMINE   Injectable 50 milliGRAM(s) IntraMuscular once PRN eps prophylaxis  haloperidol     Tablet 5 milliGRAM(s) Oral every 4 hours PRN agitation  haloperidol    Injectable 5 milliGRAM(s) IntraMuscular at bedtime PRN psychosis - refusing  po  haloperidol    Injectable 5 milliGRAM(s) IntraMuscular once PRN combative behavior  LORazepam     Tablet 2 milliGRAM(s) Oral every 6 hours PRN Agitation  LORazepam   Injectable 2 milliGRAM(s) IntraMuscular once PRN severe agitation  OLANZapine Injectable 10 milliGRAM(s) IntraMuscular at bedtime PRN psychosis if refuses po  
MEDICATIONS  (PRN):  diphenhydrAMINE 50 milliGRAM(s) Oral every 4 hours PRN anxiety  diphenhydrAMINE   Injectable 50 milliGRAM(s) IntraMuscular once PRN eps prophylaxis  diphenhydrAMINE   Injectable 50 milliGRAM(s) IntraMuscular once PRN eps prophylaxis  haloperidol     Tablet 5 milliGRAM(s) Oral every 4 hours PRN agitation  haloperidol    Injectable 5 milliGRAM(s) IntraMuscular once PRN combative behavior  haloperidol    Injectable 5 milliGRAM(s) IntraMuscular at bedtime PRN psychosis - refusing  po  ibuprofen  Tablet. 400 milliGRAM(s) Oral once PRN Mild Pain (1 - 3)  LORazepam     Tablet 2 milliGRAM(s) Oral every 6 hours PRN Agitation  LORazepam   Injectable 2 milliGRAM(s) IntraMuscular once PRN severe agitation  OLANZapine Injectable 10 milliGRAM(s) IntraMuscular at bedtime PRN psychosis if refuses po  
MEDICATIONS  (PRN):  diphenhydrAMINE 50 milliGRAM(s) Oral every 4 hours PRN anxiety  diphenhydrAMINE   Injectable 50 milliGRAM(s) IntraMuscular once PRN eps prophylaxis  diphenhydrAMINE   Injectable 50 milliGRAM(s) IntraMuscular once PRN eps prophylaxis  haloperidol     Tablet 5 milliGRAM(s) Oral every 4 hours PRN agitation  haloperidol    Injectable 5 milliGRAM(s) IntraMuscular at bedtime PRN psychosis - refusing  po  haloperidol    Injectable 5 milliGRAM(s) IntraMuscular once PRN combative behavior  ibuprofen  Tablet. 400 milliGRAM(s) Oral once PRN Mild Pain (1 - 3)  LORazepam     Tablet 2 milliGRAM(s) Oral every 6 hours PRN Agitation  LORazepam   Injectable 2 milliGRAM(s) IntraMuscular once PRN severe agitation  OLANZapine Injectable 10 milliGRAM(s) IntraMuscular at bedtime PRN psychosis if refuses po  
MEDICATIONS  (PRN):  diphenhydrAMINE 50 milliGRAM(s) Oral every 4 hours PRN anxiety  diphenhydrAMINE   Injectable 50 milliGRAM(s) IntraMuscular once PRN eps prophylaxis  diphenhydrAMINE   Injectable 50 milliGRAM(s) IntraMuscular once PRN eps prophylaxis  fluPHENAZine 5 milliGRAM(s) Oral every 6 hours PRN agitation  fluPHENAZine  Injectable 5 milliGRAM(s) IntraMuscular once PRN severe agitation  LORazepam     Tablet 2 milliGRAM(s) Oral every 6 hours PRN Agitation  OLANZapine Injectable 10 milliGRAM(s) IntraMuscular at bedtime PRN psychosis if refuses po  
MEDICATIONS  (PRN):  diphenhydrAMINE 50 milliGRAM(s) Oral every 4 hours PRN anxiety  diphenhydrAMINE   Injectable 50 milliGRAM(s) IntraMuscular once PRN eps prophylaxis  fluPHENAZine 5 milliGRAM(s) Oral every 6 hours PRN agitation  fluPHENAZine  Injectable 5 milliGRAM(s) IntraMuscular once PRN severe agitation  LORazepam     Tablet 2 milliGRAM(s) Oral every 6 hours PRN Agitation  
MEDICATIONS  (PRN):  diphenhydrAMINE 50 milliGRAM(s) Oral every 4 hours PRN anxiety  diphenhydrAMINE   Injectable 50 milliGRAM(s) IntraMuscular once PRN eps prophylaxis  diphenhydrAMINE   Injectable 50 milliGRAM(s) IntraMuscular once PRN eps prophylaxis  haloperidol     Tablet 5 milliGRAM(s) Oral every 4 hours PRN agitation  haloperidol    Injectable 5 milliGRAM(s) IntraMuscular at bedtime PRN psychosis - refusing  po  haloperidol    Injectable 5 milliGRAM(s) IntraMuscular once PRN combative behavior  ibuprofen  Tablet. 400 milliGRAM(s) Oral once PRN Mild Pain (1 - 3)  LORazepam     Tablet 2 milliGRAM(s) Oral every 6 hours PRN Agitation  LORazepam   Injectable 2 milliGRAM(s) IntraMuscular once PRN severe agitation  OLANZapine Injectable 10 milliGRAM(s) IntraMuscular at bedtime PRN psychosis if refuses po  polyethylene glycol 3350 17 Gram(s) Oral two times a day PRN constipation  
MEDICATIONS  (PRN):  diphenhydrAMINE 50 milliGRAM(s) Oral every 4 hours PRN anxiety  diphenhydrAMINE   Injectable 50 milliGRAM(s) IntraMuscular once PRN eps prophylaxis  diphenhydrAMINE   Injectable 50 milliGRAM(s) IntraMuscular once PRN eps prophylaxis  haloperidol     Tablet 5 milliGRAM(s) Oral every 4 hours PRN agitation  haloperidol    Injectable 5 milliGRAM(s) IntraMuscular once PRN combative behavior  haloperidol    Injectable 5 milliGRAM(s) IntraMuscular at bedtime PRN psychosis - refusing  po  LORazepam     Tablet 2 milliGRAM(s) Oral every 6 hours PRN Agitation  LORazepam   Injectable 2 milliGRAM(s) IntraMuscular once PRN severe agitation  OLANZapine Injectable 10 milliGRAM(s) IntraMuscular at bedtime PRN psychosis if refuses po  
MEDICATIONS  (PRN):  diphenhydrAMINE 50 milliGRAM(s) Oral every 4 hours PRN anxiety  diphenhydrAMINE   Injectable 50 milliGRAM(s) IntraMuscular once PRN eps prophylaxis  fluPHENAZine 5 milliGRAM(s) Oral every 6 hours PRN agitation  fluPHENAZine  Injectable 5 milliGRAM(s) IntraMuscular once PRN severe agitation  LORazepam     Tablet 2 milliGRAM(s) Oral every 6 hours PRN Agitation  
MEDICATIONS  (PRN):  diphenhydrAMINE 50 milliGRAM(s) Oral every 4 hours PRN anxiety  diphenhydrAMINE   Injectable 50 milliGRAM(s) IntraMuscular once PRN eps prophylaxis  fluPHENAZine 5 milliGRAM(s) Oral every 6 hours PRN agitation  fluPHENAZine  Injectable 5 milliGRAM(s) IntraMuscular once PRN severe agitation  LORazepam     Tablet 2 milliGRAM(s) Oral every 6 hours PRN Agitation  
MEDICATIONS  (PRN):  diphenhydrAMINE 50 milliGRAM(s) Oral every 4 hours PRN anxiety  diphenhydrAMINE   Injectable 50 milliGRAM(s) IntraMuscular once PRN eps prophylaxis  diphenhydrAMINE   Injectable 50 milliGRAM(s) IntraMuscular once PRN eps prophylaxis  haloperidol     Tablet 5 milliGRAM(s) Oral every 4 hours PRN agitation  haloperidol    Injectable 5 milliGRAM(s) IntraMuscular once PRN combative behavior  haloperidol    Injectable 5 milliGRAM(s) IntraMuscular at bedtime PRN psychosis - refusing  po  LORazepam     Tablet 2 milliGRAM(s) Oral every 6 hours PRN Agitation  LORazepam   Injectable 2 milliGRAM(s) IntraMuscular once PRN severe agitation  OLANZapine Injectable 10 milliGRAM(s) IntraMuscular at bedtime PRN psychosis if refuses po  
MEDICATIONS  (PRN):  diphenhydrAMINE 50 milliGRAM(s) Oral every 4 hours PRN anxiety  diphenhydrAMINE   Injectable 50 milliGRAM(s) IntraMuscular once PRN eps prophylaxis  fluPHENAZine 5 milliGRAM(s) Oral every 6 hours PRN agitation  fluPHENAZine  Injectable 5 milliGRAM(s) IntraMuscular once PRN severe agitation  LORazepam     Tablet 2 milliGRAM(s) Oral every 6 hours PRN Agitation  
MEDICATIONS  (PRN):  diphenhydrAMINE 50 milliGRAM(s) Oral every 4 hours PRN anxiety  diphenhydrAMINE   Injectable 50 milliGRAM(s) IntraMuscular once PRN eps prophylaxis  diphenhydrAMINE   Injectable 50 milliGRAM(s) IntraMuscular once PRN eps prophylaxis  haloperidol     Tablet 5 milliGRAM(s) Oral every 4 hours PRN agitation  haloperidol    Injectable 5 milliGRAM(s) IntraMuscular at bedtime PRN psychosis - refusing  po  haloperidol    Injectable 5 milliGRAM(s) IntraMuscular once PRN combative behavior  LORazepam     Tablet 2 milliGRAM(s) Oral every 6 hours PRN Agitation  LORazepam   Injectable 2 milliGRAM(s) IntraMuscular once PRN severe agitation  OLANZapine Injectable 10 milliGRAM(s) IntraMuscular at bedtime PRN psychosis if refuses po  
MEDICATIONS  (PRN):  diphenhydrAMINE 50 milliGRAM(s) Oral every 4 hours PRN anxiety  diphenhydrAMINE   Injectable 50 milliGRAM(s) IntraMuscular once PRN eps prophylaxis  fluPHENAZine 5 milliGRAM(s) Oral every 6 hours PRN agitation  fluPHENAZine  Injectable 5 milliGRAM(s) IntraMuscular once PRN severe agitation  LORazepam     Tablet 2 milliGRAM(s) Oral every 6 hours PRN Agitation  
MEDICATIONS  (PRN):  diphenhydrAMINE 50 milliGRAM(s) Oral every 4 hours PRN anxiety  diphenhydrAMINE   Injectable 50 milliGRAM(s) IntraMuscular once PRN eps prophylaxis  diphenhydrAMINE   Injectable 50 milliGRAM(s) IntraMuscular once PRN eps prophylaxis  haloperidol     Tablet 5 milliGRAM(s) Oral every 4 hours PRN agitation  haloperidol    Injectable 5 milliGRAM(s) IntraMuscular at bedtime PRN psychosis - refusing  po  haloperidol    Injectable 5 milliGRAM(s) IntraMuscular once PRN combative behavior  LORazepam     Tablet 2 milliGRAM(s) Oral every 6 hours PRN Agitation  LORazepam   Injectable 2 milliGRAM(s) IntraMuscular once PRN severe agitation  OLANZapine Injectable 10 milliGRAM(s) IntraMuscular at bedtime PRN psychosis if refuses po  
MEDICATIONS  (PRN):  diphenhydrAMINE 50 milliGRAM(s) Oral every 4 hours PRN anxiety  diphenhydrAMINE   Injectable 50 milliGRAM(s) IntraMuscular once PRN eps prophylaxis  fluPHENAZine 5 milliGRAM(s) Oral every 6 hours PRN agitation  fluPHENAZine  Injectable 5 milliGRAM(s) IntraMuscular once PRN severe agitation  LORazepam     Tablet 2 milliGRAM(s) Oral every 6 hours PRN Agitation  
MEDICATIONS  (PRN):  diphenhydrAMINE 50 milliGRAM(s) Oral every 4 hours PRN anxiety  diphenhydrAMINE   Injectable 50 milliGRAM(s) IntraMuscular once PRN eps prophylaxis  fluPHENAZine 5 milliGRAM(s) Oral every 6 hours PRN agitation  fluPHENAZine  Injectable 5 milliGRAM(s) IntraMuscular once PRN severe agitation  LORazepam     Tablet 2 milliGRAM(s) Oral every 6 hours PRN Agitation  
MEDICATIONS  (PRN):  diphenhydrAMINE 50 milliGRAM(s) Oral every 4 hours PRN anxiety  diphenhydrAMINE   Injectable 50 milliGRAM(s) IntraMuscular once PRN eps prophylaxis  diphenhydrAMINE   Injectable 50 milliGRAM(s) IntraMuscular once PRN eps prophylaxis  haloperidol     Tablet 5 milliGRAM(s) Oral every 4 hours PRN agitation  haloperidol    Injectable 5 milliGRAM(s) IntraMuscular once PRN combative behavior  haloperidol    Injectable 5 milliGRAM(s) IntraMuscular at bedtime PRN psychosis - refusing  po  LORazepam     Tablet 2 milliGRAM(s) Oral every 6 hours PRN Agitation  LORazepam   Injectable 2 milliGRAM(s) IntraMuscular once PRN severe agitation  OLANZapine Injectable 10 milliGRAM(s) IntraMuscular at bedtime PRN psychosis if refuses po  
MEDICATIONS  (PRN):  diphenhydrAMINE 50 milliGRAM(s) Oral every 4 hours PRN anxiety  diphenhydrAMINE   Injectable 50 milliGRAM(s) IntraMuscular once PRN eps prophylaxis  diphenhydrAMINE   Injectable 50 milliGRAM(s) IntraMuscular once PRN eps prophylaxis  haloperidol     Tablet 5 milliGRAM(s) Oral every 4 hours PRN agitation  haloperidol    Injectable 5 milliGRAM(s) IntraMuscular at bedtime PRN psychosis - refusing  po  haloperidol    Injectable 5 milliGRAM(s) IntraMuscular once PRN combative behavior  LORazepam     Tablet 2 milliGRAM(s) Oral every 6 hours PRN Agitation  LORazepam   Injectable 2 milliGRAM(s) IntraMuscular once PRN severe agitation  OLANZapine Injectable 10 milliGRAM(s) IntraMuscular at bedtime PRN psychosis if refuses po  
MEDICATIONS  (PRN):  diphenhydrAMINE 50 milliGRAM(s) Oral every 4 hours PRN anxiety  diphenhydrAMINE   Injectable 50 milliGRAM(s) IntraMuscular once PRN eps prophylaxis  diphenhydrAMINE   Injectable 50 milliGRAM(s) IntraMuscular once PRN eps prophylaxis  haloperidol     Tablet 5 milliGRAM(s) Oral every 4 hours PRN agitation  haloperidol    Injectable 5 milliGRAM(s) IntraMuscular once PRN combative behavior  haloperidol    Injectable 5 milliGRAM(s) IntraMuscular at bedtime PRN psychosis - refusing  po  LORazepam     Tablet 2 milliGRAM(s) Oral every 6 hours PRN Agitation  OLANZapine Injectable 10 milliGRAM(s) IntraMuscular at bedtime PRN psychosis if refuses po  
MEDICATIONS  (PRN):  diphenhydrAMINE 50 milliGRAM(s) Oral every 4 hours PRN anxiety  diphenhydrAMINE   Injectable 50 milliGRAM(s) IntraMuscular once PRN eps prophylaxis  fluPHENAZine 5 milliGRAM(s) Oral every 6 hours PRN agitation  fluPHENAZine  Injectable 5 milliGRAM(s) IntraMuscular once PRN severe agitation  LORazepam     Tablet 2 milliGRAM(s) Oral every 6 hours PRN Agitation  
MEDICATIONS  (PRN):  diphenhydrAMINE 50 milliGRAM(s) Oral every 4 hours PRN anxiety  diphenhydrAMINE   Injectable 50 milliGRAM(s) IntraMuscular once PRN eps prophylaxis  fluPHENAZine 5 milliGRAM(s) Oral every 6 hours PRN agitation  fluPHENAZine  Injectable 5 milliGRAM(s) IntraMuscular once PRN severe agitation  LORazepam     Tablet 2 milliGRAM(s) Oral every 6 hours PRN Agitation  
MEDICATIONS  (PRN):  diphenhydrAMINE 50 milliGRAM(s) Oral every 4 hours PRN anxiety  diphenhydrAMINE   Injectable 50 milliGRAM(s) IntraMuscular once PRN eps prophylaxis  diphenhydrAMINE   Injectable 50 milliGRAM(s) IntraMuscular once PRN eps prophylaxis  haloperidol     Tablet 5 milliGRAM(s) Oral every 4 hours PRN agitation  haloperidol    Injectable 5 milliGRAM(s) IntraMuscular once PRN combative behavior  haloperidol    Injectable 5 milliGRAM(s) IntraMuscular at bedtime PRN psychosis - refusing  po  LORazepam     Tablet 2 milliGRAM(s) Oral every 6 hours PRN Agitation  LORazepam   Injectable 2 milliGRAM(s) IntraMuscular once PRN severe agitation  OLANZapine Injectable 10 milliGRAM(s) IntraMuscular at bedtime PRN psychosis if refuses po  
MEDICATIONS  (PRN):  diphenhydrAMINE 50 milliGRAM(s) Oral every 4 hours PRN anxiety  diphenhydrAMINE   Injectable 50 milliGRAM(s) IntraMuscular once PRN eps prophylaxis  fluPHENAZine 5 milliGRAM(s) Oral every 6 hours PRN agitation  fluPHENAZine  Injectable 5 milliGRAM(s) IntraMuscular once PRN severe agitation  LORazepam     Tablet 2 milliGRAM(s) Oral every 6 hours PRN Agitation  
MEDICATIONS  (PRN):  diphenhydrAMINE 50 milliGRAM(s) Oral every 4 hours PRN anxiety  diphenhydrAMINE   Injectable 50 milliGRAM(s) IntraMuscular once PRN eps prophylaxis  diphenhydrAMINE   Injectable 50 milliGRAM(s) IntraMuscular once PRN eps prophylaxis  haloperidol     Tablet 5 milliGRAM(s) Oral every 4 hours PRN agitation  haloperidol    Injectable 5 milliGRAM(s) IntraMuscular once PRN combative behavior  haloperidol    Injectable 5 milliGRAM(s) IntraMuscular at bedtime PRN psychosis - refusing  po  LORazepam     Tablet 2 milliGRAM(s) Oral every 6 hours PRN Agitation  LORazepam   Injectable 2 milliGRAM(s) IntraMuscular once PRN severe agitation  OLANZapine Injectable 10 milliGRAM(s) IntraMuscular at bedtime PRN psychosis if refuses po

## 2021-05-12 NOTE — [facility] INPATIENT PSYCHIATRY PROGRESS NOTE - NSTXPROBPSYCHO_PSY_ALL_CORE
PSYCHOTIC SYMPTOMS

## 2021-05-12 NOTE — [facility] INPATIENT PSYCHIATRY PROGRESS NOTE - NSTXNEGATPROGRES_PSY_ALL_CORE
No Change

## 2021-05-12 NOTE — BH INPATIENT PSYCHIATRY PROGRESS NOTE - NSBHCONSBHPROVDETAILS_PSY_A_CORE  FT
attempted  contact with dr. weathers at 8210 but she is on vacation;   1/5: emailed Dr. Loya  1/5: as per Dr Loya email, patient only seen twice by provider, EDGAR recommended, with poor insight, nonadherent with clozapine.
attempted  contact with dr. weathers at 8210 but she is on vacation; [place]
attempted  contact with dr. weathers at 8210 but she is on vacation;   1/5: emailed Dr. Loya  1/5: as per Dr Loya email, patient only seen twice by provider, EDGAR recommended, with poor insight, nonadherent with clozapine
attempted  contact with dr. weathers at 8210 but she is on vacation;   1/5: emailed Dr. Loya  1/5: as per Dr Loya email, patient only seen twice by provider, EDGAR recommended, with poor insight, nonadherent with clozapine.
attempted  contact with dr. weathers at 8210 but she is on vacation; she is [place]
attempted  contact with dr. weathers at 8210 but she is on vacation;   1/5: emailed Dr. Loya  1/5: as per Dr Loya email, patient only seen twice by provider, EDGAR recommended, with poor insight, nonadherent with clozapine
attempted  contact with dr. weathers at 8210 but she is on vacation;   1/5: emailed Dr. Loya  1/5: as per Dr Loya email, patient only seen twice by provider, EDGAR recommended, with poor insight, nonadherent with clozapine.
attempted  contact with dr. weathers at 8210 but she is on vacation; she is in [place]
attempted  contact with dr. weathers at 8210 but she is on vacation;   1/5: emailed Dr. Loya  1/5: as per Dr Loya email, patient only seen twice by provider, EDGAR recommended, with poor insight
attempted  contact with dr. weathers at 8210 but she is on vacation;   1/5: emailed Dr. Loya  1/5: as per Dr Loya email, patient only seen twice by provider, EDGAR recommended, with poor insight, nonadherent with clozapine
attempted  contact with dr. weathers at 8210 but she is on vacation;   1/5: emailed Dr. Loya  1/5: as per Dr Loya email, patient only seen twice by provider, EDGAR recommended, with poor insight, nonadherent with clozapine.
attempted  contact with dr. weathers at 8210 but she is on vacation;   1/5: emailed Dr. Loya  1/5: as per Dr Loya email, patient only seen twice by provider, EDGAR recommended, with poor insight, nonadherent with clozapine
attempted  contact with dr. weathers at 8210 but she is on vacation;   1/5: emailed Dr. Loya  1/5: as per Dr Loya email, patient only seen twice by provider, EDGAR recommended, with poor insight, nonadherent with clozapine.
attempted  contact with dr. weathers at 8210 but she is on vacation; she is in [place]
attempted  contact with dr. weathers at 8210 but she is on vacation;   1/5: emailed Dr. Loya  1/5: as per Dr Loya email, patient only seen twice by provider, EDGAR recommended, with poor insight, nonadherent with clozapine.
attempted  contact with dr. weathers at 8210 but she is on vacation; she is in [place]
attempted  contact with dr. weathers at 8210 but she is on vacation;   1/5: emailed Dr. Loya  1/5: as per Dr Loya email, patient only seen twice by provider, EDGAR recommended, with poor insight, nonadherent with clozapine.
attempted  contact with dr. waethers at 8210 but she is on vacation; she is in [place].
attempted  contact with dr. weathers at 8210 but she is on vacation;   1/5: emailed Dr. Loya  1/5: as per Dr Loya email, patient only seen twice by provider, EDGAR recommended, with poor insight, nonadherent with clozapine.
attempted  contact with dr. weathers at 8210 but she is on vacation;   1/5: emailed Dr. Loya  1/5: as per Dr Loya email, patient only seen twice by provider, EDGAR recommended, with poor insight, nonadherent with clozapine.
attempted  contact with dr. weathers at 8210 but she is on vacation; she is in [place]
attempted  contact with dr. weathers at 8210 but she is on vacation;   1/5: emailed Dr. Loya  1/5: as per Dr Loya email, patient only seen twice by provider, EDGAR recommended, with poor insight, nonadherent with clozapine.
attempted  contact with dr. weathers at 8210 but she is on vacation;   1/5: emailed Dr. Loya  1/5: as per Dr Loya email, patient only seen twice by provider, EDGAR recommended, with poor insight, nonadherent with clozapine
attempted  contact with dr. weathers at 8210 but she is on vacation;   1/5: emailed Dr. Loya  1/5: as per Dr Loya email, patient only seen twice by provider, EDGAR recommended, with poor insight, nonadherent with clozapine.
attempted  contact with dr. weathers at 8210 but she is on vacation;   1/5: emailed Dr. Loya  1/5: as per Dr Loya email, patient only seen twice by provider, EDGAR recommended, with poor insight, nonadherent with clozapine
attempted  contact with dr. weathers at 8210 but she is on vacation;   1/5: emailed Dr. Loya  1/5: as per Dr Loya email, patient only seen twice by provider, EDGAR recommended, with poor insight, nonadherent with clozapine.
attempted  contact with dr. weathers at 8210 but she is on vacation;   1/5: emailed Dr. Loya  1/5: as per Dr Loya email, patient only seen twice by provider, EDGAR recommended, with poor insight, nonadherent with clozapine.
attempted  contact with dr. weathers at 8210 but she is on vacation;   1/5: emailed Dr. Loya  1/5: as per Dr Loya email, patient only seen twice by provider, EDGAR recommended, with poor insight, nonadherent with clozapine
attempted  contact with dr. weathers at 8210 but she is on vacation;   1/5: emailed Dr. Loya  1/5: as per Dr Loya email, patient only seen twice by provider, EDGAR recommended, with poor insight, nonadherent with clozapine.
attempted  contact with dr. weathers at 8210 but she is on vacation;   1/5: emailed Dr. Loya  1/5: as per Dr Loya email, patient only seen twice by provider, EDGAR recommended, with poor insight, nonadherent with clozapine
attempted  contact with dr. weathers at 8210 but she is on vacation;   1/5: emailed Dr. Loya  1/5: as per Dr Loya email, patient only seen twice by provider, EDGAR recommended, with poor insight, nonadherent with clozapine.
attempted  contact with dr. weathers at 8210 but she is on vacation; she is in [place]
attempted  contact with dr. weathers at 8210 but she is on vacation;   1/5: emailed Dr. Loya  1/5: as per Dr Loya email, patient only seen twice by provider, EDGAR recommended, with poor insight, nonadherent with clozapine.
attempted  contact with dr. weathers at 8210 but she is on vacation;   1/5: emailed Dr. Loya  1/5: as per Dr Loya email, patient only seen twice by provider, EDGAR recommended, with poor insight, nonadherent with clozapine
attempted  contact with dr. weathers at 8210 but she is on vacation;   1/5: emailed Dr. Loya  1/5: as per Dr Loya email, patient only seen twice by provider, EDGAR recommended, with poor insight, nonadherent with clozapine
attempted  contact with dr. weathers at 8210 but she is on vacation;   1/5: emailed Dr. Loya  1/5: as per Dr Loya email, patient only seen twice by provider, EDGAR recommended, with poor insight, nonadherent with clozapine.
attempted  contact with dr. weathers at 8210 but she is on vacation;   1/5: emailed Dr. Loya  1/5: as per Dr Loya email, patient only seen twice by provider, EDGAR recommended, with poor insight, nonadherent with clozapine
attempted  contact with dr. weathers at 8210 but she is on vacation;   1/5: emailed Dr. Loya  1/5: as per Dr Loya email, patient only seen twice by provider, EDGAR recommended, with poor insight, nonadherent with clozapine.
attempted  contact with dr. weathers at 8210 but she is on vacation;   1/5: emailed Dr. Loya  1/5: as per Dr Loya email, patient only seen twice by provider, EDGAR recommended, with poor insight, nonadherent with clozapine
attempted  contact with dr. weathers at 8210 but she is on vacation;   1/5: emailed Dr. Loya  1/5: as per Dr Loya email, patient only seen twice by provider, EDGAR recommended, with poor insight, nonadherent with clozapine
attempted  contact with dr. weathers at 8210 but she is on vacation;   1/5: emailed Dr. Loya  1/5: as per Dr Loya email, patient only seen twice by provider, EDGAR recommended, with poor insight, nonadherent with clozapine.
attempted  contact with dr. weathers at 0928 but she is on vacation;   1/5: emailed Dr. Loya
attempted  contact with dr. weathers at 8210 but she is on vacation; [place] is [place]

## 2021-05-12 NOTE — BH SCALES AND SCREENS - NSBHSCALESCRN_PSY_ALL_CORE
Brief Psychiatric Rating Scale (BPRS)

## 2021-05-12 NOTE — BH INPATIENT PSYCHIATRY PROGRESS NOTE - NS ED [facility] REVIEW OF ED CHART VITAL SIGNS REVIEWED
Yes

## 2021-05-12 NOTE — BH SCALES AND SCREENS - NSBPRSSUSPIC_PSY_ALL_CORE
6 = Severe – definite, delusion(s) of reference or persecution that is (are) not wholly pervasive (e.g., an encapsulated delusion)
3 = Mild – occasional instances of suspiciousness that are definitely not warranted by the situation
5 = Moderately Severe – pervasive suspiciousness, or frequent ideas of reference
1 = Not Reported (Not Present)
5 = Moderately Severe – pervasive suspiciousness, or frequent ideas of reference

## 2021-05-12 NOTE — BH INPATIENT PSYCHIATRY PROGRESS NOTE - NSTXDCOPNODATENEW_PSY_ALL_CORE
11-Feb-2021
11-Feb-2021
23-Mar-2021
29-Dec-2020
11-Feb-2021
25-Jan-2021
23-Mar-2021
29-Dec-2020
11-Feb-2021
11-Feb-2021
23-Mar-2021
29-Dec-2020
11-Feb-2021
23-Mar-2021
23-Mar-2021
25-Jan-2021
29-Dec-2020
11-Feb-2021
11-Feb-2021
23-Mar-2021
25-Jan-2021
29-Dec-2020
29-Dec-2020
11-Feb-2021
11-Feb-2021
23-Mar-2021
11-Feb-2021
23-Mar-2021
23-Mar-2021
25-Jan-2021
11-Feb-2021
25-Jan-2021
11-Feb-2021
25-Jan-2021
25-Jan-2021
23-Mar-2021
23-Mar-2021
29-Dec-2020
29-Dec-2020
11-Feb-2021
23-Mar-2021
25-Jan-2021
29-Dec-2020
11-Feb-2021
29-Dec-2020
29-Dec-2020
23-Mar-2021
29-Dec-2020
11-Feb-2021
23-Mar-2021
11-Feb-2021
23-Mar-2021
23-Mar-2021
25-Jan-2021
23-Mar-2021
23-Mar-2021
29-Dec-2020
11-Feb-2021
29-Dec-2020
11-Feb-2021
23-Mar-2021
29-Dec-2020
11-Feb-2021
29-Dec-2020
11-Feb-2021
23-Mar-2021
23-Mar-2021
29-Dec-2020
29-Dec-2020
23-Mar-2021
29-Dec-2020
23-Mar-2021
11-Feb-2021
23-Mar-2021
29-Dec-2020
23-Mar-2021
29-Dec-2020
29-Dec-2020
11-Feb-2021
29-Dec-2020

## 2021-05-12 NOTE — BH INPATIENT PSYCHIATRY PROGRESS NOTE - NSBHCHARTREVIEWVS_PSY_A_CORE FT
Vital Signs Last 24 Hrs  T(C): 36.7 (25 Dec 2020 06:43), Max: 36.7 (25 Dec 2020 06:43)  T(F): 98.1 (25 Dec 2020 06:43), Max: 98.1 (25 Dec 2020 06:43)  HR: 110 (25 Dec 2020 06:43) (110 - 110)  BP: 128/68 (25 Dec 2020 06:43) (128/68 - 128/68)  BP(mean): --  RR: --  SpO2: --
Vital Signs Last 24 Hrs  T(C): 37.3 (13 Jan 2021 07:36), Max: 37.3 (13 Jan 2021 07:36)  T(F): 99.1 (13 Jan 2021 07:36), Max: 99.1 (13 Jan 2021 07:36)  HR: 97 (13 Jan 2021 07:36) (97 - 97)  BP: 111/76 (13 Jan 2021 07:36) (111/76 - 111/76)  BP(mean): --  RR: --  SpO2: --
Vital Signs Last 24 Hrs  T(C): 36.4 (09 Jan 2021 07:59), Max: 36.4 (09 Jan 2021 07:59)  T(F): 97.5 (09 Jan 2021 07:59), Max: 97.5 (09 Jan 2021 07:59)  Orthostatic VS    01-09-21 @ 07:59  Lying BP: --/-- HR: --   Sitting BP: 113/68 HR: 120  Standing BP: --/-- HR: --  Site: --   Mode: --  
Vital Signs Last 24 Hrs  T(C): 36.4 (15 Mar 2021 08:20), Max: 36.7 (14 Mar 2021 20:37)  T(F): 97.6 (15 Mar 2021 08:20), Max: 98 (14 Mar 2021 20:37)  HR: 95 (15 Mar 2021 08:20) (95 - 95)  BP: 95/52 (15 Mar 2021 08:20) (95/52 - 95/52)  BP(mean): --  RR: --  SpO2: --
Vital Signs Last 24 Hrs  T(C): 36.4 (16 Feb 2021 09:08), Max: 36.6 (15 Feb 2021 20:30)  T(F): 97.5 (16 Feb 2021 09:08), Max: 97.8 (15 Feb 2021 20:30)  HR: 82 (16 Feb 2021 09:08) (82 - 82)  BP: 102/62 (16 Feb 2021 09:08) (102/62 - 102/62)  BP(mean): --  RR: --  SpO2: --
Vital Signs Last 24 Hrs  T(C): 36.7 (11 Mar 2021 07:41), Max: 36.7 (11 Mar 2021 07:41)  T(F): 98 (11 Mar 2021 07:41), Max: 98 (11 Mar 2021 07:41)  HR: 85 (11 Mar 2021 07:41) (85 - 85)  BP: 91/57 (11 Mar 2021 07:41) (91/57 - 91/57)  BP(mean): --  RR: --  SpO2: --
Vital Signs Last 24 Hrs  T(C): 36.7 (17 Jan 2021 06:37), Max: 36.7 (17 Jan 2021 06:37)  T(F): 98.1 (17 Jan 2021 06:37), Max: 98.1 (17 Jan 2021 06:37)  HR: 84 (17 Jan 2021 06:37) (84 - 84)  BP: 126/78 (17 Jan 2021 06:37) (126/78 - 126/78)  BP(mean): --  RR: --  SpO2: --
Vital Signs Last 24 Hrs  T(C): 36.9 (22 Mar 2021 07:04), Max: 37 (21 Mar 2021 17:43)  T(F): 98.4 (22 Mar 2021 07:04), Max: 98.6 (21 Mar 2021 17:43)  HR: 102 (22 Mar 2021 07:04) (102 - 102)  BP: 111/66 (22 Mar 2021 07:04) (111/66 - 111/66)  BP(mean): --  RR: --  SpO2: --
Vital Signs Last 24 Hrs  T(C): 37 (14 Jan 2021 07:50), Max: 37.4 (13 Jan 2021 12:12)  T(F): 98.6 (14 Jan 2021 07:50), Max: 99.4 (13 Jan 2021 12:12)  HR: 100 (14 Jan 2021 07:50) (100 - 100)  BP: 100/60 (14 Jan 2021 07:50) (100/60 - 100/60)  BP(mean): --  RR: --  SpO2: --
Vital Signs Last 24 Hrs  T(C): 37 (30 Mar 2021 07:57), Max: 37 (30 Mar 2021 07:57)  T(F): 98.6 (30 Mar 2021 07:57), Max: 98.6 (30 Mar 2021 07:57)  HR: --  BP: --  BP(mean): --  RR: --  SpO2: --
Vital Signs Last 24 Hrs  T(C): 36.2 (14 Feb 2021 07:33), Max: 36.5 (13 Feb 2021 20:07)  T(F): 97.2 (14 Feb 2021 07:33), Max: 97.7 (13 Feb 2021 20:07)  HR: 95 (14 Feb 2021 07:33) (95 - 95)  BP: 92/61 (14 Feb 2021 07:33) (92/61 - 92/61)  BP(mean): --  RR: --  SpO2: --
Vital Signs Last 24 Hrs  T(C): 36.3 (08 Apr 2021 17:51), Max: 36.3 (08 Apr 2021 17:51)  T(F): 97.4 (08 Apr 2021 17:51), Max: 97.4 (08 Apr 2021 17:51)  HR: --  BP: --  BP(mean): --  RR: --  SpO2: --
Vital Signs Last 24 Hrs  T(C): 36.7 (05 Apr 2021 06:22), Max: 36.7 (05 Apr 2021 06:22)  T(F): 98.1 (05 Apr 2021 06:22), Max: 98.1 (05 Apr 2021 06:22)  HR: 88 (05 Apr 2021 06:22) (88 - 88)  BP: 104/72 (05 Apr 2021 06:22) (104/72 - 104/72)  BP(mean): --  RR: --  SpO2: --
Vital Signs Last 24 Hrs  T(C): 36.7 (27 Apr 2021 08:01), Max: 37.4 (26 Apr 2021 17:28)  T(F): 98 (27 Apr 2021 08:01), Max: 99.4 (26 Apr 2021 17:28)  HR: 120 (27 Apr 2021 08:01) (120 - 120)  BP: 113/75 (27 Apr 2021 08:01) (113/75 - 113/75)  BP(mean): --  RR: --  SpO2: --
Vital Signs Last 24 Hrs  T(C): 36.8 (18 Jan 2021 07:50), Max: 36.8 (18 Jan 2021 07:50)  T(F): 98.2 (18 Jan 2021 07:50), Max: 98.2 (18 Jan 2021 07:50)  HR: 93 (18 Jan 2021 07:50) (93 - 93)  BP: 100/65 (18 Jan 2021 07:50) (100/65 - 100/65)  BP(mean): --  RR: --  SpO2: --
Vital Signs Last 24 Hrs  T(C): 37.1 (28 Dec 2020 06:20), Max: 37.1 (28 Dec 2020 06:20)  T(F): 98.7 (28 Dec 2020 06:20), Max: 98.7 (28 Dec 2020 06:20)  HR: 81 (28 Dec 2020 06:20) (81 - 81)  BP: 98/72 (28 Dec 2020 06:20) (98/72 - 98/72)  BP(mean): --  RR: --  SpO2: --
Vital Signs Last 24 Hrs  T(C): 36.6 (10 May 2021 08:13), Max: 36.9 (09 May 2021 17:41)  T(F): 97.9 (10 May 2021 08:13), Max: 98.5 (09 May 2021 17:41)  HR: --  BP: --  BP(mean): --  RR: --  SpO2: --
Vital Signs Last 24 Hrs  T(C): 36.6 (27 Jan 2021 07:30), Max: 36.8 (26 Jan 2021 20:18)  T(F): 97.9 (27 Jan 2021 07:30), Max: 98.2 (26 Jan 2021 20:18)  HR: 90 (27 Jan 2021 07:30) (90 - 90)  BP: 113/70 (27 Jan 2021 07:30) (113/70 - 113/70)  BP(mean): --  RR: --  SpO2: --
Vital Signs Last 24 Hrs  T(C): 36.1 (29 Dec 2020 06:19), Max: 36.1 (29 Dec 2020 06:19)  T(F): 97 (29 Dec 2020 06:19), Max: 97 (29 Dec 2020 06:19)  HR: 94 (29 Dec 2020 06:19) (94 - 94)  BP: 100/68 (29 Dec 2020 06:19) (100/68 - 100/68)  BP(mean): --  RR: --  SpO2: --
Vital Signs Last 24 Hrs  T(C): 36.2 (13 Apr 2021 17:05), Max: 36.2 (13 Apr 2021 17:05)  T(F): 97.2 (13 Apr 2021 17:05), Max: 97.2 (13 Apr 2021 17:05)  HR: --  BP: --  BP(mean): --  RR: --  SpO2: --
Vital Signs Last 24 Hrs  T(C): 36.7 (25 Jan 2021 07:28), Max: 36.7 (25 Jan 2021 07:28)  T(F): 98.1 (25 Jan 2021 07:28), Max: 98.1 (25 Jan 2021 07:28)  HR: 76 (25 Jan 2021 07:28) (76 - 76)  BP: 109/60 (25 Jan 2021 07:28) (109/60 - 109/60)  BP(mean): --  RR: --  SpO2: --
Vital Signs Last 24 Hrs  T(C): 37.1 (04 Feb 2021 06:24), Max: 37.1 (04 Feb 2021 06:24)  T(F): 98.8 (04 Feb 2021 06:24), Max: 98.8 (04 Feb 2021 06:24)  HR: 80 (04 Feb 2021 06:24) (80 - 80)  BP: 101/65 (04 Feb 2021 06:24) (101/65 - 101/65)  BP(mean): --  RR: --  SpO2: --
Vital Signs Last 24 Hrs  T(C): 36.2 (13 Mar 2021 07:37), Max: 36.2 (13 Mar 2021 07:37)  T(F): 97.2 (13 Mar 2021 07:37), Max: 97.2 (13 Mar 2021 07:37)  HR: 76 (13 Mar 2021 07:37) (76 - 76)  BP: 110/67 (13 Mar 2021 07:37) (110/67 - 110/67)  BP(mean): --  RR: --  SpO2: --
Vital Signs Last 24 Hrs  T(C): 36.6 (17 Mar 2021 06:29), Max: 36.6 (17 Mar 2021 06:29)  T(F): 97.8 (17 Mar 2021 06:29), Max: 97.8 (17 Mar 2021 06:29)  HR: --  BP: --  BP(mean): --  RR: 17 (17 Mar 2021 06:29) (17 - 17)  SpO2: --
Vital Signs Last 24 Hrs  T(C): 36.8 (07 May 2021 06:45), Max: 37.3 (06 May 2021 17:32)  T(F): 98.3 (07 May 2021 06:45), Max: 99.2 (06 May 2021 17:32)  HR: 93 (07 May 2021 06:45) (93 - 93)  BP: 101/64 (07 May 2021 06:45) (101/64 - 101/64)  BP(mean): 93 (07 May 2021 06:45) (93 - 93)  RR: --  SpO2: --
Vital Signs Last 24 Hrs  T(C): 36.8 (07 May 2021 06:45), Max: 37.3 (06 May 2021 17:32)  T(F): 98.3 (07 May 2021 06:45), Max: 99.2 (06 May 2021 17:32)  HR: 93 (07 May 2021 06:45) (93 - 93)  BP: 101/64 (07 May 2021 06:45) (101/64 - 101/64)  BP(mean): 93 (07 May 2021 06:45) (93 - 93)  RR: --  SpO2: --
Vital Signs Last 24 Hrs  T(C): 36.6 (16 Jan 2021 07:40), Max: 36.6 (16 Jan 2021 07:40)  T(F): 97.8 (16 Jan 2021 07:40), Max: 97.8 (16 Jan 2021 07:40)  HR: 93 (16 Jan 2021 07:40) (93 - 93)  BP: 90/58 (16 Jan 2021 07:40) (90/58 - 90/58)  BP(mean): --  RR: --  SpO2: --
Vital Signs Last 24 Hrs  T(C): 36.5 (12 Apr 2021 17:35), Max: 36.5 (12 Apr 2021 17:35)  T(F): 97.7 (12 Apr 2021 17:35), Max: 97.7 (12 Apr 2021 17:35)  HR: --  BP: --  BP(mean): --  RR: --  SpO2: --
Vital Signs Last 24 Hrs  T(C): 36.5 (23 Feb 2021 07:47), Max: 36.6 (22 Feb 2021 10:32)  T(F): 97.7 (23 Feb 2021 07:47), Max: 97.9 (22 Feb 2021 10:32)  HR: 72 (23 Feb 2021 07:47) (72 - 72)  BP: 103/52 (23 Feb 2021 07:47) (103/52 - 103/52)  BP(mean): --  RR: --  SpO2: --
Vital Signs Last 24 Hrs  T(C): 36.7 (29 Jan 2021 06:07), Max: 37.4 (28 Jan 2021 20:42)  T(F): 98 (29 Jan 2021 06:07), Max: 99.4 (28 Jan 2021 20:42)  HR: 72 (29 Jan 2021 06:07) (72 - 72)  BP: 101/65 (29 Jan 2021 06:07) (101/65 - 101/65)  BP(mean): --  RR: --  SpO2: --
Vital Signs Last 24 Hrs  T(C): 37 (05 Feb 2021 07:22), Max: 37 (05 Feb 2021 07:22)  T(F): 98.6 (05 Feb 2021 07:22), Max: 98.6 (05 Feb 2021 07:22)  HR: 73 (05 Feb 2021 07:22) (73 - 73)  BP: 98/65 (05 Feb 2021 07:22) (98/65 - 98/65)  BP(mean): --  RR: --  SpO2: --
Vital Signs Last 24 Hrs  T(C): 37.6 (25 Jan 2021 20:10), Max: 37.6 (25 Jan 2021 20:10)  T(F): 99.6 (25 Jan 2021 20:10), Max: 99.6 (25 Jan 2021 20:10)  HR: --  BP: --  BP(mean): --  RR: --  SpO2: --
Vital Signs Last 24 Hrs  T(C): 36.3 (23 Mar 2021 09:00), Max: 36.6 (22 Mar 2021 19:34)  T(F): 97.3 (23 Mar 2021 09:00), Max: 97.8 (22 Mar 2021 19:34)  HR: --  BP: --  BP(mean): --  RR: --  SpO2: --
Vital Signs Last 24 Hrs  T(C): 36.4 (23 Dec 2020 06:18), Max: 37.1 (22 Dec 2020 20:38)  T(F): 97.5 (23 Dec 2020 06:18), Max: 98.7 (22 Dec 2020 20:38)  HR: 116 (22 Dec 2020 20:38) (116 - 116)  BP: 117/69 (22 Dec 2020 20:38) (117/69 - 117/69)  BP(mean): --  RR: 17 (23 Dec 2020 06:18) (17 - 17)  SpO2: --
Vital Signs Last 24 Hrs  T(C): 36.8 (18 Mar 2021 06:30), Max: 37.2 (17 Mar 2021 21:00)  T(F): 98.2 (18 Mar 2021 06:30), Max: 98.9 (17 Mar 2021 21:00)  HR: 83 (18 Mar 2021 06:30) (83 - 83)  BP: 107/74 (18 Mar 2021 06:30) (107/74 - 107/74)  BP(mean): --  RR: --  SpO2: --
Vital Signs Last 24 Hrs  T(C): 36.9 (11 Jan 2021 06:16), Max: 36.9 (11 Jan 2021 06:16)  T(F): 98.4 (11 Jan 2021 06:16), Max: 98.4 (11 Jan 2021 06:16)  HR: --  BP: --  BP(mean): --  RR: --  SpO2: --
Vital Signs Last 24 Hrs  T(C): 36.7 (10 Feb 2021 07:31), Max: 36.7 (10 Feb 2021 07:31)  T(F): 98 (10 Feb 2021 07:31), Max: 98 (10 Feb 2021 07:31)  HR: 92 (10 Feb 2021 07:31) (92 - 92)  BP: 101/71 (10 Feb 2021 07:31) (101/71 - 101/71)  BP(mean): --  RR: --  SpO2: --
Vital Signs Last 24 Hrs  T(C): 37.1 (03 May 2021 06:48), Max: 37.1 (02 May 2021 17:29)  T(F): 98.8 (03 May 2021 06:48), Max: 98.8 (03 May 2021 06:48)  HR: 97 (03 May 2021 06:48) (69 - 98)  BP: 97/62 (03 May 2021 06:48) (97/62 - 98/53)  BP(mean): --  RR: --  SpO2: --
Vital Signs Last 24 Hrs  T(C): 37.2 (31 Dec 2020 07:59), Max: 37.2 (31 Dec 2020 07:59)  T(F): 98.9 (31 Dec 2020 07:59), Max: 98.9 (31 Dec 2020 07:59)  HR: 94 (31 Dec 2020 07:59) (94 - 94)  BP: 114/62 (31 Dec 2020 07:59) (114/62 - 114/62)  BP(mean): --  RR: --  SpO2: --
Vital Signs Last 24 Hrs  T(C): 36.5 (19 Feb 2021 07:52), Max: 37.2 (18 Feb 2021 20:45)  T(F): 97.7 (19 Feb 2021 07:52), Max: 99 (18 Feb 2021 20:45)  HR: 77 (19 Feb 2021 07:52) (77 - 77)  BP: 118/76 (19 Feb 2021 07:52) (118/76 - 118/76)  BP(mean): --  RR: --  SpO2: --
Vital Signs Last 24 Hrs  T(C): 36.6 (31 Mar 2021 18:30), Max: 36.6 (31 Mar 2021 18:30)  T(F): 97.8 (31 Mar 2021 18:30), Max: 97.8 (31 Mar 2021 18:30)  HR: --  BP: --  BP(mean): --  RR: --  SpO2: --
Vital Signs Last 24 Hrs  T(C): 36.8 (20 Jan 2021 07:38), Max: 36.8 (20 Jan 2021 07:38)  T(F): 98.2 (20 Jan 2021 07:38), Max: 98.2 (20 Jan 2021 07:38)  HR: 73 (20 Jan 2021 07:38) (73 - 73)  BP: 105/64 (20 Jan 2021 07:38) (105/64 - 105/64)  BP(mean): --  RR: --  SpO2: --
Vital Signs Last 24 Hrs  T(C): 37.1 (26 Apr 2021 08:33), Max: 37.4 (25 Apr 2021 17:40)  T(F): 98.7 (26 Apr 2021 08:33), Max: 99.3 (25 Apr 2021 17:40)  HR: 108 (26 Apr 2021 08:33) (108 - 108)  BP: 107/78 (26 Apr 2021 08:33) (107/78 - 107/78)  BP(mean): --  RR: --  SpO2: --
Vital Signs Last 24 Hrs  T(C): 36.3 (26 Feb 2021 07:50), Max: 36.8 (25 Feb 2021 21:05)  T(F): 97.3 (26 Feb 2021 07:50), Max: 98.3 (25 Feb 2021 21:05)  HR: 76 (26 Feb 2021 07:50) (76 - 76)  BP: 92/54 (26 Feb 2021 07:50) (92/54 - 92/54)  BP(mean): --  RR: --  SpO2: --
Vital Signs Last 24 Hrs  T(C): 36.4 (20 Jan 2021 20:56), Max: 36.4 (20 Jan 2021 20:56)  T(F): 97.6 (20 Jan 2021 20:56), Max: 97.6 (20 Jan 2021 20:56)  HR: --  BP: --  BP(mean): --  RR: --  SpO2: --
Vital Signs Last 24 Hrs  T(C): 36.6 (28 Jan 2021 06:19), Max: 37.3 (27 Jan 2021 19:34)  T(F): 97.9 (28 Jan 2021 06:19), Max: 99.2 (27 Jan 2021 19:34)  HR: --  BP: --  BP(mean): --  RR: --  SpO2: --
Vital Signs Last 24 Hrs  T(C): 36.2 (11 Apr 2021 18:24), Max: 36.2 (11 Apr 2021 18:24)  T(F): 97.2 (11 Apr 2021 18:24), Max: 97.2 (11 Apr 2021 18:24)  HR: --  BP: --  BP(mean): --  RR: --  SpO2: --
Vital Signs Last 24 Hrs  T(C): 36.6 (02 Mar 2021 06:25), Max: 36.6 (02 Mar 2021 06:25)  T(F): 97.9 (02 Mar 2021 06:25), Max: 97.9 (02 Mar 2021 06:25)  HR: --  BP: 116/79 (02 Mar 2021 06:25) (116/79 - 116/79)  BP(mean): --  RR: 78 (02 Mar 2021 06:25) (78 - 78)  SpO2: --
Vital Signs Last 24 Hrs  T(C): 36.6 (09 Mar 2021 07:35), Max: 36.6 (09 Mar 2021 07:35)  T(F): 97.9 (09 Mar 2021 07:35), Max: 97.9 (09 Mar 2021 07:35)  HR: 98 (09 Mar 2021 07:35) (98 - 98)  BP: 120/86 (09 Mar 2021 07:35) (120/86 - 120/86)  BP(mean): --  RR: --  SpO2: --
Vital Signs Last 24 Hrs  T(C): 36.1 (03 May 2021 17:13), Max: 36.1 (03 May 2021 17:13)  T(F): 97 (03 May 2021 17:13), Max: 97 (03 May 2021 17:13)  HR: --  BP: --  BP(mean): --  RR: --  SpO2: --
Vital Signs Last 24 Hrs  T(C): 36.6 (02 Apr 2021 08:34), Max: 36.9 (01 Apr 2021 18:54)  T(F): 97.9 (02 Apr 2021 08:34), Max: 98.4 (01 Apr 2021 18:54)  HR: 76 (02 Apr 2021 08:34) (76 - 76)  BP: 102/61 (02 Apr 2021 08:34) (102/61 - 102/61)  BP(mean): --  RR: --  SpO2: --
Vital Signs Last 24 Hrs  T(C): 36.6 (22 Feb 2021 10:32), Max: 36.6 (22 Feb 2021 10:32)  T(F): 97.9 (22 Feb 2021 10:32), Max: 97.9 (22 Feb 2021 10:32)  HR: --  BP: --  BP(mean): --  RR: --  SpO2: --
Vital Signs Last 24 Hrs  T(C): 35.9 (20 Feb 2021 07:38), Max: 35.9 (20 Feb 2021 07:38)  T(F): 96.6 (20 Feb 2021 07:38), Max: 96.6 (20 Feb 2021 07:38)  HR: 76 (20 Feb 2021 07:38) (76 - 76)  BP: 108/62 (20 Feb 2021 07:38) (108/62 - 108/62)  BP(mean): --  RR: --  SpO2: --
Vital Signs Last 24 Hrs  T(C): 36.3 (12 Feb 2021 07:42), Max: 37.3 (11 Feb 2021 20:30)  T(F): 97.3 (12 Feb 2021 07:42), Max: 99.1 (11 Feb 2021 20:30)  HR: 76 (12 Feb 2021 07:42) (76 - 76)  BP: 107/60 (12 Feb 2021 07:42) (107/60 - 107/60)  BP(mean): --  RR: --  SpO2: --
Vital Signs Last 24 Hrs  T(C): 36.4 (25 Feb 2021 06:05), Max: 36.4 (25 Feb 2021 06:05)  T(F): 97.6 (25 Feb 2021 06:05), Max: 97.6 (25 Feb 2021 06:05)  HR: 83 (25 Feb 2021 06:05) (83 - 83)  BP: 123/73 (25 Feb 2021 06:05) (123/73 - 123/73)  BP(mean): --  RR: 17 (25 Feb 2021 06:05) (17 - 17)  SpO2: --
Vital Signs Last 24 Hrs  T(C): 36.5 (11 Feb 2021 06:20), Max: 36.5 (11 Feb 2021 06:20)  T(F): 97.7 (11 Feb 2021 06:20), Max: 97.7 (11 Feb 2021 06:20)  HR: 110 (11 Feb 2021 06:20) (110 - 110)  BP: 117/71 (11 Feb 2021 06:20) (117/71 - 117/71)  BP(mean): --  RR: --  SpO2: --
Vital Signs Last 24 Hrs  T(C): 36.8 (29 Apr 2021 20:50), Max: 36.8 (29 Apr 2021 20:50)  T(F): 98.3 (29 Apr 2021 20:50), Max: 98.3 (29 Apr 2021 20:50)  HR: --  BP: --  BP(mean): --  RR: --  SpO2: --
Vital Signs Last 24 Hrs  T(C): 36.2 (24 Jan 2021 16:04), Max: 36.9 (23 Jan 2021 20:29)  T(F): 97.1 (24 Jan 2021 16:04), Max: 98.5 (23 Jan 2021 20:29)  
Vital Signs Last 24 Hrs  T(C): 36.3 (17 Feb 2021 07:51), Max: 36.4 (16 Feb 2021 20:14)  T(F): 97.4 (17 Feb 2021 07:51), Max: 97.5 (16 Feb 2021 20:14)  HR: 63 (17 Feb 2021 07:51) (63 - 63)  BP: 94/60 (17 Feb 2021 07:51) (94/60 - 94/60)  BP(mean): --  RR: --  SpO2: --
Vital Signs Last 24 Hrs  T(C): 36.8 (09 Feb 2021 07:59), Max: 36.8 (09 Feb 2021 07:59)  T(F): 98.2 (09 Feb 2021 07:59), Max: 98.2 (09 Feb 2021 07:59)  HR: 100 (09 Feb 2021 07:59) (100 - 100)  BP: 108/61 (09 Feb 2021 07:59) (108/61 - 108/61)  BP(mean): --  RR: --  SpO2: --
Vital Signs Last 24 Hrs  T(C): 37.1 (21 Apr 2021 17:59), Max: 37.1 (21 Apr 2021 17:59)  T(F): 98.7 (21 Apr 2021 17:59), Max: 98.7 (21 Apr 2021 17:59)  HR: --  BP: --  BP(mean): --  RR: --  SpO2: --
Vital Signs Last 24 Hrs  T(C): 37.1 (30 Dec 2020 06:15), Max: 37.1 (30 Dec 2020 06:15)  T(F): 98.7 (30 Dec 2020 06:15), Max: 98.7 (30 Dec 2020 06:15)  HR: 76 (30 Dec 2020 06:15) (76 - 76)  BP: 100/54 (30 Dec 2020 06:15) (100/54 - 100/54)  BP(mean): --  RR: --  SpO2: --
Vital Signs Last 24 Hrs  T(C): 37.2 (13 Feb 2021 06:24), Max: 37.2 (12 Feb 2021 20:31)  T(F): 98.9 (13 Feb 2021 06:24), Max: 99 (12 Feb 2021 20:31)  HR: 112 (13 Feb 2021 06:24) (112 - 112)  BP: 117/77 (13 Feb 2021 06:24) (117/77 - 117/77)  BP(mean): --  RR: 20 (13 Feb 2021 06:24) (20 - 20)  SpO2: --
Vital Signs Last 24 Hrs  T(C): 36.8 (04 Jan 2021 06:26), Max: 36.8 (04 Jan 2021 06:26)  T(F): 98.2 (04 Jan 2021 06:26), Max: 98.2 (04 Jan 2021 06:26)  HR: 69 (04 Jan 2021 06:26) (69 - 69)  BP: 106/69 (04 Jan 2021 06:26) (106/69 - 106/69)  BP(mean): --  RR: 18 (04 Jan 2021 06:26) (18 - 18)  SpO2: --
Vital Signs Last 24 Hrs  T(C): 36.2 (05 Mar 2021 07:41), Max: 36.2 (04 Mar 2021 20:50)  T(F): 97.2 (05 Mar 2021 07:41), Max: 97.2 (05 Mar 2021 07:41)  HR: --  BP: --  BP(mean): --  RR: --  SpO2: --
Vital Signs Last 24 Hrs  T(C): 36.3 (12 Mar 2021 07:40), Max: 36.3 (12 Mar 2021 07:40)  T(F): 97.4 (12 Mar 2021 07:40), Max: 97.4 (12 Mar 2021 07:40)  HR: 63 (12 Mar 2021 07:40) (63 - 63)  BP: 103/65 (12 Mar 2021 07:40) (103/65 - 103/65)  BP(mean): --  RR: --  SpO2: --
Vital Signs Last 24 Hrs  T(C): 36.9 (03 Feb 2021 07:19), Max: 37 (02 Feb 2021 19:52)  T(F): 98.5 (03 Feb 2021 07:19), Max: 98.6 (02 Feb 2021 19:52)  HR: 80 (03 Feb 2021 07:19) (80 - 80)  BP: 99/64 (03 Feb 2021 07:19) (99/64 - 99/64)  BP(mean): --  RR: --  SpO2: --
Vital Signs Last 24 Hrs  T(C): 36.4 (24 Mar 2021 06:44), Max: 36.4 (24 Mar 2021 06:44)  T(F): 97.5 (24 Mar 2021 06:44), Max: 97.5 (24 Mar 2021 06:44)  HR: 84 (24 Mar 2021 06:44) (84 - 84)  BP: 105/61 (24 Mar 2021 06:44) (105/61 - 105/61)  BP(mean): --  RR: --  SpO2: --
Vital Signs Last 24 Hrs  T(C): 36.8 (02 Feb 2021 08:47), Max: 37.8 (01 Feb 2021 20:19)  T(F): 98.2 (02 Feb 2021 08:47), Max: 100 (01 Feb 2021 20:19)  HR: 84 (02 Feb 2021 08:47) (84 - 84)  BP: 128/80 (02 Feb 2021 08:47) (128/80 - 128/80)  BP(mean): --  RR: --  SpO2: --
Vital Signs Last 24 Hrs  T(C): 36.7 (03 Mar 2021 08:05), Max: 36.7 (03 Mar 2021 08:05)  T(F): 98 (03 Mar 2021 08:05), Max: 98 (03 Mar 2021 08:05)  HR: 80 (03 Mar 2021 08:05) (80 - 80)  BP: 90/60 (03 Mar 2021 08:05) (90/60 - 90/60)  BP(mean): --  RR: --  SpO2: --
Vital Signs Last 24 Hrs  T(C): 37.1 (28 Mar 2021 17:53), Max: 37.1 (28 Mar 2021 17:53)  T(F): 98.7 (28 Mar 2021 17:53), Max: 98.7 (28 Mar 2021 17:53)  HR: --  BP: --  BP(mean): --  RR: 18 (28 Mar 2021 08:37) (18 - 18)  SpO2: 100% (28 Mar 2021 08:37) (100% - 100%)
Vital Signs Last 24 Hrs  T(C): 36.8 (28 Apr 2021 08:53), Max: 37.6 (27 Apr 2021 19:12)  T(F): 98.2 (28 Apr 2021 08:53), Max: 99.6 (27 Apr 2021 19:12)  HR: 100 (28 Apr 2021 08:53) (100 - 100)  BP: 114/- (28 Apr 2021 08:53) (114/- - 114/-)  BP(mean): --  RR: --  SpO2: --
Vital Signs Last 24 Hrs  T(C): 36.9 (11 May 2021 08:12), Max: 36.9 (11 May 2021 08:12)  T(F): 98.4 (11 May 2021 08:12), Max: 98.4 (11 May 2021 08:12)  HR: --  BP: --  BP(mean): --  RR: --  SpO2: --
Vital Signs Last 24 Hrs  T(C): 37.1 (29 Apr 2021 09:20), Max: 37.3 (28 Apr 2021 17:42)  T(F): 98.8 (29 Apr 2021 09:20), Max: 99.2 (28 Apr 2021 17:42)  HR: --  BP: --  BP(mean): --  RR: --  SpO2: --
Vital Signs Last 24 Hrs  T(C): 36.6 (22 Apr 2021 20:51), Max: 37 (22 Apr 2021 17:43)  T(F): 97.9 (22 Apr 2021 20:51), Max: 98.6 (22 Apr 2021 17:43)  HR: --  BP: --  BP(mean): --  RR: --  SpO2: --
Vital Signs Last 24 Hrs  T(C): 36.6 (07 Apr 2021 18:14), Max: 36.6 (07 Apr 2021 18:14)  T(F): 97.8 (07 Apr 2021 18:14), Max: 97.8 (07 Apr 2021 18:14)  HR: --  BP: --  BP(mean): --  RR: --  SpO2: --
Vital Signs Last 24 Hrs  T(C): 36.8 (23 Jan 2021 15:49), Max: 36.8 (23 Jan 2021 15:49)  T(F): 98.2 (23 Jan 2021 15:49), Max: 98.2 (23 Jan 2021 15:49)  HR: 86 (23 Jan 2021 06:20) (86 - 86)  BP: 133/80 (23 Jan 2021 06:20) (133/80 - 133/80)  BP(mean): --  RR: 17 (23 Jan 2021 06:20) (17 - 17)  SpO2: --
Vital Signs Last 24 Hrs  T(C): 37 (15 Apr 2021 17:49), Max: 37 (15 Apr 2021 17:49)  T(F): 98.6 (15 Apr 2021 17:49), Max: 98.6 (15 Apr 2021 17:49)  HR: --  BP: --  BP(mean): --  RR: --  SpO2: --
Vital Signs Last 24 Hrs  T(C): 36.1 (12 Jan 2021 07:49), Max: 36.2 (11 Jan 2021 20:57)  T(F): 97 (12 Jan 2021 07:49), Max: 97.1 (11 Jan 2021 20:57)  HR: 100 (12 Jan 2021 07:49) (100 - 100)  BP: 119/69 (12 Jan 2021 07:49) (119/69 - 119/69)  BP(mean): --  RR: --  SpO2: --
Vital Signs Last 24 Hrs  T(C): 36.4 (14 Feb 2021 21:19), Max: 36.4 (14 Feb 2021 21:19)  T(F): 97.6 (14 Feb 2021 21:19), Max: 97.6 (14 Feb 2021 21:19)  HR: --  BP: --  BP(mean): --  RR: --  SpO2: --
Vital Signs Last 24 Hrs  T(C): 36.7 (10 Mar 2021 07:43), Max: 36.7 (10 Mar 2021 07:43)  T(F): 98.1 (10 Mar 2021 07:43), Max: 98.1 (10 Mar 2021 07:43)  HR: 91 (10 Mar 2021 07:43) (91 - 91)  BP: 92/60 (10 Mar 2021 07:43) (92/60 - 92/60)  BP(mean): --  RR: --  SpO2: --
Vital Signs Last 24 Hrs  T(C): 36.8 (08 Mar 2021 07:50), Max: 36.8 (08 Mar 2021 07:50)  T(F): 98.2 (08 Mar 2021 07:50), Max: 98.2 (08 Mar 2021 07:50)  HR: 92 (08 Mar 2021 07:50) (92 - 92)  BP: 100/60 (08 Mar 2021 07:50) (100/60 - 100/60)  BP(mean): --  RR: --  SpO2: --
Vital Signs Last 24 Hrs  T(C): 36.6 (01 Feb 2021 07:28), Max: 37.3 (31 Jan 2021 20:52)  T(F): 97.8 (01 Feb 2021 07:28), Max: 99.2 (31 Jan 2021 20:52)  HR: 78 (01 Feb 2021 07:28) (78 - 78)  BP: 102/62 (01 Feb 2021 07:28) (102/62 - 102/62)  BP(mean): --  RR: --  SpO2: --
Vital Signs Last 24 Hrs  T(C): 36.7 (15 Obed 2021 07:31), Max: 36.7 (15 Obed 2021 07:31)  T(F): 98 (15 Obed 2021 07:31), Max: 98 (15 Obed 2021 07:31)  HR: 97 (15 Obed 2021 07:31) (97 - 97)  BP: 106/71 (15 Obed 2021 07:31) (106/71 - 106/71)  BP(mean): --  RR: --  SpO2: --
Vital Signs Last 24 Hrs  T(C): 37.1 (24 Dec 2020 07:35), Max: 37.1 (24 Dec 2020 07:35)  T(F): 98.7 (24 Dec 2020 07:35), Max: 98.7 (24 Dec 2020 07:35)  HR: 114 (24 Dec 2020 07:35) (114 - 114)  BP: 102/71 (24 Dec 2020 07:35) (102/71 - 102/71)  BP(mean): --  RR: --  SpO2: --
Vital Signs Last 24 Hrs  T(C): 36.4 (19 Mar 2021 06:24), Max: 36.4 (19 Mar 2021 06:24)  T(F): 97.5 (19 Mar 2021 06:24), Max: 97.5 (19 Mar 2021 06:24)  HR: 71 (19 Mar 2021 06:24) (71 - 71)  BP: 103/61 (19 Mar 2021 06:24) (103/61 - 103/61)  BP(mean): --  RR: --  SpO2: --
Vital Signs Last 24 Hrs  T(C): 36.4 (31 Mar 2021 08:45), Max: 36.6 (30 Mar 2021 18:45)  T(F): 97.5 (31 Mar 2021 08:45), Max: 97.9 (30 Mar 2021 18:45)  HR: 80 (31 Mar 2021 08:45) (80 - 80)  BP: 112/71 (31 Mar 2021 08:45) (112/71 - 112/71)  BP(mean): --  RR: --  SpO2: --
Vital Signs Last 24 Hrs  T(C): 37 (06 Apr 2021 07:27), Max: 37 (06 Apr 2021 07:27)  T(F): 98.6 (06 Apr 2021 07:27), Max: 98.6 (06 Apr 2021 07:27)  HR: 88 (06 Apr 2021 07:27) (88 - 88)  BP: 109/- (06 Apr 2021 07:27) (109/- - 109/-)  BP(mean): --  RR: --  SpO2: --
Vital Signs Last 24 Hrs  T(C): 36.5 (01 Mar 2021 07:03), Max: 36.5 (28 Feb 2021 19:41)  T(F): 97.7 (01 Mar 2021 07:03), Max: 97.7 (28 Feb 2021 19:41)  HR: 100 (01 Mar 2021 07:03) (100 - 100)  BP: 128/87 (01 Mar 2021 07:03) (128/87 - 128/87)  BP(mean): --  RR: 18 (01 Mar 2021 07:03) (18 - 18)  SpO2: --
Vital Signs Last 24 Hrs  T(C): 36.7 (08 Feb 2021 07:53), Max: 36.9 (07 Feb 2021 20:40)  T(F): 98 (08 Feb 2021 07:53), Max: 98.5 (07 Feb 2021 20:40)  HR: --  BP: --  BP(mean): --  RR: --  SpO2: --
Vital Signs Last 24 Hrs  T(C): 36.7 (14 Apr 2021 17:16), Max: 36.7 (14 Apr 2021 17:16)  T(F): 98 (14 Apr 2021 17:16), Max: 98 (14 Apr 2021 17:16)  HR: --  BP: --  BP(mean): --  RR: --  SpO2: --
Vital Signs Last 24 Hrs  T(C): 35.8 (16 Mar 2021 07:48), Max: 36.3 (15 Mar 2021 20:25)  T(F): 96.4 (16 Mar 2021 07:48), Max: 97.3 (15 Mar 2021 20:25)  HR: 88 (16 Mar 2021 07:48) (88 - 88)  BP: 90/58 (16 Mar 2021 07:48) (90/58 - 90/58)  BP(mean): --  RR: --  SpO2: --
Vital Signs Last 24 Hrs  T(C): 36.7 (05 May 2021 06:50), Max: 37.1 (04 May 2021 17:27)  T(F): 98 (05 May 2021 06:50), Max: 98.7 (04 May 2021 17:27)  HR: 112 (05 May 2021 06:50) (112 - 112)  BP: 102/71 (05 May 2021 06:50) (102/71 - 102/71)  BP(mean): --  RR: --  SpO2: --
Vital Signs Last 24 Hrs  T(C): 36.3 (19 Jan 2021 07:39), Max: 36.3 (19 Jan 2021 07:39)  T(F): 97.4 (19 Jan 2021 07:39), Max: 97.4 (19 Jan 2021 07:39)  HR: --  BP: --  BP(mean): --  RR: --  SpO2: --
Vital Signs Last 24 Hrs  T(C): 36.1 (23 Feb 2021 22:51), Max: 36.1 (23 Feb 2021 22:51)  T(F): 97 (23 Feb 2021 22:51), Max: 97 (23 Feb 2021 22:51)  HR: --  BP: --  BP(mean): --  RR: --  SpO2: --
Vital Signs Last 24 Hrs  T(C): 36.3 (18 Feb 2021 07:44), Max: 36.4 (17 Feb 2021 20:38)  T(F): 97.4 (18 Feb 2021 07:44), Max: 97.5 (17 Feb 2021 20:38)  HR: 70 (18 Feb 2021 07:44) (70 - 70)  BP: 91/60 (18 Feb 2021 07:44) (91/60 - 91/60)  BP(mean): --  RR: --  SpO2: --
Vital Signs Last 24 Hrs  T(C): 36.2 (20 Apr 2021 08:34), Max: 37.5 (19 Apr 2021 17:38)  T(F): 97.1 (20 Apr 2021 08:34), Max: 99.5 (19 Apr 2021 17:38)  HR: 88 (20 Apr 2021 08:34) (88 - 88)  BP: 114/66 (20 Apr 2021 08:34) (114/66 - 114/66)  BP(mean): --  RR: --  SpO2: --
Vital Signs Last 24 Hrs  T(C): 36.5 (20 Apr 2021 17:21), Max: 36.5 (20 Apr 2021 17:21)  T(F): 97.7 (20 Apr 2021 17:21), Max: 97.7 (20 Apr 2021 17:21)  HR: --  BP: --  BP(mean): --  RR: --  SpO2: --
Vital Signs Last 24 Hrs  T(C): 36.6 (22 Jan 2021 07:43), Max: 36.6 (22 Jan 2021 07:43)  T(F): 97.8 (22 Jan 2021 07:43), Max: 97.8 (22 Jan 2021 07:43)  HR: --  BP: --  BP(mean): --  RR: --  SpO2: --
Vital Signs Last 24 Hrs  T(C): 36.6 (08 Jan 2021 07:43), Max: 36.7 (07 Jan 2021 20:17)  T(F): 97.8 (08 Jan 2021 07:43), Max: 98 (07 Jan 2021 20:17)  HR: 86 (08 Jan 2021 07:43) (86 - 86)  BP: 93/58 (08 Jan 2021 07:43) (93/58 - 93/58)  BP(mean): --  RR: --  SpO2: --
Vital Signs Last 24 Hrs  T(C): 36.7 (26 Mar 2021 07:00), Max: 36.7 (26 Mar 2021 07:00)  T(F): 98.1 (26 Mar 2021 07:00), Max: 98.1 (26 Mar 2021 07:00)  HR: 65 (26 Mar 2021 07:00) (65 - 65)  BP: 98/60 (26 Mar 2021 07:00) (98/60 - 98/60)  BP(mean): --  RR: --  SpO2: --
Vital Signs Last 24 Hrs  T(C): 36.7 (25 Mar 2021 08:20), Max: 36.9 (24 Mar 2021 17:50)  T(F): 98.1 (25 Mar 2021 08:20), Max: 98.5 (24 Mar 2021 17:50)  HR: --  BP: --  BP(mean): --  RR: --  SpO2: --
Vital Signs Last 24 Hrs  T(C): 35.9 (06 Jan 2021 07:43), Max: 36.3 (05 Jan 2021 20:57)  T(F): 96.6 (06 Jan 2021 07:43), Max: 97.3 (05 Jan 2021 20:57)  HR: 101 (06 Jan 2021 07:43) (101 - 101)  BP: 103/66 (06 Jan 2021 07:43) (103/66 - 103/66)  BP(mean): --  RR: --  SpO2: --
Vital Signs Last 24 Hrs  T(C): 36.7 (06 May 2021 08:20), Max: 37.4 (05 May 2021 20:07)  T(F): 98.1 (06 May 2021 08:20), Max: 99.4 (05 May 2021 20:07)  HR: --  BP: 103/68 (06 May 2021 08:20) (103/68 - 103/68)  BP(mean): 108 (06 May 2021 08:20) (108 - 108)  RR: --  SpO2: --
Vital Signs Last 24 Hrs  T(C): 36.7 (07 Apr 2021 07:09), Max: 36.7 (07 Apr 2021 07:09)  T(F): 98 (07 Apr 2021 07:09), Max: 98 (07 Apr 2021 07:09)  HR: 110 (07 Apr 2021 07:09) (110 - 110)  BP: 98/67 (07 Apr 2021 07:09) (98/67 - 98/67)  BP(mean): --  RR: --  SpO2: --
Vital Signs Last 24 Hrs  T(C): 36.6 (04 Mar 2021 07:42), Max: 36.7 (03 Mar 2021 23:01)  T(F): 97.8 (04 Mar 2021 07:42), Max: 98 (03 Mar 2021 23:01)  HR: 89 (04 Mar 2021 07:42) (89 - 89)  BP: 99/67 (04 Mar 2021 07:42) (99/67 - 99/67)  BP(mean): --  RR: --  SpO2: --
Vital Signs Last 24 Hrs  T(C): 36.8 (12 May 2021 08:12), Max: 36.8 (12 May 2021 08:12)  T(F): 98.2 (12 May 2021 08:12), Max: 98.2 (12 May 2021 08:12)  HR: --  BP: --  BP(mean): --  RR: --  SpO2: --
Vital Signs Last 24 Hrs  T(C): 36.4 (18 Apr 2021 17:02), Max: 36.4 (18 Apr 2021 17:02)  T(F): 97.5 (18 Apr 2021 17:02), Max: 97.5 (18 Apr 2021 17:02)  HR: --  BP: --  BP(mean): --  RR: --  SpO2: --
Vital Signs Last 24 Hrs  T(C): 36.4 (06 Jan 2021 21:29), Max: 36.4 (06 Jan 2021 21:29)  T(F): 97.5 (06 Jan 2021 21:29), Max: 97.5 (06 Jan 2021 21:29)  HR: 116 (07 Jan 2021 07:34) (116 - 116)  BP: 120/66 (07 Jan 2021 07:34) (120/66 - 120/66)  BP(mean): --  RR: --  SpO2: --
Vital Signs Last 24 Hrs  T(C): 37.2 (26 Dec 2020 07:31), Max: 37.2 (26 Dec 2020 07:31)  T(F): 98.9 (26 Dec 2020 07:31), Max: 98.9 (26 Dec 2020 07:31)  HR: 97 (26 Dec 2020 07:31) (97 - 97)  BP: 109/66 (26 Dec 2020 07:31) (109/66 - 109/66)  BP(mean): --  RR: --  SpO2: --
Vital Signs Last 24 Hrs  T(C): 36.3 (05 Jan 2021 06:19), Max: 36.3 (04 Jan 2021 19:46)  T(F): 97.3 (05 Jan 2021 06:19), Max: 97.4 (04 Jan 2021 19:46)  HR: --  BP: --  BP(mean): --  RR: --  SpO2: --
Vital Signs Last 24 Hrs  T(C): 36.9 (10 Obed 2021 08:02), Max: 36.9 (10 Obed 2021 08:02)  T(F): 98.5 (10 Obed 2021 08:02), Max: 98.5 (10 Obed 2021 08:02)  HR: 94 (10 Obed 2021 08:02) (94 - 94)  BP: 124/74 (10 Obed 2021 08:02) (124/74 - 124/74)  BP(mean): --  RR: --  SpO2: --

## 2021-05-12 NOTE — BH INPATIENT PSYCHIATRY PROGRESS NOTE - NSBHFUPMEDSE_PSY_A_CORE
None known
None known
Yes
None known
Yes
None known
Yes
None known
Yes
None known
Yes
None known
Yes
None known
Yes
None known
Yes
None known
Yes
None known
Yes
None known
Yes
None known
Yes
None known
None known
Yes
Yes
None known
Yes
None known
Yes
None known
Yes
None known
Yes
None known
None known

## 2021-05-12 NOTE — BH INPATIENT PSYCHIATRY PROGRESS NOTE - NSTXPSYCHOGOAL_PSY_ALL_CORE
Will engage in a 15 minute conversation with no irrational content
Will report using relaxation skills 3 times a day to reduce anxiety about delusions/hallucinations
Will verbalize 1 strategy to successfully cope with psychotic symptoms
Will engage in a 15 minute conversation with no irrational content
Be able to utilize coping skills to ignore hallucinations so that he/she could attend and participate constructively in groups
Will identify 2 coping skills that help mitigate hallucinations
Will report using a mindfulness skill to be able to read 5 pages of a book daily despite hallucinations
Be able to utilize coping skills to ignore hallucinations so that he/she could attend and participate constructively in groups
Make at least 5 reality based statements/requests to staff and/or peers
Be able to utilize coping skills to ignore hallucinations so that he/she could attend and participate constructively in groups
Will verbalize 1 strategy to successfully cope with psychotic symptoms
Will verbalize 1 strategy to successfully cope with psychotic symptoms
Be able to utilize coping skills to ignore hallucinations so that he/she could attend and participate constructively in groups
Be able to utilize coping skills to ignore hallucinations so that he/she could attend and participate constructively in groups
Will engage in a 15 minute conversation with no irrational content
Be able to utilize coping skills to ignore hallucinations so that he/she could attend and participate constructively in groups
Will identify 2 coping skills that assist with focus on reality
Be able to utilize coping skills to ignore hallucinations so that he/she could attend and participate constructively in groups
Will identify 2 coping skills that help mitigate hallucinations
Make at least 5 reality based statements/requests to staff and/or peers
Will be able to report experiencing hallucinations to staff
Make at least 5 reality based statements/requests to staff and/or peers
Will be able to report experiencing hallucinations to staff
Be able to utilize coping skills to ignore hallucinations so that he/she could attend and participate constructively in groups
Make at least 5 reality based statements/requests to staff and/or peers
Will engage in a 15 minute conversation with no irrational content
Will engage in a 15 minute conversation with no irrational content
Be able to utilize coping skills to ignore hallucinations so that he/she could attend and participate constructively in groups
Will engage in a 15 minute conversation with no irrational content
Will ask for PRN medication to manage hallucinations
Will engage in a 15 minute conversation with no irrational content
Will engage in a 15 minute conversation with no irrational content
Will identify 1 trigger/stressor that exacerbates hallucinations
Will report using relaxation skills 3 times a day to reduce anxiety about delusions/hallucinations
Other...
Will identify 2 coping skills that assist with focus on reality
Make at least 5 reality based statements/requests to staff and/or peers
Will engage in a 15 minute conversation with no irrational content
Will report using relaxation skills 3 times a day to reduce anxiety about delusions/hallucinations
Will verbalize 1 strategy to successfully cope with psychotic symptoms
Make at least 5 reality based statements/requests to staff and/or peers
Make at least 5 reality based statements/requests to staff and/or peers
Other...
Be able to utilize coping skills to ignore hallucinations so that he/she could attend and participate constructively in groups
Make at least 5 reality based statements/requests to staff and/or peers
Will engage in a 15 minute conversation with no irrational content
Will engage in a 15 minute conversation with no irrational content
Be able to utilize coping skills to ignore hallucinations so that he/she could attend and participate constructively in groups
Be able to utilize coping skills to ignore hallucinations so that he/she could attend and participate constructively in groups
Make at least 5 reality based statements/requests to staff and/or peers
Will ask for PRN medication to manage hallucinations
Will identify 2 coping skills that assist with focus on reality
Be able to utilize coping skills to ignore hallucinations so that he/she could attend and participate constructively in groups
Make at least 5 reality based statements/requests to staff and/or peers
Will engage in a 15 minute conversation with no irrational content
Be able to utilize coping skills to ignore hallucinations so that he/she could attend and participate constructively in groups
Make at least 5 reality based statements/requests to staff and/or peers
Will engage in a 15 minute conversation with no irrational content
Will engage in a 15 minute conversation with no irrational content
Be able to utilize coping skills to ignore hallucinations so that he/she could attend and participate constructively in groups
Other...
Be able to utilize coping skills to ignore hallucinations so that he/she could attend and participate constructively in groups
Be able to utilize coping skills to ignore hallucinations so that he/she could attend and participate constructively in groups
Make at least 5 reality based statements/requests to staff and/or peers
Will ask for PRN medication to manage hallucinations
Will verbalize 1 strategy to successfully cope with psychotic symptoms
Will report using a mindfulness skill to be able to read 5 pages of a book daily despite hallucinations
Will report using relaxation skills 3 times a day to reduce anxiety about delusions/hallucinations
Make at least 5 reality based statements/requests to staff and/or peers
Make at least 5 reality based statements/requests to staff and/or peers
Be able to utilize coping skills to ignore hallucinations so that he/she could attend and participate constructively in groups
Make at least 5 reality based statements/requests to staff and/or peers
Will be able to report experiencing hallucinations to staff
Make at least 5 reality based statements/requests to staff and/or peers
Make at least 5 reality based statements/requests to staff and/or peers
Will be able to report experiencing hallucinations to staff
Will engage in a 15 minute conversation with no irrational content
Be able to utilize coping skills to ignore hallucinations so that he/she could attend and participate constructively in groups
Be able to utilize coping skills to ignore hallucinations so that he/she could attend and participate constructively in groups
Make at least 5 reality based statements/requests to staff and/or peers
Will engage in a 15 minute conversation with no irrational content
Will identify 2 coping skills that assist with focus on reality
Be able to utilize coping skills to ignore hallucinations so that he/she could attend and participate constructively in groups
Will identify 1 trigger/stressor that exacerbates hallucinations
Be able to utilize coping skills to ignore hallucinations so that he/she could attend and participate constructively in groups
Make at least 5 reality based statements/requests to staff and/or peers
Will identify 2 coping skills that help mitigate hallucinations
Be able to utilize coping skills to ignore hallucinations so that he/she could attend and participate constructively in groups
Other...
Will identify 1 trigger/stressor that exacerbates hallucinations
Will report using a mindfulness skill to be able to read 5 pages of a book daily despite hallucinations
Will identify 1 trigger/stressor that exacerbates hallucinations
Will report using relaxation skills 3 times a day to reduce anxiety about delusions/hallucinations

## 2021-05-12 NOTE — BH SCALES AND SCREENS - NSBPRSUNUTHOCON_PSY_ALL_CORE
5 = Moderately Severe – full delusional conviction, but delusion(s) has only occasional impact on behavior
6 = Severe - delusion(s) has significant effect, e.g., neglects responsibilities because of preoccupations with belief that he/she is God
2 = Very Mild – delusion(s) suspected or likely
5 = Moderately Severe – full delusional conviction, but delusion(s) has only occasional impact on behavior
4 = Moderate – full delusional conviction, but delusion(s) has little or no influence on behavior
2 = Very Mild – delusion(s) suspected or likely
6 = Severe - delusion(s) has significant effect, e.g., neglects responsibilities because of preoccupations with belief that he/she is God
6 = Severe - delusion(s) has significant effect, e.g., neglects responsibilities because of preoccupations with belief that he/she is God

## 2021-05-12 NOTE — BH INPATIENT PSYCHIATRY PROGRESS NOTE - NSTXDCOPNOINTERMD_PSY_ALL_CORE
considering EDGAR
ACT/ AOT support
considering EDGAR
state hospital application  Caplyta and Zyprexa   trial clozapine
ACT/ AOT support
ACT/ AOT support  Caplyta and Zyprexa 
ACT/ AOT support  Caplyta and Zyprexa 
state hospital application  Caplyta and Zyprexa   trial clozapine
state hospital application  Caplyta and Zyprexa   trial clozapine
considering EDGAR
considering EDGAR
state hospital application  Caplyta and Zyprexa   trial clozapine
ACT/ AOT support  Caplyta and Zyprexa 
considering EDGAR
considering EDGAR
ACT/ AOT support  Caplyta and Zyprexa 
considering EDGAR
ACT/ AOT support  Caplyta and Zyprexa 
considering EDGAR
ACT/ AOT support  Caplyta and Zyprexa 
considering EDGAR
considering EDGAR
state hospital application  Caplyta and Zyprexa   trial clozapine
considering EDGAR
state hospital application  Caplyta and Zyprexa   trial clozapine
ACT/ AOT support  Caplyta and Zyprexa 
considering EDGAR
considering EDGAR
ACT/ AOT support  Caplyta and Zyprexa 
considering EDGAR
considering EDGAR
ACT/ AOT support  Caplyta and Zyprexa 
ACT/ AOT support  Caplyta and Zyprexa 
considering EDGAR
considering EDGAR
state hospital application  Caplyta and Zyprexa   trial clozapine
ACT/ AOT support
considering EDGAR
ACT/ AOT support  Caplyta and Zyprexa 
considering EDGAR
state hospital application  Caplyta and Zyprexa   trial clozapine
considering EDGAR
ACT/ AOT support  Caplyta and Zyprexa 
considering EDGAR
considering EGDAR
state hospital application  Caplyta and Zyprexa   trial clozapine
considering EDGAR
state hospital application  Caplyta and Zyprexa   trial clozapine
considering EDGAR
considering EDGAR
ACT/ AOT support  Caplyta and Zyprexa 
considering EDGAR
ACT/ AOT support  Caplyta and Zyprexa 
considering EDGAR
state hospital application  Caplyta and Zyprexa   trial clozapine
considering EDGAR
considering EDGAR
state hospital application  Caplyta and Zyprexa   trial clozapine
considering EDGAR
ACT/ AOT support  Caplyta and Zyprexa 
considering EDGAR
considering EDGAR
state hospital application  Caplyta and Zyprexa   trial clozapine
state hospital application  Caplyta and Zyprexa   trial clozapine
ACT/ AOT support
considering EDGAR
ACT/ AOT support
considering EDGAR
state hospital application  Caplyta and Zyprexa   trial clozapine
ACT/ AOT support  Caplyta and Zyprexa 
considering EDGAR

## 2021-05-12 NOTE — BH INPATIENT PSYCHIATRY PROGRESS NOTE - NSTXDCOPNODATEEST_PSY_ALL_CORE
16-Feb-2021
16-Mar-2021
22-Mar-2021
05-Jan-2021
09-Feb-2021
16-Mar-2021
28-Apr-2021
05-Jan-2021
12-Apr-2021
12-Jan-2021
16-Feb-2021
12-Apr-2021
12-Apr-2021
23-Feb-2021
30-Dec-2020
05-Jan-2021
12-Apr-2021
12-Apr-2021
02-Feb-2021
05-Jan-2021
09-Feb-2021
24-Dec-2020
28-Jan-2021
29-Dec-2020
16-Feb-2021
16-Mar-2021
04-Mar-2021
12-Apr-2021
05-Apr-2021
02-Feb-2021
09-Mar-2021
12-Apr-2021
12-Apr-2021
16-Mar-2021
16-Mar-2021
04-Feb-2021
06-Apr-2021
09-Feb-2021
12-Jan-2021
16-Feb-2021
19-Jan-2021
28-Jan-2021
02-Feb-2021
16-Mar-2021
28-Apr-2021
23-Feb-2021
04-Mar-2021
04-May-2021
16-Mar-2021
29-Dec-2020
22-Mar-2021
05-Jan-2021
09-Mar-2021
19-Jan-2021
06-Apr-2021
09-Feb-2021
19-Jan-2021
04-Feb-2021
04-Mar-2021
05-Jan-2021
12-Jan-2021
12-Apr-2021
04-Mar-2021
16-Feb-2021
09-Mar-2021
09-Mar-2021
12-Jan-2021
19-Jan-2021
19-Jan-2021
28-Jan-2021
28-Jan-2021
12-Jan-2021
16-Feb-2021
01-Apr-2021
05-Apr-2021
09-Mar-2021
16-Mar-2021
26-Jan-2021
09-Feb-2021
19-Jan-2021
06-Apr-2021
05-Apr-2021
01-Apr-2021
02-Mar-2021
16-Mar-2021
19-Jan-2021
04-May-2021
06-Apr-2021
09-Feb-2021
16-Mar-2021
29-Mar-2021
28-Apr-2021
12-Apr-2021
12-Jan-2021
12-Jan-2021
23-Feb-2021
23-Feb-2021
05-Apr-2021
12-Apr-2021
12-Apr-2021
05-Apr-2021
23-Feb-2021
29-Dec-2020
05-Jan-2021
24-Dec-2020
30-Dec-2020
05-Jan-2021

## 2021-05-12 NOTE — BH INPATIENT PSYCHIATRY PROGRESS NOTE - NSTXPSYCHOPROGRES_PSY_ALL_CORE
Improving
Improving
No Change
Improving
Improving
No Change
Improving
Improving
No Change
Improving
Improving
No Change
No Change
Improving
No Change
Improving
No Change
Improving
No Change
Improving
No Change
Improving
Improving
No Change
Improving
No Change
No Change
Improving
No Change
Improving
Improving
No Change
Improving
No Change
Improving
No Change
Improving
Improving
No Change
Improving
No Change
No Change
Improving
No Change
Improving
No Change
Improving
No Change
Improving
No Change
Improving
No Change
Improving
Improving
No Change

## 2021-05-12 NOTE — BH INPATIENT PSYCHIATRY PROGRESS NOTE - NSTXNEGATDATEEST_PSY_ALL_CORE
06-Apr-2021
21-Apr-2021
06-Apr-2021
28-Apr-2021
06-Apr-2021
21-Apr-2021
28-Apr-2021
21-Apr-2021
06-Apr-2021
06-Apr-2021
21-Apr-2021
28-Apr-2021
28-Apr-2021
06-Apr-2021
28-Apr-2021
06-Apr-2021
06-Apr-2021
28-Apr-2021
06-Apr-2021
06-Apr-2021
28-Apr-2021
21-Apr-2021
06-Apr-2021

## 2021-05-12 NOTE — BH SCALES AND SCREENS - NSBPRSGRANDIOS_PSY_ALL_CORE
1 = Not Reported (Not Present)

## 2021-05-12 NOTE — BH SCALES AND SCREENS - NSBHPTASSESSDT_PSY_A_CORE
15-Apr-2021 19:02
24-Mar-2021 14:42
28-Apr-2021 15:46
07-Jan-2021 16:55
05-May-2021 14:41
07-Apr-2021 15:13
12-May-2021 15:37
31-Mar-2021 16:33

## 2021-05-12 NOTE — BH INPATIENT PSYCHIATRY PROGRESS NOTE - NSBHATTENDATTEST_PSY_ALL_CORE
I have personally seen, examined and participated in the care of this patient. I have reviewed all pertinent clinical information, including history, physical exam, plan and the Medical/PA/NP Student’s note and agree except as noted.
all services provided by attending MD without trainee./I have personally seen, examined and participated in the care of this patient. I have reviewed all pertinent clinical information, including history, physical exam, plan and the Medical/PA/NP Student’s note and agree except as noted.

## 2021-05-12 NOTE — BH INPATIENT PSYCHIATRY PROGRESS NOTE - NSTXNEGATINTERMD_PSY_ALL_CORE
trial clozapine
trial clozapine, doses are titrated up
trial clozapine
trial clozapine, doses are titrated up

## 2021-05-12 NOTE — BH INPATIENT PSYCHIATRY PROGRESS NOTE - NSBHPROGSUIC_PSY_A_CORE
no

## 2021-05-12 NOTE — BH INPATIENT PSYCHIATRY PROGRESS NOTE - NSTXPSYCHODATENEW_PSY_ALL_CORE
11-Apr-2021
18-Feb-2021
29-Mar-2021
11-Apr-2021
18-Feb-2021
18-Feb-2021
30-Dec-2020
04-Apr-2021
11-Apr-2021
18-Feb-2021
30-Dec-2020
30-Dec-2020
11-Apr-2021
30-Dec-2020
11-Apr-2021
29-Mar-2021
30-Dec-2020
30-Dec-2020
18-Feb-2021
18-Feb-2021
30-Dec-2020
30-Dec-2020
18-Feb-2021
18-Feb-2021
11-Apr-2021
18-Feb-2021
04-Apr-2021
11-Apr-2021
04-Apr-2021
11-Apr-2021
18-Feb-2021
30-Dec-2020
11-Apr-2021
30-Dec-2020
04-Apr-2021
11-Apr-2021
11-Apr-2021
30-Dec-2020
18-Feb-2021
30-Dec-2020
18-Feb-2021
30-Dec-2020
11-Apr-2021
11-Apr-2021
18-Feb-2021
30-Dec-2020
30-Dec-2020
11-Apr-2021
29-Mar-2021
18-Feb-2021
30-Dec-2020
11-Apr-2021
18-Feb-2021
18-Feb-2021
30-Dec-2020
11-Apr-2021
30-Dec-2020
18-Feb-2021
11-Apr-2021
18-Feb-2021
30-Dec-2020
18-Feb-2021
18-Feb-2021
30-Dec-2020
30-Dec-2020
18-Feb-2021
11-Apr-2021
18-Feb-2021
04-Apr-2021
18-Feb-2021
18-Feb-2021
30-Dec-2020
30-Dec-2020
11-Apr-2021
30-Dec-2020
18-Feb-2021
30-Dec-2020
11-Apr-2021
29-Mar-2021
11-Apr-2021
18-Feb-2021
30-Dec-2020
18-Feb-2021
18-Feb-2021

## 2021-05-12 NOTE — BH INPATIENT PSYCHIATRY PROGRESS NOTE - NSBHROSSTATEMENT_PSY_A_CORE
.

## 2021-05-12 NOTE — BH INPATIENT PSYCHIATRY PROGRESS NOTE - NSTXPROBNEGAT_PSY_ALL_CORE
NEGATIVE SELF-TALK

## 2021-05-12 NOTE — BH INPATIENT PSYCHIATRY PROGRESS NOTE - NSTXPSYCHODATEEST_PSY_ALL_CORE
25-Feb-2021
18-Apr-2021
18-Feb-2021
21-Jan-2021
06-Apr-2021
28-Jan-2021
01-Apr-2021
04-Mar-2021
21-Dec-2020
21-Dec-2020
21-Mar-2021
04-Feb-2021
06-Apr-2021
21-Dec-2020
21-Jan-2021
24-Mar-2021
21-Dec-2020
11-Feb-2021
21-Jan-2021
04-Mar-2021
11-Mar-2021
18-Apr-2021
11-Apr-2021
18-Feb-2021
11-Apr-2021
18-Apr-2021
21-Dec-2020
22-Dec-2020
04-Mar-2021
18-Apr-2021
21-Dec-2020
11-Mar-2021
28-Jan-2021
28-Mar-2021
04-Apr-2021
11-Apr-2021
21-Dec-2020
21-Dec-2020
18-Mar-2021
04-Apr-2021
11-Mar-2021
11-Mar-2021
28-Jan-2021
21-Mar-2021
22-Dec-2020
24-Dec-2020
25-Feb-2021
18-Apr-2021
18-Apr-2021
18-Mar-2021
21-Apr-2021
21-Dec-2020
21-Dec-2020
28-Jan-2021
15-Apr-2021
15-Mar-2021
21-Apr-2021
21-Jan-2021
21-Dec-2020
24-Mar-2021
31-Dec-2020
21-Dec-2020
21-Dec-2020
21-Jan-2021
06-Apr-2021
21-Dec-2020
21-Dec-2020
22-Dec-2020
21-Dec-2020
21-Dec-2020
25-Feb-2021
28-Jan-2021
21-Dec-2020
28-Apr-2021
01-Apr-2021
18-Apr-2021
18-Apr-2021
21-Dec-2020
21-Dec-2020
21-Jan-2021
21-Apr-2021
21-Dec-2020
11-Feb-2021
24-Mar-2021
21-Dec-2020
28-Jan-2021
11-Feb-2021
15-Apr-2021
18-Mar-2021
21-Dec-2020
06-Apr-2021
11-Feb-2021
18-Apr-2021
24-Dec-2020
21-Dec-2020
28-Apr-2021
18-Feb-2021
18-Feb-2021
21-Dec-2020
21-Mar-2021
29-Dec-2020
24-Dec-2020
21-Dec-2020
04-Apr-2021
21-Dec-2020
21-Dec-2020

## 2021-05-12 NOTE — BH INPATIENT PSYCHIATRY PROGRESS NOTE - NSTXDCOPNOMODTX_PSY_ALL_CORE
Yes

## 2021-05-12 NOTE — BH INPATIENT PSYCHIATRY PROGRESS NOTE - TELEPSYCHIATRY?
No

## 2021-05-12 NOTE — BH INPATIENT PSYCHIATRY PROGRESS NOTE - NSBHFUPINTERVALHXFT_PSY_A_CORE
Pt is doing better on the unit.    c/o: Patient reports improvement in voices : "there is only one who is talking through me" Pt denies disturbing content of thoughts or AH and is coping with them much better. Slept well. denies s/h I/i/p. denies any side effects.

## 2021-05-12 NOTE — BH INPATIENT PSYCHIATRY PROGRESS NOTE - MSE OPTIONS
Unstructured MSE
Unstructured MSE
Structured MSE
Unstructured MSE
Structured MSE
Unstructured MSE
Structured MSE
Unstructured MSE
Structured MSE
Structured MSE
Unstructured MSE
Structured MSE
Unstructured MSE
Structured MSE
Unstructured MSE
Structured MSE
Unstructured MSE
Structured MSE
Unstructured MSE
Structured MSE
Unstructured MSE
Structured MSE
Unstructured MSE
Structured MSE

## 2021-05-12 NOTE — BH INPATIENT PSYCHIATRY PROGRESS NOTE - NSICDXBHPRIMARYDX_PSY_ALL_CORE
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  
Schizophrenia   F20.9  
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  
Schizophrenia   F20.9  
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  
Schizophrenia   F20.9  
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  
Schizophrenia   F20.9  
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  
Schizophrenia   F20.9  
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  
Schizophrenia   F20.9  
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  
Schizophrenia   F20.9  
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  
Schizophrenia   F20.9  
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  
Schizophrenia   F20.9  
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  
Schizophrenia   F20.9  
Schizophrenia   F20.9  
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  
Schizophrenia   F20.9  
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  
Schizophrenia   F20.9  
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  
Schizophrenia   F20.9  

## 2021-05-12 NOTE — BH SCALES AND SCREENS - NSBPRSMANNPOST_PSY_ALL_CORE
4 = Moderate – e.g., assumes yoga position for a brief period of time, infrequent tongue protrusions, rocking
1 = Not Observed (Not present)
3 = Mild – strange behavior but not obviously bizarre, e.g., infrequent head-tilting (side to side) in a rhythmic fashion, intermittent abnormal finger movements
4 = Moderate – e.g., assumes yoga position for a brief period of time, infrequent tongue protrusions, rocking

## 2021-05-12 NOTE — BH INPATIENT PSYCHIATRY PROGRESS NOTE - NSBHASSESSSUMMFT_PSY_ALL_CORE
Patient is a 25 year old female, domiciled with mother, with a [facility] hospitalization and discharge on November 20, 2020, BIB mother to ED on October 11, 2020 for talking to herself, yelling, and not sleeping. Mother stated that the patient had been non-compliant with her medications for three days prior to initial presentation. Mother stated that the patient was taking Clozapine 200mg once a day but two weeks prior to admission, the patient was switched to Abilify 5mg. Patient was also prescribed Propanol 20mg twice a day. Mother stated that the patient sees Dr. Antonina Villaseñor. She stated that the patient refused her medications on 3 consecutive days. She stated that the patient is not using drugs or alcohol. She stated that since the patient had not been taking the medication and she had been talking to herself and had been yelling and screaming but nonviolent. Mother stated that the patient was worsening due to medication noncompliance.    Past psychiatric history: prominent for multiple psychiatric admissions due to persistent and often excalating auditory hallucinations with neglect of ADLs. Patient had trials of Abilify, Zyprexa, Risperidone, and Haldol. The trial of Clozapine after 200 mg did not control psychosis and patient could not sustain compliance.  No known medical history.  Family history is non contributory.  Substance abuse is not known.    Psychosocial:  Patient is unemployed and lives with family.    On initial evaluation, Pt appeared stated age was poorly kempt. She was not cooperative with interviewers as she was preoccupied with internal stimuli; she was virtually unaware of surroundings due to severe disorganization of thought process. This led to her being disruptive and intrusive and unable to respond to redirections appropriately. It was not possible at that time to engage her in conversation or to successfully assist her ADLs.      Pt treatment regimen during this admission included trials of Aripiprazole, haloperidol, lorazepam, olanzapine. Later on, there was a trial of olanzapine up to 30 mg qd with lumateperone (Coplyta 42 mg).   The combination trial was marginally effective but did not significantly controlled disruptive voices or patient's ability to communicate on rational level. She still needed multiple prns and was not able to tolerate appropriately meeting with ACT team for discharge planning. Subsequently, re-trial of Clozapine was undertaken while patient was referred for possible transfer to a [facility]. Patient tolerated Clozapine titration very well, with minimum of side effects and started to display meaningful clinical improvement in communications, visibility, ADLs, ability to control loud outbursts/yelling in response to voices. Patient herself admitted that the last medication was helpful to her and that voices decreased significantly in volume, frequency, annoying quality. At this point she developed strong conviction that she was ready for discharge.   As her legal status was expiring, the Court hearing for Further retention took place on 5/11/21, - where  denied Hospital application for further retention and ordered patient to be discharged on 5/12/21.    By the time patient had to be discharged on Court Order, her clozapine dose was only titrated up to 200 mg/day, despite original intention of titrating it to at least 300 mg/day. Clozapine blood level on200 mg/day was sent out on 5/11/21, results - pending. we hope that its values will be helpful in decision-making re further dose titration on an OP basis. ( Previously, during her first clozapine trial on max 200 mg/day dose patient still was responding to internal stimuli , as per pt's Mom, who appears to be reliable in her reports.)  Last CBC indicated ANC - 5.45 - WNL on 5/11/21.    Patient has chronic risk factors fo suicidality and aggression but is assessed not to be an acute risk at this time.    She is being discharged on Court Order.    We recommend to consider further Clozapine dose titration on an OP basis, in hope that further improvement in psychosis will follow.    Diagnosis  Schizophrenia    Current Medications  Clozapine 200 mg qhs

## 2021-05-12 NOTE — BH INPATIENT PSYCHIATRY PROGRESS NOTE - NSTXDCOPNOGOAL_PSY_ALL_CORE
Will agree to participate in appropriate outpatient care

## 2021-05-12 NOTE — BH INPATIENT PSYCHIATRY PROGRESS NOTE - NSBHCONTPROVIDER_PSY_ALL_CORE
Yes...
No, attempted...
Yes...
Yes...
No, attempted...
Yes...
No, attempted...
Yes...
No, attempted...
Yes...
No, attempted...
Yes...
Yes...
No, attempted...
Yes...
No, attempted...
Yes...
No, attempted...
Yes...
No, attempted...
No, attempted...

## 2021-05-12 NOTE — [facility] INPATIENT PSYCHIATRY PROGRESS NOTE - CURRENT MEDICATION
MEDICATIONS  (STANDING):  ARIPiprazole 10 milliGRAM(s) Oral at bedtime  cephalexin 500 milliGRAM(s) Oral every 12 hours  LORazepam   Injectable 2 milliGRAM(s) IntraMuscular once    MEDICATIONS  (PRN):  diphenhydrAMINE 50 milliGRAM(s) Oral every 4 hours PRN anxiety  diphenhydrAMINE   Injectable 50 milliGRAM(s) IntraMuscular once PRN eps prophylaxis  fluPHENAZine 5 milliGRAM(s) Oral every 6 hours PRN agitation  fluPHENAZine  Injectable 5 milliGRAM(s) IntraMuscular once PRN severe agitation  LORazepam     Tablet 2 milliGRAM(s) Oral every 6 hours PRN Agitation  
MEDICATIONS  (STANDING):  ARIPiprazole 15 milliGRAM(s) Oral at bedtime  LORazepam   Injectable 2 milliGRAM(s) IntraMuscular once    MEDICATIONS  (PRN):  diphenhydrAMINE 50 milliGRAM(s) Oral every 4 hours PRN anxiety  diphenhydrAMINE   Injectable 50 milliGRAM(s) IntraMuscular once PRN eps prophylaxis  fluPHENAZine 5 milliGRAM(s) Oral every 6 hours PRN agitation  fluPHENAZine  Injectable 5 milliGRAM(s) IntraMuscular once PRN severe agitation  LORazepam     Tablet 2 milliGRAM(s) Oral every 6 hours PRN Agitation  
MEDICATIONS  (STANDING):  ARIPiprazole 20 milliGRAM(s) Oral at bedtime  LORazepam   Injectable 2 milliGRAM(s) IntraMuscular once    MEDICATIONS  (PRN):  diphenhydrAMINE 50 milliGRAM(s) Oral every 4 hours PRN anxiety  diphenhydrAMINE   Injectable 50 milliGRAM(s) IntraMuscular once PRN eps prophylaxis  fluPHENAZine 5 milliGRAM(s) Oral every 6 hours PRN agitation  fluPHENAZine  Injectable 5 milliGRAM(s) IntraMuscular once PRN severe agitation  LORazepam     Tablet 2 milliGRAM(s) Oral every 6 hours PRN Agitation  
MEDICATIONS  (STANDING):  ARIPiprazole 25 milliGRAM(s) Oral at bedtime  haloperidol     Tablet 5 milliGRAM(s) Oral at bedtime  LORazepam   Injectable 2 milliGRAM(s) IntraMuscular once  melatonin. 3 milliGRAM(s) Oral at bedtime    MEDICATIONS  (PRN):  diphenhydrAMINE 50 milliGRAM(s) Oral every 4 hours PRN anxiety  diphenhydrAMINE   Injectable 50 milliGRAM(s) IntraMuscular once PRN eps prophylaxis  fluPHENAZine 5 milliGRAM(s) Oral every 6 hours PRN agitation  fluPHENAZine  Injectable 5 milliGRAM(s) IntraMuscular once PRN severe agitation  LORazepam     Tablet 2 milliGRAM(s) Oral every 6 hours PRN Agitation  
MEDICATIONS  (STANDING):  LORazepam   Injectable 2 milliGRAM(s) IntraMuscular once  LORazepam   Injectable 2 milliGRAM(s) IntraMuscular once  OLANZapine Disintegrating Tablet 15 milliGRAM(s) Oral at bedtime    MEDICATIONS  (PRN):  diphenhydrAMINE 50 milliGRAM(s) Oral every 4 hours PRN anxiety  diphenhydrAMINE   Injectable 50 milliGRAM(s) IntraMuscular once PRN eps prophylaxis  diphenhydrAMINE   Injectable 50 milliGRAM(s) IntraMuscular once PRN eps prophylaxis  fluPHENAZine 5 milliGRAM(s) Oral every 6 hours PRN agitation  fluPHENAZine  Injectable 5 milliGRAM(s) IntraMuscular once PRN severe agitation  fluPHENAZine  Injectable 5 milliGRAM(s) IntraMuscular once PRN severe agitation  LORazepam     Tablet 2 milliGRAM(s) Oral every 6 hours PRN Agitation  
MEDICATIONS  (STANDING):  LORazepam   Injectable 2 milliGRAM(s) IntraMuscular once  OLANZapine Disintegrating Tablet 10 milliGRAM(s) Oral at bedtime    MEDICATIONS  (PRN):  diphenhydrAMINE 50 milliGRAM(s) Oral every 4 hours PRN anxiety  diphenhydrAMINE   Injectable 50 milliGRAM(s) IntraMuscular once PRN eps prophylaxis  fluPHENAZine 5 milliGRAM(s) Oral every 6 hours PRN agitation  fluPHENAZine  Injectable 5 milliGRAM(s) IntraMuscular once PRN severe agitation  LORazepam     Tablet 2 milliGRAM(s) Oral every 6 hours PRN Agitation  
MEDICATIONS  (STANDING):  OLANZapine Disintegrating Tablet 15 milliGRAM(s) Oral at bedtime    MEDICATIONS  (PRN):  diphenhydrAMINE 50 milliGRAM(s) Oral every 4 hours PRN anxiety  diphenhydrAMINE   Injectable 50 milliGRAM(s) IntraMuscular once PRN eps prophylaxis  diphenhydrAMINE   Injectable 50 milliGRAM(s) IntraMuscular once PRN eps prophylaxis  fluPHENAZine 5 milliGRAM(s) Oral every 6 hours PRN agitation  fluPHENAZine  Injectable 5 milliGRAM(s) IntraMuscular once PRN severe agitation  LORazepam     Tablet 2 milliGRAM(s) Oral every 6 hours PRN Agitation  OLANZapine Injectable 10 milliGRAM(s) IntraMuscular at bedtime PRN psychosis if refuses po  
MEDICATIONS  (STANDING):  OLANZapine Disintegrating Tablet 20 milliGRAM(s) Oral at bedtime    MEDICATIONS  (PRN):  diphenhydrAMINE 50 milliGRAM(s) Oral every 4 hours PRN anxiety  diphenhydrAMINE   Injectable 50 milliGRAM(s) IntraMuscular once PRN eps prophylaxis  diphenhydrAMINE   Injectable 50 milliGRAM(s) IntraMuscular once PRN eps prophylaxis  fluPHENAZine 5 milliGRAM(s) Oral every 6 hours PRN agitation  fluPHENAZine  Injectable 5 milliGRAM(s) IntraMuscular once PRN severe agitation  LORazepam     Tablet 2 milliGRAM(s) Oral every 6 hours PRN Agitation  OLANZapine Injectable 10 milliGRAM(s) IntraMuscular at bedtime PRN psychosis if refuses po  
MEDICATIONS  (STANDING):  OLANZapine Disintegrating Tablet 30 milliGRAM(s) Oral at bedtime    MEDICATIONS  (PRN):  diphenhydrAMINE 50 milliGRAM(s) Oral every 4 hours PRN anxiety  diphenhydrAMINE   Injectable 50 milliGRAM(s) IntraMuscular once PRN eps prophylaxis  diphenhydrAMINE   Injectable 50 milliGRAM(s) IntraMuscular once PRN eps prophylaxis  fluPHENAZine 5 milliGRAM(s) Oral every 6 hours PRN agitation  fluPHENAZine  Injectable 5 milliGRAM(s) IntraMuscular once PRN severe agitation  LORazepam     Tablet 2 milliGRAM(s) Oral every 6 hours PRN Agitation  OLANZapine Injectable 10 milliGRAM(s) IntraMuscular at bedtime PRN psychosis if refuses po  
MEDICATIONS  (STANDING):  OLANZapine Disintegrating Tablet 30 milliGRAM(s) Oral at bedtime    MEDICATIONS  (PRN):  diphenhydrAMINE 50 milliGRAM(s) Oral every 4 hours PRN anxiety  diphenhydrAMINE   Injectable 50 milliGRAM(s) IntraMuscular once PRN eps prophylaxis  diphenhydrAMINE   Injectable 50 milliGRAM(s) IntraMuscular once PRN eps prophylaxis  fluPHENAZine 5 milliGRAM(s) Oral every 6 hours PRN agitation  fluPHENAZine  Injectable 5 milliGRAM(s) IntraMuscular once PRN severe agitation  LORazepam     Tablet 2 milliGRAM(s) Oral every 6 hours PRN Agitation  OLANZapine Injectable 10 milliGRAM(s) IntraMuscular at bedtime PRN psychosis if refuses po  
MEDICATIONS  (STANDING):  benztropine 0.5 milliGRAM(s) Oral two times a day  cloZAPine 175 milliGRAM(s) Oral at bedtime  OLANZapine Disintegrating Tablet 10 milliGRAM(s) Oral daily  OLANZapine Disintegrating Tablet 10 milliGRAM(s) Oral at bedtime    MEDICATIONS  (PRN):  diphenhydrAMINE 50 milliGRAM(s) Oral every 4 hours PRN anxiety  diphenhydrAMINE   Injectable 50 milliGRAM(s) IntraMuscular once PRN eps prophylaxis  diphenhydrAMINE   Injectable 50 milliGRAM(s) IntraMuscular once PRN eps prophylaxis  haloperidol     Tablet 5 milliGRAM(s) Oral every 4 hours PRN agitation  haloperidol    Injectable 5 milliGRAM(s) IntraMuscular once PRN combative behavior  haloperidol    Injectable 5 milliGRAM(s) IntraMuscular at bedtime PRN psychosis - refusing  po  ibuprofen  Tablet. 400 milliGRAM(s) Oral once PRN Mild Pain (1 - 3)  LORazepam     Tablet 2 milliGRAM(s) Oral every 6 hours PRN Agitation  LORazepam   Injectable 2 milliGRAM(s) IntraMuscular once PRN severe agitation  OLANZapine Injectable 10 milliGRAM(s) IntraMuscular at bedtime PRN psychosis if refuses po  polyethylene glycol 3350 17 Gram(s) Oral two times a day PRN constipation  
MEDICATIONS  (STANDING):  benztropine 0.5 milliGRAM(s) Oral two times a day  cloZAPine 175 milliGRAM(s) Oral at bedtime  OLANZapine Disintegrating Tablet 10 milliGRAM(s) Oral daily  OLANZapine Disintegrating Tablet 5 milliGRAM(s) Oral at bedtime    MEDICATIONS  (PRN):  diphenhydrAMINE 50 milliGRAM(s) Oral every 4 hours PRN anxiety  diphenhydrAMINE   Injectable 50 milliGRAM(s) IntraMuscular once PRN eps prophylaxis  diphenhydrAMINE   Injectable 50 milliGRAM(s) IntraMuscular once PRN eps prophylaxis  haloperidol     Tablet 5 milliGRAM(s) Oral every 4 hours PRN agitation  haloperidol    Injectable 5 milliGRAM(s) IntraMuscular once PRN combative behavior  haloperidol    Injectable 5 milliGRAM(s) IntraMuscular at bedtime PRN psychosis - refusing  po  ibuprofen  Tablet. 400 milliGRAM(s) Oral once PRN Mild Pain (1 - 3)  LORazepam     Tablet 2 milliGRAM(s) Oral every 6 hours PRN Agitation  LORazepam   Injectable 2 milliGRAM(s) IntraMuscular once PRN severe agitation  OLANZapine Injectable 10 milliGRAM(s) IntraMuscular at bedtime PRN psychosis if refuses po  polyethylene glycol 3350 17 Gram(s) Oral two times a day PRN constipation  
MEDICATIONS  (STANDING):  benztropine 1 milliGRAM(s) Oral at bedtime  haloperidol     Tablet 10 milliGRAM(s) Oral at bedtime  OLANZapine Disintegrating Tablet 15 milliGRAM(s) Oral at bedtime    MEDICATIONS  (PRN):  diphenhydrAMINE 50 milliGRAM(s) Oral every 4 hours PRN anxiety  diphenhydrAMINE   Injectable 50 milliGRAM(s) IntraMuscular once PRN eps prophylaxis  diphenhydrAMINE   Injectable 50 milliGRAM(s) IntraMuscular once PRN eps prophylaxis  haloperidol     Tablet 5 milliGRAM(s) Oral every 4 hours PRN agitation  haloperidol    Injectable 5 milliGRAM(s) IntraMuscular once PRN combative behavior  haloperidol    Injectable 5 milliGRAM(s) IntraMuscular at bedtime PRN psychosis - refusing  po  LORazepam     Tablet 2 milliGRAM(s) Oral every 6 hours PRN Agitation  OLANZapine Injectable 10 milliGRAM(s) IntraMuscular at bedtime PRN psychosis if refuses po  
MEDICATIONS  (STANDING):  benztropine 1 milliGRAM(s) Oral at bedtime  haloperidol     Tablet 15 milliGRAM(s) Oral at bedtime  OLANZapine Disintegrating Tablet 10 milliGRAM(s) Oral at bedtime    MEDICATIONS  (PRN):  diphenhydrAMINE 50 milliGRAM(s) Oral every 4 hours PRN anxiety  diphenhydrAMINE   Injectable 50 milliGRAM(s) IntraMuscular once PRN eps prophylaxis  diphenhydrAMINE   Injectable 50 milliGRAM(s) IntraMuscular once PRN eps prophylaxis  haloperidol     Tablet 5 milliGRAM(s) Oral every 4 hours PRN agitation  haloperidol    Injectable 5 milliGRAM(s) IntraMuscular at bedtime PRN psychosis - refusing  po  haloperidol    Injectable 5 milliGRAM(s) IntraMuscular once PRN combative behavior  LORazepam     Tablet 2 milliGRAM(s) Oral every 6 hours PRN Agitation  LORazepam   Injectable 2 milliGRAM(s) IntraMuscular once PRN severe agitation  OLANZapine Injectable 10 milliGRAM(s) IntraMuscular at bedtime PRN psychosis if refuses po  
MEDICATIONS  (STANDING):  benztropine 1 milliGRAM(s) Oral at bedtime  haloperidol     Tablet 15 milliGRAM(s) Oral at bedtime  OLANZapine Disintegrating Tablet 10 milliGRAM(s) Oral at bedtime    MEDICATIONS  (PRN):  diphenhydrAMINE 50 milliGRAM(s) Oral every 4 hours PRN anxiety  diphenhydrAMINE   Injectable 50 milliGRAM(s) IntraMuscular once PRN eps prophylaxis  diphenhydrAMINE   Injectable 50 milliGRAM(s) IntraMuscular once PRN eps prophylaxis  haloperidol     Tablet 5 milliGRAM(s) Oral every 4 hours PRN agitation  haloperidol    Injectable 5 milliGRAM(s) IntraMuscular at bedtime PRN psychosis - refusing  po  haloperidol    Injectable 5 milliGRAM(s) IntraMuscular once PRN combative behavior  LORazepam     Tablet 2 milliGRAM(s) Oral every 6 hours PRN Agitation  LORazepam   Injectable 2 milliGRAM(s) IntraMuscular once PRN severe agitation  OLANZapine Injectable 10 milliGRAM(s) IntraMuscular at bedtime PRN psychosis if refuses po  
MEDICATIONS  (STANDING):  benztropine 1 milliGRAM(s) Oral at bedtime  haloperidol     Tablet 15 milliGRAM(s) Oral at bedtime  OLANZapine Disintegrating Tablet 10 milliGRAM(s) Oral at bedtime    MEDICATIONS  (PRN):  diphenhydrAMINE 50 milliGRAM(s) Oral every 4 hours PRN anxiety  diphenhydrAMINE   Injectable 50 milliGRAM(s) IntraMuscular once PRN eps prophylaxis  diphenhydrAMINE   Injectable 50 milliGRAM(s) IntraMuscular once PRN eps prophylaxis  haloperidol     Tablet 5 milliGRAM(s) Oral every 4 hours PRN agitation  haloperidol    Injectable 5 milliGRAM(s) IntraMuscular at bedtime PRN psychosis - refusing  po  haloperidol    Injectable 5 milliGRAM(s) IntraMuscular once PRN combative behavior  LORazepam     Tablet 2 milliGRAM(s) Oral every 6 hours PRN Agitation  OLANZapine Injectable 10 milliGRAM(s) IntraMuscular at bedtime PRN psychosis if refuses po  
MEDICATIONS  (STANDING):  benztropine 1 milliGRAM(s) Oral at bedtime  haloperidol     Tablet 15 milliGRAM(s) Oral at bedtime  lumateperone tosylate 42 milliGRAM(s) Oral with dinner    MEDICATIONS  (PRN):  diphenhydrAMINE 50 milliGRAM(s) Oral every 4 hours PRN anxiety  diphenhydrAMINE   Injectable 50 milliGRAM(s) IntraMuscular once PRN eps prophylaxis  diphenhydrAMINE   Injectable 50 milliGRAM(s) IntraMuscular once PRN eps prophylaxis  haloperidol     Tablet 5 milliGRAM(s) Oral every 4 hours PRN agitation  haloperidol    Injectable 5 milliGRAM(s) IntraMuscular at bedtime PRN psychosis - refusing  po  haloperidol    Injectable 5 milliGRAM(s) IntraMuscular once PRN combative behavior  LORazepam     Tablet 2 milliGRAM(s) Oral every 6 hours PRN Agitation  LORazepam   Injectable 2 milliGRAM(s) IntraMuscular once PRN severe agitation  OLANZapine Injectable 10 milliGRAM(s) IntraMuscular at bedtime PRN psychosis if refuses po  
MEDICATIONS  (STANDING):  benztropine 1 milliGRAM(s) Oral at bedtime  lumateperone tosylate 42 milliGRAM(s) Oral <User Schedule>  OLANZapine Disintegrating Tablet 5 milliGRAM(s) Oral daily  OLANZapine Disintegrating Tablet 20 milliGRAM(s) Oral at bedtime    MEDICATIONS  (PRN):  diphenhydrAMINE 50 milliGRAM(s) Oral every 4 hours PRN anxiety  diphenhydrAMINE   Injectable 50 milliGRAM(s) IntraMuscular once PRN eps prophylaxis  diphenhydrAMINE   Injectable 50 milliGRAM(s) IntraMuscular once PRN eps prophylaxis  haloperidol     Tablet 5 milliGRAM(s) Oral every 4 hours PRN agitation  haloperidol    Injectable 5 milliGRAM(s) IntraMuscular at bedtime PRN psychosis - refusing  po  haloperidol    Injectable 5 milliGRAM(s) IntraMuscular once PRN combative behavior  LORazepam     Tablet 2 milliGRAM(s) Oral every 6 hours PRN Agitation  LORazepam   Injectable 2 milliGRAM(s) IntraMuscular once PRN severe agitation  OLANZapine Injectable 10 milliGRAM(s) IntraMuscular at bedtime PRN psychosis if refuses po  
MEDICATIONS  (STANDING):  benztropine 1 milliGRAM(s) Oral at bedtime  lumateperone tosylate 42 milliGRAM(s) Oral <User Schedule>  OLANZapine Disintegrating Tablet 5 milliGRAM(s) Oral daily  OLANZapine Disintegrating Tablet 20 milliGRAM(s) Oral at bedtime    MEDICATIONS  (PRN):  diphenhydrAMINE 50 milliGRAM(s) Oral every 4 hours PRN anxiety  diphenhydrAMINE   Injectable 50 milliGRAM(s) IntraMuscular once PRN eps prophylaxis  diphenhydrAMINE   Injectable 50 milliGRAM(s) IntraMuscular once PRN eps prophylaxis  haloperidol     Tablet 5 milliGRAM(s) Oral every 4 hours PRN agitation  haloperidol    Injectable 5 milliGRAM(s) IntraMuscular at bedtime PRN psychosis - refusing  po  haloperidol    Injectable 5 milliGRAM(s) IntraMuscular once PRN combative behavior  LORazepam     Tablet 2 milliGRAM(s) Oral every 6 hours PRN Agitation  LORazepam   Injectable 2 milliGRAM(s) IntraMuscular once PRN severe agitation  OLANZapine Injectable 10 milliGRAM(s) IntraMuscular at bedtime PRN psychosis if refuses po  
MEDICATIONS  (STANDING):  benztropine 1 milliGRAM(s) Oral at bedtime  lumateperone tosylate 42 milliGRAM(s) Oral with dinner  OLANZapine Disintegrating Tablet 10 milliGRAM(s) Oral at bedtime    MEDICATIONS  (PRN):  diphenhydrAMINE 50 milliGRAM(s) Oral every 4 hours PRN anxiety  diphenhydrAMINE   Injectable 50 milliGRAM(s) IntraMuscular once PRN eps prophylaxis  diphenhydrAMINE   Injectable 50 milliGRAM(s) IntraMuscular once PRN eps prophylaxis  haloperidol     Tablet 5 milliGRAM(s) Oral every 4 hours PRN agitation  haloperidol    Injectable 5 milliGRAM(s) IntraMuscular once PRN combative behavior  haloperidol    Injectable 5 milliGRAM(s) IntraMuscular at bedtime PRN psychosis - refusing  po  LORazepam     Tablet 2 milliGRAM(s) Oral every 6 hours PRN Agitation  LORazepam   Injectable 2 milliGRAM(s) IntraMuscular once PRN severe agitation  OLANZapine Injectable 10 milliGRAM(s) IntraMuscular at bedtime PRN psychosis if refuses po  
MEDICATIONS  (STANDING):  benztropine 1 milliGRAM(s) Oral at bedtime  lumateperone tosylate 42 milliGRAM(s) Oral with dinner  OLANZapine Disintegrating Tablet 15 milliGRAM(s) Oral at bedtime    MEDICATIONS  (PRN):  diphenhydrAMINE 50 milliGRAM(s) Oral every 4 hours PRN anxiety  diphenhydrAMINE   Injectable 50 milliGRAM(s) IntraMuscular once PRN eps prophylaxis  diphenhydrAMINE   Injectable 50 milliGRAM(s) IntraMuscular once PRN eps prophylaxis  haloperidol     Tablet 5 milliGRAM(s) Oral every 4 hours PRN agitation  haloperidol    Injectable 5 milliGRAM(s) IntraMuscular at bedtime PRN psychosis - refusing  po  haloperidol    Injectable 5 milliGRAM(s) IntraMuscular once PRN combative behavior  LORazepam     Tablet 2 milliGRAM(s) Oral every 6 hours PRN Agitation  LORazepam   Injectable 2 milliGRAM(s) IntraMuscular once PRN severe agitation  OLANZapine Injectable 10 milliGRAM(s) IntraMuscular at bedtime PRN psychosis if refuses po  
MEDICATIONS  (STANDING):  benztropine 1 milliGRAM(s) Oral two times a day  lumateperone tosylate 42 milliGRAM(s) Oral <User Schedule>  OLANZapine Disintegrating Tablet 10 milliGRAM(s) Oral daily  OLANZapine Disintegrating Tablet 20 milliGRAM(s) Oral at bedtime    MEDICATIONS  (PRN):  diphenhydrAMINE 50 milliGRAM(s) Oral every 4 hours PRN anxiety  diphenhydrAMINE   Injectable 50 milliGRAM(s) IntraMuscular once PRN eps prophylaxis  diphenhydrAMINE   Injectable 50 milliGRAM(s) IntraMuscular once PRN eps prophylaxis  haloperidol     Tablet 5 milliGRAM(s) Oral every 4 hours PRN agitation  haloperidol    Injectable 5 milliGRAM(s) IntraMuscular once PRN combative behavior  haloperidol    Injectable 5 milliGRAM(s) IntraMuscular at bedtime PRN psychosis - refusing  po  LORazepam     Tablet 2 milliGRAM(s) Oral every 6 hours PRN Agitation  LORazepam   Injectable 2 milliGRAM(s) IntraMuscular once PRN severe agitation  OLANZapine Injectable 10 milliGRAM(s) IntraMuscular at bedtime PRN psychosis if refuses po  
MEDICATIONS  (STANDING):  LORazepam   Injectable 2 milliGRAM(s) IntraMuscular once  LORazepam   Injectable 2 milliGRAM(s) IntraMuscular once  OLANZapine Disintegrating Tablet 15 milliGRAM(s) Oral at bedtime    MEDICATIONS  (PRN):  diphenhydrAMINE 50 milliGRAM(s) Oral every 4 hours PRN anxiety  diphenhydrAMINE   Injectable 50 milliGRAM(s) IntraMuscular once PRN eps prophylaxis  diphenhydrAMINE   Injectable 50 milliGRAM(s) IntraMuscular once PRN eps prophylaxis  fluPHENAZine 5 milliGRAM(s) Oral every 6 hours PRN agitation  fluPHENAZine  Injectable 5 milliGRAM(s) IntraMuscular once PRN severe agitation  LORazepam     Tablet 2 milliGRAM(s) Oral every 6 hours PRN Agitation  
MEDICATIONS  (STANDING):  benztropine 1 milliGRAM(s) Oral at bedtime  haloperidol     Tablet 7.5 milliGRAM(s) Oral at bedtime  OLANZapine Disintegrating Tablet 15 milliGRAM(s) Oral at bedtime    MEDICATIONS  (PRN):  diphenhydrAMINE 50 milliGRAM(s) Oral every 4 hours PRN anxiety  diphenhydrAMINE   Injectable 50 milliGRAM(s) IntraMuscular once PRN eps prophylaxis  diphenhydrAMINE   Injectable 50 milliGRAM(s) IntraMuscular once PRN eps prophylaxis  haloperidol     Tablet 5 milliGRAM(s) Oral every 4 hours PRN agitation  haloperidol    Injectable 5 milliGRAM(s) IntraMuscular at bedtime PRN psychosis - refusing  po  haloperidol    Injectable 5 milliGRAM(s) IntraMuscular once PRN combative behavior  LORazepam     Tablet 2 milliGRAM(s) Oral every 6 hours PRN Agitation  OLANZapine Injectable 10 milliGRAM(s) IntraMuscular at bedtime PRN psychosis if refuses po  
MEDICATIONS  (STANDING):  haloperidol     Tablet 5 milliGRAM(s) Oral daily  haloperidol     Tablet 5 milliGRAM(s) Oral at bedtime  LORazepam   Injectable 2 milliGRAM(s) IntraMuscular once  melatonin. 3 milliGRAM(s) Oral at bedtime    MEDICATIONS  (PRN):  diphenhydrAMINE 50 milliGRAM(s) Oral every 4 hours PRN anxiety  diphenhydrAMINE   Injectable 50 milliGRAM(s) IntraMuscular once PRN eps prophylaxis  fluPHENAZine 5 milliGRAM(s) Oral every 6 hours PRN agitation  fluPHENAZine  Injectable 5 milliGRAM(s) IntraMuscular once PRN severe agitation  LORazepam     Tablet 2 milliGRAM(s) Oral every 6 hours PRN Agitation  
MEDICATIONS  (STANDING):  LORazepam   Injectable 2 milliGRAM(s) IntraMuscular once  LORazepam   Injectable 2 milliGRAM(s) IntraMuscular once  OLANZapine Disintegrating Tablet 15 milliGRAM(s) Oral at bedtime    MEDICATIONS  (PRN):  diphenhydrAMINE 50 milliGRAM(s) Oral every 4 hours PRN anxiety  diphenhydrAMINE   Injectable 50 milliGRAM(s) IntraMuscular once PRN eps prophylaxis  diphenhydrAMINE   Injectable 50 milliGRAM(s) IntraMuscular once PRN eps prophylaxis  fluPHENAZine 5 milliGRAM(s) Oral every 6 hours PRN agitation  fluPHENAZine  Injectable 5 milliGRAM(s) IntraMuscular once PRN severe agitation  fluPHENAZine  Injectable 5 milliGRAM(s) IntraMuscular once PRN severe agitation  LORazepam     Tablet 2 milliGRAM(s) Oral every 6 hours PRN Agitation  
MEDICATIONS  (STANDING):  benztropine 1 milliGRAM(s) Oral at bedtime  haloperidol     Tablet 15 milliGRAM(s) Oral at bedtime  lumateperone tosylate 42 milliGRAM(s) Oral with dinner    MEDICATIONS  (PRN):  diphenhydrAMINE 50 milliGRAM(s) Oral every 4 hours PRN anxiety  diphenhydrAMINE   Injectable 50 milliGRAM(s) IntraMuscular once PRN eps prophylaxis  diphenhydrAMINE   Injectable 50 milliGRAM(s) IntraMuscular once PRN eps prophylaxis  haloperidol     Tablet 5 milliGRAM(s) Oral every 4 hours PRN agitation  haloperidol    Injectable 5 milliGRAM(s) IntraMuscular at bedtime PRN psychosis - refusing  po  haloperidol    Injectable 5 milliGRAM(s) IntraMuscular once PRN combative behavior  LORazepam     Tablet 2 milliGRAM(s) Oral every 6 hours PRN Agitation  LORazepam   Injectable 2 milliGRAM(s) IntraMuscular once PRN severe agitation  OLANZapine Injectable 10 milliGRAM(s) IntraMuscular at bedtime PRN psychosis if refuses po  
MEDICATIONS  (STANDING):  benztropine 1 milliGRAM(s) Oral at bedtime  lumateperone tosylate 42 milliGRAM(s) Oral with dinner  OLANZapine Disintegrating Tablet 15 milliGRAM(s) Oral at bedtime    MEDICATIONS  (PRN):  diphenhydrAMINE 50 milliGRAM(s) Oral every 4 hours PRN anxiety  diphenhydrAMINE   Injectable 50 milliGRAM(s) IntraMuscular once PRN eps prophylaxis  diphenhydrAMINE   Injectable 50 milliGRAM(s) IntraMuscular once PRN eps prophylaxis  haloperidol     Tablet 5 milliGRAM(s) Oral every 4 hours PRN agitation  haloperidol    Injectable 5 milliGRAM(s) IntraMuscular once PRN combative behavior  haloperidol    Injectable 5 milliGRAM(s) IntraMuscular at bedtime PRN psychosis - refusing  po  LORazepam     Tablet 2 milliGRAM(s) Oral every 6 hours PRN Agitation  LORazepam   Injectable 2 milliGRAM(s) IntraMuscular once PRN severe agitation  OLANZapine Injectable 10 milliGRAM(s) IntraMuscular at bedtime PRN psychosis if refuses po  
MEDICATIONS  (STANDING):  haloperidol     Tablet 5 milliGRAM(s) Oral at bedtime  OLANZapine Disintegrating Tablet 20 milliGRAM(s) Oral at bedtime    MEDICATIONS  (PRN):  diphenhydrAMINE 50 milliGRAM(s) Oral every 4 hours PRN anxiety  diphenhydrAMINE   Injectable 50 milliGRAM(s) IntraMuscular once PRN eps prophylaxis  diphenhydrAMINE   Injectable 50 milliGRAM(s) IntraMuscular once PRN eps prophylaxis  haloperidol     Tablet 5 milliGRAM(s) Oral every 4 hours PRN agitation  haloperidol    Injectable 5 milliGRAM(s) IntraMuscular at bedtime PRN psychosis - refusing  po  haloperidol    Injectable 5 milliGRAM(s) IntraMuscular once PRN combative behavior  LORazepam     Tablet 2 milliGRAM(s) Oral every 6 hours PRN Agitation  OLANZapine Injectable 10 milliGRAM(s) IntraMuscular at bedtime PRN psychosis if refuses po  
MEDICATIONS  (STANDING):  benztropine 1 milliGRAM(s) Oral at bedtime  haloperidol     Tablet 10 milliGRAM(s) Oral at bedtime  OLANZapine Disintegrating Tablet 15 milliGRAM(s) Oral at bedtime    MEDICATIONS  (PRN):  diphenhydrAMINE 50 milliGRAM(s) Oral every 4 hours PRN anxiety  diphenhydrAMINE   Injectable 50 milliGRAM(s) IntraMuscular once PRN eps prophylaxis  diphenhydrAMINE   Injectable 50 milliGRAM(s) IntraMuscular once PRN eps prophylaxis  haloperidol     Tablet 5 milliGRAM(s) Oral every 4 hours PRN agitation  haloperidol    Injectable 5 milliGRAM(s) IntraMuscular once PRN combative behavior  haloperidol    Injectable 5 milliGRAM(s) IntraMuscular at bedtime PRN psychosis - refusing  po  LORazepam     Tablet 2 milliGRAM(s) Oral every 6 hours PRN Agitation  OLANZapine Injectable 10 milliGRAM(s) IntraMuscular at bedtime PRN psychosis if refuses po  
MEDICATIONS  (STANDING):  LORazepam   Injectable 2 milliGRAM(s) IntraMuscular once  OLANZapine Disintegrating Tablet 5 milliGRAM(s) Oral at bedtime    MEDICATIONS  (PRN):  diphenhydrAMINE 50 milliGRAM(s) Oral every 4 hours PRN anxiety  diphenhydrAMINE   Injectable 50 milliGRAM(s) IntraMuscular once PRN eps prophylaxis  fluPHENAZine 5 milliGRAM(s) Oral every 6 hours PRN agitation  fluPHENAZine  Injectable 5 milliGRAM(s) IntraMuscular once PRN severe agitation  LORazepam     Tablet 2 milliGRAM(s) Oral every 6 hours PRN Agitation  
MEDICATIONS  (STANDING):  OLANZapine Disintegrating Tablet 30 milliGRAM(s) Oral at bedtime    MEDICATIONS  (PRN):  diphenhydrAMINE 50 milliGRAM(s) Oral every 4 hours PRN anxiety  diphenhydrAMINE   Injectable 50 milliGRAM(s) IntraMuscular once PRN eps prophylaxis  diphenhydrAMINE   Injectable 50 milliGRAM(s) IntraMuscular once PRN eps prophylaxis  fluPHENAZine 5 milliGRAM(s) Oral every 6 hours PRN agitation  fluPHENAZine  Injectable 5 milliGRAM(s) IntraMuscular once PRN severe agitation  LORazepam     Tablet 2 milliGRAM(s) Oral every 6 hours PRN Agitation  OLANZapine Injectable 10 milliGRAM(s) IntraMuscular at bedtime PRN psychosis if refuses po  
MEDICATIONS  (STANDING):  benztropine 1 milliGRAM(s) Oral two times a day  cloZAPine 100 milliGRAM(s) Oral at bedtime  lumateperone tosylate 42 milliGRAM(s) Oral <User Schedule>  OLANZapine Disintegrating Tablet 10 milliGRAM(s) Oral daily  OLANZapine Disintegrating Tablet 10 milliGRAM(s) Oral at bedtime    MEDICATIONS  (PRN):  diphenhydrAMINE 50 milliGRAM(s) Oral every 4 hours PRN anxiety  diphenhydrAMINE   Injectable 50 milliGRAM(s) IntraMuscular once PRN eps prophylaxis  diphenhydrAMINE   Injectable 50 milliGRAM(s) IntraMuscular once PRN eps prophylaxis  haloperidol     Tablet 5 milliGRAM(s) Oral every 4 hours PRN agitation  haloperidol    Injectable 5 milliGRAM(s) IntraMuscular at bedtime PRN psychosis - refusing  po  haloperidol    Injectable 5 milliGRAM(s) IntraMuscular once PRN combative behavior  ibuprofen  Tablet. 400 milliGRAM(s) Oral once PRN Mild Pain (1 - 3)  LORazepam     Tablet 2 milliGRAM(s) Oral every 6 hours PRN Agitation  LORazepam   Injectable 2 milliGRAM(s) IntraMuscular once PRN severe agitation  OLANZapine Injectable 10 milliGRAM(s) IntraMuscular at bedtime PRN psychosis if refuses po  
MEDICATIONS  (STANDING):  benztropine 1 milliGRAM(s) Oral at bedtime  haloperidol     Tablet 15 milliGRAM(s) Oral at bedtime  OLANZapine Disintegrating Tablet 10 milliGRAM(s) Oral at bedtime    MEDICATIONS  (PRN):  diphenhydrAMINE 50 milliGRAM(s) Oral every 4 hours PRN anxiety  diphenhydrAMINE   Injectable 50 milliGRAM(s) IntraMuscular once PRN eps prophylaxis  diphenhydrAMINE   Injectable 50 milliGRAM(s) IntraMuscular once PRN eps prophylaxis  haloperidol     Tablet 5 milliGRAM(s) Oral every 4 hours PRN agitation  haloperidol    Injectable 5 milliGRAM(s) IntraMuscular once PRN combative behavior  haloperidol    Injectable 5 milliGRAM(s) IntraMuscular at bedtime PRN psychosis - refusing  po  LORazepam     Tablet 2 milliGRAM(s) Oral every 6 hours PRN Agitation  LORazepam   Injectable 2 milliGRAM(s) IntraMuscular once PRN severe agitation  OLANZapine Injectable 10 milliGRAM(s) IntraMuscular at bedtime PRN psychosis if refuses po  
MEDICATIONS  (STANDING):  benztropine 1 milliGRAM(s) Oral at bedtime  haloperidol     Tablet 15 milliGRAM(s) Oral at bedtime  lumateperone tosylate 42 milliGRAM(s) Oral with dinner    MEDICATIONS  (PRN):  diphenhydrAMINE 50 milliGRAM(s) Oral every 4 hours PRN anxiety  diphenhydrAMINE   Injectable 50 milliGRAM(s) IntraMuscular once PRN eps prophylaxis  diphenhydrAMINE   Injectable 50 milliGRAM(s) IntraMuscular once PRN eps prophylaxis  haloperidol     Tablet 5 milliGRAM(s) Oral every 4 hours PRN agitation  haloperidol    Injectable 5 milliGRAM(s) IntraMuscular once PRN combative behavior  haloperidol    Injectable 5 milliGRAM(s) IntraMuscular at bedtime PRN psychosis - refusing  po  LORazepam     Tablet 2 milliGRAM(s) Oral every 6 hours PRN Agitation  LORazepam   Injectable 2 milliGRAM(s) IntraMuscular once PRN severe agitation  OLANZapine Injectable 10 milliGRAM(s) IntraMuscular at bedtime PRN psychosis if refuses po  
MEDICATIONS  (STANDING):  benztropine 1 milliGRAM(s) Oral at bedtime  haloperidol     Tablet 7.5 milliGRAM(s) Oral at bedtime  OLANZapine Disintegrating Tablet 15 milliGRAM(s) Oral at bedtime    MEDICATIONS  (PRN):  diphenhydrAMINE 50 milliGRAM(s) Oral every 4 hours PRN anxiety  diphenhydrAMINE   Injectable 50 milliGRAM(s) IntraMuscular once PRN eps prophylaxis  diphenhydrAMINE   Injectable 50 milliGRAM(s) IntraMuscular once PRN eps prophylaxis  haloperidol     Tablet 5 milliGRAM(s) Oral every 4 hours PRN agitation  haloperidol    Injectable 5 milliGRAM(s) IntraMuscular at bedtime PRN psychosis - refusing  po  haloperidol    Injectable 5 milliGRAM(s) IntraMuscular once PRN combative behavior  LORazepam     Tablet 2 milliGRAM(s) Oral every 6 hours PRN Agitation  OLANZapine Injectable 10 milliGRAM(s) IntraMuscular at bedtime PRN psychosis if refuses po  
MEDICATIONS  (STANDING):  OLANZapine Disintegrating Tablet 25 milliGRAM(s) Oral at bedtime    MEDICATIONS  (PRN):  diphenhydrAMINE 50 milliGRAM(s) Oral every 4 hours PRN anxiety  diphenhydrAMINE   Injectable 50 milliGRAM(s) IntraMuscular once PRN eps prophylaxis  diphenhydrAMINE   Injectable 50 milliGRAM(s) IntraMuscular once PRN eps prophylaxis  fluPHENAZine 5 milliGRAM(s) Oral every 6 hours PRN agitation  fluPHENAZine  Injectable 5 milliGRAM(s) IntraMuscular once PRN severe agitation  LORazepam     Tablet 2 milliGRAM(s) Oral every 6 hours PRN Agitation  OLANZapine Injectable 10 milliGRAM(s) IntraMuscular at bedtime PRN psychosis if refuses po  
MEDICATIONS  (STANDING):  benztropine 1 milliGRAM(s) Oral two times a day  lumateperone tosylate 42 milliGRAM(s) Oral <User Schedule>  OLANZapine Disintegrating Tablet 5 milliGRAM(s) Oral daily  OLANZapine Disintegrating Tablet 20 milliGRAM(s) Oral at bedtime    MEDICATIONS  (PRN):  diphenhydrAMINE 50 milliGRAM(s) Oral every 4 hours PRN anxiety  diphenhydrAMINE   Injectable 50 milliGRAM(s) IntraMuscular once PRN eps prophylaxis  diphenhydrAMINE   Injectable 50 milliGRAM(s) IntraMuscular once PRN eps prophylaxis  haloperidol     Tablet 5 milliGRAM(s) Oral every 4 hours PRN agitation  haloperidol    Injectable 5 milliGRAM(s) IntraMuscular at bedtime PRN psychosis - refusing  po  haloperidol    Injectable 5 milliGRAM(s) IntraMuscular once PRN combative behavior  LORazepam     Tablet 2 milliGRAM(s) Oral every 6 hours PRN Agitation  LORazepam   Injectable 2 milliGRAM(s) IntraMuscular once PRN severe agitation  OLANZapine Injectable 10 milliGRAM(s) IntraMuscular at bedtime PRN psychosis if refuses po  
MEDICATIONS  (STANDING):  LORazepam   Injectable 2 milliGRAM(s) IntraMuscular once  OLANZapine Disintegrating Tablet 15 milliGRAM(s) Oral at bedtime    MEDICATIONS  (PRN):  diphenhydrAMINE 50 milliGRAM(s) Oral every 4 hours PRN anxiety  diphenhydrAMINE   Injectable 50 milliGRAM(s) IntraMuscular once PRN eps prophylaxis  fluPHENAZine 5 milliGRAM(s) Oral every 6 hours PRN agitation  fluPHENAZine  Injectable 5 milliGRAM(s) IntraMuscular once PRN severe agitation  LORazepam     Tablet 2 milliGRAM(s) Oral every 6 hours PRN Agitation  
MEDICATIONS  (STANDING):  benztropine 1 milliGRAM(s) Oral at bedtime  haloperidol     Tablet 7.5 milliGRAM(s) Oral at bedtime  OLANZapine Disintegrating Tablet 15 milliGRAM(s) Oral at bedtime    MEDICATIONS  (PRN):  diphenhydrAMINE 50 milliGRAM(s) Oral every 4 hours PRN anxiety  diphenhydrAMINE   Injectable 50 milliGRAM(s) IntraMuscular once PRN eps prophylaxis  diphenhydrAMINE   Injectable 50 milliGRAM(s) IntraMuscular once PRN eps prophylaxis  haloperidol     Tablet 5 milliGRAM(s) Oral every 4 hours PRN agitation  haloperidol    Injectable 5 milliGRAM(s) IntraMuscular once PRN combative behavior  haloperidol    Injectable 5 milliGRAM(s) IntraMuscular at bedtime PRN psychosis - refusing  po  LORazepam     Tablet 2 milliGRAM(s) Oral every 6 hours PRN Agitation  OLANZapine Injectable 10 milliGRAM(s) IntraMuscular at bedtime PRN psychosis if refuses po  
MEDICATIONS  (STANDING):  benztropine 1 milliGRAM(s) Oral two times a day  cloZAPine 137.5 milliGRAM(s) Oral at bedtime  lumateperone tosylate 42 milliGRAM(s) Oral <User Schedule>  OLANZapine Disintegrating Tablet 10 milliGRAM(s) Oral daily  OLANZapine Disintegrating Tablet 10 milliGRAM(s) Oral at bedtime    MEDICATIONS  (PRN):  diphenhydrAMINE 50 milliGRAM(s) Oral every 4 hours PRN anxiety  diphenhydrAMINE   Injectable 50 milliGRAM(s) IntraMuscular once PRN eps prophylaxis  diphenhydrAMINE   Injectable 50 milliGRAM(s) IntraMuscular once PRN eps prophylaxis  haloperidol     Tablet 5 milliGRAM(s) Oral every 4 hours PRN agitation  haloperidol    Injectable 5 milliGRAM(s) IntraMuscular at bedtime PRN psychosis - refusing  po  haloperidol    Injectable 5 milliGRAM(s) IntraMuscular once PRN combative behavior  ibuprofen  Tablet. 400 milliGRAM(s) Oral once PRN Mild Pain (1 - 3)  LORazepam     Tablet 2 milliGRAM(s) Oral every 6 hours PRN Agitation  LORazepam   Injectable 2 milliGRAM(s) IntraMuscular once PRN severe agitation  OLANZapine Injectable 10 milliGRAM(s) IntraMuscular at bedtime PRN psychosis if refuses po  
MEDICATIONS  (STANDING):  benztropine 1 milliGRAM(s) Oral two times a day  lumateperone tosylate 42 milliGRAM(s) Oral <User Schedule>  OLANZapine Disintegrating Tablet 5 milliGRAM(s) Oral daily  OLANZapine Disintegrating Tablet 20 milliGRAM(s) Oral at bedtime    MEDICATIONS  (PRN):  diphenhydrAMINE 50 milliGRAM(s) Oral every 4 hours PRN anxiety  diphenhydrAMINE   Injectable 50 milliGRAM(s) IntraMuscular once PRN eps prophylaxis  diphenhydrAMINE   Injectable 50 milliGRAM(s) IntraMuscular once PRN eps prophylaxis  haloperidol     Tablet 5 milliGRAM(s) Oral every 4 hours PRN agitation  haloperidol    Injectable 5 milliGRAM(s) IntraMuscular at bedtime PRN psychosis - refusing  po  haloperidol    Injectable 5 milliGRAM(s) IntraMuscular once PRN combative behavior  LORazepam     Tablet 2 milliGRAM(s) Oral every 6 hours PRN Agitation  LORazepam   Injectable 2 milliGRAM(s) IntraMuscular once PRN severe agitation  OLANZapine Injectable 10 milliGRAM(s) IntraMuscular at bedtime PRN psychosis if refuses po  
MEDICATIONS  (STANDING):  OLANZapine Disintegrating Tablet 20 milliGRAM(s) Oral at bedtime    MEDICATIONS  (PRN):  diphenhydrAMINE 50 milliGRAM(s) Oral every 4 hours PRN anxiety  diphenhydrAMINE   Injectable 50 milliGRAM(s) IntraMuscular once PRN eps prophylaxis  diphenhydrAMINE   Injectable 50 milliGRAM(s) IntraMuscular once PRN eps prophylaxis  fluPHENAZine 5 milliGRAM(s) Oral every 6 hours PRN agitation  fluPHENAZine  Injectable 5 milliGRAM(s) IntraMuscular once PRN severe agitation  LORazepam     Tablet 2 milliGRAM(s) Oral every 6 hours PRN Agitation  OLANZapine Injectable 10 milliGRAM(s) IntraMuscular at bedtime PRN psychosis if refuses po  
MEDICATIONS  (STANDING):  benztropine 1 milliGRAM(s) Oral at bedtime  haloperidol     Tablet 15 milliGRAM(s) Oral at bedtime  OLANZapine Disintegrating Tablet 10 milliGRAM(s) Oral at bedtime    MEDICATIONS  (PRN):  diphenhydrAMINE 50 milliGRAM(s) Oral every 4 hours PRN anxiety  diphenhydrAMINE   Injectable 50 milliGRAM(s) IntraMuscular once PRN eps prophylaxis  diphenhydrAMINE   Injectable 50 milliGRAM(s) IntraMuscular once PRN eps prophylaxis  haloperidol     Tablet 5 milliGRAM(s) Oral every 4 hours PRN agitation  haloperidol    Injectable 5 milliGRAM(s) IntraMuscular once PRN combative behavior  haloperidol    Injectable 5 milliGRAM(s) IntraMuscular at bedtime PRN psychosis - refusing  po  LORazepam     Tablet 2 milliGRAM(s) Oral every 6 hours PRN Agitation  LORazepam   Injectable 2 milliGRAM(s) IntraMuscular once PRN severe agitation  OLANZapine Injectable 10 milliGRAM(s) IntraMuscular at bedtime PRN psychosis if refuses po  
MEDICATIONS  (STANDING):  benztropine 1 milliGRAM(s) Oral at bedtime  lumateperone tosylate 42 milliGRAM(s) Oral with dinner  OLANZapine Disintegrating Tablet 20 milliGRAM(s) Oral at bedtime    MEDICATIONS  (PRN):  diphenhydrAMINE 50 milliGRAM(s) Oral every 4 hours PRN anxiety  diphenhydrAMINE   Injectable 50 milliGRAM(s) IntraMuscular once PRN eps prophylaxis  diphenhydrAMINE   Injectable 50 milliGRAM(s) IntraMuscular once PRN eps prophylaxis  haloperidol     Tablet 5 milliGRAM(s) Oral every 4 hours PRN agitation  haloperidol    Injectable 5 milliGRAM(s) IntraMuscular once PRN combative behavior  haloperidol    Injectable 5 milliGRAM(s) IntraMuscular at bedtime PRN psychosis - refusing  po  LORazepam     Tablet 2 milliGRAM(s) Oral every 6 hours PRN Agitation  LORazepam   Injectable 2 milliGRAM(s) IntraMuscular once PRN severe agitation  OLANZapine Injectable 10 milliGRAM(s) IntraMuscular at bedtime PRN psychosis if refuses po  
MEDICATIONS  (STANDING):  benztropine 1 milliGRAM(s) Oral at bedtime  haloperidol     Tablet 15 milliGRAM(s) Oral at bedtime  lumateperone tosylate 42 milliGRAM(s) Oral with dinner    MEDICATIONS  (PRN):  diphenhydrAMINE 50 milliGRAM(s) Oral every 4 hours PRN anxiety  diphenhydrAMINE   Injectable 50 milliGRAM(s) IntraMuscular once PRN eps prophylaxis  diphenhydrAMINE   Injectable 50 milliGRAM(s) IntraMuscular once PRN eps prophylaxis  haloperidol     Tablet 5 milliGRAM(s) Oral every 4 hours PRN agitation  haloperidol    Injectable 5 milliGRAM(s) IntraMuscular at bedtime PRN psychosis - refusing  po  haloperidol    Injectable 5 milliGRAM(s) IntraMuscular once PRN combative behavior  LORazepam     Tablet 2 milliGRAM(s) Oral every 6 hours PRN Agitation  LORazepam   Injectable 2 milliGRAM(s) IntraMuscular once PRN severe agitation  OLANZapine Injectable 10 milliGRAM(s) IntraMuscular at bedtime PRN psychosis if refuses po  
MEDICATIONS  (STANDING):  benztropine 1 milliGRAM(s) Oral at bedtime  haloperidol     Tablet 7.5 milliGRAM(s) Oral at bedtime  OLANZapine Disintegrating Tablet 15 milliGRAM(s) Oral at bedtime    MEDICATIONS  (PRN):  diphenhydrAMINE 50 milliGRAM(s) Oral every 4 hours PRN anxiety  diphenhydrAMINE   Injectable 50 milliGRAM(s) IntraMuscular once PRN eps prophylaxis  diphenhydrAMINE   Injectable 50 milliGRAM(s) IntraMuscular once PRN eps prophylaxis  haloperidol     Tablet 5 milliGRAM(s) Oral every 4 hours PRN agitation  haloperidol    Injectable 5 milliGRAM(s) IntraMuscular at bedtime PRN psychosis - refusing  po  haloperidol    Injectable 5 milliGRAM(s) IntraMuscular once PRN combative behavior  LORazepam     Tablet 2 milliGRAM(s) Oral every 6 hours PRN Agitation  OLANZapine Injectable 10 milliGRAM(s) IntraMuscular at bedtime PRN psychosis if refuses po  
MEDICATIONS  (STANDING):  benztropine 1 milliGRAM(s) Oral two times a day  cloZAPine 100 milliGRAM(s) Oral at bedtime  lumateperone tosylate 42 milliGRAM(s) Oral <User Schedule>  OLANZapine Disintegrating Tablet 10 milliGRAM(s) Oral daily  OLANZapine Disintegrating Tablet 10 milliGRAM(s) Oral at bedtime    MEDICATIONS  (PRN):  diphenhydrAMINE 50 milliGRAM(s) Oral every 4 hours PRN anxiety  diphenhydrAMINE   Injectable 50 milliGRAM(s) IntraMuscular once PRN eps prophylaxis  diphenhydrAMINE   Injectable 50 milliGRAM(s) IntraMuscular once PRN eps prophylaxis  haloperidol     Tablet 5 milliGRAM(s) Oral every 4 hours PRN agitation  haloperidol    Injectable 5 milliGRAM(s) IntraMuscular at bedtime PRN psychosis - refusing  po  haloperidol    Injectable 5 milliGRAM(s) IntraMuscular once PRN combative behavior  ibuprofen  Tablet. 400 milliGRAM(s) Oral once PRN Mild Pain (1 - 3)  LORazepam     Tablet 2 milliGRAM(s) Oral every 6 hours PRN Agitation  LORazepam   Injectable 2 milliGRAM(s) IntraMuscular once PRN severe agitation  OLANZapine Injectable 10 milliGRAM(s) IntraMuscular at bedtime PRN psychosis if refuses po  
MEDICATIONS  (STANDING):  ARIPiprazole 10 milliGRAM(s) Oral at bedtime  cephalexin 500 milliGRAM(s) Oral every 12 hours  LORazepam   Injectable 2 milliGRAM(s) IntraMuscular once    MEDICATIONS  (PRN):  diphenhydrAMINE 50 milliGRAM(s) Oral every 4 hours PRN anxiety  diphenhydrAMINE   Injectable 50 milliGRAM(s) IntraMuscular once PRN eps prophylaxis  fluPHENAZine 5 milliGRAM(s) Oral every 6 hours PRN agitation  fluPHENAZine  Injectable 5 milliGRAM(s) IntraMuscular once PRN severe agitation  LORazepam     Tablet 2 milliGRAM(s) Oral every 6 hours PRN Agitation  
MEDICATIONS  (STANDING):  LORazepam   Injectable 2 milliGRAM(s) IntraMuscular once  LORazepam   Injectable 2 milliGRAM(s) IntraMuscular once  OLANZapine Disintegrating Tablet 15 milliGRAM(s) Oral at bedtime    MEDICATIONS  (PRN):  diphenhydrAMINE 50 milliGRAM(s) Oral every 4 hours PRN anxiety  diphenhydrAMINE   Injectable 50 milliGRAM(s) IntraMuscular once PRN eps prophylaxis  diphenhydrAMINE   Injectable 50 milliGRAM(s) IntraMuscular once PRN eps prophylaxis  fluPHENAZine 5 milliGRAM(s) Oral every 6 hours PRN agitation  fluPHENAZine  Injectable 5 milliGRAM(s) IntraMuscular once PRN severe agitation  LORazepam     Tablet 2 milliGRAM(s) Oral every 6 hours PRN Agitation  
MEDICATIONS  (STANDING):  benztropine 0.5 milliGRAM(s) Oral two times a day  cloZAPine 175 milliGRAM(s) Oral at bedtime  OLANZapine Disintegrating Tablet 10 milliGRAM(s) Oral daily  OLANZapine Disintegrating Tablet 10 milliGRAM(s) Oral at bedtime    MEDICATIONS  (PRN):  diphenhydrAMINE 50 milliGRAM(s) Oral every 4 hours PRN anxiety  diphenhydrAMINE   Injectable 50 milliGRAM(s) IntraMuscular once PRN eps prophylaxis  diphenhydrAMINE   Injectable 50 milliGRAM(s) IntraMuscular once PRN eps prophylaxis  haloperidol     Tablet 5 milliGRAM(s) Oral every 4 hours PRN agitation  haloperidol    Injectable 5 milliGRAM(s) IntraMuscular once PRN combative behavior  haloperidol    Injectable 5 milliGRAM(s) IntraMuscular at bedtime PRN psychosis - refusing  po  ibuprofen  Tablet. 400 milliGRAM(s) Oral once PRN Mild Pain (1 - 3)  LORazepam     Tablet 2 milliGRAM(s) Oral every 6 hours PRN Agitation  LORazepam   Injectable 2 milliGRAM(s) IntraMuscular once PRN severe agitation  OLANZapine Injectable 10 milliGRAM(s) IntraMuscular at bedtime PRN psychosis if refuses po  polyethylene glycol 3350 17 Gram(s) Oral two times a day PRN constipation  
MEDICATIONS  (STANDING):  haloperidol     Tablet 5 milliGRAM(s) Oral at bedtime  OLANZapine Disintegrating Tablet 20 milliGRAM(s) Oral at bedtime    MEDICATIONS  (PRN):  diphenhydrAMINE 50 milliGRAM(s) Oral every 4 hours PRN anxiety  diphenhydrAMINE   Injectable 50 milliGRAM(s) IntraMuscular once PRN eps prophylaxis  diphenhydrAMINE   Injectable 50 milliGRAM(s) IntraMuscular once PRN eps prophylaxis  haloperidol     Tablet 5 milliGRAM(s) Oral every 4 hours PRN agitation  haloperidol    Injectable 5 milliGRAM(s) IntraMuscular at bedtime PRN psychosis - refusing  po  haloperidol    Injectable 5 milliGRAM(s) IntraMuscular once PRN combative behavior  LORazepam     Tablet 2 milliGRAM(s) Oral every 6 hours PRN Agitation  OLANZapine Injectable 10 milliGRAM(s) IntraMuscular at bedtime PRN psychosis if refuses po  
MEDICATIONS  (STANDING):  ARIPiprazole 25 milliGRAM(s) Oral at bedtime  haloperidol     Tablet 5 milliGRAM(s) Oral daily  haloperidol     Tablet 5 milliGRAM(s) Oral at bedtime  LORazepam   Injectable 2 milliGRAM(s) IntraMuscular once  melatonin. 3 milliGRAM(s) Oral at bedtime    MEDICATIONS  (PRN):  diphenhydrAMINE 50 milliGRAM(s) Oral every 4 hours PRN anxiety  diphenhydrAMINE   Injectable 50 milliGRAM(s) IntraMuscular once PRN eps prophylaxis  fluPHENAZine 5 milliGRAM(s) Oral every 6 hours PRN agitation  fluPHENAZine  Injectable 5 milliGRAM(s) IntraMuscular once PRN severe agitation  LORazepam     Tablet 2 milliGRAM(s) Oral every 6 hours PRN Agitation  
MEDICATIONS  (STANDING):  OLANZapine Disintegrating Tablet 15 milliGRAM(s) Oral at bedtime    MEDICATIONS  (PRN):  diphenhydrAMINE 50 milliGRAM(s) Oral every 4 hours PRN anxiety  diphenhydrAMINE   Injectable 50 milliGRAM(s) IntraMuscular once PRN eps prophylaxis  diphenhydrAMINE   Injectable 50 milliGRAM(s) IntraMuscular once PRN eps prophylaxis  fluPHENAZine 5 milliGRAM(s) Oral every 6 hours PRN agitation  fluPHENAZine  Injectable 5 milliGRAM(s) IntraMuscular once PRN severe agitation  LORazepam     Tablet 2 milliGRAM(s) Oral every 6 hours PRN Agitation  OLANZapine Injectable 10 milliGRAM(s) IntraMuscular at bedtime PRN psychosis if refuses po  
MEDICATIONS  (STANDING):  LORazepam   Injectable 2 milliGRAM(s) IntraMuscular once  OLANZapine Disintegrating Tablet 15 milliGRAM(s) Oral at bedtime    MEDICATIONS  (PRN):  diphenhydrAMINE 50 milliGRAM(s) Oral every 4 hours PRN anxiety  diphenhydrAMINE   Injectable 50 milliGRAM(s) IntraMuscular once PRN eps prophylaxis  fluPHENAZine 5 milliGRAM(s) Oral every 6 hours PRN agitation  fluPHENAZine  Injectable 5 milliGRAM(s) IntraMuscular once PRN severe agitation  LORazepam     Tablet 2 milliGRAM(s) Oral every 6 hours PRN Agitation  
MEDICATIONS  (STANDING):  benztropine 1 milliGRAM(s) Oral two times a day  lumateperone tosylate 42 milliGRAM(s) Oral <User Schedule>  OLANZapine Disintegrating Tablet 20 milliGRAM(s) Oral at bedtime  OLANZapine Disintegrating Tablet 10 milliGRAM(s) Oral daily    MEDICATIONS  (PRN):  diphenhydrAMINE 50 milliGRAM(s) Oral every 4 hours PRN anxiety  diphenhydrAMINE   Injectable 50 milliGRAM(s) IntraMuscular once PRN eps prophylaxis  diphenhydrAMINE   Injectable 50 milliGRAM(s) IntraMuscular once PRN eps prophylaxis  haloperidol     Tablet 5 milliGRAM(s) Oral every 4 hours PRN agitation  haloperidol    Injectable 5 milliGRAM(s) IntraMuscular at bedtime PRN psychosis - refusing  po  haloperidol    Injectable 5 milliGRAM(s) IntraMuscular once PRN combative behavior  LORazepam     Tablet 2 milliGRAM(s) Oral every 6 hours PRN Agitation  LORazepam   Injectable 2 milliGRAM(s) IntraMuscular once PRN severe agitation  OLANZapine Injectable 10 milliGRAM(s) IntraMuscular at bedtime PRN psychosis if refuses po  
MEDICATIONS  (STANDING):  ARIPiprazole 20 milliGRAM(s) Oral at bedtime  LORazepam   Injectable 2 milliGRAM(s) IntraMuscular once    MEDICATIONS  (PRN):  diphenhydrAMINE 50 milliGRAM(s) Oral every 4 hours PRN anxiety  diphenhydrAMINE   Injectable 50 milliGRAM(s) IntraMuscular once PRN eps prophylaxis  fluPHENAZine 5 milliGRAM(s) Oral every 6 hours PRN agitation  fluPHENAZine  Injectable 5 milliGRAM(s) IntraMuscular once PRN severe agitation  LORazepam     Tablet 2 milliGRAM(s) Oral every 6 hours PRN Agitation  
MEDICATIONS  (STANDING):  benztropine 1 milliGRAM(s) Oral at bedtime  haloperidol     Tablet 15 milliGRAM(s) Oral at bedtime  lumateperone tosylate 42 milliGRAM(s) Oral with dinner    MEDICATIONS  (PRN):  diphenhydrAMINE 50 milliGRAM(s) Oral every 4 hours PRN anxiety  diphenhydrAMINE   Injectable 50 milliGRAM(s) IntraMuscular once PRN eps prophylaxis  diphenhydrAMINE   Injectable 50 milliGRAM(s) IntraMuscular once PRN eps prophylaxis  haloperidol     Tablet 5 milliGRAM(s) Oral every 4 hours PRN agitation  haloperidol    Injectable 5 milliGRAM(s) IntraMuscular at bedtime PRN psychosis - refusing  po  haloperidol    Injectable 5 milliGRAM(s) IntraMuscular once PRN combative behavior  LORazepam     Tablet 2 milliGRAM(s) Oral every 6 hours PRN Agitation  LORazepam   Injectable 2 milliGRAM(s) IntraMuscular once PRN severe agitation  OLANZapine Injectable 10 milliGRAM(s) IntraMuscular at bedtime PRN psychosis if refuses po  
MEDICATIONS  (STANDING):  benztropine 1 milliGRAM(s) Oral two times a day  cloZAPine 125 milliGRAM(s) Oral at bedtime  lumateperone tosylate 42 milliGRAM(s) Oral <User Schedule>  OLANZapine Disintegrating Tablet 10 milliGRAM(s) Oral daily  OLANZapine Disintegrating Tablet 10 milliGRAM(s) Oral at bedtime    MEDICATIONS  (PRN):  diphenhydrAMINE 50 milliGRAM(s) Oral every 4 hours PRN anxiety  diphenhydrAMINE   Injectable 50 milliGRAM(s) IntraMuscular once PRN eps prophylaxis  diphenhydrAMINE   Injectable 50 milliGRAM(s) IntraMuscular once PRN eps prophylaxis  haloperidol     Tablet 5 milliGRAM(s) Oral every 4 hours PRN agitation  haloperidol    Injectable 5 milliGRAM(s) IntraMuscular at bedtime PRN psychosis - refusing  po  haloperidol    Injectable 5 milliGRAM(s) IntraMuscular once PRN combative behavior  ibuprofen  Tablet. 400 milliGRAM(s) Oral once PRN Mild Pain (1 - 3)  LORazepam     Tablet 2 milliGRAM(s) Oral every 6 hours PRN Agitation  LORazepam   Injectable 2 milliGRAM(s) IntraMuscular once PRN severe agitation  OLANZapine Injectable 10 milliGRAM(s) IntraMuscular at bedtime PRN psychosis if refuses po  
MEDICATIONS  (STANDING):  OLANZapine Disintegrating Tablet 30 milliGRAM(s) Oral at bedtime    MEDICATIONS  (PRN):  diphenhydrAMINE 50 milliGRAM(s) Oral every 4 hours PRN anxiety  diphenhydrAMINE   Injectable 50 milliGRAM(s) IntraMuscular once PRN eps prophylaxis  diphenhydrAMINE   Injectable 50 milliGRAM(s) IntraMuscular once PRN eps prophylaxis  fluPHENAZine 5 milliGRAM(s) Oral every 6 hours PRN agitation  fluPHENAZine  Injectable 5 milliGRAM(s) IntraMuscular once PRN severe agitation  LORazepam     Tablet 2 milliGRAM(s) Oral every 6 hours PRN Agitation  OLANZapine Injectable 10 milliGRAM(s) IntraMuscular at bedtime PRN psychosis if refuses po  
MEDICATIONS  (STANDING):  benztropine 1 milliGRAM(s) Oral at bedtime  haloperidol     Tablet 15 milliGRAM(s) Oral at bedtime  lumateperone tosylate 42 milliGRAM(s) Oral with dinner    MEDICATIONS  (PRN):  diphenhydrAMINE 50 milliGRAM(s) Oral every 4 hours PRN anxiety  diphenhydrAMINE   Injectable 50 milliGRAM(s) IntraMuscular once PRN eps prophylaxis  diphenhydrAMINE   Injectable 50 milliGRAM(s) IntraMuscular once PRN eps prophylaxis  haloperidol     Tablet 5 milliGRAM(s) Oral every 4 hours PRN agitation  haloperidol    Injectable 5 milliGRAM(s) IntraMuscular at bedtime PRN psychosis - refusing  po  haloperidol    Injectable 5 milliGRAM(s) IntraMuscular once PRN combative behavior  LORazepam     Tablet 2 milliGRAM(s) Oral every 6 hours PRN Agitation  LORazepam   Injectable 2 milliGRAM(s) IntraMuscular once PRN severe agitation  OLANZapine Injectable 10 milliGRAM(s) IntraMuscular at bedtime PRN psychosis if refuses po  
MEDICATIONS  (STANDING):  benztropine 1 milliGRAM(s) Oral at bedtime  haloperidol     Tablet 7.5 milliGRAM(s) Oral at bedtime  OLANZapine Disintegrating Tablet 15 milliGRAM(s) Oral at bedtime    MEDICATIONS  (PRN):  diphenhydrAMINE 50 milliGRAM(s) Oral every 4 hours PRN anxiety  diphenhydrAMINE   Injectable 50 milliGRAM(s) IntraMuscular once PRN eps prophylaxis  diphenhydrAMINE   Injectable 50 milliGRAM(s) IntraMuscular once PRN eps prophylaxis  haloperidol     Tablet 5 milliGRAM(s) Oral every 4 hours PRN agitation  haloperidol    Injectable 5 milliGRAM(s) IntraMuscular at bedtime PRN psychosis - refusing  po  haloperidol    Injectable 5 milliGRAM(s) IntraMuscular once PRN combative behavior  LORazepam     Tablet 2 milliGRAM(s) Oral every 6 hours PRN Agitation  OLANZapine Injectable 10 milliGRAM(s) IntraMuscular at bedtime PRN psychosis if refuses po  
MEDICATIONS  (STANDING):  benztropine 1 milliGRAM(s) Oral two times a day  cloZAPine 75 milliGRAM(s) Oral at bedtime  lumateperone tosylate 42 milliGRAM(s) Oral <User Schedule>  OLANZapine Disintegrating Tablet 10 milliGRAM(s) Oral daily  OLANZapine Disintegrating Tablet 10 milliGRAM(s) Oral at bedtime    MEDICATIONS  (PRN):  diphenhydrAMINE 50 milliGRAM(s) Oral every 4 hours PRN anxiety  diphenhydrAMINE   Injectable 50 milliGRAM(s) IntraMuscular once PRN eps prophylaxis  diphenhydrAMINE   Injectable 50 milliGRAM(s) IntraMuscular once PRN eps prophylaxis  haloperidol     Tablet 5 milliGRAM(s) Oral every 4 hours PRN agitation  haloperidol    Injectable 5 milliGRAM(s) IntraMuscular at bedtime PRN psychosis - refusing  po  haloperidol    Injectable 5 milliGRAM(s) IntraMuscular once PRN combative behavior  ibuprofen  Tablet. 400 milliGRAM(s) Oral once PRN Mild Pain (1 - 3)  LORazepam     Tablet 2 milliGRAM(s) Oral every 6 hours PRN Agitation  LORazepam   Injectable 2 milliGRAM(s) IntraMuscular once PRN severe agitation  OLANZapine Injectable 10 milliGRAM(s) IntraMuscular at bedtime PRN psychosis if refuses po  
MEDICATIONS  (STANDING):  LORazepam   Injectable 2 milliGRAM(s) IntraMuscular once  LORazepam   Injectable 2 milliGRAM(s) IntraMuscular once  OLANZapine Disintegrating Tablet 15 milliGRAM(s) Oral at bedtime    MEDICATIONS  (PRN):  diphenhydrAMINE 50 milliGRAM(s) Oral every 4 hours PRN anxiety  diphenhydrAMINE   Injectable 50 milliGRAM(s) IntraMuscular once PRN eps prophylaxis  diphenhydrAMINE   Injectable 50 milliGRAM(s) IntraMuscular once PRN eps prophylaxis  fluPHENAZine 5 milliGRAM(s) Oral every 6 hours PRN agitation  fluPHENAZine  Injectable 5 milliGRAM(s) IntraMuscular once PRN severe agitation  LORazepam     Tablet 2 milliGRAM(s) Oral every 6 hours PRN Agitation  
MEDICATIONS  (STANDING):  benztropine 1 milliGRAM(s) Oral at bedtime  haloperidol     Tablet 7.5 milliGRAM(s) Oral at bedtime  OLANZapine Disintegrating Tablet 15 milliGRAM(s) Oral at bedtime    MEDICATIONS  (PRN):  diphenhydrAMINE 50 milliGRAM(s) Oral every 4 hours PRN anxiety  diphenhydrAMINE   Injectable 50 milliGRAM(s) IntraMuscular once PRN eps prophylaxis  diphenhydrAMINE   Injectable 50 milliGRAM(s) IntraMuscular once PRN eps prophylaxis  haloperidol     Tablet 5 milliGRAM(s) Oral every 4 hours PRN agitation  haloperidol    Injectable 5 milliGRAM(s) IntraMuscular at bedtime PRN psychosis - refusing  po  haloperidol    Injectable 5 milliGRAM(s) IntraMuscular once PRN combative behavior  LORazepam     Tablet 2 milliGRAM(s) Oral every 6 hours PRN Agitation  OLANZapine Injectable 10 milliGRAM(s) IntraMuscular at bedtime PRN psychosis if refuses po  
MEDICATIONS  (STANDING):  benztropine 1 milliGRAM(s) Oral at bedtime  lumateperone tosylate 42 milliGRAM(s) Oral with dinner  OLANZapine Disintegrating Tablet 20 milliGRAM(s) Oral at bedtime    MEDICATIONS  (PRN):  diphenhydrAMINE 50 milliGRAM(s) Oral every 4 hours PRN anxiety  diphenhydrAMINE   Injectable 50 milliGRAM(s) IntraMuscular once PRN eps prophylaxis  diphenhydrAMINE   Injectable 50 milliGRAM(s) IntraMuscular once PRN eps prophylaxis  haloperidol     Tablet 5 milliGRAM(s) Oral every 4 hours PRN agitation  haloperidol    Injectable 5 milliGRAM(s) IntraMuscular once PRN combative behavior  haloperidol    Injectable 5 milliGRAM(s) IntraMuscular at bedtime PRN psychosis - refusing  po  LORazepam     Tablet 2 milliGRAM(s) Oral every 6 hours PRN Agitation  LORazepam   Injectable 2 milliGRAM(s) IntraMuscular once PRN severe agitation  OLANZapine Injectable 10 milliGRAM(s) IntraMuscular at bedtime PRN psychosis if refuses po  
MEDICATIONS  (STANDING):  cloZAPine 200 milliGRAM(s) Oral at bedtime    MEDICATIONS  (PRN):  diphenhydrAMINE 50 milliGRAM(s) Oral every 4 hours PRN anxiety  diphenhydrAMINE   Injectable 50 milliGRAM(s) IntraMuscular once PRN eps prophylaxis  diphenhydrAMINE   Injectable 50 milliGRAM(s) IntraMuscular once PRN eps prophylaxis  haloperidol     Tablet 5 milliGRAM(s) Oral every 4 hours PRN agitation  haloperidol    Injectable 5 milliGRAM(s) IntraMuscular at bedtime PRN psychosis - refusing  po  haloperidol    Injectable 5 milliGRAM(s) IntraMuscular once PRN combative behavior  ibuprofen  Tablet. 400 milliGRAM(s) Oral once PRN Mild Pain (1 - 3)  LORazepam     Tablet 2 milliGRAM(s) Oral every 6 hours PRN Agitation  LORazepam   Injectable 2 milliGRAM(s) IntraMuscular once PRN severe agitation  OLANZapine Injectable 10 milliGRAM(s) IntraMuscular at bedtime PRN psychosis if refuses po  polyethylene glycol 3350 17 Gram(s) Oral two times a day PRN constipation  
MEDICATIONS  (STANDING):  cloZAPine 200 milliGRAM(s) Oral at bedtime  OLANZapine Disintegrating Tablet 5 milliGRAM(s) Oral daily    MEDICATIONS  (PRN):  diphenhydrAMINE 50 milliGRAM(s) Oral every 4 hours PRN anxiety  diphenhydrAMINE   Injectable 50 milliGRAM(s) IntraMuscular once PRN eps prophylaxis  diphenhydrAMINE   Injectable 50 milliGRAM(s) IntraMuscular once PRN eps prophylaxis  haloperidol     Tablet 5 milliGRAM(s) Oral every 4 hours PRN agitation  haloperidol    Injectable 5 milliGRAM(s) IntraMuscular at bedtime PRN psychosis - refusing  po  haloperidol    Injectable 5 milliGRAM(s) IntraMuscular once PRN combative behavior  ibuprofen  Tablet. 400 milliGRAM(s) Oral once PRN Mild Pain (1 - 3)  LORazepam     Tablet 2 milliGRAM(s) Oral every 6 hours PRN Agitation  LORazepam   Injectable 2 milliGRAM(s) IntraMuscular once PRN severe agitation  OLANZapine Injectable 10 milliGRAM(s) IntraMuscular at bedtime PRN psychosis if refuses po  polyethylene glycol 3350 17 Gram(s) Oral two times a day PRN constipation  
MEDICATIONS  (STANDING):  benztropine 1 milliGRAM(s) Oral two times a day  cloZAPine 25 milliGRAM(s) Oral at bedtime  lumateperone tosylate 42 milliGRAM(s) Oral <User Schedule>  OLANZapine Disintegrating Tablet 10 milliGRAM(s) Oral daily  OLANZapine Disintegrating Tablet 15 milliGRAM(s) Oral at bedtime    MEDICATIONS  (PRN):  diphenhydrAMINE 50 milliGRAM(s) Oral every 4 hours PRN anxiety  diphenhydrAMINE   Injectable 50 milliGRAM(s) IntraMuscular once PRN eps prophylaxis  diphenhydrAMINE   Injectable 50 milliGRAM(s) IntraMuscular once PRN eps prophylaxis  haloperidol     Tablet 5 milliGRAM(s) Oral every 4 hours PRN agitation  haloperidol    Injectable 5 milliGRAM(s) IntraMuscular at bedtime PRN psychosis - refusing  po  haloperidol    Injectable 5 milliGRAM(s) IntraMuscular once PRN combative behavior  LORazepam     Tablet 2 milliGRAM(s) Oral every 6 hours PRN Agitation  LORazepam   Injectable 2 milliGRAM(s) IntraMuscular once PRN severe agitation  OLANZapine Injectable 10 milliGRAM(s) IntraMuscular at bedtime PRN psychosis if refuses po  
MEDICATIONS  (STANDING):  benztropine 1 milliGRAM(s) Oral two times a day  lumateperone tosylate 42 milliGRAM(s) Oral <User Schedule>  OLANZapine Disintegrating Tablet 5 milliGRAM(s) Oral daily  OLANZapine Disintegrating Tablet 20 milliGRAM(s) Oral at bedtime    MEDICATIONS  (PRN):  diphenhydrAMINE 50 milliGRAM(s) Oral every 4 hours PRN anxiety  diphenhydrAMINE   Injectable 50 milliGRAM(s) IntraMuscular once PRN eps prophylaxis  diphenhydrAMINE   Injectable 50 milliGRAM(s) IntraMuscular once PRN eps prophylaxis  haloperidol     Tablet 5 milliGRAM(s) Oral every 4 hours PRN agitation  haloperidol    Injectable 5 milliGRAM(s) IntraMuscular at bedtime PRN psychosis - refusing  po  haloperidol    Injectable 5 milliGRAM(s) IntraMuscular once PRN combative behavior  LORazepam     Tablet 2 milliGRAM(s) Oral every 6 hours PRN Agitation  LORazepam   Injectable 2 milliGRAM(s) IntraMuscular once PRN severe agitation  OLANZapine Injectable 10 milliGRAM(s) IntraMuscular at bedtime PRN psychosis if refuses po  
MEDICATIONS  (STANDING):  benztropine 1 milliGRAM(s) Oral at bedtime  lumateperone tosylate 42 milliGRAM(s) Oral with dinner  OLANZapine Disintegrating Tablet 20 milliGRAM(s) Oral at bedtime    MEDICATIONS  (PRN):  diphenhydrAMINE 50 milliGRAM(s) Oral every 4 hours PRN anxiety  diphenhydrAMINE   Injectable 50 milliGRAM(s) IntraMuscular once PRN eps prophylaxis  diphenhydrAMINE   Injectable 50 milliGRAM(s) IntraMuscular once PRN eps prophylaxis  haloperidol     Tablet 5 milliGRAM(s) Oral every 4 hours PRN agitation  haloperidol    Injectable 5 milliGRAM(s) IntraMuscular at bedtime PRN psychosis - refusing  po  haloperidol    Injectable 5 milliGRAM(s) IntraMuscular once PRN combative behavior  LORazepam     Tablet 2 milliGRAM(s) Oral every 6 hours PRN Agitation  LORazepam   Injectable 2 milliGRAM(s) IntraMuscular once PRN severe agitation  OLANZapine Injectable 10 milliGRAM(s) IntraMuscular at bedtime PRN psychosis if refuses po  
MEDICATIONS  (STANDING):  benztropine 1 milliGRAM(s) Oral at bedtime  haloperidol     Tablet 15 milliGRAM(s) Oral at bedtime  lumateperone tosylate 42 milliGRAM(s) Oral with dinner    MEDICATIONS  (PRN):  diphenhydrAMINE 50 milliGRAM(s) Oral every 4 hours PRN anxiety  diphenhydrAMINE   Injectable 50 milliGRAM(s) IntraMuscular once PRN eps prophylaxis  diphenhydrAMINE   Injectable 50 milliGRAM(s) IntraMuscular once PRN eps prophylaxis  haloperidol     Tablet 5 milliGRAM(s) Oral every 4 hours PRN agitation  haloperidol    Injectable 5 milliGRAM(s) IntraMuscular once PRN combative behavior  haloperidol    Injectable 5 milliGRAM(s) IntraMuscular at bedtime PRN psychosis - refusing  po  LORazepam     Tablet 2 milliGRAM(s) Oral every 6 hours PRN Agitation  LORazepam   Injectable 2 milliGRAM(s) IntraMuscular once PRN severe agitation  OLANZapine Injectable 10 milliGRAM(s) IntraMuscular at bedtime PRN psychosis if refuses po  
MEDICATIONS  (STANDING):  haloperidol     Tablet 5 milliGRAM(s) Oral at bedtime  haloperidol     Tablet 5 milliGRAM(s) Oral daily  LORazepam   Injectable 2 milliGRAM(s) IntraMuscular once  melatonin. 3 milliGRAM(s) Oral at bedtime    MEDICATIONS  (PRN):  diphenhydrAMINE 50 milliGRAM(s) Oral every 4 hours PRN anxiety  diphenhydrAMINE   Injectable 50 milliGRAM(s) IntraMuscular once PRN eps prophylaxis  fluPHENAZine 5 milliGRAM(s) Oral every 6 hours PRN agitation  fluPHENAZine  Injectable 5 milliGRAM(s) IntraMuscular once PRN severe agitation  LORazepam     Tablet 2 milliGRAM(s) Oral every 6 hours PRN Agitation  
MEDICATIONS  (STANDING):  benztropine 1 milliGRAM(s) Oral at bedtime  haloperidol     Tablet 15 milliGRAM(s) Oral at bedtime  OLANZapine Disintegrating Tablet 10 milliGRAM(s) Oral at bedtime    MEDICATIONS  (PRN):  diphenhydrAMINE 50 milliGRAM(s) Oral every 4 hours PRN anxiety  diphenhydrAMINE   Injectable 50 milliGRAM(s) IntraMuscular once PRN eps prophylaxis  diphenhydrAMINE   Injectable 50 milliGRAM(s) IntraMuscular once PRN eps prophylaxis  haloperidol     Tablet 5 milliGRAM(s) Oral every 4 hours PRN agitation  haloperidol    Injectable 5 milliGRAM(s) IntraMuscular at bedtime PRN psychosis - refusing  po  haloperidol    Injectable 5 milliGRAM(s) IntraMuscular once PRN combative behavior  LORazepam     Tablet 2 milliGRAM(s) Oral every 6 hours PRN Agitation  LORazepam   Injectable 2 milliGRAM(s) IntraMuscular once PRN severe agitation  OLANZapine Injectable 10 milliGRAM(s) IntraMuscular at bedtime PRN psychosis if refuses po  
MEDICATIONS  (STANDING):  benztropine 1 milliGRAM(s) Oral at bedtime  lumateperone tosylate 42 milliGRAM(s) Oral with dinner  OLANZapine Disintegrating Tablet 20 milliGRAM(s) Oral at bedtime    MEDICATIONS  (PRN):  diphenhydrAMINE 50 milliGRAM(s) Oral every 4 hours PRN anxiety  diphenhydrAMINE   Injectable 50 milliGRAM(s) IntraMuscular once PRN eps prophylaxis  diphenhydrAMINE   Injectable 50 milliGRAM(s) IntraMuscular once PRN eps prophylaxis  haloperidol     Tablet 5 milliGRAM(s) Oral every 4 hours PRN agitation  haloperidol    Injectable 5 milliGRAM(s) IntraMuscular at bedtime PRN psychosis - refusing  po  haloperidol    Injectable 5 milliGRAM(s) IntraMuscular once PRN combative behavior  LORazepam     Tablet 2 milliGRAM(s) Oral every 6 hours PRN Agitation  LORazepam   Injectable 2 milliGRAM(s) IntraMuscular once PRN severe agitation  OLANZapine Injectable 10 milliGRAM(s) IntraMuscular at bedtime PRN psychosis if refuses po  
MEDICATIONS  (STANDING):  benztropine 1 milliGRAM(s) Oral two times a day  lumateperone tosylate 42 milliGRAM(s) Oral <User Schedule>  OLANZapine Disintegrating Tablet 5 milliGRAM(s) Oral daily  OLANZapine Disintegrating Tablet 20 milliGRAM(s) Oral at bedtime    MEDICATIONS  (PRN):  diphenhydrAMINE 50 milliGRAM(s) Oral every 4 hours PRN anxiety  diphenhydrAMINE   Injectable 50 milliGRAM(s) IntraMuscular once PRN eps prophylaxis  diphenhydrAMINE   Injectable 50 milliGRAM(s) IntraMuscular once PRN eps prophylaxis  haloperidol     Tablet 5 milliGRAM(s) Oral every 4 hours PRN agitation  haloperidol    Injectable 5 milliGRAM(s) IntraMuscular at bedtime PRN psychosis - refusing  po  haloperidol    Injectable 5 milliGRAM(s) IntraMuscular once PRN combative behavior  LORazepam     Tablet 2 milliGRAM(s) Oral every 6 hours PRN Agitation  LORazepam   Injectable 2 milliGRAM(s) IntraMuscular once PRN severe agitation  OLANZapine Injectable 10 milliGRAM(s) IntraMuscular at bedtime PRN psychosis if refuses po  
MEDICATIONS  (STANDING):  benztropine 1 milliGRAM(s) Oral at bedtime  haloperidol     Tablet 10 milliGRAM(s) Oral at bedtime  OLANZapine Disintegrating Tablet 15 milliGRAM(s) Oral at bedtime    MEDICATIONS  (PRN):  diphenhydrAMINE 50 milliGRAM(s) Oral every 4 hours PRN anxiety  diphenhydrAMINE   Injectable 50 milliGRAM(s) IntraMuscular once PRN eps prophylaxis  diphenhydrAMINE   Injectable 50 milliGRAM(s) IntraMuscular once PRN eps prophylaxis  haloperidol     Tablet 5 milliGRAM(s) Oral every 4 hours PRN agitation  haloperidol    Injectable 5 milliGRAM(s) IntraMuscular once PRN combative behavior  haloperidol    Injectable 5 milliGRAM(s) IntraMuscular at bedtime PRN psychosis - refusing  po  LORazepam     Tablet 2 milliGRAM(s) Oral every 6 hours PRN Agitation  OLANZapine Injectable 10 milliGRAM(s) IntraMuscular at bedtime PRN psychosis if refuses po  
MEDICATIONS  (STANDING):  OLANZapine Disintegrating Tablet 20 milliGRAM(s) Oral at bedtime    MEDICATIONS  (PRN):  diphenhydrAMINE 50 milliGRAM(s) Oral every 4 hours PRN anxiety  diphenhydrAMINE   Injectable 50 milliGRAM(s) IntraMuscular once PRN eps prophylaxis  diphenhydrAMINE   Injectable 50 milliGRAM(s) IntraMuscular once PRN eps prophylaxis  fluPHENAZine 5 milliGRAM(s) Oral every 6 hours PRN agitation  fluPHENAZine  Injectable 5 milliGRAM(s) IntraMuscular once PRN severe agitation  LORazepam     Tablet 2 milliGRAM(s) Oral every 6 hours PRN Agitation  OLANZapine Injectable 10 milliGRAM(s) IntraMuscular at bedtime PRN psychosis if refuses po  
MEDICATIONS  (STANDING):  LORazepam   Injectable 2 milliGRAM(s) IntraMuscular once  OLANZapine Disintegrating Tablet 10 milliGRAM(s) Oral at bedtime    MEDICATIONS  (PRN):  diphenhydrAMINE 50 milliGRAM(s) Oral every 4 hours PRN anxiety  diphenhydrAMINE   Injectable 50 milliGRAM(s) IntraMuscular once PRN eps prophylaxis  fluPHENAZine 5 milliGRAM(s) Oral every 6 hours PRN agitation  fluPHENAZine  Injectable 5 milliGRAM(s) IntraMuscular once PRN severe agitation  LORazepam     Tablet 2 milliGRAM(s) Oral every 6 hours PRN Agitation  
MEDICATIONS  (STANDING):  benztropine 1 milliGRAM(s) Oral at bedtime  lumateperone tosylate 42 milliGRAM(s) Oral <User Schedule>  OLANZapine Disintegrating Tablet 20 milliGRAM(s) Oral at bedtime    MEDICATIONS  (PRN):  diphenhydrAMINE 50 milliGRAM(s) Oral every 4 hours PRN anxiety  diphenhydrAMINE   Injectable 50 milliGRAM(s) IntraMuscular once PRN eps prophylaxis  diphenhydrAMINE   Injectable 50 milliGRAM(s) IntraMuscular once PRN eps prophylaxis  haloperidol     Tablet 5 milliGRAM(s) Oral every 4 hours PRN agitation  haloperidol    Injectable 5 milliGRAM(s) IntraMuscular at bedtime PRN psychosis - refusing  po  haloperidol    Injectable 5 milliGRAM(s) IntraMuscular once PRN combative behavior  LORazepam     Tablet 2 milliGRAM(s) Oral every 6 hours PRN Agitation  LORazepam   Injectable 2 milliGRAM(s) IntraMuscular once PRN severe agitation  OLANZapine Injectable 10 milliGRAM(s) IntraMuscular at bedtime PRN psychosis if refuses po  
MEDICATIONS  (STANDING):  LORazepam   Injectable 2 milliGRAM(s) IntraMuscular once    MEDICATIONS  (PRN):  diphenhydrAMINE 50 milliGRAM(s) Oral every 4 hours PRN anxiety  diphenhydrAMINE   Injectable 50 milliGRAM(s) IntraMuscular once PRN eps prophylaxis  fluPHENAZine 5 milliGRAM(s) Oral every 6 hours PRN agitation  fluPHENAZine  Injectable 5 milliGRAM(s) IntraMuscular once PRN severe agitation  LORazepam     Tablet 2 milliGRAM(s) Oral every 6 hours PRN Agitation  
MEDICATIONS  (STANDING):  benztropine 1 milliGRAM(s) Oral at bedtime  haloperidol     Tablet 15 milliGRAM(s) Oral at bedtime  OLANZapine Disintegrating Tablet 10 milliGRAM(s) Oral at bedtime    MEDICATIONS  (PRN):  diphenhydrAMINE 50 milliGRAM(s) Oral every 4 hours PRN anxiety  diphenhydrAMINE   Injectable 50 milliGRAM(s) IntraMuscular once PRN eps prophylaxis  diphenhydrAMINE   Injectable 50 milliGRAM(s) IntraMuscular once PRN eps prophylaxis  haloperidol     Tablet 5 milliGRAM(s) Oral every 4 hours PRN agitation  haloperidol    Injectable 5 milliGRAM(s) IntraMuscular once PRN combative behavior  haloperidol    Injectable 5 milliGRAM(s) IntraMuscular at bedtime PRN psychosis - refusing  po  LORazepam     Tablet 2 milliGRAM(s) Oral every 6 hours PRN Agitation  LORazepam   Injectable 2 milliGRAM(s) IntraMuscular once PRN severe agitation  OLANZapine Injectable 10 milliGRAM(s) IntraMuscular at bedtime PRN psychosis if refuses po  
MEDICATIONS  (STANDING):  benztropine 1 milliGRAM(s) Oral at bedtime  haloperidol     Tablet 15 milliGRAM(s) Oral at bedtime  lumateperone tosylate 42 milliGRAM(s) Oral with dinner    MEDICATIONS  (PRN):  diphenhydrAMINE 50 milliGRAM(s) Oral every 4 hours PRN anxiety  diphenhydrAMINE   Injectable 50 milliGRAM(s) IntraMuscular once PRN eps prophylaxis  diphenhydrAMINE   Injectable 50 milliGRAM(s) IntraMuscular once PRN eps prophylaxis  haloperidol     Tablet 5 milliGRAM(s) Oral every 4 hours PRN agitation  haloperidol    Injectable 5 milliGRAM(s) IntraMuscular at bedtime PRN psychosis - refusing  po  haloperidol    Injectable 5 milliGRAM(s) IntraMuscular once PRN combative behavior  LORazepam     Tablet 2 milliGRAM(s) Oral every 6 hours PRN Agitation  LORazepam   Injectable 2 milliGRAM(s) IntraMuscular once PRN severe agitation  OLANZapine Injectable 10 milliGRAM(s) IntraMuscular at bedtime PRN psychosis if refuses po  
MEDICATIONS  (STANDING):  benztropine 1 milliGRAM(s) Oral two times a day  lumateperone tosylate 42 milliGRAM(s) Oral <User Schedule>  OLANZapine Disintegrating Tablet 20 milliGRAM(s) Oral at bedtime  OLANZapine Disintegrating Tablet 5 milliGRAM(s) Oral daily    MEDICATIONS  (PRN):  diphenhydrAMINE 50 milliGRAM(s) Oral every 4 hours PRN anxiety  diphenhydrAMINE   Injectable 50 milliGRAM(s) IntraMuscular once PRN eps prophylaxis  diphenhydrAMINE   Injectable 50 milliGRAM(s) IntraMuscular once PRN eps prophylaxis  haloperidol     Tablet 5 milliGRAM(s) Oral every 4 hours PRN agitation  haloperidol    Injectable 5 milliGRAM(s) IntraMuscular at bedtime PRN psychosis - refusing  po  haloperidol    Injectable 5 milliGRAM(s) IntraMuscular once PRN combative behavior  LORazepam     Tablet 2 milliGRAM(s) Oral every 6 hours PRN Agitation  LORazepam   Injectable 2 milliGRAM(s) IntraMuscular once PRN severe agitation  OLANZapine Injectable 10 milliGRAM(s) IntraMuscular at bedtime PRN psychosis if refuses po  
MEDICATIONS  (STANDING):  ARIPiprazole 10 milliGRAM(s) Oral at bedtime  cephalexin 500 milliGRAM(s) Oral every 12 hours  LORazepam   Injectable 2 milliGRAM(s) IntraMuscular once    MEDICATIONS  (PRN):  diphenhydrAMINE 50 milliGRAM(s) Oral every 4 hours PRN anxiety  diphenhydrAMINE   Injectable 50 milliGRAM(s) IntraMuscular once PRN eps prophylaxis  fluPHENAZine 5 milliGRAM(s) Oral every 6 hours PRN agitation  fluPHENAZine  Injectable 5 milliGRAM(s) IntraMuscular once PRN severe agitation  LORazepam     Tablet 2 milliGRAM(s) Oral every 6 hours PRN Agitation  
MEDICATIONS  (STANDING):  benztropine 1 milliGRAM(s) Oral at bedtime  haloperidol     Tablet 15 milliGRAM(s) Oral at bedtime  OLANZapine Disintegrating Tablet 10 milliGRAM(s) Oral at bedtime    MEDICATIONS  (PRN):  diphenhydrAMINE 50 milliGRAM(s) Oral every 4 hours PRN anxiety  diphenhydrAMINE   Injectable 50 milliGRAM(s) IntraMuscular once PRN eps prophylaxis  diphenhydrAMINE   Injectable 50 milliGRAM(s) IntraMuscular once PRN eps prophylaxis  haloperidol     Tablet 5 milliGRAM(s) Oral every 4 hours PRN agitation  haloperidol    Injectable 5 milliGRAM(s) IntraMuscular once PRN combative behavior  haloperidol    Injectable 5 milliGRAM(s) IntraMuscular at bedtime PRN psychosis - refusing  po  LORazepam     Tablet 2 milliGRAM(s) Oral every 6 hours PRN Agitation  LORazepam   Injectable 2 milliGRAM(s) IntraMuscular once PRN severe agitation  OLANZapine Injectable 10 milliGRAM(s) IntraMuscular at bedtime PRN psychosis if refuses po  
MEDICATIONS  (STANDING):  benztropine 1 milliGRAM(s) Oral at bedtime  haloperidol     Tablet 10 milliGRAM(s) Oral at bedtime  OLANZapine Disintegrating Tablet 15 milliGRAM(s) Oral at bedtime    MEDICATIONS  (PRN):  diphenhydrAMINE 50 milliGRAM(s) Oral every 4 hours PRN anxiety  diphenhydrAMINE   Injectable 50 milliGRAM(s) IntraMuscular once PRN eps prophylaxis  diphenhydrAMINE   Injectable 50 milliGRAM(s) IntraMuscular once PRN eps prophylaxis  haloperidol     Tablet 5 milliGRAM(s) Oral every 4 hours PRN agitation  haloperidol    Injectable 5 milliGRAM(s) IntraMuscular once PRN combative behavior  haloperidol    Injectable 5 milliGRAM(s) IntraMuscular at bedtime PRN psychosis - refusing  po  LORazepam     Tablet 2 milliGRAM(s) Oral every 6 hours PRN Agitation  OLANZapine Injectable 10 milliGRAM(s) IntraMuscular at bedtime PRN psychosis if refuses po  
MEDICATIONS  (STANDING):  benztropine 1 milliGRAM(s) Oral at bedtime  lumateperone tosylate 42 milliGRAM(s) Oral with dinner  OLANZapine Disintegrating Tablet 15 milliGRAM(s) Oral at bedtime    MEDICATIONS  (PRN):  diphenhydrAMINE 50 milliGRAM(s) Oral every 4 hours PRN anxiety  diphenhydrAMINE   Injectable 50 milliGRAM(s) IntraMuscular once PRN eps prophylaxis  diphenhydrAMINE   Injectable 50 milliGRAM(s) IntraMuscular once PRN eps prophylaxis  haloperidol     Tablet 5 milliGRAM(s) Oral every 4 hours PRN agitation  haloperidol    Injectable 5 milliGRAM(s) IntraMuscular at bedtime PRN psychosis - refusing  po  haloperidol    Injectable 5 milliGRAM(s) IntraMuscular once PRN combative behavior  LORazepam     Tablet 2 milliGRAM(s) Oral every 6 hours PRN Agitation  LORazepam   Injectable 2 milliGRAM(s) IntraMuscular once PRN severe agitation  OLANZapine Injectable 10 milliGRAM(s) IntraMuscular at bedtime PRN psychosis if refuses po  
MEDICATIONS  (STANDING):  benztropine 1 milliGRAM(s) Oral at bedtime  lumateperone tosylate 42 milliGRAM(s) Oral with dinner  OLANZapine Disintegrating Tablet 15 milliGRAM(s) Oral at bedtime    MEDICATIONS  (PRN):  diphenhydrAMINE 50 milliGRAM(s) Oral every 4 hours PRN anxiety  diphenhydrAMINE   Injectable 50 milliGRAM(s) IntraMuscular once PRN eps prophylaxis  diphenhydrAMINE   Injectable 50 milliGRAM(s) IntraMuscular once PRN eps prophylaxis  haloperidol     Tablet 5 milliGRAM(s) Oral every 4 hours PRN agitation  haloperidol    Injectable 5 milliGRAM(s) IntraMuscular at bedtime PRN psychosis - refusing  po  haloperidol    Injectable 5 milliGRAM(s) IntraMuscular once PRN combative behavior  LORazepam     Tablet 2 milliGRAM(s) Oral every 6 hours PRN Agitation  LORazepam   Injectable 2 milliGRAM(s) IntraMuscular once PRN severe agitation  OLANZapine Injectable 10 milliGRAM(s) IntraMuscular at bedtime PRN psychosis if refuses po  
MEDICATIONS  (STANDING):  LORazepam   Injectable 2 milliGRAM(s) IntraMuscular once  OLANZapine Disintegrating Tablet 10 milliGRAM(s) Oral at bedtime    MEDICATIONS  (PRN):  diphenhydrAMINE 50 milliGRAM(s) Oral every 4 hours PRN anxiety  diphenhydrAMINE   Injectable 50 milliGRAM(s) IntraMuscular once PRN eps prophylaxis  fluPHENAZine 5 milliGRAM(s) Oral every 6 hours PRN agitation  fluPHENAZine  Injectable 5 milliGRAM(s) IntraMuscular once PRN severe agitation  LORazepam     Tablet 2 milliGRAM(s) Oral every 6 hours PRN Agitation  
MEDICATIONS  (STANDING):  ARIPiprazole 30 milliGRAM(s) Oral at bedtime  LORazepam   Injectable 2 milliGRAM(s) IntraMuscular once    MEDICATIONS  (PRN):  diphenhydrAMINE 50 milliGRAM(s) Oral every 4 hours PRN anxiety  diphenhydrAMINE   Injectable 50 milliGRAM(s) IntraMuscular once PRN eps prophylaxis  fluPHENAZine 5 milliGRAM(s) Oral every 6 hours PRN agitation  fluPHENAZine  Injectable 5 milliGRAM(s) IntraMuscular once PRN severe agitation  LORazepam     Tablet 2 milliGRAM(s) Oral every 6 hours PRN Agitation  
MEDICATIONS  (STANDING):  benztropine 1 milliGRAM(s) Oral at bedtime  haloperidol     Tablet 7.5 milliGRAM(s) Oral at bedtime  OLANZapine Disintegrating Tablet 15 milliGRAM(s) Oral at bedtime    MEDICATIONS  (PRN):  diphenhydrAMINE 50 milliGRAM(s) Oral every 4 hours PRN anxiety  diphenhydrAMINE   Injectable 50 milliGRAM(s) IntraMuscular once PRN eps prophylaxis  diphenhydrAMINE   Injectable 50 milliGRAM(s) IntraMuscular once PRN eps prophylaxis  haloperidol     Tablet 5 milliGRAM(s) Oral every 4 hours PRN agitation  haloperidol    Injectable 5 milliGRAM(s) IntraMuscular at bedtime PRN psychosis - refusing  po  haloperidol    Injectable 5 milliGRAM(s) IntraMuscular once PRN combative behavior  LORazepam     Tablet 2 milliGRAM(s) Oral every 6 hours PRN Agitation  OLANZapine Injectable 10 milliGRAM(s) IntraMuscular at bedtime PRN psychosis if refuses po  
MEDICATIONS  (STANDING):  benztropine 1 milliGRAM(s) Oral at bedtime  lumateperone tosylate 42 milliGRAM(s) Oral with dinner  OLANZapine Disintegrating Tablet 15 milliGRAM(s) Oral at bedtime    MEDICATIONS  (PRN):  diphenhydrAMINE 50 milliGRAM(s) Oral every 4 hours PRN anxiety  diphenhydrAMINE   Injectable 50 milliGRAM(s) IntraMuscular once PRN eps prophylaxis  diphenhydrAMINE   Injectable 50 milliGRAM(s) IntraMuscular once PRN eps prophylaxis  haloperidol     Tablet 5 milliGRAM(s) Oral every 4 hours PRN agitation  haloperidol    Injectable 5 milliGRAM(s) IntraMuscular once PRN combative behavior  haloperidol    Injectable 5 milliGRAM(s) IntraMuscular at bedtime PRN psychosis - refusing  po  LORazepam     Tablet 2 milliGRAM(s) Oral every 6 hours PRN Agitation  LORazepam   Injectable 2 milliGRAM(s) IntraMuscular once PRN severe agitation  OLANZapine Injectable 10 milliGRAM(s) IntraMuscular at bedtime PRN psychosis if refuses po  
MEDICATIONS  (STANDING):  benztropine 1 milliGRAM(s) Oral two times a day  cloZAPine 75 milliGRAM(s) Oral at bedtime  lumateperone tosylate 42 milliGRAM(s) Oral <User Schedule>  OLANZapine Disintegrating Tablet 10 milliGRAM(s) Oral at bedtime  OLANZapine Disintegrating Tablet 10 milliGRAM(s) Oral daily    MEDICATIONS  (PRN):  diphenhydrAMINE 50 milliGRAM(s) Oral every 4 hours PRN anxiety  diphenhydrAMINE   Injectable 50 milliGRAM(s) IntraMuscular once PRN eps prophylaxis  diphenhydrAMINE   Injectable 50 milliGRAM(s) IntraMuscular once PRN eps prophylaxis  haloperidol     Tablet 5 milliGRAM(s) Oral every 4 hours PRN agitation  haloperidol    Injectable 5 milliGRAM(s) IntraMuscular once PRN combative behavior  haloperidol    Injectable 5 milliGRAM(s) IntraMuscular at bedtime PRN psychosis - refusing  po  ibuprofen  Tablet. 400 milliGRAM(s) Oral once PRN Mild Pain (1 - 3)  LORazepam     Tablet 2 milliGRAM(s) Oral every 6 hours PRN Agitation  LORazepam   Injectable 2 milliGRAM(s) IntraMuscular once PRN severe agitation  OLANZapine Injectable 10 milliGRAM(s) IntraMuscular at bedtime PRN psychosis if refuses po  
MEDICATIONS  (STANDING):  ARIPiprazole 30 milliGRAM(s) Oral at bedtime  LORazepam   Injectable 2 milliGRAM(s) IntraMuscular once    MEDICATIONS  (PRN):  diphenhydrAMINE 50 milliGRAM(s) Oral every 4 hours PRN anxiety  diphenhydrAMINE   Injectable 50 milliGRAM(s) IntraMuscular once PRN eps prophylaxis  fluPHENAZine 5 milliGRAM(s) Oral every 6 hours PRN agitation  fluPHENAZine  Injectable 5 milliGRAM(s) IntraMuscular once PRN severe agitation  LORazepam     Tablet 2 milliGRAM(s) Oral every 6 hours PRN Agitation  
MEDICATIONS  (STANDING):  benztropine 0.5 milliGRAM(s) Oral two times a day  cloZAPine 175 milliGRAM(s) Oral at bedtime  OLANZapine Disintegrating Tablet 10 milliGRAM(s) Oral daily  OLANZapine Disintegrating Tablet 10 milliGRAM(s) Oral at bedtime    MEDICATIONS  (PRN):  diphenhydrAMINE 50 milliGRAM(s) Oral every 4 hours PRN anxiety  diphenhydrAMINE   Injectable 50 milliGRAM(s) IntraMuscular once PRN eps prophylaxis  diphenhydrAMINE   Injectable 50 milliGRAM(s) IntraMuscular once PRN eps prophylaxis  haloperidol     Tablet 5 milliGRAM(s) Oral every 4 hours PRN agitation  haloperidol    Injectable 5 milliGRAM(s) IntraMuscular once PRN combative behavior  haloperidol    Injectable 5 milliGRAM(s) IntraMuscular at bedtime PRN psychosis - refusing  po  ibuprofen  Tablet. 400 milliGRAM(s) Oral once PRN Mild Pain (1 - 3)  LORazepam     Tablet 2 milliGRAM(s) Oral every 6 hours PRN Agitation  LORazepam   Injectable 2 milliGRAM(s) IntraMuscular once PRN severe agitation  OLANZapine Injectable 10 milliGRAM(s) IntraMuscular at bedtime PRN psychosis if refuses po  polyethylene glycol 3350 17 Gram(s) Oral two times a day PRN constipation  
MEDICATIONS  (STANDING):  benztropine 1 milliGRAM(s) Oral two times a day  lumateperone tosylate 42 milliGRAM(s) Oral <User Schedule>  OLANZapine Disintegrating Tablet 20 milliGRAM(s) Oral at bedtime  OLANZapine Disintegrating Tablet 5 milliGRAM(s) Oral daily    MEDICATIONS  (PRN):  diphenhydrAMINE 50 milliGRAM(s) Oral every 4 hours PRN anxiety  diphenhydrAMINE   Injectable 50 milliGRAM(s) IntraMuscular once PRN eps prophylaxis  diphenhydrAMINE   Injectable 50 milliGRAM(s) IntraMuscular once PRN eps prophylaxis  haloperidol     Tablet 5 milliGRAM(s) Oral every 4 hours PRN agitation  haloperidol    Injectable 5 milliGRAM(s) IntraMuscular at bedtime PRN psychosis - refusing  po  haloperidol    Injectable 5 milliGRAM(s) IntraMuscular once PRN combative behavior  LORazepam     Tablet 2 milliGRAM(s) Oral every 6 hours PRN Agitation  LORazepam   Injectable 2 milliGRAM(s) IntraMuscular once PRN severe agitation  OLANZapine Injectable 10 milliGRAM(s) IntraMuscular at bedtime PRN psychosis if refuses po  
MEDICATIONS  (STANDING):  benztropine 1 milliGRAM(s) Oral at bedtime  lumateperone tosylate 42 milliGRAM(s) Oral with dinner  OLANZapine Disintegrating Tablet 20 milliGRAM(s) Oral at bedtime    MEDICATIONS  (PRN):  diphenhydrAMINE 50 milliGRAM(s) Oral every 4 hours PRN anxiety  diphenhydrAMINE   Injectable 50 milliGRAM(s) IntraMuscular once PRN eps prophylaxis  diphenhydrAMINE   Injectable 50 milliGRAM(s) IntraMuscular once PRN eps prophylaxis  haloperidol     Tablet 5 milliGRAM(s) Oral every 4 hours PRN agitation  haloperidol    Injectable 5 milliGRAM(s) IntraMuscular at bedtime PRN psychosis - refusing  po  haloperidol    Injectable 5 milliGRAM(s) IntraMuscular once PRN combative behavior  LORazepam     Tablet 2 milliGRAM(s) Oral every 6 hours PRN Agitation  LORazepam   Injectable 2 milliGRAM(s) IntraMuscular once PRN severe agitation  OLANZapine Injectable 10 milliGRAM(s) IntraMuscular at bedtime PRN psychosis if refuses po  
MEDICATIONS  (STANDING):  benztropine 1 milliGRAM(s) Oral two times a day  lumateperone tosylate 42 milliGRAM(s) Oral <User Schedule>  OLANZapine Disintegrating Tablet 20 milliGRAM(s) Oral at bedtime  OLANZapine Disintegrating Tablet 10 milliGRAM(s) Oral daily    MEDICATIONS  (PRN):  diphenhydrAMINE 50 milliGRAM(s) Oral every 4 hours PRN anxiety  diphenhydrAMINE   Injectable 50 milliGRAM(s) IntraMuscular once PRN eps prophylaxis  diphenhydrAMINE   Injectable 50 milliGRAM(s) IntraMuscular once PRN eps prophylaxis  haloperidol     Tablet 5 milliGRAM(s) Oral every 4 hours PRN agitation  haloperidol    Injectable 5 milliGRAM(s) IntraMuscular at bedtime PRN psychosis - refusing  po  haloperidol    Injectable 5 milliGRAM(s) IntraMuscular once PRN combative behavior  LORazepam     Tablet 2 milliGRAM(s) Oral every 6 hours PRN Agitation  LORazepam   Injectable 2 milliGRAM(s) IntraMuscular once PRN severe agitation  OLANZapine Injectable 10 milliGRAM(s) IntraMuscular at bedtime PRN psychosis if refuses po  
MEDICATIONS  (STANDING):  cloZAPine 200 milliGRAM(s) Oral at bedtime    MEDICATIONS  (PRN):  diphenhydrAMINE 50 milliGRAM(s) Oral every 4 hours PRN anxiety  diphenhydrAMINE   Injectable 50 milliGRAM(s) IntraMuscular once PRN eps prophylaxis  diphenhydrAMINE   Injectable 50 milliGRAM(s) IntraMuscular once PRN eps prophylaxis  haloperidol     Tablet 5 milliGRAM(s) Oral every 4 hours PRN agitation  haloperidol    Injectable 5 milliGRAM(s) IntraMuscular at bedtime PRN psychosis - refusing  po  haloperidol    Injectable 5 milliGRAM(s) IntraMuscular once PRN combative behavior  ibuprofen  Tablet. 400 milliGRAM(s) Oral once PRN Mild Pain (1 - 3)  LORazepam     Tablet 2 milliGRAM(s) Oral every 6 hours PRN Agitation  LORazepam   Injectable 2 milliGRAM(s) IntraMuscular once PRN severe agitation  OLANZapine Injectable 10 milliGRAM(s) IntraMuscular at bedtime PRN psychosis if refuses po  polyethylene glycol 3350 17 Gram(s) Oral two times a day PRN constipation  
MEDICATIONS  (STANDING):  benztropine 1 milliGRAM(s) Oral two times a day  cloZAPine 75 milliGRAM(s) Oral at bedtime  lumateperone tosylate 42 milliGRAM(s) Oral <User Schedule>  OLANZapine Disintegrating Tablet 10 milliGRAM(s) Oral at bedtime  OLANZapine Disintegrating Tablet 10 milliGRAM(s) Oral daily    MEDICATIONS  (PRN):  diphenhydrAMINE 50 milliGRAM(s) Oral every 4 hours PRN anxiety  diphenhydrAMINE   Injectable 50 milliGRAM(s) IntraMuscular once PRN eps prophylaxis  diphenhydrAMINE   Injectable 50 milliGRAM(s) IntraMuscular once PRN eps prophylaxis  haloperidol     Tablet 5 milliGRAM(s) Oral every 4 hours PRN agitation  haloperidol    Injectable 5 milliGRAM(s) IntraMuscular once PRN combative behavior  haloperidol    Injectable 5 milliGRAM(s) IntraMuscular at bedtime PRN psychosis - refusing  po  ibuprofen  Tablet. 400 milliGRAM(s) Oral once PRN Mild Pain (1 - 3)  LORazepam     Tablet 2 milliGRAM(s) Oral every 6 hours PRN Agitation  LORazepam   Injectable 2 milliGRAM(s) IntraMuscular once PRN severe agitation  OLANZapine Injectable 10 milliGRAM(s) IntraMuscular at bedtime PRN psychosis if refuses po  
MEDICATIONS  (STANDING):  ARIPiprazole 30 milliGRAM(s) Oral at bedtime  LORazepam   Injectable 2 milliGRAM(s) IntraMuscular once  melatonin. 3 milliGRAM(s) Oral at bedtime    MEDICATIONS  (PRN):  diphenhydrAMINE 50 milliGRAM(s) Oral every 4 hours PRN anxiety  diphenhydrAMINE   Injectable 50 milliGRAM(s) IntraMuscular once PRN eps prophylaxis  fluPHENAZine 5 milliGRAM(s) Oral every 6 hours PRN agitation  fluPHENAZine  Injectable 5 milliGRAM(s) IntraMuscular once PRN severe agitation  LORazepam     Tablet 2 milliGRAM(s) Oral every 6 hours PRN Agitation  
MEDICATIONS  (STANDING):  benztropine 1 milliGRAM(s) Oral at bedtime  haloperidol     Tablet 15 milliGRAM(s) Oral at bedtime  OLANZapine Disintegrating Tablet 10 milliGRAM(s) Oral at bedtime    MEDICATIONS  (PRN):  diphenhydrAMINE 50 milliGRAM(s) Oral every 4 hours PRN anxiety  diphenhydrAMINE   Injectable 50 milliGRAM(s) IntraMuscular once PRN eps prophylaxis  diphenhydrAMINE   Injectable 50 milliGRAM(s) IntraMuscular once PRN eps prophylaxis  haloperidol     Tablet 5 milliGRAM(s) Oral every 4 hours PRN agitation  haloperidol    Injectable 5 milliGRAM(s) IntraMuscular at bedtime PRN psychosis - refusing  po  haloperidol    Injectable 5 milliGRAM(s) IntraMuscular once PRN combative behavior  LORazepam     Tablet 2 milliGRAM(s) Oral every 6 hours PRN Agitation  LORazepam   Injectable 2 milliGRAM(s) IntraMuscular once PRN severe agitation  OLANZapine Injectable 10 milliGRAM(s) IntraMuscular at bedtime PRN psychosis if refuses po  
MEDICATIONS  (STANDING):  benztropine 1 milliGRAM(s) Oral two times a day  lumateperone tosylate 42 milliGRAM(s) Oral <User Schedule>  OLANZapine Disintegrating Tablet 5 milliGRAM(s) Oral daily  OLANZapine Disintegrating Tablet 20 milliGRAM(s) Oral at bedtime    MEDICATIONS  (PRN):  diphenhydrAMINE 50 milliGRAM(s) Oral every 4 hours PRN anxiety  diphenhydrAMINE   Injectable 50 milliGRAM(s) IntraMuscular once PRN eps prophylaxis  diphenhydrAMINE   Injectable 50 milliGRAM(s) IntraMuscular once PRN eps prophylaxis  haloperidol     Tablet 5 milliGRAM(s) Oral every 4 hours PRN agitation  haloperidol    Injectable 5 milliGRAM(s) IntraMuscular at bedtime PRN psychosis - refusing  po  haloperidol    Injectable 5 milliGRAM(s) IntraMuscular once PRN combative behavior  LORazepam     Tablet 2 milliGRAM(s) Oral every 6 hours PRN Agitation  LORazepam   Injectable 2 milliGRAM(s) IntraMuscular once PRN severe agitation  OLANZapine Injectable 10 milliGRAM(s) IntraMuscular at bedtime PRN psychosis if refuses po  
MEDICATIONS  (STANDING):  ARIPiprazole 10 milliGRAM(s) Oral at bedtime  cephalexin 500 milliGRAM(s) Oral every 12 hours  LORazepam   Injectable 2 milliGRAM(s) IntraMuscular once    MEDICATIONS  (PRN):  diphenhydrAMINE 50 milliGRAM(s) Oral every 4 hours PRN anxiety  diphenhydrAMINE   Injectable 50 milliGRAM(s) IntraMuscular once PRN eps prophylaxis  fluPHENAZine 5 milliGRAM(s) Oral every 6 hours PRN agitation  fluPHENAZine  Injectable 5 milliGRAM(s) IntraMuscular once PRN severe agitation  LORazepam     Tablet 2 milliGRAM(s) Oral every 6 hours PRN Agitation  
MEDICATIONS  (STANDING):  haloperidol     Tablet 5 milliGRAM(s) Oral daily  haloperidol     Tablet 5 milliGRAM(s) Oral at bedtime  LORazepam   Injectable 2 milliGRAM(s) IntraMuscular once  melatonin. 3 milliGRAM(s) Oral at bedtime    MEDICATIONS  (PRN):  diphenhydrAMINE 50 milliGRAM(s) Oral every 4 hours PRN anxiety  diphenhydrAMINE   Injectable 50 milliGRAM(s) IntraMuscular once PRN eps prophylaxis  fluPHENAZine 5 milliGRAM(s) Oral every 6 hours PRN agitation  fluPHENAZine  Injectable 5 milliGRAM(s) IntraMuscular once PRN severe agitation  LORazepam     Tablet 2 milliGRAM(s) Oral every 6 hours PRN Agitation  
MEDICATIONS  (STANDING):  ARIPiprazole 25 milliGRAM(s) Oral at bedtime  LORazepam   Injectable 2 milliGRAM(s) IntraMuscular once    MEDICATIONS  (PRN):  diphenhydrAMINE 50 milliGRAM(s) Oral every 4 hours PRN anxiety  diphenhydrAMINE   Injectable 50 milliGRAM(s) IntraMuscular once PRN eps prophylaxis  fluPHENAZine 5 milliGRAM(s) Oral every 6 hours PRN agitation  fluPHENAZine  Injectable 5 milliGRAM(s) IntraMuscular once PRN severe agitation  LORazepam     Tablet 2 milliGRAM(s) Oral every 6 hours PRN Agitation

## 2021-05-13 PROCEDURE — 99443: CPT

## 2021-05-15 LAB
CLOZAPINE SERPL-MCNC: 204 NG/ML — LOW (ref 350–600)
CLOZAPINE SPEC-SCNC: 325 NG/ML — LOW
NORCLOZAPINE SERPL-MCNC: 121 NG/ML — SIGNIFICANT CHANGE UP

## 2021-05-17 ENCOUNTER — OUTPATIENT (OUTPATIENT)
Dept: OUTPATIENT SERVICES | Facility: HOSPITAL | Age: 26
LOS: 1 days | Discharge: NURSING, REHAB OR LONG-TERM CARE | End: 2021-05-17
Payer: COMMERCIAL

## 2021-05-18 LAB
A1C WITH ESTIMATED AVERAGE GLUCOSE RESULT: 4.3 % — SIGNIFICANT CHANGE UP (ref 4–5.6)
CHOLEST SERPL-MCNC: 150 MG/DL — SIGNIFICANT CHANGE UP
ESTIMATED AVERAGE GLUCOSE: 77 MG/DL — SIGNIFICANT CHANGE UP (ref 68–114)
HCT VFR BLD CALC: 39.3 % — SIGNIFICANT CHANGE UP (ref 34.5–45)
HDLC SERPL-MCNC: 40 MG/DL — LOW
HGB BLD-MCNC: 12.4 G/DL — SIGNIFICANT CHANGE UP (ref 11.5–15.5)
LIPID PNL WITH DIRECT LDL SERPL: 98 MG/DL — SIGNIFICANT CHANGE UP
MCHC RBC-ENTMCNC: 25.9 PG — LOW (ref 27–34)
MCHC RBC-ENTMCNC: 31.6 GM/DL — LOW (ref 32–36)
MCV RBC AUTO: 82.2 FL — SIGNIFICANT CHANGE UP (ref 80–100)
NON HDL CHOLESTEROL: 110 MG/DL — SIGNIFICANT CHANGE UP
NRBC # BLD: 0 /100 WBCS — SIGNIFICANT CHANGE UP
NRBC # FLD: 0 K/UL — SIGNIFICANT CHANGE UP
PLATELET # BLD AUTO: 174 K/UL — SIGNIFICANT CHANGE UP (ref 150–400)
RBC # BLD: 4.78 M/UL — SIGNIFICANT CHANGE UP (ref 3.8–5.2)
RBC # FLD: 14.3 % — SIGNIFICANT CHANGE UP (ref 10.3–14.5)
TRIGL SERPL-MCNC: 62 MG/DL — SIGNIFICANT CHANGE UP
WBC # BLD: 5.79 K/UL — SIGNIFICANT CHANGE UP (ref 3.8–10.5)
WBC # FLD AUTO: 5.79 K/UL — SIGNIFICANT CHANGE UP (ref 3.8–10.5)

## 2021-05-25 LAB
BASOPHILS # BLD AUTO: 0.01 K/UL — SIGNIFICANT CHANGE UP (ref 0–0.2)
BASOPHILS NFR BLD AUTO: 0.1 % — SIGNIFICANT CHANGE UP (ref 0–2)
CRP SERPL-MCNC: 5.6 MG/L — HIGH
EOSINOPHIL # BLD AUTO: 0.01 K/UL — SIGNIFICANT CHANGE UP (ref 0–0.5)
EOSINOPHIL NFR BLD AUTO: 0.1 % — SIGNIFICANT CHANGE UP (ref 0–6)
HCT VFR BLD CALC: 37.4 % — SIGNIFICANT CHANGE UP (ref 34.5–45)
HGB BLD-MCNC: 11.9 G/DL — SIGNIFICANT CHANGE UP (ref 11.5–15.5)
IANC: 7.28 K/UL — SIGNIFICANT CHANGE UP (ref 1.5–8.5)
IMM GRANULOCYTES NFR BLD AUTO: 0.3 % — SIGNIFICANT CHANGE UP (ref 0–1.5)
LYMPHOCYTES # BLD AUTO: 0.62 K/UL — LOW (ref 1–3.3)
LYMPHOCYTES # BLD AUTO: 7.2 % — LOW (ref 13–44)
MCHC RBC-ENTMCNC: 25.8 PG — LOW (ref 27–34)
MCHC RBC-ENTMCNC: 31.8 GM/DL — LOW (ref 32–36)
MCV RBC AUTO: 81.1 FL — SIGNIFICANT CHANGE UP (ref 80–100)
MONOCYTES # BLD AUTO: 0.7 K/UL — SIGNIFICANT CHANGE UP (ref 0–0.9)
MONOCYTES NFR BLD AUTO: 8.1 % — SIGNIFICANT CHANGE UP (ref 2–14)
NEUTROPHILS # BLD AUTO: 7.28 K/UL — SIGNIFICANT CHANGE UP (ref 1.8–7.4)
NEUTROPHILS NFR BLD AUTO: 84.2 % — HIGH (ref 43–77)
NRBC # BLD: 0 /100 WBCS — SIGNIFICANT CHANGE UP
NRBC # FLD: 0 K/UL — SIGNIFICANT CHANGE UP
PLATELET # BLD AUTO: 157 K/UL — SIGNIFICANT CHANGE UP (ref 150–400)
RBC # BLD: 4.61 M/UL — SIGNIFICANT CHANGE UP (ref 3.8–5.2)
RBC # FLD: 14.5 % — SIGNIFICANT CHANGE UP (ref 10.3–14.5)
TROPONIN T, HIGH SENSITIVITY RESULT: <6 NG/L — SIGNIFICANT CHANGE UP
WBC # BLD: 8.65 K/UL — SIGNIFICANT CHANGE UP (ref 3.8–10.5)
WBC # FLD AUTO: 8.65 K/UL — SIGNIFICANT CHANGE UP (ref 3.8–10.5)

## 2021-05-25 PROCEDURE — 99213 OFFICE O/P EST LOW 20 MIN: CPT

## 2021-06-01 LAB
BASOPHILS # BLD AUTO: 0.01 K/UL — SIGNIFICANT CHANGE UP (ref 0–0.2)
BASOPHILS NFR BLD AUTO: 0.1 % — SIGNIFICANT CHANGE UP (ref 0–2)
CRP SERPL-MCNC: 7.8 MG/L — HIGH
EOSINOPHIL # BLD AUTO: 0 K/UL — SIGNIFICANT CHANGE UP (ref 0–0.5)
EOSINOPHIL NFR BLD AUTO: 0 % — SIGNIFICANT CHANGE UP (ref 0–6)
HCT VFR BLD CALC: 37.6 % — SIGNIFICANT CHANGE UP (ref 34.5–45)
HGB BLD-MCNC: 12 G/DL — SIGNIFICANT CHANGE UP (ref 11.5–15.5)
IANC: 6.46 K/UL — SIGNIFICANT CHANGE UP (ref 1.5–8.5)
IMM GRANULOCYTES NFR BLD AUTO: 0.5 % — SIGNIFICANT CHANGE UP (ref 0–1.5)
LYMPHOCYTES # BLD AUTO: 0.66 K/UL — LOW (ref 1–3.3)
LYMPHOCYTES # BLD AUTO: 8.4 % — LOW (ref 13–44)
MCHC RBC-ENTMCNC: 25.7 PG — LOW (ref 27–34)
MCHC RBC-ENTMCNC: 31.9 GM/DL — LOW (ref 32–36)
MCV RBC AUTO: 80.5 FL — SIGNIFICANT CHANGE UP (ref 80–100)
MONOCYTES # BLD AUTO: 0.73 K/UL — SIGNIFICANT CHANGE UP (ref 0–0.9)
MONOCYTES NFR BLD AUTO: 9.2 % — SIGNIFICANT CHANGE UP (ref 2–14)
NEUTROPHILS # BLD AUTO: 6.46 K/UL — SIGNIFICANT CHANGE UP (ref 1.8–7.4)
NEUTROPHILS NFR BLD AUTO: 81.8 % — HIGH (ref 43–77)
NRBC # BLD: 0 /100 WBCS — SIGNIFICANT CHANGE UP
NRBC # FLD: 0 K/UL — SIGNIFICANT CHANGE UP
PLATELET # BLD AUTO: 208 K/UL — SIGNIFICANT CHANGE UP (ref 150–400)
RBC # BLD: 4.67 M/UL — SIGNIFICANT CHANGE UP (ref 3.8–5.2)
RBC # FLD: 14.2 % — SIGNIFICANT CHANGE UP (ref 10.3–14.5)
TROPONIN T, HIGH SENSITIVITY RESULT: 17 NG/L — SIGNIFICANT CHANGE UP
WBC # BLD: 7.9 K/UL — SIGNIFICANT CHANGE UP (ref 3.8–10.5)
WBC # FLD AUTO: 7.9 K/UL — SIGNIFICANT CHANGE UP (ref 3.8–10.5)

## 2021-06-01 PROCEDURE — 99213 OFFICE O/P EST LOW 20 MIN: CPT

## 2021-06-10 DIAGNOSIS — F25.9 SCHIZOAFFECTIVE DISORDER, UNSPECIFIED: ICD-10-CM

## 2021-06-16 ENCOUNTER — EMERGENCY (EMERGENCY)
Facility: HOSPITAL | Age: 26
LOS: 1 days | Discharge: HOME | End: 2021-06-16
Attending: EMERGENCY MEDICINE | Admitting: EMERGENCY MEDICINE
Payer: COMMERCIAL

## 2021-06-16 VITALS
HEART RATE: 90 BPM | SYSTOLIC BLOOD PRESSURE: 106 MMHG | DIASTOLIC BLOOD PRESSURE: 69 MMHG | OXYGEN SATURATION: 100 % | HEIGHT: 61 IN | RESPIRATION RATE: 18 BRPM

## 2021-06-16 LAB
ALBUMIN SERPL ELPH-MCNC: 4.1 G/DL — SIGNIFICANT CHANGE UP (ref 3.3–5)
ALP SERPL-CCNC: 119 U/L — SIGNIFICANT CHANGE UP (ref 40–120)
ALT FLD-CCNC: 15 U/L — SIGNIFICANT CHANGE UP (ref 4–33)
ANION GAP SERPL CALC-SCNC: 16 MMOL/L — HIGH (ref 7–14)
AST SERPL-CCNC: 21 U/L — SIGNIFICANT CHANGE UP (ref 4–32)
BASOPHILS # BLD AUTO: 0 K/UL — SIGNIFICANT CHANGE UP (ref 0–0.2)
BASOPHILS NFR BLD AUTO: 0 % — SIGNIFICANT CHANGE UP (ref 0–2)
BILIRUB SERPL-MCNC: 0.6 MG/DL — SIGNIFICANT CHANGE UP (ref 0.2–1.2)
BUN SERPL-MCNC: 9 MG/DL — SIGNIFICANT CHANGE UP (ref 7–23)
CALCIUM SERPL-MCNC: 9.2 MG/DL — SIGNIFICANT CHANGE UP (ref 8.4–10.5)
CHLORIDE SERPL-SCNC: 104 MMOL/L — SIGNIFICANT CHANGE UP (ref 98–107)
CO2 SERPL-SCNC: 21 MMOL/L — LOW (ref 22–31)
CREAT SERPL-MCNC: 0.46 MG/DL — LOW (ref 0.5–1.3)
EOSINOPHIL # BLD AUTO: 0 K/UL — SIGNIFICANT CHANGE UP (ref 0–0.5)
EOSINOPHIL NFR BLD AUTO: 0 % — SIGNIFICANT CHANGE UP (ref 0–6)
GLUCOSE SERPL-MCNC: 128 MG/DL — HIGH (ref 70–99)
HCT VFR BLD CALC: 39.9 % — SIGNIFICANT CHANGE UP (ref 34.5–45)
HGB BLD-MCNC: 12.6 G/DL — SIGNIFICANT CHANGE UP (ref 11.5–15.5)
IANC: 4.45 K/UL — SIGNIFICANT CHANGE UP (ref 1.5–8.5)
IMM GRANULOCYTES NFR BLD AUTO: 0.6 % — SIGNIFICANT CHANGE UP (ref 0–1.5)
LYMPHOCYTES # BLD AUTO: 0.45 K/UL — LOW (ref 1–3.3)
LYMPHOCYTES # BLD AUTO: 8.3 % — LOW (ref 13–44)
MCHC RBC-ENTMCNC: 25.1 PG — LOW (ref 27–34)
MCHC RBC-ENTMCNC: 31.6 GM/DL — LOW (ref 32–36)
MCV RBC AUTO: 79.6 FL — LOW (ref 80–100)
MONOCYTES # BLD AUTO: 0.52 K/UL — SIGNIFICANT CHANGE UP (ref 0–0.9)
MONOCYTES NFR BLD AUTO: 9.5 % — SIGNIFICANT CHANGE UP (ref 2–14)
NEUTROPHILS # BLD AUTO: 4.45 K/UL — SIGNIFICANT CHANGE UP (ref 1.8–7.4)
NEUTROPHILS NFR BLD AUTO: 81.6 % — HIGH (ref 43–77)
NRBC # BLD: 0 /100 WBCS — SIGNIFICANT CHANGE UP
NRBC # FLD: 0 K/UL — SIGNIFICANT CHANGE UP
PLATELET # BLD AUTO: 166 K/UL — SIGNIFICANT CHANGE UP (ref 150–400)
POTASSIUM SERPL-MCNC: 4.7 MMOL/L — SIGNIFICANT CHANGE UP (ref 3.5–5.3)
POTASSIUM SERPL-SCNC: 4.7 MMOL/L — SIGNIFICANT CHANGE UP (ref 3.5–5.3)
PROT SERPL-MCNC: 6.9 G/DL — SIGNIFICANT CHANGE UP (ref 6–8.3)
RBC # BLD: 5.01 M/UL — SIGNIFICANT CHANGE UP (ref 3.8–5.2)
RBC # FLD: 13.8 % — SIGNIFICANT CHANGE UP (ref 10.3–14.5)
SODIUM SERPL-SCNC: 141 MMOL/L — SIGNIFICANT CHANGE UP (ref 135–145)
WBC # BLD: 5.45 K/UL — SIGNIFICANT CHANGE UP (ref 3.8–10.5)
WBC # FLD AUTO: 5.45 K/UL — SIGNIFICANT CHANGE UP (ref 3.8–10.5)

## 2021-06-16 PROCEDURE — 99284 EMERGENCY DEPT VISIT MOD MDM: CPT

## 2021-06-16 RX ORDER — FLUCONAZOLE 150 MG/1
1 TABLET ORAL
Qty: 7 | Refills: 0
Start: 2021-06-16 | End: 2021-06-22

## 2021-06-16 RX ORDER — FLUCONAZOLE 150 MG/1
150 TABLET ORAL ONCE
Refills: 0 | Status: COMPLETED | OUTPATIENT
Start: 2021-06-16 | End: 2021-06-16

## 2021-06-16 RX ORDER — FLUCONAZOLE 150 MG/1
100 TABLET ORAL ONCE
Refills: 0 | Status: DISCONTINUED | OUTPATIENT
Start: 2021-06-16 | End: 2021-06-16

## 2021-06-16 RX ADMIN — FLUCONAZOLE 150 MILLIGRAM(S): 150 TABLET ORAL at 19:38

## 2021-06-16 NOTE — ED PROVIDER NOTE - NSFOLLOWUPINSTRUCTIONS_ED_ALL_ED_FT
Take Fluconazole as prescribed daily for 1 week  Follow up with Gastroenterology      Follow up with Psychiatry     Worsening, continued or new concerning symptoms return to the emergency department.

## 2021-06-16 NOTE — ED PROVIDER NOTE - OBJECTIVE STATEMENT
24 y/o female with pmhx of schizophrenia on clozapine, admitted at [facility] for 6 months discharged last month, presents to ED for throat pain and tongue sores x few days. As per mom, pt was seen at urgent care and told she may have cold sores and given a cream with no relief. Pt tolerating po. As per mom at bedside, believes her medication is making her too drowsy and it needs to be adjusted. As per mom, pt was suppose to get her blood work rechecked this week. Pt last followed up with psych two weeks ago and had normal cbc. No fevers, chills, weight loss, drooling, cough, abd pain, n/v. Pt does not consent for hiv testing. 26 y/o female with pmhx of schizophrenia on clozapine, admitted at [facility] for 6 months discharged last month, presents to ED for throat pain and tongue sores x few days. As per mom, pt was seen at urgent care and told she may have cold sores and given a cream with no relief. Pt tolerating po. As per mom at bedside, believes her medication is making her too drowsy and it needs to be adjusted. As per mom, pt was suppose to get her blood work rechecked this week. Pt last followed up with psych two weeks ago and had normal cbc. No fevers, chills, weight loss, drooling, cough, abd pain, n/v. Pt does not consent for hiv testing.    ANN

## 2021-06-16 NOTE — ED ADULT NURSE NOTE - OBJECTIVE STATEMENT
Received pt to intake 10c, A+Ox4, ambulatory, family at bedside. C/O mouth sores x 1 week, accompanied with pain, trouble swallowing. pt appears to have a flat affect. Respirations even and unlabored, normal work of breathing, no accessory muscle use, speaking in full clear uninterrupted sentences. 20G to [facility], Labs sent, Medicated as per MD, will continue to monitor.

## 2021-06-16 NOTE — ED PROVIDER NOTE - ENMT, MLM
Airway patent, Nasal mucosa clear. Throat has no vesicles, no oropharyngeal exudates and uvula is midline. No lesions. Tongue with thick white film. Airway patent, Nasal mucosa clear. Throat has no vesicles, no oropharyngeal exudates and uvula is midline. No lesions. Tongue and roof of mouth with thick white film, +scrapes off

## 2021-06-16 NOTE — ED ADULT TRIAGE NOTE - CHIEF COMPLAINT QUOTE
Pt states that she has been having sores in her mouth for the past week, pt states that she went to [facility] and she was given mouthwash that is not helping. Pt's tongue appears to have white coating. Pt accompanied by her mother who states that pt takes Clozaril, pt denies and states that she only takes placebo.  Past medical history: schizophrenia

## 2021-06-16 NOTE — ED PROVIDER NOTE - PATIENT PORTAL LINK FT
You can access the FollowMyHealth Patient Portal offered by [facility] by registering at the following website: [email or web address]. By joining [facility]’s FollowMyHealth portal, you will also be able to view your health information using other applications (apps) compatible with our system.

## 2021-06-16 NOTE — ED PROVIDER NOTE - CLINICAL SUMMARY MEDICAL DECISION MAKING FREE TEXT BOX
26 y/o female with pmhx of schizophrenia on clozapine, admitted at [facility] for 6 months discharged last month, presents to ED for throat pain and tongue sores x few days. As per mom, pt was seen at urgent care and told she may have cold sores and given a cream with no relief. Pt tolerating po. As per mom at bedside, believes her medication is making her too drowsy and it needs to be adjusted. Pt last followed up with psych two weeks ago and had normal cbc. As per mom, pt was suppose to get her blood work rechecked this week. No fevers, chills, weight loss, drooling, cough, abd pain, n/v. Pt does not consent for hiv testing. on exam pt with thick white film on tongue, and pt awake, alert, cooperative. concern for oral thrush- plan to treat with flucanzole 150mg, as per pharmacy no interaction with clozapine, check labs including clozapine level, and refer to outpatient psych and GI.

## 2021-06-24 LAB
CLOZAPINE SERPL-MCNC: 655 NG/ML — HIGH (ref 350–600)
CLOZAPINE SPEC-SCNC: 969 NG/ML — SIGNIFICANT CHANGE UP
NORCLOZAPINE SERPL-MCNC: 314 NG/ML — SIGNIFICANT CHANGE UP

## 2021-06-24 PROCEDURE — 99214 OFFICE O/P EST MOD 30 MIN: CPT | Mod: 95

## 2021-06-25 LAB
BASOPHILS # BLD AUTO: 0.02 K/UL — SIGNIFICANT CHANGE UP (ref 0–0.2)
BASOPHILS NFR BLD AUTO: 0.2 % — SIGNIFICANT CHANGE UP (ref 0–2)
CRP SERPL-MCNC: <4 MG/L — SIGNIFICANT CHANGE UP
EOSINOPHIL # BLD AUTO: 0 K/UL — SIGNIFICANT CHANGE UP (ref 0–0.5)
EOSINOPHIL NFR BLD AUTO: 0 % — SIGNIFICANT CHANGE UP (ref 0–6)
HCT VFR BLD CALC: 41.4 % — SIGNIFICANT CHANGE UP (ref 34.5–45)
HGB BLD-MCNC: 13.1 G/DL — SIGNIFICANT CHANGE UP (ref 11.5–15.5)
IANC: 10.21 K/UL — HIGH (ref 1.5–8.5)
IMM GRANULOCYTES NFR BLD AUTO: 0.6 % — SIGNIFICANT CHANGE UP (ref 0–1.5)
LYMPHOCYTES # BLD AUTO: 1.02 K/UL — SIGNIFICANT CHANGE UP (ref 1–3.3)
LYMPHOCYTES # BLD AUTO: 8.2 % — LOW (ref 13–44)
MCHC RBC-ENTMCNC: 25.1 PG — LOW (ref 27–34)
MCHC RBC-ENTMCNC: 31.6 GM/DL — LOW (ref 32–36)
MCV RBC AUTO: 79.5 FL — LOW (ref 80–100)
MONOCYTES # BLD AUTO: 1.11 K/UL — HIGH (ref 0–0.9)
MONOCYTES NFR BLD AUTO: 8.9 % — SIGNIFICANT CHANGE UP (ref 2–14)
NEUTROPHILS # BLD AUTO: 10.21 K/UL — HIGH (ref 1.8–7.4)
NEUTROPHILS NFR BLD AUTO: 82.1 % — HIGH (ref 43–77)
NRBC # BLD: 0 /100 WBCS — SIGNIFICANT CHANGE UP
NRBC # FLD: 0 K/UL — SIGNIFICANT CHANGE UP
PLATELET # BLD AUTO: 216 K/UL — SIGNIFICANT CHANGE UP (ref 150–400)
RBC # BLD: 5.21 M/UL — HIGH (ref 3.8–5.2)
RBC # FLD: 14.2 % — SIGNIFICANT CHANGE UP (ref 10.3–14.5)
TROPONIN T, HIGH SENSITIVITY RESULT: <6 NG/L — SIGNIFICANT CHANGE UP
WBC # BLD: 12.43 K/UL — HIGH (ref 3.8–10.5)
WBC # FLD AUTO: 12.43 K/UL — HIGH (ref 3.8–10.5)

## 2021-06-25 NOTE — ED POST DISCHARGE NOTE - RESULT SUMMARY
RASHEED Grijalva: Clozapine level 655, normal level 350-600. Pt called, no answer, left message for callback.

## 2022-04-21 ENCOUNTER — EMERGENCY (EMERGENCY)
Facility: HOSPITAL | Age: 27
LOS: 1 days | Discharge: HOME | End: 2022-04-21
Attending: EMERGENCY MEDICINE
Payer: COMMERCIAL

## 2022-04-21 VITALS
HEART RATE: 115 BPM | SYSTOLIC BLOOD PRESSURE: 102 MMHG | HEIGHT: 61 IN | WEIGHT: 132.06 LBS | TEMPERATURE: 98 F | OXYGEN SATURATION: 99 % | RESPIRATION RATE: 18 BRPM | DIASTOLIC BLOOD PRESSURE: 63 MMHG

## 2022-04-21 VITALS
SYSTOLIC BLOOD PRESSURE: 109 MMHG | RESPIRATION RATE: 17 BRPM | TEMPERATURE: 98 F | DIASTOLIC BLOOD PRESSURE: 73 MMHG | OXYGEN SATURATION: 96 %

## 2022-04-21 NOTE — ED PROVIDER NOTE - PATIENT PORTAL LINK FT
You can access the FollowMyHealth Patient Portal offered by [facility] by registering at the following website: [email or web address]. By joining [facility]’s FollowMyHealth portal, you will also be able to view your health information using other applications (apps) compatible with our system.

## 2022-04-21 NOTE — ED PROVIDER NOTE - NSFOLLOWUPINSTRUCTIONS_ED_ALL_ED_FT
return to the ED if symptoms worsen, fever/chills, nausea/vomiting, abdominal pain or if pain localizes to one specific spot, diarrhea/constipation. Follow up with PMD within 1 week

## 2022-04-21 NOTE — ED ADULT NURSE NOTE - OBJECTIVE STATEMENT
Pt A&Ox4, NAD. Pt presents to the ED from [place] with complaints of constipation and abdominal pain which has since subsided. Breathing even and unlabored.

## 2022-04-21 NOTE — CHART NOTE - NSCHARTNOTEFT_GEN_A_CORE
SW Note: SW made aware by med team pt is medically stable for d/c to inpt [facility]. SW met with pt and [facility] aide at bedside, aide reports pt is returning to [facility], [place] [facility]. SW arranged transport via [facility] ([facility]), call trx to RN station for completion of BH questions, RN aware of need ro complete questions for transport. SW left [facility] and [facility] [place] letter with unit clerk, no further SW services identified at this time

## 2022-04-21 NOTE — ED PROVIDER NOTE - OBJECTIVE STATEMENT
25yo F hx of bipolar sent in from [place] for evaluation of constipation. notes that has not had a bowel movement for 4 days but had an enema this morning and since then had a good bowel movement and feels much better. denies any abdominal pain at this time. Denies f/c/n/v/cp/sob/palpitations/ cough/rash/headache/dizziness/abd.pain/d//dysuria/hematuria. no sick contacts no recent travel.

## 2022-04-21 NOTE — ED ADULT TRIAGE NOTE - CHIEF COMPLAINT QUOTE
Pt BIBA from [place], c/o constipation x 4 days, was given medication to have BM which she did today, states "my stomach feels better now"

## 2022-04-28 NOTE — BH SOCIAL WORK INITIAL PSYCHOSOCIAL EVALUATION - NSBHSUBSTANCEHX_PSY_ALL_CORE
Patient notified of Stacey's orders, he started Entresto today  Will get labs in 2 weeks    Weight  4/28 190     bp  147/67, 61      No

## 2022-09-22 NOTE — ED PROVIDER NOTE - ENMT, MLM
OBSERVATION
Airway patent, Nasal mucosa clear. Mouth with normal mucosa. Throat has no vesicles, no oropharyngeal exudates and uvula is midline.

## 2022-10-17 NOTE — ED BEHAVIORAL HEALTH ASSESSMENT NOTE - MUSCLE TONE / STRENGTH
Other High Dose Vitamin A Pregnancy And Lactation Text: High dose vitamin A therapy is contraindicated during pregnancy and breast feeding.

## 2023-03-27 NOTE — BH INPATIENT PSYCHIATRY ASSESSMENT NOTE - NSICDXPASTSURGICALHX_GEN_ALL_CORE_FT
PAST SURGICAL HISTORY:  No significant past surgical history      on the discharge service for the patient. I have reviewed and made amendments to the documentation where necessary.

## 2023-06-13 NOTE — ED PROVIDER NOTE - SEVERITY
Impression: Examination revealed cataracts. Trace NS OU.
NVS OU. 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 MODERATE

## 2024-02-21 NOTE — BH INPATIENT PSYCHIATRY PROGRESS NOTE - NSCGISEVERILLNESS_PSY_ALL_CORE
Conjuntivae and eyelids appear normal,  Sclerae : White without injection 6 = Severely ill - disruptive pathology, behavior and function are frequently influenced by symptoms, may require assistance from others

## 2024-03-12 NOTE — BH INPATIENT PSYCHIATRY ASSESSMENT NOTE - NSBHMSEBEHAV_PSY_A_CORE
CHW - Case Closure        Patient .   
CHW - Outreach Attempt    Community Health Worker left a voicemail message for 1st attempt to contact patient regardinst missed follow up visit attempt call.     
CHW - Outreach Attempt    Community Health Worker to attempt to contact patient on:  2/26/24  2nd missed follow up visit attempt call.     
Uncooperative

## 2024-06-23 NOTE — BH INPATIENT PSYCHIATRY PROGRESS NOTE - NSBHMSEREMMEM_PSY_A_CORE
Unable to assess
Present (15 x15 bpm)
Unable to assess
Yes
Unable to assess

## 2024-08-29 NOTE — BH INPATIENT PSYCHIATRY PROGRESS NOTE - MSE UNSTRUCTURED FT
[General Appearance - Alert] : alert [General Appearance - In No Acute Distress] : in no acute distress [General Appearance - Well Nourished] : well nourished [Sclera] : the sclera and conjunctiva were normal [Extraocular Movements] : extraocular movements were intact The patient appears stated age, fair hygiene, disheveled, hair not combed.  She was calm and superficially cooperative with the interview. She maintains limited eye contact. No psychomotor agitation or retardation. Steady gait. The patient’s speech was fluent, normal in tone, rate and volume. The patient’s mood is “good” Affect is constricted, stable, appropriate. Her thoughts are impoverished, with poverty of content; She denies delusions and hallucinations; however, she is observed responding to internal stimuli. She denies any suicidal or homicidal ideation, intent, or plan. Insight is limited. Judgment is improving. Impulse control has been fair on the unit. [Outer Ear] : the ears and nose were normal in appearance [Oropharynx] : the oropharynx was normal [Neck Appearance] : the appearance of the neck was normal [Edema] : there was no peripheral edema [No Spinal Tenderness] : no spinal tenderness [Nail Clubbing] : no clubbing  or cyanosis of the fingernails [Musculoskeletal - Swelling] : no joint swelling seen [Motor Tone] : muscle strength and tone were normal [] : no rash [Oriented To Time, Place, And Person] : oriented to person, place, and time [Respiration, Rhythm And Depth] : normal respiratory rhythm and effort [FreeTextEntry1] : As above

## 2025-03-11 NOTE — ED ADULT NURSE NOTE - PAIN RATING/NUMBER SCALE (0-10): REST
Quality 130: Documentation Of Current Medications In The Medical Record: Current Medications Documented
Detail Level: Detailed
0

## 2025-06-03 NOTE — BH TREATMENT PLAN - NSTXDCOPNOINTERSW_PSY_ALL_CORE
JONATHAN met with pt,  pt unable ot tolerate more than several questions regarding her well being. JONATHAN communicated with ACT team, made plan for ACT team MD to meet with pt on unit, Thursday APril 15. JONATHAN communicated with d/c planning staff regarding ACT/AOT status. JONATHAN met with team regarding plans. JONATHAN communicated with pts mothe rfor support and psychoeducation.
SW will discuss pt continued to provide pt with psychoeducation, discuss importance of engagement with treatment while on the unit (e.i. medication compliance), and initiate referral process once pt. is in agreement with plan. SW will make referral recommendations following discharge to higher level of care (PHP vs. IOP).
SW will provide pt with support, provide psychoeducation on illness, and provide encouragement with treatment compliance. SW will also make recommendations for care coordination referral to provide extra support when discharged.
SW will provide support, continue to complete appropriate paperwork for ACT/AOT and continue to provide support/updates to family regarding pt care/discharge plan.
SW will assist in determining appropriate mental health aftercare follow up with pt. SW will make recommendations for pt to consider intensive partial program. SW will provide support, psychoeducation on illness and make appropriate referrals when pt is closer to discharge.
SW will provide pt with support, provide psychoeducation on illness, and provide encouragement with treatment compliance. SW will also make recommendations for care coordination referral to provide extra support when discharged
SW will provide pt with support, encouragement to be compliant with medication and discuss appropriate aftercare options prior to referrals/discharge.
SW will provide pt with support and provide encouragement with treatment compliance. SW will also make recommendations for care coordination referral to provide extra support as well as discuss mental health programs best suited for continued treatment with pt.
SW will discuss mental health outpatient treatment with patient. SW will provide psychoeducation on illness and medication compliance. SW will connect with pt current providers at [facility] to discuss pt progress and referral back to them.
SW attempted to provide patient with support and psycho-education.  State application submitted.  SW participates in interdisciplinary treatment team meeting to coordinate patient's care.
SW met with pt and ACT team  for interview. ACT team has reported pt is too symptomatic. SW emt with team to discus, will start state transfer referral. SW communicated with pts mother regarding status and state referral process. Family is upset but supportive of tx.
SW met with pt, unable to tolerate conversation. Pt is irritable, verbally aggressive towards writer when talking about showering. SW communicated iwth pts mother for support and to gain better history of tx and behaviors. SW communicated with ACT staff, will have Drs speak to each other. ACT team reports they have not yet accepted pt. SW addedd aditional information on AOT documents and sent to D/c planning staff.
SW will discuss continuation of care with current treatment team. SW will provide pt with support, psychoeducation and complete appropriate referral when pt is closer to discharge.
Support and psychoed to continue and all elemnts of the discharge plan to be arranged as appropriate.
SW will meet with pt provide support, discuss dual discharge planning as well as encourage to attend groups.
Support and psychoed to continue and all elemnts of the discharge plan to be arranged as appropriate.
SW attempted to provide patient with supoprt and psycho-education but patient's behaviors/symptoms are mostly unchanged. State application was submitted. SW participated in interdisciplinary treatment team meetings to coordate patient's care.
No